# Patient Record
Sex: FEMALE | Race: WHITE | NOT HISPANIC OR LATINO | Employment: UNEMPLOYED | URBAN - METROPOLITAN AREA
[De-identification: names, ages, dates, MRNs, and addresses within clinical notes are randomized per-mention and may not be internally consistent; named-entity substitution may affect disease eponyms.]

---

## 2023-05-18 ENCOUNTER — OFFICE VISIT (OUTPATIENT)
Dept: PRIMARY CARE | Facility: CLINIC | Age: 68
End: 2023-05-18
Payer: MEDICARE

## 2023-05-18 VITALS
HEIGHT: 68 IN | WEIGHT: 241 LBS | BODY MASS INDEX: 36.53 KG/M2 | DIASTOLIC BLOOD PRESSURE: 72 MMHG | SYSTOLIC BLOOD PRESSURE: 124 MMHG

## 2023-05-18 DIAGNOSIS — M54.32 BILATERAL SCIATICA: ICD-10-CM

## 2023-05-18 DIAGNOSIS — E11.9 TYPE 2 DIABETES MELLITUS WITHOUT COMPLICATION, WITHOUT LONG-TERM CURRENT USE OF INSULIN (MULTI): ICD-10-CM

## 2023-05-18 DIAGNOSIS — Z00.00 ROUTINE GENERAL MEDICAL EXAMINATION AT HEALTH CARE FACILITY: Primary | ICD-10-CM

## 2023-05-18 DIAGNOSIS — M79.605 PAIN IN BOTH LOWER EXTREMITIES: ICD-10-CM

## 2023-05-18 DIAGNOSIS — M79.604 PAIN IN BOTH LOWER EXTREMITIES: ICD-10-CM

## 2023-05-18 DIAGNOSIS — M54.31 BILATERAL SCIATICA: ICD-10-CM

## 2023-05-18 PROCEDURE — 3066F NEPHROPATHY DOC TX: CPT | Performed by: STUDENT IN AN ORGANIZED HEALTH CARE EDUCATION/TRAINING PROGRAM

## 2023-05-18 PROCEDURE — 1159F MED LIST DOCD IN RCRD: CPT | Performed by: STUDENT IN AN ORGANIZED HEALTH CARE EDUCATION/TRAINING PROGRAM

## 2023-05-18 PROCEDURE — G0439 PPPS, SUBSEQ VISIT: HCPCS | Performed by: STUDENT IN AN ORGANIZED HEALTH CARE EDUCATION/TRAINING PROGRAM

## 2023-05-18 PROCEDURE — 1036F TOBACCO NON-USER: CPT | Performed by: STUDENT IN AN ORGANIZED HEALTH CARE EDUCATION/TRAINING PROGRAM

## 2023-05-18 PROCEDURE — 99214 OFFICE O/P EST MOD 30 MIN: CPT | Performed by: STUDENT IN AN ORGANIZED HEALTH CARE EDUCATION/TRAINING PROGRAM

## 2023-05-18 PROCEDURE — 1170F FXNL STATUS ASSESSED: CPT | Performed by: STUDENT IN AN ORGANIZED HEALTH CARE EDUCATION/TRAINING PROGRAM

## 2023-05-18 PROCEDURE — 4010F ACE/ARB THERAPY RXD/TAKEN: CPT | Performed by: STUDENT IN AN ORGANIZED HEALTH CARE EDUCATION/TRAINING PROGRAM

## 2023-05-18 PROCEDURE — 3074F SYST BP LT 130 MM HG: CPT | Performed by: STUDENT IN AN ORGANIZED HEALTH CARE EDUCATION/TRAINING PROGRAM

## 2023-05-18 PROCEDURE — 3078F DIAST BP <80 MM HG: CPT | Performed by: STUDENT IN AN ORGANIZED HEALTH CARE EDUCATION/TRAINING PROGRAM

## 2023-05-18 PROCEDURE — 1160F RVW MEDS BY RX/DR IN RCRD: CPT | Performed by: STUDENT IN AN ORGANIZED HEALTH CARE EDUCATION/TRAINING PROGRAM

## 2023-05-18 RX ORDER — FLUTICASONE PROPIONATE 50 MCG
SPRAY, SUSPENSION (ML) NASAL
COMMUNITY

## 2023-05-18 RX ORDER — LISINOPRIL 40 MG/1
40 TABLET ORAL DAILY
COMMUNITY
Start: 2018-07-11 | End: 2023-06-14 | Stop reason: SDUPTHER

## 2023-05-18 RX ORDER — BLOOD SUGAR DIAGNOSTIC
STRIP MISCELLANEOUS
Qty: 100 STRIP | Refills: 1 | Status: SHIPPED | OUTPATIENT
Start: 2023-05-18 | End: 2024-06-10 | Stop reason: SDUPTHER

## 2023-05-18 RX ORDER — METFORMIN HYDROCHLORIDE 500 MG/1
500 TABLET ORAL
COMMUNITY
Start: 2018-07-11 | End: 2023-06-14 | Stop reason: SDUPTHER

## 2023-05-18 RX ORDER — OMEPRAZOLE 40 MG/1
40 CAPSULE, DELAYED RELEASE ORAL DAILY
COMMUNITY
Start: 2018-07-11 | End: 2023-06-14 | Stop reason: SDUPTHER

## 2023-05-18 RX ORDER — AMLODIPINE BESYLATE 10 MG/1
10 TABLET ORAL DAILY
COMMUNITY
Start: 2018-07-11 | End: 2023-06-14 | Stop reason: SDUPTHER

## 2023-05-18 RX ORDER — CHOLECALCIFEROL (VITAMIN D3) 50 MCG
50 TABLET ORAL
COMMUNITY

## 2023-05-18 RX ORDER — PAROXETINE HYDROCHLORIDE 20 MG/1
20 TABLET, FILM COATED ORAL DAILY
COMMUNITY
Start: 2018-07-11 | End: 2023-06-14 | Stop reason: SDUPTHER

## 2023-05-18 RX ORDER — SIMVASTATIN 10 MG/1
10 TABLET, FILM COATED ORAL NIGHTLY
COMMUNITY
Start: 2018-07-11 | End: 2023-07-12 | Stop reason: SDUPTHER

## 2023-05-18 RX ORDER — HYDROCHLOROTHIAZIDE 25 MG/1
25 TABLET ORAL DAILY
COMMUNITY
Start: 2018-07-11 | End: 2023-06-14 | Stop reason: SDUPTHER

## 2023-05-18 RX ORDER — MELOXICAM 15 MG/1
15 TABLET ORAL DAILY
COMMUNITY
Start: 2021-09-21 | End: 2023-06-14 | Stop reason: SDUPTHER

## 2023-05-18 RX ORDER — MULTIVITAMIN
1 TABLET ORAL DAILY
COMMUNITY

## 2023-05-18 RX ORDER — BLOOD SUGAR DIAGNOSTIC
STRIP MISCELLANEOUS
COMMUNITY
Start: 2018-07-11 | End: 2023-05-18 | Stop reason: SDUPTHER

## 2023-05-18 ASSESSMENT — ACTIVITIES OF DAILY LIVING (ADL)
DOING_HOUSEWORK: INDEPENDENT
BATHING: INDEPENDENT
GROCERY_SHOPPING: INDEPENDENT
TAKING_MEDICATION: INDEPENDENT
DRESSING: INDEPENDENT
MANAGING_FINANCES: INDEPENDENT

## 2023-05-18 ASSESSMENT — ENCOUNTER SYMPTOMS
LOSS OF SENSATION IN FEET: 0
OCCASIONAL FEELINGS OF UNSTEADINESS: 0
DEPRESSION: 0

## 2023-05-18 NOTE — PROGRESS NOTES
"Subjective   Patient ID: Jordyn Neal is a 68 y.o. female who presents for Medicare Annual Wellness Visit Subsequent (Med refill).    HPI   Routine fu and Wellness exam.  She had right shoulder replacement 1/2023, recovering well. However, she slept in a recliner for 3.5 months, now has b/l back pain and pain in her legs. Some improvement after she went back to sleeping in her bed. No LE weakness. Increased pain when she ambulates.    Working on diet, has lost 10 lbs.    She has stress in her life caring for her sister at home.  Review of Systems  12-point ROS reviewed and was negative unless otherwise noted in HPI.    Objective   /72   Ht 1.727 m (5' 8\")   Wt 109 kg (241 lb)   BMI 36.64 kg/m²     Physical Exam  GEN: conversant, NAD  HEENT: PERRL, EOMI, MMM  NECK: supple, no carotid bruits appreciated b/l  CV: S1, S2, RRR  PULM: CTAB  ABD: soft, NT, obese  NEURO: no new gross focal deficits  EXT: no sig LE edema  PSYCH: appropriate affect    Assessment/Plan     #Leg pain: unclear etiology, possibly due to deconditioning after shoulder surgery. Referral to PT, graded exercise program. Check ABIs to assess for vascular claudication. Referral to PM&R for back eval.    #Hypertension: Stable. Refilled medications. Anxiety/stress from home living situation likely contributing.      #Shoulder OA: shoulder replacement performed 1/2023     #NIDDM: Lifestyle modifications discussed. Weight loss. Continue metformin, refilled. Offered trial of GLP-1 agonist or Mounjaro, patient to consider.     #HLD: Continue statin. Offered CACS, patient declines.     #Severe obesity, class 2: We have discussed importance of weight loss, lifestyle modifications. Has been more active since detention.      #Fatty liver: She says she had liver ultrasound in the past.      #GERD: PPI as needed.     #Depression: Generally stable. Continue Paroxetine.      #Health maintenance: Following with GYN. UTD with mammography. TDaP ~2016. Has had " pneumovax. Colonoscopy performed 2021, 5 year f/u advised. Received Shingrix. Advised yearly flu shot. Up to date on COVID and booster.      RTC 3 mo

## 2023-06-14 DIAGNOSIS — E11.9 TYPE 2 DIABETES MELLITUS WITHOUT COMPLICATION, WITHOUT LONG-TERM CURRENT USE OF INSULIN (MULTI): Primary | ICD-10-CM

## 2023-06-14 DIAGNOSIS — I10 HYPERTENSION, UNSPECIFIED TYPE: ICD-10-CM

## 2023-06-14 DIAGNOSIS — M19.019 ARTHRITIS OF SHOULDER: ICD-10-CM

## 2023-06-14 DIAGNOSIS — K21.00 GASTROESOPHAGEAL REFLUX DISEASE WITH ESOPHAGITIS, UNSPECIFIED WHETHER HEMORRHAGE: ICD-10-CM

## 2023-06-14 DIAGNOSIS — F32.A DEPRESSION, UNSPECIFIED DEPRESSION TYPE: ICD-10-CM

## 2023-06-14 RX ORDER — METFORMIN HYDROCHLORIDE 500 MG/1
500 TABLET ORAL
Qty: 30 TABLET | Refills: 2 | Status: SHIPPED | OUTPATIENT
Start: 2023-06-14 | End: 2023-08-17 | Stop reason: SDUPTHER

## 2023-06-14 RX ORDER — HYDROCHLOROTHIAZIDE 25 MG/1
25 TABLET ORAL DAILY
Qty: 30 TABLET | Refills: 2 | Status: SHIPPED | OUTPATIENT
Start: 2023-06-14 | End: 2023-08-17 | Stop reason: SDUPTHER

## 2023-06-14 RX ORDER — OMEPRAZOLE 40 MG/1
40 CAPSULE, DELAYED RELEASE ORAL DAILY
Qty: 30 CAPSULE | Refills: 2 | Status: SHIPPED | OUTPATIENT
Start: 2023-06-14 | End: 2023-08-17 | Stop reason: SDUPTHER

## 2023-06-14 RX ORDER — PAROXETINE HYDROCHLORIDE 20 MG/1
20 TABLET, FILM COATED ORAL DAILY
Qty: 30 TABLET | Refills: 2 | Status: SHIPPED | OUTPATIENT
Start: 2023-06-14 | End: 2023-08-17 | Stop reason: SDUPTHER

## 2023-06-14 RX ORDER — LISINOPRIL 40 MG/1
40 TABLET ORAL DAILY
Qty: 30 TABLET | Refills: 2 | Status: SHIPPED | OUTPATIENT
Start: 2023-06-14 | End: 2023-08-17 | Stop reason: SDUPTHER

## 2023-06-14 RX ORDER — MELOXICAM 15 MG/1
15 TABLET ORAL DAILY
Qty: 30 TABLET | Refills: 2 | Status: SHIPPED | OUTPATIENT
Start: 2023-06-14 | End: 2023-09-12

## 2023-06-14 RX ORDER — AMLODIPINE BESYLATE 10 MG/1
10 TABLET ORAL DAILY
Qty: 30 TABLET | Refills: 2 | Status: SHIPPED | OUTPATIENT
Start: 2023-06-14 | End: 2023-08-17 | Stop reason: SDUPTHER

## 2023-07-12 DIAGNOSIS — E78.5 HYPERLIPIDEMIA, UNSPECIFIED HYPERLIPIDEMIA TYPE: ICD-10-CM

## 2023-07-12 RX ORDER — SIMVASTATIN 10 MG/1
10 TABLET, FILM COATED ORAL NIGHTLY
Qty: 30 TABLET | Refills: 3 | Status: SHIPPED | OUTPATIENT
Start: 2023-07-12 | End: 2023-08-17 | Stop reason: SDUPTHER

## 2023-08-17 ENCOUNTER — LAB (OUTPATIENT)
Dept: LAB | Facility: LAB | Age: 68
End: 2023-08-17
Payer: MEDICARE

## 2023-08-17 ENCOUNTER — OFFICE VISIT (OUTPATIENT)
Dept: PRIMARY CARE | Facility: CLINIC | Age: 68
End: 2023-08-17
Payer: MEDICARE

## 2023-08-17 VITALS — DIASTOLIC BLOOD PRESSURE: 65 MMHG | SYSTOLIC BLOOD PRESSURE: 118 MMHG

## 2023-08-17 DIAGNOSIS — Z00.00 HEALTH CARE MAINTENANCE: ICD-10-CM

## 2023-08-17 DIAGNOSIS — E78.5 HYPERLIPIDEMIA, UNSPECIFIED HYPERLIPIDEMIA TYPE: ICD-10-CM

## 2023-08-17 DIAGNOSIS — E11.9 TYPE 2 DIABETES MELLITUS WITHOUT COMPLICATION, WITHOUT LONG-TERM CURRENT USE OF INSULIN (MULTI): ICD-10-CM

## 2023-08-17 DIAGNOSIS — K21.00 GASTROESOPHAGEAL REFLUX DISEASE WITH ESOPHAGITIS, UNSPECIFIED WHETHER HEMORRHAGE: ICD-10-CM

## 2023-08-17 DIAGNOSIS — F32.A DEPRESSION, UNSPECIFIED DEPRESSION TYPE: ICD-10-CM

## 2023-08-17 DIAGNOSIS — Z00.00 HEALTHCARE MAINTENANCE: ICD-10-CM

## 2023-08-17 DIAGNOSIS — Z00.00 HEALTHCARE MAINTENANCE: Primary | ICD-10-CM

## 2023-08-17 DIAGNOSIS — E11.69 TYPE 2 DIABETES MELLITUS WITH OTHER SPECIFIED COMPLICATION, WITHOUT LONG-TERM CURRENT USE OF INSULIN (MULTI): ICD-10-CM

## 2023-08-17 DIAGNOSIS — I10 HYPERTENSION, UNSPECIFIED TYPE: ICD-10-CM

## 2023-08-17 LAB
ALANINE AMINOTRANSFERASE (SGPT) (U/L) IN SER/PLAS: 35 U/L (ref 7–45)
ALBUMIN (G/DL) IN SER/PLAS: 4.6 G/DL (ref 3.4–5)
ALKALINE PHOSPHATASE (U/L) IN SER/PLAS: 92 U/L (ref 33–136)
ANION GAP IN SER/PLAS: 16 MMOL/L (ref 10–20)
ASPARTATE AMINOTRANSFERASE (SGOT) (U/L) IN SER/PLAS: 42 U/L (ref 9–39)
BILIRUBIN TOTAL (MG/DL) IN SER/PLAS: 1.3 MG/DL (ref 0–1.2)
CALCIUM (MG/DL) IN SER/PLAS: 10.8 MG/DL (ref 8.6–10.6)
CARBON DIOXIDE, TOTAL (MMOL/L) IN SER/PLAS: 25 MMOL/L (ref 21–32)
CHLORIDE (MMOL/L) IN SER/PLAS: 98 MMOL/L (ref 98–107)
CHOLESTEROL (MG/DL) IN SER/PLAS: 165 MG/DL (ref 0–199)
CHOLESTEROL IN HDL (MG/DL) IN SER/PLAS: 43.6 MG/DL
CHOLESTEROL/HDL RATIO: 3.8
CREATININE (MG/DL) IN SER/PLAS: 1.42 MG/DL (ref 0.5–1.05)
ERYTHROCYTE DISTRIBUTION WIDTH (RATIO) BY AUTOMATED COUNT: 12.5 % (ref 11.5–14.5)
ERYTHROCYTE MEAN CORPUSCULAR HEMOGLOBIN CONCENTRATION (G/DL) BY AUTOMATED: 33.2 G/DL (ref 32–36)
ERYTHROCYTE MEAN CORPUSCULAR VOLUME (FL) BY AUTOMATED COUNT: 90 FL (ref 80–100)
ERYTHROCYTES (10*6/UL) IN BLOOD BY AUTOMATED COUNT: 4.44 X10E12/L (ref 4–5.2)
ESTIMATED AVERAGE GLUCOSE FOR HBA1C: 123 MG/DL
GFR FEMALE: 41 ML/MIN/1.73M2
GLUCOSE (MG/DL) IN SER/PLAS: 94 MG/DL (ref 74–99)
HEMATOCRIT (%) IN BLOOD BY AUTOMATED COUNT: 39.8 % (ref 36–46)
HEMOGLOBIN (G/DL) IN BLOOD: 13.2 G/DL (ref 12–16)
HEMOGLOBIN A1C/HEMOGLOBIN TOTAL IN BLOOD: 5.9 %
LDL: 83 MG/DL (ref 0–99)
LEUKOCYTES (10*3/UL) IN BLOOD BY AUTOMATED COUNT: 8.3 X10E9/L (ref 4.4–11.3)
NRBC (PER 100 WBCS) BY AUTOMATED COUNT: 0 /100 WBC (ref 0–0)
PLATELETS (10*3/UL) IN BLOOD AUTOMATED COUNT: 262 X10E9/L (ref 150–450)
POTASSIUM (MMOL/L) IN SER/PLAS: 4.1 MMOL/L (ref 3.5–5.3)
PROTEIN TOTAL: 6.9 G/DL (ref 6.4–8.2)
SODIUM (MMOL/L) IN SER/PLAS: 135 MMOL/L (ref 136–145)
TRIGLYCERIDE (MG/DL) IN SER/PLAS: 194 MG/DL (ref 0–149)
UREA NITROGEN (MG/DL) IN SER/PLAS: 18 MG/DL (ref 6–23)
VLDL: 39 MG/DL (ref 0–40)

## 2023-08-17 PROCEDURE — 3078F DIAST BP <80 MM HG: CPT | Performed by: STUDENT IN AN ORGANIZED HEALTH CARE EDUCATION/TRAINING PROGRAM

## 2023-08-17 PROCEDURE — 4010F ACE/ARB THERAPY RXD/TAKEN: CPT | Performed by: STUDENT IN AN ORGANIZED HEALTH CARE EDUCATION/TRAINING PROGRAM

## 2023-08-17 PROCEDURE — 83036 HEMOGLOBIN GLYCOSYLATED A1C: CPT

## 2023-08-17 PROCEDURE — 1126F AMNT PAIN NOTED NONE PRSNT: CPT | Performed by: STUDENT IN AN ORGANIZED HEALTH CARE EDUCATION/TRAINING PROGRAM

## 2023-08-17 PROCEDURE — 85027 COMPLETE CBC AUTOMATED: CPT

## 2023-08-17 PROCEDURE — 99214 OFFICE O/P EST MOD 30 MIN: CPT | Performed by: STUDENT IN AN ORGANIZED HEALTH CARE EDUCATION/TRAINING PROGRAM

## 2023-08-17 PROCEDURE — 36415 COLL VENOUS BLD VENIPUNCTURE: CPT

## 2023-08-17 PROCEDURE — 1036F TOBACCO NON-USER: CPT | Performed by: STUDENT IN AN ORGANIZED HEALTH CARE EDUCATION/TRAINING PROGRAM

## 2023-08-17 PROCEDURE — 3066F NEPHROPATHY DOC TX: CPT | Performed by: STUDENT IN AN ORGANIZED HEALTH CARE EDUCATION/TRAINING PROGRAM

## 2023-08-17 PROCEDURE — 80053 COMPREHEN METABOLIC PANEL: CPT

## 2023-08-17 PROCEDURE — 3074F SYST BP LT 130 MM HG: CPT | Performed by: STUDENT IN AN ORGANIZED HEALTH CARE EDUCATION/TRAINING PROGRAM

## 2023-08-17 PROCEDURE — 1159F MED LIST DOCD IN RCRD: CPT | Performed by: STUDENT IN AN ORGANIZED HEALTH CARE EDUCATION/TRAINING PROGRAM

## 2023-08-17 PROCEDURE — 80061 LIPID PANEL: CPT

## 2023-08-17 PROCEDURE — 1160F RVW MEDS BY RX/DR IN RCRD: CPT | Performed by: STUDENT IN AN ORGANIZED HEALTH CARE EDUCATION/TRAINING PROGRAM

## 2023-08-17 RX ORDER — LISINOPRIL 40 MG/1
40 TABLET ORAL DAILY
Qty: 30 TABLET | Refills: 5 | Status: SHIPPED | OUTPATIENT
Start: 2023-08-17 | End: 2024-03-11 | Stop reason: SDUPTHER

## 2023-08-17 RX ORDER — SIMVASTATIN 10 MG/1
10 TABLET, FILM COATED ORAL NIGHTLY
Qty: 30 TABLET | Refills: 5 | Status: SHIPPED | OUTPATIENT
Start: 2023-08-17 | End: 2024-02-12 | Stop reason: SDUPTHER

## 2023-08-17 RX ORDER — PAROXETINE HYDROCHLORIDE 20 MG/1
20 TABLET, FILM COATED ORAL DAILY
Qty: 30 TABLET | Refills: 5 | Status: SHIPPED | OUTPATIENT
Start: 2023-08-17 | End: 2024-03-11 | Stop reason: SDUPTHER

## 2023-08-17 RX ORDER — METFORMIN HYDROCHLORIDE 500 MG/1
500 TABLET ORAL
Qty: 60 TABLET | Refills: 5 | Status: SHIPPED | OUTPATIENT
Start: 2023-08-17 | End: 2024-02-12 | Stop reason: SDUPTHER

## 2023-08-17 RX ORDER — HYDROCHLOROTHIAZIDE 25 MG/1
25 TABLET ORAL DAILY
Qty: 30 TABLET | Refills: 5 | Status: SHIPPED | OUTPATIENT
Start: 2023-08-17 | End: 2024-03-11 | Stop reason: SDUPTHER

## 2023-08-17 RX ORDER — AMLODIPINE BESYLATE 10 MG/1
10 TABLET ORAL DAILY
Qty: 30 TABLET | Refills: 5 | Status: SHIPPED | OUTPATIENT
Start: 2023-08-17 | End: 2024-03-11 | Stop reason: SDUPTHER

## 2023-08-17 RX ORDER — OMEPRAZOLE 40 MG/1
40 CAPSULE, DELAYED RELEASE ORAL DAILY
Qty: 30 CAPSULE | Refills: 5 | Status: SHIPPED | OUTPATIENT
Start: 2023-08-17 | End: 2024-03-11 | Stop reason: SDUPTHER

## 2023-08-17 NOTE — PROGRESS NOTES
Subjective   Patient ID: Jordyn Neal is a 68 y.o. female who presents for Follow-up (Med refill).    HPI   Routine fu.  Leg pain has resolved.  She is trying to walk for exercise.  She has successful lost a few more pounds.    Stress in her life caring for her sister Carrie and sister's declining health.  Review of Systems  12-point ROS reviewed and was negative unless otherwise noted in HPI.    Objective   There were no vitals taken for this visit.    Physical Exam  GEN: conversant, NAD  HEENT: PERRL, EOMI, MMM  NECK: supple, no carotid bruits appreciated b/l  CV: S1, S2, RRR  PULM: CTAB  ABD: soft, NT, obese  NEURO: no new gross focal deficits  EXT: no sig LE edema  PSYCH: appropriate affect    Assessment/Plan        #Hypertension: Stable. Refilled medications. Anxiety/stress from home living situation likely contributing.      #Shoulder OA: shoulder replacement performed 1/2023     #NIDDM: Lifestyle modifications discussed. Weight loss. Continue metformin, refilled. Offered trial of GLP-1 agonist or Mounjaro, patient to consider. Check A1c.     #HLD: Continue statin. Offered CACS, patient declines.     #Severe obesity, class 2: We have discussed importance of weight loss, lifestyle modifications. Has been more active since [facility].      #Fatty liver: She says she had liver ultrasound in the past.      #GERD: PPI as needed.     #Depression: Generally stable. Continue Paroxetine.      #Health maintenance: Following with GYN. Mammogram ordered. TDaP ~2016. Has had pneumovax. Colonoscopy performed 2021, 5 year f/u advised. Received Shingrix. Advised yearly flu shot. Up to date on COVID and booster.      RTC 3 mo

## 2023-10-12 DIAGNOSIS — M19.90 OSTEOARTHRITIS, UNSPECIFIED OSTEOARTHRITIS TYPE, UNSPECIFIED SITE: Primary | ICD-10-CM

## 2023-10-12 RX ORDER — MELOXICAM 15 MG/1
15 TABLET ORAL DAILY
Qty: 90 TABLET | Refills: 0 | Status: SHIPPED | OUTPATIENT
Start: 2023-10-12 | End: 2024-01-16 | Stop reason: SDUPTHER

## 2023-10-12 RX ORDER — MELOXICAM 15 MG/1
15 TABLET ORAL DAILY
COMMUNITY
Start: 2023-09-12 | End: 2023-10-12 | Stop reason: SDUPTHER

## 2023-12-20 ENCOUNTER — OFFICE VISIT (OUTPATIENT)
Dept: PRIMARY CARE | Facility: CLINIC | Age: 68
End: 2023-12-20
Payer: MEDICARE

## 2023-12-20 VITALS
DIASTOLIC BLOOD PRESSURE: 76 MMHG | WEIGHT: 235 LBS | HEIGHT: 68 IN | BODY MASS INDEX: 35.61 KG/M2 | SYSTOLIC BLOOD PRESSURE: 134 MMHG

## 2023-12-20 DIAGNOSIS — Z00.00 HEALTHCARE MAINTENANCE: ICD-10-CM

## 2023-12-20 DIAGNOSIS — I10 HYPERTENSION, UNSPECIFIED TYPE: ICD-10-CM

## 2023-12-20 DIAGNOSIS — F32.A DEPRESSION, UNSPECIFIED DEPRESSION TYPE: ICD-10-CM

## 2023-12-20 DIAGNOSIS — E78.5 HYPERLIPIDEMIA, UNSPECIFIED HYPERLIPIDEMIA TYPE: ICD-10-CM

## 2023-12-20 DIAGNOSIS — E11.69 TYPE 2 DIABETES MELLITUS WITH OTHER SPECIFIED COMPLICATION, WITHOUT LONG-TERM CURRENT USE OF INSULIN (MULTI): Primary | ICD-10-CM

## 2023-12-20 PROCEDURE — 3078F DIAST BP <80 MM HG: CPT | Performed by: STUDENT IN AN ORGANIZED HEALTH CARE EDUCATION/TRAINING PROGRAM

## 2023-12-20 PROCEDURE — 3044F HG A1C LEVEL LT 7.0%: CPT | Performed by: STUDENT IN AN ORGANIZED HEALTH CARE EDUCATION/TRAINING PROGRAM

## 2023-12-20 PROCEDURE — 1159F MED LIST DOCD IN RCRD: CPT | Performed by: STUDENT IN AN ORGANIZED HEALTH CARE EDUCATION/TRAINING PROGRAM

## 2023-12-20 PROCEDURE — 1160F RVW MEDS BY RX/DR IN RCRD: CPT | Performed by: STUDENT IN AN ORGANIZED HEALTH CARE EDUCATION/TRAINING PROGRAM

## 2023-12-20 PROCEDURE — 1126F AMNT PAIN NOTED NONE PRSNT: CPT | Performed by: STUDENT IN AN ORGANIZED HEALTH CARE EDUCATION/TRAINING PROGRAM

## 2023-12-20 PROCEDURE — 4010F ACE/ARB THERAPY RXD/TAKEN: CPT | Performed by: STUDENT IN AN ORGANIZED HEALTH CARE EDUCATION/TRAINING PROGRAM

## 2023-12-20 PROCEDURE — 3075F SYST BP GE 130 - 139MM HG: CPT | Performed by: STUDENT IN AN ORGANIZED HEALTH CARE EDUCATION/TRAINING PROGRAM

## 2023-12-20 PROCEDURE — 99213 OFFICE O/P EST LOW 20 MIN: CPT | Performed by: STUDENT IN AN ORGANIZED HEALTH CARE EDUCATION/TRAINING PROGRAM

## 2023-12-20 PROCEDURE — 1036F TOBACCO NON-USER: CPT | Performed by: STUDENT IN AN ORGANIZED HEALTH CARE EDUCATION/TRAINING PROGRAM

## 2023-12-20 PROCEDURE — 3066F NEPHROPATHY DOC TX: CPT | Performed by: STUDENT IN AN ORGANIZED HEALTH CARE EDUCATION/TRAINING PROGRAM

## 2023-12-20 NOTE — PROGRESS NOTES
Subjective   Patient ID: Jordyn Neal is a 68 y.o. female who presents for Follow-up.    HPI   Routine fu.  She feels well in general.  Her main complaint is chronic knee pain from arthritis. She takes meloxicam for this, does not want to see ortho or have knee injection or replacement.    Stress in her life caring for her sister Carrie and sister's declining health.   Review of Systems  12-point ROS reviewed and was negative unless otherwise noted in HPI.    Objective   There were no vitals taken for this visit.    Physical Exam  GEN: conversant, NAD  HEENT: PERRL, EOMI, MMM  NECK: supple  CV: S1, S2, RRR  PULM: CTAB  ABD: soft, NT, obese  NEURO: no new gross focal deficits  EXT: no sig LE edema  PSYCH: appropriate affect    Assessment/Plan     #Hypertension: Stable. Anxiety/stress from home living situation likely contributing.      #Shoulder/Knee OA: shoulder replacement performed 1/2023. Using meloxicam. She declines ortho eval for knees, can take Tylenol PRN.     #NIDDM: Lifestyle modifications discussed. Weight loss. Continue metformin, refilled. Offered trial of GLP-1 agonist or Mounjaro, patient to consider.      #HLD: Continue statin. Offered CACS, patient declines.     #Severe obesity, class 2: We have discussed importance of weight loss, lifestyle modifications. Has been more active since [facility].      #Fatty liver: She says she had liver ultrasound in the past.      #GERD: PPI as needed.     #Depression: Generally stable. Continue Paroxetine.     #CKD 3: continue ACE-i     #Health maintenance: Following with GYN. Mammogram ordered. TDaP ~2016. Has had pneumovax. Colonoscopy performed 2021, 5 year f/u advised. Received Shingrix. Advised RSV vaccine. Advised yearly flu shot. Up to date on COVID and booster.      RTC 3-6 mo

## 2024-01-15 DIAGNOSIS — M19.90 OSTEOARTHRITIS, UNSPECIFIED OSTEOARTHRITIS TYPE, UNSPECIFIED SITE: ICD-10-CM

## 2024-01-17 RX ORDER — MELOXICAM 15 MG/1
15 TABLET ORAL DAILY
Qty: 90 TABLET | Refills: 0 | Status: SHIPPED | OUTPATIENT
Start: 2024-01-17 | End: 2024-03-11 | Stop reason: SDUPTHER

## 2024-02-12 DIAGNOSIS — E78.5 HYPERLIPIDEMIA, UNSPECIFIED HYPERLIPIDEMIA TYPE: ICD-10-CM

## 2024-02-12 DIAGNOSIS — E11.9 TYPE 2 DIABETES MELLITUS WITHOUT COMPLICATION, WITHOUT LONG-TERM CURRENT USE OF INSULIN (MULTI): ICD-10-CM

## 2024-02-12 RX ORDER — SIMVASTATIN 10 MG/1
10 TABLET, FILM COATED ORAL NIGHTLY
Qty: 30 TABLET | Refills: 5 | Status: SHIPPED | OUTPATIENT
Start: 2024-02-12 | End: 2024-08-10

## 2024-02-12 RX ORDER — METFORMIN HYDROCHLORIDE 500 MG/1
500 TABLET ORAL
Qty: 60 TABLET | Refills: 5 | Status: SHIPPED | OUTPATIENT
Start: 2024-02-12 | End: 2024-08-10

## 2024-03-11 DIAGNOSIS — K21.00 GASTROESOPHAGEAL REFLUX DISEASE WITH ESOPHAGITIS, UNSPECIFIED WHETHER HEMORRHAGE: ICD-10-CM

## 2024-03-11 DIAGNOSIS — M19.90 OSTEOARTHRITIS, UNSPECIFIED OSTEOARTHRITIS TYPE, UNSPECIFIED SITE: ICD-10-CM

## 2024-03-11 DIAGNOSIS — F32.A DEPRESSION, UNSPECIFIED DEPRESSION TYPE: ICD-10-CM

## 2024-03-11 DIAGNOSIS — I10 HYPERTENSION, UNSPECIFIED TYPE: ICD-10-CM

## 2024-03-11 RX ORDER — HYDROCHLOROTHIAZIDE 25 MG/1
25 TABLET ORAL DAILY
Qty: 30 TABLET | Refills: 5 | Status: SHIPPED | OUTPATIENT
Start: 2024-03-11 | End: 2024-09-07

## 2024-03-11 RX ORDER — AMLODIPINE BESYLATE 10 MG/1
10 TABLET ORAL DAILY
Qty: 30 TABLET | Refills: 1 | Status: SHIPPED | OUTPATIENT
Start: 2024-03-11 | End: 2024-05-14 | Stop reason: SDUPTHER

## 2024-03-11 RX ORDER — MELOXICAM 15 MG/1
15 TABLET ORAL DAILY
Qty: 90 TABLET | Refills: 0 | Status: SHIPPED | OUTPATIENT
Start: 2024-03-11 | End: 2024-06-09

## 2024-03-11 RX ORDER — OMEPRAZOLE 40 MG/1
40 CAPSULE, DELAYED RELEASE ORAL DAILY
Qty: 30 CAPSULE | Refills: 5 | Status: SHIPPED | OUTPATIENT
Start: 2024-03-11 | End: 2024-09-07

## 2024-03-11 RX ORDER — PAROXETINE HYDROCHLORIDE 20 MG/1
20 TABLET, FILM COATED ORAL DAILY
Qty: 30 TABLET | Refills: 5 | Status: SHIPPED | OUTPATIENT
Start: 2024-03-11 | End: 2024-09-07

## 2024-03-11 RX ORDER — LISINOPRIL 40 MG/1
40 TABLET ORAL DAILY
Qty: 30 TABLET | Refills: 5 | Status: SHIPPED | OUTPATIENT
Start: 2024-03-11 | End: 2024-09-07

## 2024-04-22 ENCOUNTER — OFFICE VISIT (OUTPATIENT)
Dept: OBSTETRICS AND GYNECOLOGY | Facility: CLINIC | Age: 69
End: 2024-04-22
Payer: MEDICARE

## 2024-04-22 VITALS
BODY MASS INDEX: 35.8 KG/M2 | SYSTOLIC BLOOD PRESSURE: 120 MMHG | WEIGHT: 236.2 LBS | HEIGHT: 68 IN | DIASTOLIC BLOOD PRESSURE: 70 MMHG

## 2024-04-22 DIAGNOSIS — Z01.419 ENCOUNTER FOR ANNUAL ROUTINE GYNECOLOGICAL EXAMINATION: Primary | ICD-10-CM

## 2024-04-22 PROCEDURE — 99397 PER PM REEVAL EST PAT 65+ YR: CPT | Performed by: OBSTETRICS & GYNECOLOGY

## 2024-04-22 PROCEDURE — 1159F MED LIST DOCD IN RCRD: CPT | Performed by: OBSTETRICS & GYNECOLOGY

## 2024-04-22 ASSESSMENT — PATIENT HEALTH QUESTIONNAIRE - PHQ9
10. IF YOU CHECKED OFF ANY PROBLEMS, HOW DIFFICULT HAVE THESE PROBLEMS MADE IT FOR YOU TO DO YOUR WORK, TAKE CARE OF THINGS AT HOME, OR GET ALONG WITH OTHER PEOPLE: SOMEWHAT DIFFICULT
2. FEELING DOWN, DEPRESSED OR HOPELESS: SEVERAL DAYS
1. LITTLE INTEREST OR PLEASURE IN DOING THINGS: NOT AT ALL
SUM OF ALL RESPONSES TO PHQ9 QUESTIONS 1 & 2: 1

## 2024-04-22 NOTE — PROGRESS NOTES
"Assessment     1. Encounter for annual routine gynecological examination  - pap screening discontinued  - mammogram order in place from PCP  - Declines order for DEXA scan    Please return for your next visit in 1-2 years or sooner as needed.    Candi Tapia MD      Subjective     Jordyn Neal is a 69 y.o. female who is here for a routine exam.   PCP = Kit Fleming MD    APE Concerns: None    Gynecologic History:    OBHx: ,  x 1 and c/s x 1, older son passed away from leukemia, has a grandson named Jayant  Last Pap: 2020: NILM/neg HPV  History of Dysplasia: Denies  Sexually active: Not active with   STI testing: Declines  Mammogram: [facility] 1 in   Colonoscopy: 2022  DEXA scan: Declines   Menopause concerns: final menstrual period at age 41yo, occasional hot flashes  FamHx of GYN cancers:  sister passed away from uterine cancer at age 70yo    PMH, PSH, FH, SH, medications and allergies reviewed and edited as needed.      Objective   /70 (BP Location: Left arm, Patient Position: Sitting)   Ht 1.727 m (5' 8\")   Wt 107 kg (236 lb 3.2 oz)   BMI 35.91 kg/m²      General:   Alert and oriented, in no acute distress   Neck: Supple. No visible thyromegaly.    Breast/Axilla: Normal to palpation bilaterally without masses, skin changes, or nipple discharge.    Abdomen: Soft, non-tender, without masses or organomegaly   Vulva: Normal architecture without erythema, masses, or lesions.    Vagina: Normal mucosa without lesions, masses, or atrophy. No abnormal vaginal discharge.    Cervix: Normal without masses, lesions, or signs of cervicitis.    Uterus: Normal mobile, non-enlarged uterus    Adnexa: Normal without masses or lesions   Pelvic Floor No POP noted. No high tone pelvic floor   Psych Normal affect. Normal mood.        "

## 2024-04-22 NOTE — PROGRESS NOTES
Subjective   Patient ID: Jordyn Neal is a 69 y.o. female who presents for Annual Exam.    Last pap: 6/30/20 normal HPV-  Last mamm: 9/7/22, ordered for this year  LMP: absent  Contraception: menopause  Sexually active: no  Self breast exam: yes  Diet: balanced  Exercise: not much      HPI    Review of Systems    Objective   Physical Exam    Assessment/Plan            Ghazala Llamas CMA 04/22/24 10:49 AM

## 2024-04-25 ENCOUNTER — HOSPITAL ENCOUNTER (OUTPATIENT)
Dept: RADIOLOGY | Facility: CLINIC | Age: 69
Discharge: HOME | End: 2024-04-25
Payer: MEDICARE

## 2024-04-25 VITALS — HEIGHT: 68 IN | WEIGHT: 233 LBS | BODY MASS INDEX: 35.31 KG/M2

## 2024-04-25 DIAGNOSIS — Z00.00 HEALTHCARE MAINTENANCE: ICD-10-CM

## 2024-04-25 PROCEDURE — 77067 SCR MAMMO BI INCL CAD: CPT

## 2024-04-25 PROCEDURE — 77063 BREAST TOMOSYNTHESIS BI: CPT | Mod: BILATERAL PROCEDURE | Performed by: RADIOLOGY

## 2024-04-25 PROCEDURE — 77067 SCR MAMMO BI INCL CAD: CPT | Mod: BILATERAL PROCEDURE | Performed by: RADIOLOGY

## 2024-04-29 ENCOUNTER — OFFICE VISIT (OUTPATIENT)
Dept: PRIMARY CARE | Facility: CLINIC | Age: 69
End: 2024-04-29
Payer: MEDICARE

## 2024-04-29 ENCOUNTER — LAB (OUTPATIENT)
Dept: LAB | Facility: LAB | Age: 69
End: 2024-04-29
Payer: MEDICARE

## 2024-04-29 VITALS
DIASTOLIC BLOOD PRESSURE: 73 MMHG | BODY MASS INDEX: 35.31 KG/M2 | WEIGHT: 233 LBS | HEIGHT: 68 IN | SYSTOLIC BLOOD PRESSURE: 121 MMHG

## 2024-04-29 DIAGNOSIS — Z13.220 LIPID SCREENING: ICD-10-CM

## 2024-04-29 DIAGNOSIS — E11.9 TYPE 2 DIABETES MELLITUS WITHOUT COMPLICATION, WITHOUT LONG-TERM CURRENT USE OF INSULIN (MULTI): ICD-10-CM

## 2024-04-29 DIAGNOSIS — Z00.00 MEDICARE ANNUAL WELLNESS VISIT, SUBSEQUENT: ICD-10-CM

## 2024-04-29 DIAGNOSIS — Z00.00 HEALTH CARE MAINTENANCE: ICD-10-CM

## 2024-04-29 DIAGNOSIS — Z00.00 ROUTINE GENERAL MEDICAL EXAMINATION AT HEALTH CARE FACILITY: Primary | ICD-10-CM

## 2024-04-29 DIAGNOSIS — M17.9 OSTEOARTHRITIS OF KNEE, UNSPECIFIED LATERALITY, UNSPECIFIED OSTEOARTHRITIS TYPE: ICD-10-CM

## 2024-04-29 DIAGNOSIS — I10 HYPERTENSION, UNSPECIFIED TYPE: ICD-10-CM

## 2024-04-29 DIAGNOSIS — Z00.00 ENCOUNTER FOR PREVENTIVE HEALTH EXAMINATION: ICD-10-CM

## 2024-04-29 LAB
ALBUMIN SERPL BCP-MCNC: 4.5 G/DL (ref 3.4–5)
ALP SERPL-CCNC: 102 U/L (ref 33–136)
ALT SERPL W P-5'-P-CCNC: 31 U/L (ref 7–45)
ANION GAP SERPL CALC-SCNC: 14 MMOL/L (ref 10–20)
AST SERPL W P-5'-P-CCNC: 37 U/L (ref 9–39)
BILIRUB SERPL-MCNC: 1.1 MG/DL (ref 0–1.2)
BUN SERPL-MCNC: 17 MG/DL (ref 6–23)
CALCIUM SERPL-MCNC: 10.2 MG/DL (ref 8.6–10.6)
CHLORIDE SERPL-SCNC: 100 MMOL/L (ref 98–107)
CHOLEST SERPL-MCNC: 174 MG/DL (ref 0–199)
CHOLESTEROL/HDL RATIO: 3.8
CO2 SERPL-SCNC: 26 MMOL/L (ref 21–32)
CREAT SERPL-MCNC: 1.26 MG/DL (ref 0.5–1.05)
EGFRCR SERPLBLD CKD-EPI 2021: 47 ML/MIN/1.73M*2
ERYTHROCYTE [DISTWIDTH] IN BLOOD BY AUTOMATED COUNT: 12.5 % (ref 11.5–14.5)
EST. AVERAGE GLUCOSE BLD GHB EST-MCNC: 123 MG/DL
GLUCOSE SERPL-MCNC: 96 MG/DL (ref 74–99)
HBA1C MFR BLD: 5.9 %
HCT VFR BLD AUTO: 38.3 % (ref 36–46)
HDLC SERPL-MCNC: 46.1 MG/DL
HGB BLD-MCNC: 12.9 G/DL (ref 12–16)
LDLC SERPL CALC-MCNC: 83 MG/DL
MCH RBC QN AUTO: 30.3 PG (ref 26–34)
MCHC RBC AUTO-ENTMCNC: 33.7 G/DL (ref 32–36)
MCV RBC AUTO: 90 FL (ref 80–100)
NON HDL CHOLESTEROL: 128 MG/DL (ref 0–149)
NRBC BLD-RTO: 0 /100 WBCS (ref 0–0)
PLATELET # BLD AUTO: 238 X10*3/UL (ref 150–450)
POTASSIUM SERPL-SCNC: 4.2 MMOL/L (ref 3.5–5.3)
PROT SERPL-MCNC: 6.6 G/DL (ref 6.4–8.2)
RBC # BLD AUTO: 4.26 X10*6/UL (ref 4–5.2)
SODIUM SERPL-SCNC: 136 MMOL/L (ref 136–145)
TRIGL SERPL-MCNC: 224 MG/DL (ref 0–149)
TSH SERPL-ACNC: 1.52 MIU/L (ref 0.44–3.98)
VLDL: 45 MG/DL (ref 0–40)
WBC # BLD AUTO: 7.3 X10*3/UL (ref 4.4–11.3)

## 2024-04-29 PROCEDURE — 99397 PER PM REEVAL EST PAT 65+ YR: CPT | Performed by: STUDENT IN AN ORGANIZED HEALTH CARE EDUCATION/TRAINING PROGRAM

## 2024-04-29 PROCEDURE — 1159F MED LIST DOCD IN RCRD: CPT | Performed by: STUDENT IN AN ORGANIZED HEALTH CARE EDUCATION/TRAINING PROGRAM

## 2024-04-29 PROCEDURE — 36415 COLL VENOUS BLD VENIPUNCTURE: CPT

## 2024-04-29 PROCEDURE — 1170F FXNL STATUS ASSESSED: CPT | Performed by: STUDENT IN AN ORGANIZED HEALTH CARE EDUCATION/TRAINING PROGRAM

## 2024-04-29 PROCEDURE — 1160F RVW MEDS BY RX/DR IN RCRD: CPT | Performed by: STUDENT IN AN ORGANIZED HEALTH CARE EDUCATION/TRAINING PROGRAM

## 2024-04-29 PROCEDURE — 84443 ASSAY THYROID STIM HORMONE: CPT

## 2024-04-29 PROCEDURE — 80053 COMPREHEN METABOLIC PANEL: CPT

## 2024-04-29 PROCEDURE — 3078F DIAST BP <80 MM HG: CPT | Performed by: STUDENT IN AN ORGANIZED HEALTH CARE EDUCATION/TRAINING PROGRAM

## 2024-04-29 PROCEDURE — 3074F SYST BP LT 130 MM HG: CPT | Performed by: STUDENT IN AN ORGANIZED HEALTH CARE EDUCATION/TRAINING PROGRAM

## 2024-04-29 PROCEDURE — 4010F ACE/ARB THERAPY RXD/TAKEN: CPT | Performed by: STUDENT IN AN ORGANIZED HEALTH CARE EDUCATION/TRAINING PROGRAM

## 2024-04-29 PROCEDURE — 99214 OFFICE O/P EST MOD 30 MIN: CPT | Performed by: STUDENT IN AN ORGANIZED HEALTH CARE EDUCATION/TRAINING PROGRAM

## 2024-04-29 PROCEDURE — G0439 PPPS, SUBSEQ VISIT: HCPCS | Performed by: STUDENT IN AN ORGANIZED HEALTH CARE EDUCATION/TRAINING PROGRAM

## 2024-04-29 PROCEDURE — 80061 LIPID PANEL: CPT

## 2024-04-29 PROCEDURE — 85027 COMPLETE CBC AUTOMATED: CPT

## 2024-04-29 PROCEDURE — 83036 HEMOGLOBIN GLYCOSYLATED A1C: CPT

## 2024-04-29 ASSESSMENT — ENCOUNTER SYMPTOMS
LOSS OF SENSATION IN FEET: 0
OCCASIONAL FEELINGS OF UNSTEADINESS: 0
DEPRESSION: 0

## 2024-04-29 ASSESSMENT — ACTIVITIES OF DAILY LIVING (ADL)
DRESSING: INDEPENDENT
DOING_HOUSEWORK: INDEPENDENT
BATHING: INDEPENDENT
TAKING_MEDICATION: INDEPENDENT
MANAGING_FINANCES: INDEPENDENT
GROCERY_SHOPPING: INDEPENDENT

## 2024-04-29 NOTE — PROGRESS NOTES
"Subjective   Patient ID: Jordyn Neal is a 69 y.o. female who presents for No chief complaint on file..    HPI   Routine fu, wellness exam, and yearly physical.    She is taking care of her sister at home, finds this taxing.    She babysits her 2.4 yo grandson.    Chronic knee pain from arthritis.    Review of Systems  12-point ROS reviewed and was negative unless otherwise noted in HPI.    Objective   /73   Ht 1.727 m (5' 8\")   Wt 106 kg (233 lb)   BMI 35.43 kg/m²     Physical Exam  GEN: conversant, NAD  HEENT: PERRL, EOMI, MMM  NECK: supple, no carotid bruits appreciated b/l  CV: S1, S2, RRR  PULM: CTAB  ABD: soft, NT, ND  NEURO: no new gross focal deficits  EXT: no sig LE edema  PSYCH: appropriate affect    Assessment/Plan     #Hypertension: Stable. Anxiety/stress from home living situation likely contributing.      #Shoulder/Knee OA: shoulder replacement performed 1/2023. Using meloxicam. She declines ortho eval for knees, can take Tylenol PRN.     #NIDDM: Lifestyle modifications discussed. Weight loss. Continue metformin, refilled. Offered trial of GLP-1 agonist or Mounjaro, patient to consider. Check A1c.     #HLD: Continue statin. Offered CACS, patient declines.     #Severe obesity, class 2: We have discussed importance of weight loss, lifestyle modifications. Has been more active since [facility].      #Fatty liver: She says she had liver ultrasound in the past.      #GERD: PPI as needed.     #Depression: Generally stable. Continue Paroxetine.      #CKD 3: continue ACE-I, check CMP     #Health maintenance: Following with GYN. Mammogram done 4/2024. TDaP ~2016. Has had pneumovax. Colonoscopy performed 2021, 5 year f/u advised. Received Shingrix. Advised RSV vaccine. Advised yearly flu shot. Up to date on COVID and booster.      RTC 6 mo      "

## 2024-05-14 DIAGNOSIS — I10 HYPERTENSION, UNSPECIFIED TYPE: ICD-10-CM

## 2024-05-14 RX ORDER — AMLODIPINE BESYLATE 10 MG/1
10 TABLET ORAL DAILY
Qty: 90 TABLET | Refills: 1 | Status: SHIPPED | OUTPATIENT
Start: 2024-05-14 | End: 2024-11-10

## 2024-06-10 DIAGNOSIS — E11.9 TYPE 2 DIABETES MELLITUS WITHOUT COMPLICATION, WITHOUT LONG-TERM CURRENT USE OF INSULIN (MULTI): ICD-10-CM

## 2024-06-10 RX ORDER — BLOOD SUGAR DIAGNOSTIC
STRIP MISCELLANEOUS
Qty: 100 STRIP | Refills: 1 | Status: SHIPPED | OUTPATIENT
Start: 2024-06-10

## 2024-07-22 DIAGNOSIS — M17.9 OSTEOARTHRITIS OF KNEE, UNSPECIFIED LATERALITY, UNSPECIFIED OSTEOARTHRITIS TYPE: Primary | ICD-10-CM

## 2024-07-22 RX ORDER — MELOXICAM 15 MG/1
15 TABLET ORAL DAILY
Qty: 30 TABLET | Refills: 0 | Status: SHIPPED | OUTPATIENT
Start: 2024-07-22 | End: 2024-08-21

## 2024-08-13 DIAGNOSIS — E11.9 TYPE 2 DIABETES MELLITUS WITHOUT COMPLICATION, WITHOUT LONG-TERM CURRENT USE OF INSULIN (MULTI): ICD-10-CM

## 2024-08-13 RX ORDER — METFORMIN HYDROCHLORIDE 500 MG/1
500 TABLET ORAL
Qty: 60 TABLET | Refills: 2 | Status: SHIPPED | OUTPATIENT
Start: 2024-08-13 | End: 2024-11-11

## 2024-08-18 DIAGNOSIS — M17.9 OSTEOARTHRITIS OF KNEE, UNSPECIFIED LATERALITY, UNSPECIFIED OSTEOARTHRITIS TYPE: ICD-10-CM

## 2024-08-19 RX ORDER — MELOXICAM 15 MG/1
15 TABLET ORAL DAILY
Qty: 30 TABLET | Refills: 0 | Status: SHIPPED | OUTPATIENT
Start: 2024-08-19

## 2024-09-18 DIAGNOSIS — M17.9 OSTEOARTHRITIS OF KNEE, UNSPECIFIED LATERALITY, UNSPECIFIED OSTEOARTHRITIS TYPE: ICD-10-CM

## 2024-09-18 DIAGNOSIS — K21.00 GASTROESOPHAGEAL REFLUX DISEASE WITH ESOPHAGITIS, UNSPECIFIED WHETHER HEMORRHAGE: ICD-10-CM

## 2024-09-18 DIAGNOSIS — E78.5 HYPERLIPIDEMIA, UNSPECIFIED HYPERLIPIDEMIA TYPE: ICD-10-CM

## 2024-09-18 DIAGNOSIS — I10 HYPERTENSION, UNSPECIFIED TYPE: ICD-10-CM

## 2024-09-18 DIAGNOSIS — F32.A DEPRESSION, UNSPECIFIED DEPRESSION TYPE: ICD-10-CM

## 2024-09-18 RX ORDER — MELOXICAM 15 MG/1
15 TABLET ORAL DAILY
Qty: 30 TABLET | Refills: 1 | Status: SHIPPED | OUTPATIENT
Start: 2024-09-18

## 2024-09-18 RX ORDER — OMEPRAZOLE 40 MG/1
40 CAPSULE, DELAYED RELEASE ORAL DAILY
Qty: 30 CAPSULE | Refills: 1 | Status: SHIPPED | OUTPATIENT
Start: 2024-09-18 | End: 2025-03-17

## 2024-09-18 RX ORDER — HYDROCHLOROTHIAZIDE 25 MG/1
25 TABLET ORAL DAILY
Qty: 30 TABLET | Refills: 1 | Status: SHIPPED | OUTPATIENT
Start: 2024-09-18 | End: 2025-03-17

## 2024-09-18 RX ORDER — PAROXETINE HYDROCHLORIDE 20 MG/1
20 TABLET, FILM COATED ORAL DAILY
Qty: 30 TABLET | Refills: 1 | Status: SHIPPED | OUTPATIENT
Start: 2024-09-18 | End: 2025-03-17

## 2024-09-18 RX ORDER — LISINOPRIL 40 MG/1
40 TABLET ORAL DAILY
Qty: 30 TABLET | Refills: 1 | Status: SHIPPED | OUTPATIENT
Start: 2024-09-18 | End: 2025-03-17

## 2024-09-18 RX ORDER — SIMVASTATIN 10 MG/1
10 TABLET, FILM COATED ORAL NIGHTLY
Qty: 30 TABLET | Refills: 1 | Status: SHIPPED | OUTPATIENT
Start: 2024-09-18 | End: 2025-03-17

## 2024-11-05 ENCOUNTER — APPOINTMENT (OUTPATIENT)
Dept: PRIMARY CARE | Facility: CLINIC | Age: 69
End: 2024-11-05
Payer: MEDICARE

## 2024-11-05 VITALS
DIASTOLIC BLOOD PRESSURE: 80 MMHG | HEIGHT: 68 IN | SYSTOLIC BLOOD PRESSURE: 136 MMHG | WEIGHT: 232 LBS | BODY MASS INDEX: 35.16 KG/M2

## 2024-11-05 DIAGNOSIS — F32.A DEPRESSION, UNSPECIFIED DEPRESSION TYPE: ICD-10-CM

## 2024-11-05 DIAGNOSIS — E11.9 TYPE 2 DIABETES MELLITUS WITHOUT COMPLICATION, WITHOUT LONG-TERM CURRENT USE OF INSULIN (MULTI): ICD-10-CM

## 2024-11-05 DIAGNOSIS — E78.5 HYPERLIPIDEMIA, UNSPECIFIED HYPERLIPIDEMIA TYPE: ICD-10-CM

## 2024-11-05 DIAGNOSIS — M17.9 OSTEOARTHRITIS OF KNEE, UNSPECIFIED LATERALITY, UNSPECIFIED OSTEOARTHRITIS TYPE: ICD-10-CM

## 2024-11-05 DIAGNOSIS — I10 HYPERTENSION, UNSPECIFIED TYPE: ICD-10-CM

## 2024-11-05 DIAGNOSIS — R06.09 DYSPNEA ON EXERTION: Primary | ICD-10-CM

## 2024-11-05 DIAGNOSIS — K21.00 GASTROESOPHAGEAL REFLUX DISEASE WITH ESOPHAGITIS, UNSPECIFIED WHETHER HEMORRHAGE: ICD-10-CM

## 2024-11-05 PROCEDURE — 3079F DIAST BP 80-89 MM HG: CPT | Performed by: STUDENT IN AN ORGANIZED HEALTH CARE EDUCATION/TRAINING PROGRAM

## 2024-11-05 PROCEDURE — 99214 OFFICE O/P EST MOD 30 MIN: CPT | Performed by: STUDENT IN AN ORGANIZED HEALTH CARE EDUCATION/TRAINING PROGRAM

## 2024-11-05 PROCEDURE — 3048F LDL-C <100 MG/DL: CPT | Performed by: STUDENT IN AN ORGANIZED HEALTH CARE EDUCATION/TRAINING PROGRAM

## 2024-11-05 PROCEDURE — G2211 COMPLEX E/M VISIT ADD ON: HCPCS | Performed by: STUDENT IN AN ORGANIZED HEALTH CARE EDUCATION/TRAINING PROGRAM

## 2024-11-05 PROCEDURE — 3008F BODY MASS INDEX DOCD: CPT | Performed by: STUDENT IN AN ORGANIZED HEALTH CARE EDUCATION/TRAINING PROGRAM

## 2024-11-05 PROCEDURE — 3044F HG A1C LEVEL LT 7.0%: CPT | Performed by: STUDENT IN AN ORGANIZED HEALTH CARE EDUCATION/TRAINING PROGRAM

## 2024-11-05 PROCEDURE — 4010F ACE/ARB THERAPY RXD/TAKEN: CPT | Performed by: STUDENT IN AN ORGANIZED HEALTH CARE EDUCATION/TRAINING PROGRAM

## 2024-11-05 PROCEDURE — 3075F SYST BP GE 130 - 139MM HG: CPT | Performed by: STUDENT IN AN ORGANIZED HEALTH CARE EDUCATION/TRAINING PROGRAM

## 2024-11-05 RX ORDER — HYDROCHLOROTHIAZIDE 25 MG/1
25 TABLET ORAL DAILY
Qty: 90 TABLET | Refills: 1 | Status: SHIPPED | OUTPATIENT
Start: 2024-11-05 | End: 2025-05-04

## 2024-11-05 RX ORDER — LISINOPRIL 40 MG/1
40 TABLET ORAL DAILY
Qty: 90 TABLET | Refills: 1 | Status: SHIPPED | OUTPATIENT
Start: 2024-11-05 | End: 2025-05-04

## 2024-11-05 RX ORDER — PAROXETINE HYDROCHLORIDE 20 MG/1
20 TABLET, FILM COATED ORAL DAILY
Qty: 90 TABLET | Refills: 1 | Status: SHIPPED | OUTPATIENT
Start: 2024-11-05 | End: 2025-05-04

## 2024-11-05 RX ORDER — AMLODIPINE BESYLATE 10 MG/1
10 TABLET ORAL DAILY
Qty: 90 TABLET | Refills: 1 | Status: SHIPPED | OUTPATIENT
Start: 2024-11-05 | End: 2025-05-04

## 2024-11-05 RX ORDER — METFORMIN HYDROCHLORIDE 500 MG/1
500 TABLET ORAL
Qty: 90 TABLET | Refills: 1 | Status: SHIPPED | OUTPATIENT
Start: 2024-11-05 | End: 2025-02-03

## 2024-11-05 RX ORDER — SIMVASTATIN 10 MG/1
10 TABLET, FILM COATED ORAL NIGHTLY
Qty: 90 TABLET | Refills: 1 | Status: SHIPPED | OUTPATIENT
Start: 2024-11-05 | End: 2025-05-04

## 2024-11-05 RX ORDER — MELOXICAM 15 MG/1
15 TABLET ORAL DAILY
Qty: 90 TABLET | Refills: 1 | Status: SHIPPED | OUTPATIENT
Start: 2024-11-05

## 2024-11-05 RX ORDER — OMEPRAZOLE 40 MG/1
40 CAPSULE, DELAYED RELEASE ORAL DAILY
Qty: 90 CAPSULE | Refills: 1 | Status: SHIPPED | OUTPATIENT
Start: 2024-11-05 | End: 2025-05-04

## 2024-11-05 NOTE — PROGRESS NOTES
"Subjective   Patient ID: Jordyn Neal is a 69 y.o. female who presents for Follow-up.    HPI   Routine fu.    She has had a few episodes of dyspnea on exertion over the past several months. One episode occurred in July when she was walking an unusually long distance in the heat, felt LH and was SoB. No CP. She does not exercise intentionally ever.    Review of Systems  12-point ROS reviewed and was negative unless otherwise noted in HPI.    Objective   /80   Ht 1.727 m (5' 8\")   Wt 105 kg (232 lb)   BMI 35.28 kg/m²     Physical Exam  GEN: conversant, NAD  HEENT: PERRL, EOMI, MMM  NECK: supple, no carotid bruits appreciated b/l  CV: S1, S2, RRR  PULM: CTAB  ABD: soft, NT, ND  NEURO: no new gross focal deficits  EXT: no sig LE edema  PSYCH: appropriate affect    Assessment/Plan     #Dyspnea on exertion: referral to cardiology.    #Hypertension: Stable. Anxiety/stress from home living situation likely contributing.      #Shoulder/Knee OA: shoulder replacement performed 1/2023. Using meloxicam. She declines ortho eval for knees, can take Tylenol PRN.     #NIDDM: Lifestyle modifications discussed. Weight loss. Continue metformin, refilled. Offered trial of GLP-1 agonist or Mounjaro, patient to consider.      #HLD: Continue statin. Offered CACS, patient declines.     #Severe obesity, class 2: We have discussed importance of weight loss, lifestyle modifications. Has been more active since [facility].      #Fatty liver: She says she had liver ultrasound in the past.      #GERD: PPI as needed.     #Depression: Generally stable. Continue Paroxetine.      #CKD 3: continue ACE-i     #Health maintenance: Following with GYN. Mammogram done 4/2024. TDaP ~2016. Has had pneumovax. Colonoscopy performed 2021, 5 year f/u advised. FIT negative 7/2024. Received Shingrix. Advised RSV vaccine. Advised yearly flu shot. Up to date on COVID and booster. Longitudinal care.     RTC 6 mo      "

## 2024-11-05 NOTE — PROGRESS NOTES
"Subjective   Patient ID: Jordyn Neal is a 69 y.o. female who presents for No chief complaint on file..    HPI     Review of Systems    Objective   /80   Ht 1.727 m (5' 8\")   Wt 105 kg (232 lb)   BMI 35.28 kg/m²     Physical Exam    Assessment/Plan   {[email or web address]:90219}       "

## 2025-02-16 DIAGNOSIS — E11.9 TYPE 2 DIABETES MELLITUS WITHOUT COMPLICATION, WITHOUT LONG-TERM CURRENT USE OF INSULIN (MULTI): ICD-10-CM

## 2025-02-17 RX ORDER — METFORMIN HYDROCHLORIDE 500 MG/1
TABLET ORAL
Qty: 90 TABLET | Refills: 0 | Status: SHIPPED | OUTPATIENT
Start: 2025-02-17 | End: 2025-02-18 | Stop reason: SDUPTHER

## 2025-02-18 DIAGNOSIS — E11.9 TYPE 2 DIABETES MELLITUS WITHOUT COMPLICATION, WITHOUT LONG-TERM CURRENT USE OF INSULIN (MULTI): ICD-10-CM

## 2025-02-18 RX ORDER — METFORMIN HYDROCHLORIDE 500 MG/1
500 TABLET ORAL
Qty: 180 TABLET | Refills: 1 | Status: SHIPPED | OUTPATIENT
Start: 2025-02-18

## 2025-04-09 ENCOUNTER — HOSPITAL ENCOUNTER (INPATIENT)
Facility: HOSPITAL | Age: 70
LOS: 1 days | Discharge: ANOTHER HOSPITAL | DRG: 103 | End: 2025-04-11
Attending: STUDENT IN AN ORGANIZED HEALTH CARE EDUCATION/TRAINING PROGRAM | Admitting: STUDENT IN AN ORGANIZED HEALTH CARE EDUCATION/TRAINING PROGRAM
Payer: MEDICARE

## 2025-04-09 DIAGNOSIS — G43.A1 CYCLICAL VOMITING ASSOCIATED WITH INTRACTABLE MIGRAINE: Primary | ICD-10-CM

## 2025-04-09 DIAGNOSIS — R19.00 ABDOMINAL MASS, UNSPECIFIED ABDOMINAL LOCATION: ICD-10-CM

## 2025-04-09 LAB
ALBUMIN SERPL BCP-MCNC: 4.1 G/DL (ref 3.4–5)
ALP SERPL-CCNC: 240 U/L (ref 33–136)
ALT SERPL W P-5'-P-CCNC: 47 U/L (ref 7–45)
ANION GAP SERPL CALC-SCNC: 21 MMOL/L (ref 10–20)
AST SERPL W P-5'-P-CCNC: 67 U/L (ref 9–39)
BASOPHILS # BLD AUTO: 0.03 X10*3/UL (ref 0–0.1)
BASOPHILS NFR BLD AUTO: 0.3 %
BILIRUB SERPL-MCNC: 1.1 MG/DL (ref 0–1.2)
BUN SERPL-MCNC: 21 MG/DL (ref 6–23)
CALCIUM SERPL-MCNC: 10.1 MG/DL (ref 8.6–10.3)
CHLORIDE SERPL-SCNC: 98 MMOL/L (ref 98–107)
CO2 SERPL-SCNC: 19 MMOL/L (ref 21–32)
CREAT SERPL-MCNC: 1.81 MG/DL (ref 0.5–1.05)
EGFRCR SERPLBLD CKD-EPI 2021: 30 ML/MIN/1.73M*2
EOSINOPHIL # BLD AUTO: 0.17 X10*3/UL (ref 0–0.7)
EOSINOPHIL NFR BLD AUTO: 1.7 %
ERYTHROCYTE [DISTWIDTH] IN BLOOD BY AUTOMATED COUNT: 13.1 % (ref 11.5–14.5)
GLUCOSE SERPL-MCNC: 187 MG/DL (ref 74–99)
HCT VFR BLD AUTO: 38.2 % (ref 36–46)
HGB BLD-MCNC: 12 G/DL (ref 12–16)
IMM GRANULOCYTES # BLD AUTO: 0.04 X10*3/UL (ref 0–0.7)
IMM GRANULOCYTES NFR BLD AUTO: 0.4 % (ref 0–0.9)
LYMPHOCYTES # BLD AUTO: 1.92 X10*3/UL (ref 1.2–4.8)
LYMPHOCYTES NFR BLD AUTO: 19 %
MCH RBC QN AUTO: 28.4 PG (ref 26–34)
MCHC RBC AUTO-ENTMCNC: 31.4 G/DL (ref 32–36)
MCV RBC AUTO: 91 FL (ref 80–100)
MONOCYTES # BLD AUTO: 0.75 X10*3/UL (ref 0.1–1)
MONOCYTES NFR BLD AUTO: 7.4 %
NEUTROPHILS # BLD AUTO: 7.18 X10*3/UL (ref 1.2–7.7)
NEUTROPHILS NFR BLD AUTO: 71.2 %
NRBC BLD-RTO: 0 /100 WBCS (ref 0–0)
PLATELET # BLD AUTO: 278 X10*3/UL (ref 150–450)
POTASSIUM SERPL-SCNC: 3.9 MMOL/L (ref 3.5–5.3)
PROT SERPL-MCNC: 7.4 G/DL (ref 6.4–8.2)
RBC # BLD AUTO: 4.22 X10*6/UL (ref 4–5.2)
SODIUM SERPL-SCNC: 134 MMOL/L (ref 136–145)
WBC # BLD AUTO: 10.1 X10*3/UL (ref 4.4–11.3)

## 2025-04-09 PROCEDURE — 99285 EMERGENCY DEPT VISIT HI MDM: CPT | Performed by: STUDENT IN AN ORGANIZED HEALTH CARE EDUCATION/TRAINING PROGRAM

## 2025-04-09 PROCEDURE — 84075 ASSAY ALKALINE PHOSPHATASE: CPT | Performed by: STUDENT IN AN ORGANIZED HEALTH CARE EDUCATION/TRAINING PROGRAM

## 2025-04-09 PROCEDURE — 85025 COMPLETE CBC W/AUTO DIFF WBC: CPT | Performed by: STUDENT IN AN ORGANIZED HEALTH CARE EDUCATION/TRAINING PROGRAM

## 2025-04-09 PROCEDURE — 83690 ASSAY OF LIPASE: CPT | Performed by: STUDENT IN AN ORGANIZED HEALTH CARE EDUCATION/TRAINING PROGRAM

## 2025-04-09 PROCEDURE — 83735 ASSAY OF MAGNESIUM: CPT | Performed by: STUDENT IN AN ORGANIZED HEALTH CARE EDUCATION/TRAINING PROGRAM

## 2025-04-09 PROCEDURE — 82010 KETONE BODYS QUAN: CPT

## 2025-04-09 PROCEDURE — 36415 COLL VENOUS BLD VENIPUNCTURE: CPT | Performed by: STUDENT IN AN ORGANIZED HEALTH CARE EDUCATION/TRAINING PROGRAM

## 2025-04-09 ASSESSMENT — COLUMBIA-SUICIDE SEVERITY RATING SCALE - C-SSRS
6. HAVE YOU EVER DONE ANYTHING, STARTED TO DO ANYTHING, OR PREPARED TO DO ANYTHING TO END YOUR LIFE?: NO
2. HAVE YOU ACTUALLY HAD ANY THOUGHTS OF KILLING YOURSELF?: NO
1. IN THE PAST MONTH, HAVE YOU WISHED YOU WERE DEAD OR WISHED YOU COULD GO TO SLEEP AND NOT WAKE UP?: NO

## 2025-04-09 ASSESSMENT — PAIN DESCRIPTION - ORIENTATION: ORIENTATION: RIGHT;LOWER;POSTERIOR

## 2025-04-09 ASSESSMENT — PAIN DESCRIPTION - PAIN TYPE: TYPE: ACUTE PAIN

## 2025-04-09 ASSESSMENT — PAIN - FUNCTIONAL ASSESSMENT: PAIN_FUNCTIONAL_ASSESSMENT: 0-10

## 2025-04-09 ASSESSMENT — PAIN DESCRIPTION - LOCATION: LOCATION: ABDOMEN

## 2025-04-09 ASSESSMENT — PAIN SCALES - GENERAL: PAINLEVEL_OUTOF10: 5 - MODERATE PAIN

## 2025-04-10 ENCOUNTER — APPOINTMENT (OUTPATIENT)
Dept: CARDIOLOGY | Facility: HOSPITAL | Age: 70
DRG: 103 | End: 2025-04-10
Payer: MEDICARE

## 2025-04-10 ENCOUNTER — APPOINTMENT (OUTPATIENT)
Dept: RADIOLOGY | Facility: HOSPITAL | Age: 70
DRG: 103 | End: 2025-04-10
Payer: MEDICARE

## 2025-04-10 PROBLEM — G43.A1: Status: ACTIVE | Noted: 2025-04-10

## 2025-04-10 LAB
AFP SERPL-MCNC: 10 NG/ML (ref 0–9)
AFP SERPL-MCNC: 9 NG/ML (ref 0–9)
APPEARANCE UR: CLEAR
B-OH-BUTYR SERPL-SCNC: 0.56 MMOL/L (ref 0.02–0.27)
BILIRUB UR STRIP.AUTO-MCNC: NEGATIVE MG/DL
CANCER AG125 SERPL-ACNC: 271.8 U/ML (ref 0–30.2)
CANCER AG19-9 SERPL-ACNC: 27.2 U/ML
CANCER AG19-9 SERPL-ACNC: 28.27 U/ML
CANCER AG27-29 SERPL-ACNC: 132.9 U/ML (ref 0–38.6)
CARDIAC TROPONIN I PNL SERPL HS: 7 NG/L (ref 0–13)
CEA SERPL-MCNC: 3.1 UG/L
COLOR UR: YELLOW
GLUCOSE BLD MANUAL STRIP-MCNC: 114 MG/DL (ref 74–99)
GLUCOSE BLD MANUAL STRIP-MCNC: 121 MG/DL (ref 74–99)
GLUCOSE BLD MANUAL STRIP-MCNC: 124 MG/DL (ref 74–99)
GLUCOSE BLD MANUAL STRIP-MCNC: 132 MG/DL (ref 74–99)
GLUCOSE UR STRIP.AUTO-MCNC: NORMAL MG/DL
HOLD SPECIMEN: NORMAL
HYALINE CASTS #/AREA URNS AUTO: ABNORMAL /LPF
KETONES UR STRIP.AUTO-MCNC: ABNORMAL MG/DL
LACTATE SERPL-SCNC: 1.2 MMOL/L (ref 0.4–2)
LEUKOCYTE ESTERASE UR QL STRIP.AUTO: ABNORMAL
LIPASE SERPL-CCNC: 60 U/L (ref 9–82)
MAGNESIUM SERPL-MCNC: 1.57 MG/DL (ref 1.6–2.4)
MUCOUS THREADS #/AREA URNS AUTO: ABNORMAL /LPF
NITRITE UR QL STRIP.AUTO: NEGATIVE
PH UR STRIP.AUTO: 6 [PH]
PROT UR STRIP.AUTO-MCNC: ABNORMAL MG/DL
RBC # UR STRIP.AUTO: ABNORMAL MG/DL
RBC #/AREA URNS AUTO: ABNORMAL /HPF
SP GR UR STRIP.AUTO: 1.03
SQUAMOUS #/AREA URNS AUTO: ABNORMAL /HPF
UROBILINOGEN UR STRIP.AUTO-MCNC: ABNORMAL MG/DL
WBC #/AREA URNS AUTO: ABNORMAL /HPF

## 2025-04-10 PROCEDURE — 81001 URINALYSIS AUTO W/SCOPE: CPT | Performed by: STUDENT IN AN ORGANIZED HEALTH CARE EDUCATION/TRAINING PROGRAM

## 2025-04-10 PROCEDURE — 99223 1ST HOSP IP/OBS HIGH 75: CPT

## 2025-04-10 PROCEDURE — 93005 ELECTROCARDIOGRAM TRACING: CPT

## 2025-04-10 PROCEDURE — 76705 ECHO EXAM OF ABDOMEN: CPT | Performed by: STUDENT IN AN ORGANIZED HEALTH CARE EDUCATION/TRAINING PROGRAM

## 2025-04-10 PROCEDURE — 76830 TRANSVAGINAL US NON-OB: CPT | Performed by: STUDENT IN AN ORGANIZED HEALTH CARE EDUCATION/TRAINING PROGRAM

## 2025-04-10 PROCEDURE — 82105 ALPHA-FETOPROTEIN SERUM: CPT | Mod: PARLAB | Performed by: INTERNAL MEDICINE

## 2025-04-10 PROCEDURE — 74177 CT ABD & PELVIS W/CONTRAST: CPT

## 2025-04-10 PROCEDURE — 76705 ECHO EXAM OF ABDOMEN: CPT

## 2025-04-10 PROCEDURE — 2500000004 HC RX 250 GENERAL PHARMACY W/ HCPCS (ALT 636 FOR OP/ED): Performed by: STUDENT IN AN ORGANIZED HEALTH CARE EDUCATION/TRAINING PROGRAM

## 2025-04-10 PROCEDURE — 84484 ASSAY OF TROPONIN QUANT: CPT

## 2025-04-10 PROCEDURE — 76857 US EXAM PELVIC LIMITED: CPT | Performed by: STUDENT IN AN ORGANIZED HEALTH CARE EDUCATION/TRAINING PROGRAM

## 2025-04-10 PROCEDURE — 87086 URINE CULTURE/COLONY COUNT: CPT | Mod: PARLAB | Performed by: STUDENT IN AN ORGANIZED HEALTH CARE EDUCATION/TRAINING PROGRAM

## 2025-04-10 PROCEDURE — 99222 1ST HOSP IP/OBS MODERATE 55: CPT | Performed by: SURGERY

## 2025-04-10 PROCEDURE — 2500000002 HC RX 250 W HCPCS SELF ADMINISTERED DRUGS (ALT 637 FOR MEDICARE OP, ALT 636 FOR OP/ED)

## 2025-04-10 PROCEDURE — 87040 BLOOD CULTURE FOR BACTERIA: CPT | Mod: PARLAB

## 2025-04-10 PROCEDURE — 96365 THER/PROPH/DIAG IV INF INIT: CPT

## 2025-04-10 PROCEDURE — 74177 CT ABD & PELVIS W/CONTRAST: CPT | Performed by: STUDENT IN AN ORGANIZED HEALTH CARE EDUCATION/TRAINING PROGRAM

## 2025-04-10 PROCEDURE — 83605 ASSAY OF LACTIC ACID: CPT

## 2025-04-10 PROCEDURE — 96376 TX/PRO/DX INJ SAME DRUG ADON: CPT

## 2025-04-10 PROCEDURE — 86300 IMMUNOASSAY TUMOR CA 15-3: CPT | Mod: PARLAB | Performed by: INTERNAL MEDICINE

## 2025-04-10 PROCEDURE — 2500000001 HC RX 250 WO HCPCS SELF ADMINISTERED DRUGS (ALT 637 FOR MEDICARE OP)

## 2025-04-10 PROCEDURE — 2550000001 HC RX 255 CONTRASTS: Performed by: STUDENT IN AN ORGANIZED HEALTH CARE EDUCATION/TRAINING PROGRAM

## 2025-04-10 PROCEDURE — 86301 IMMUNOASSAY TUMOR CA 19-9: CPT | Mod: PARLAB

## 2025-04-10 PROCEDURE — 1200000002 HC GENERAL ROOM WITH TELEMETRY DAILY

## 2025-04-10 PROCEDURE — 99233 SBSQ HOSP IP/OBS HIGH 50: CPT

## 2025-04-10 PROCEDURE — A9575 INJ GADOTERATE MEGLUMI 0.1ML: HCPCS | Performed by: STUDENT IN AN ORGANIZED HEALTH CARE EDUCATION/TRAINING PROGRAM

## 2025-04-10 PROCEDURE — 86301 IMMUNOASSAY TUMOR CA 19-9: CPT | Mod: PARLAB | Performed by: INTERNAL MEDICINE

## 2025-04-10 PROCEDURE — 74183 MRI ABD W/O CNTR FLWD CNTR: CPT

## 2025-04-10 PROCEDURE — 96374 THER/PROPH/DIAG INJ IV PUSH: CPT | Mod: 59

## 2025-04-10 PROCEDURE — 76856 US EXAM PELVIC COMPLETE: CPT

## 2025-04-10 PROCEDURE — 96361 HYDRATE IV INFUSION ADD-ON: CPT

## 2025-04-10 PROCEDURE — 96375 TX/PRO/DX INJ NEW DRUG ADDON: CPT

## 2025-04-10 PROCEDURE — 2500000004 HC RX 250 GENERAL PHARMACY W/ HCPCS (ALT 636 FOR OP/ED)

## 2025-04-10 PROCEDURE — 82378 CARCINOEMBRYONIC ANTIGEN: CPT | Mod: PARLAB | Performed by: INTERNAL MEDICINE

## 2025-04-10 PROCEDURE — 82947 ASSAY GLUCOSE BLOOD QUANT: CPT

## 2025-04-10 PROCEDURE — 36415 COLL VENOUS BLD VENIPUNCTURE: CPT

## 2025-04-10 PROCEDURE — 82105 ALPHA-FETOPROTEIN SERUM: CPT | Mod: PARLAB

## 2025-04-10 PROCEDURE — 86304 IMMUNOASSAY TUMOR CA 125: CPT | Mod: PARLAB | Performed by: INTERNAL MEDICINE

## 2025-04-10 RX ORDER — HEPARIN SODIUM 5000 [USP'U]/ML
5000 INJECTION, SOLUTION INTRAVENOUS; SUBCUTANEOUS EVERY 8 HOURS
Status: DISCONTINUED | OUTPATIENT
Start: 2025-04-10 | End: 2025-04-11 | Stop reason: HOSPADM

## 2025-04-10 RX ORDER — METOCLOPRAMIDE HYDROCHLORIDE 5 MG/ML
10 INJECTION INTRAMUSCULAR; INTRAVENOUS ONCE
Status: COMPLETED | OUTPATIENT
Start: 2025-04-10 | End: 2025-04-10

## 2025-04-10 RX ORDER — HYDROCHLOROTHIAZIDE 25 MG/1
25 TABLET ORAL DAILY
Status: DISCONTINUED | OUTPATIENT
Start: 2025-04-10 | End: 2025-04-11 | Stop reason: HOSPADM

## 2025-04-10 RX ORDER — SODIUM CHLORIDE, SODIUM LACTATE, POTASSIUM CHLORIDE, CALCIUM CHLORIDE 600; 310; 30; 20 MG/100ML; MG/100ML; MG/100ML; MG/100ML
100 INJECTION, SOLUTION INTRAVENOUS CONTINUOUS
Status: ACTIVE | OUTPATIENT
Start: 2025-04-10 | End: 2025-04-11

## 2025-04-10 RX ORDER — INSULIN LISPRO 100 [IU]/ML
0-5 INJECTION, SOLUTION INTRAVENOUS; SUBCUTANEOUS EVERY 4 HOURS
Status: DISCONTINUED | OUTPATIENT
Start: 2025-04-10 | End: 2025-04-11

## 2025-04-10 RX ORDER — ONDANSETRON HYDROCHLORIDE 2 MG/ML
4 INJECTION, SOLUTION INTRAVENOUS EVERY 4 HOURS PRN
Status: DISCONTINUED | OUTPATIENT
Start: 2025-04-10 | End: 2025-04-11 | Stop reason: HOSPADM

## 2025-04-10 RX ORDER — ONDANSETRON HYDROCHLORIDE 2 MG/ML
4 INJECTION, SOLUTION INTRAVENOUS ONCE
Status: COMPLETED | OUTPATIENT
Start: 2025-04-10 | End: 2025-04-10

## 2025-04-10 RX ORDER — MORPHINE SULFATE 4 MG/ML
4 INJECTION, SOLUTION INTRAMUSCULAR; INTRAVENOUS EVERY 4 HOURS PRN
Status: DISCONTINUED | OUTPATIENT
Start: 2025-04-10 | End: 2025-04-11 | Stop reason: HOSPADM

## 2025-04-10 RX ORDER — DEXTROSE 50 % IN WATER (D50W) INTRAVENOUS SYRINGE
25
Status: DISCONTINUED | OUTPATIENT
Start: 2025-04-10 | End: 2025-04-11 | Stop reason: HOSPADM

## 2025-04-10 RX ORDER — MORPHINE SULFATE 4 MG/ML
4 INJECTION, SOLUTION INTRAMUSCULAR; INTRAVENOUS ONCE
Status: COMPLETED | OUTPATIENT
Start: 2025-04-10 | End: 2025-04-10

## 2025-04-10 RX ORDER — AMLODIPINE BESYLATE 10 MG/1
10 TABLET ORAL DAILY
Status: DISCONTINUED | OUTPATIENT
Start: 2025-04-10 | End: 2025-04-11 | Stop reason: HOSPADM

## 2025-04-10 RX ORDER — PANTOPRAZOLE SODIUM 40 MG/1
40 TABLET, DELAYED RELEASE ORAL
Status: DISCONTINUED | OUTPATIENT
Start: 2025-04-10 | End: 2025-04-11 | Stop reason: HOSPADM

## 2025-04-10 RX ORDER — POLYETHYLENE GLYCOL 3350 17 G/17G
17 POWDER, FOR SOLUTION ORAL DAILY
Status: DISCONTINUED | OUTPATIENT
Start: 2025-04-10 | End: 2025-04-11 | Stop reason: HOSPADM

## 2025-04-10 RX ORDER — FAMOTIDINE 10 MG/ML
20 INJECTION, SOLUTION INTRAVENOUS ONCE
Status: COMPLETED | OUTPATIENT
Start: 2025-04-10 | End: 2025-04-10

## 2025-04-10 RX ORDER — LOSARTAN POTASSIUM 50 MG/1
100 TABLET ORAL DAILY
COMMUNITY

## 2025-04-10 RX ORDER — LISINOPRIL 40 MG/1
40 TABLET ORAL DAILY
Status: DISCONTINUED | OUTPATIENT
Start: 2025-04-10 | End: 2025-04-11 | Stop reason: HOSPADM

## 2025-04-10 RX ORDER — DEXTROSE 50 % IN WATER (D50W) INTRAVENOUS SYRINGE
12.5
Status: DISCONTINUED | OUTPATIENT
Start: 2025-04-10 | End: 2025-04-11 | Stop reason: HOSPADM

## 2025-04-10 RX ORDER — PAROXETINE HYDROCHLORIDE 20 MG/1
20 TABLET, FILM COATED ORAL DAILY
Status: DISCONTINUED | OUTPATIENT
Start: 2025-04-10 | End: 2025-04-11 | Stop reason: HOSPADM

## 2025-04-10 RX ORDER — MAGNESIUM SULFATE HEPTAHYDRATE 40 MG/ML
4 INJECTION, SOLUTION INTRAVENOUS ONCE
Status: COMPLETED | OUTPATIENT
Start: 2025-04-10 | End: 2025-04-10

## 2025-04-10 RX ORDER — GADOTERATE MEGLUMINE 376.9 MG/ML
20 INJECTION INTRAVENOUS
Status: COMPLETED | OUTPATIENT
Start: 2025-04-10 | End: 2025-04-10

## 2025-04-10 RX ADMIN — MAGNESIUM SULFATE HEPTAHYDRATE 4 G: 40 INJECTION, SOLUTION INTRAVENOUS at 08:02

## 2025-04-10 RX ADMIN — PIPERACILLIN SODIUM AND TAZOBACTAM SODIUM 2.25 G: 2; .25 INJECTION, SOLUTION INTRAVENOUS at 18:33

## 2025-04-10 RX ADMIN — ONDANSETRON 4 MG: 2 INJECTION INTRAMUSCULAR; INTRAVENOUS at 01:49

## 2025-04-10 RX ADMIN — ONDANSETRON 4 MG: 2 INJECTION INTRAMUSCULAR; INTRAVENOUS at 07:59

## 2025-04-10 RX ADMIN — HEPARIN SODIUM 5000 UNITS: 5000 INJECTION INTRAVENOUS; SUBCUTANEOUS at 08:06

## 2025-04-10 RX ADMIN — SODIUM CHLORIDE, SODIUM LACTATE, POTASSIUM CHLORIDE, AND CALCIUM CHLORIDE 100 ML/HR: .6; .31; .03; .02 INJECTION, SOLUTION INTRAVENOUS at 08:02

## 2025-04-10 RX ADMIN — PIPERACILLIN SODIUM AND TAZOBACTAM SODIUM 3.38 G: 3; .375 INJECTION, SOLUTION INTRAVENOUS at 05:40

## 2025-04-10 RX ADMIN — MORPHINE SULFATE 4 MG: 4 INJECTION, SOLUTION INTRAMUSCULAR; INTRAVENOUS at 08:00

## 2025-04-10 RX ADMIN — MORPHINE SULFATE 4 MG: 4 INJECTION, SOLUTION INTRAMUSCULAR; INTRAVENOUS at 04:43

## 2025-04-10 RX ADMIN — FAMOTIDINE 20 MG: 10 INJECTION, SOLUTION INTRAVENOUS at 00:34

## 2025-04-10 RX ADMIN — PIPERACILLIN SODIUM AND TAZOBACTAM SODIUM 2.25 G: 2; .25 INJECTION, SOLUTION INTRAVENOUS at 14:53

## 2025-04-10 RX ADMIN — POLYETHYLENE GLYCOL 3350 17 G: 17 POWDER, FOR SOLUTION ORAL at 09:16

## 2025-04-10 RX ADMIN — IOHEXOL 75 ML: 350 INJECTION, SOLUTION INTRAVENOUS at 02:54

## 2025-04-10 RX ADMIN — GADOTERATE MEGLUMINE 20 ML: 376.9 INJECTION INTRAVENOUS at 13:54

## 2025-04-10 RX ADMIN — SODIUM CHLORIDE, SODIUM LACTATE, POTASSIUM CHLORIDE, AND CALCIUM CHLORIDE 1000 ML: .6; .31; .03; .02 INJECTION, SOLUTION INTRAVENOUS at 01:23

## 2025-04-10 RX ADMIN — ONDANSETRON 4 MG: 2 INJECTION INTRAMUSCULAR; INTRAVENOUS at 00:34

## 2025-04-10 RX ADMIN — MORPHINE SULFATE 4 MG: 4 INJECTION, SOLUTION INTRAMUSCULAR; INTRAVENOUS at 00:35

## 2025-04-10 RX ADMIN — PANTOPRAZOLE SODIUM 40 MG: 40 TABLET, DELAYED RELEASE ORAL at 08:00

## 2025-04-10 RX ADMIN — PAROXETINE HYDROCHLORIDE 20 MG: 20 TABLET, FILM COATED ORAL at 09:16

## 2025-04-10 RX ADMIN — MORPHINE SULFATE 4 MG: 4 INJECTION, SOLUTION INTRAMUSCULAR; INTRAVENOUS at 11:52

## 2025-04-10 RX ADMIN — HEPARIN SODIUM 5000 UNITS: 5000 INJECTION INTRAVENOUS; SUBCUTANEOUS at 14:53

## 2025-04-10 RX ADMIN — METOCLOPRAMIDE 10 MG: 5 INJECTION, SOLUTION INTRAMUSCULAR; INTRAVENOUS at 04:43

## 2025-04-10 SDOH — ECONOMIC STABILITY: FOOD INSECURITY: WITHIN THE PAST 12 MONTHS, THE FOOD YOU BOUGHT JUST DIDN'T LAST AND YOU DIDN'T HAVE MONEY TO GET MORE.: PATIENT DECLINED

## 2025-04-10 SDOH — ECONOMIC STABILITY: FOOD INSECURITY: HOW HARD IS IT FOR YOU TO PAY FOR THE VERY BASICS LIKE FOOD, HOUSING, MEDICAL CARE, AND HEATING?: PATIENT DECLINED

## 2025-04-10 SDOH — SOCIAL STABILITY: SOCIAL INSECURITY: WITHIN THE LAST YEAR, HAVE YOU BEEN HUMILIATED OR EMOTIONALLY ABUSED IN OTHER WAYS BY YOUR PARTNER OR EX-PARTNER?: NO

## 2025-04-10 SDOH — ECONOMIC STABILITY: HOUSING INSECURITY: IN THE LAST 12 MONTHS, WAS THERE A TIME WHEN YOU WERE NOT ABLE TO PAY THE MORTGAGE OR RENT ON TIME?: PATIENT DECLINED

## 2025-04-10 SDOH — SOCIAL STABILITY: SOCIAL INSECURITY: WITHIN THE LAST YEAR, HAVE YOU BEEN AFRAID OF YOUR PARTNER OR EX-PARTNER?: NO

## 2025-04-10 SDOH — ECONOMIC STABILITY: FOOD INSECURITY
WITHIN THE PAST 12 MONTHS, YOU WORRIED THAT YOUR FOOD WOULD RUN OUT BEFORE YOU GOT THE MONEY TO BUY MORE.: PATIENT DECLINED

## 2025-04-10 SDOH — ECONOMIC STABILITY: HOUSING INSECURITY: IN THE PAST 12 MONTHS, HOW MANY TIMES HAVE YOU MOVED WHERE YOU WERE LIVING?: 1

## 2025-04-10 SDOH — SOCIAL STABILITY: SOCIAL INSECURITY: ARE THERE ANY APPARENT SIGNS OF INJURIES/BEHAVIORS THAT COULD BE RELATED TO ABUSE/NEGLECT?: NO

## 2025-04-10 SDOH — ECONOMIC STABILITY: INCOME INSECURITY
IN THE PAST 12 MONTHS HAS THE ELECTRIC, GAS, OIL, OR WATER COMPANY THREATENED TO SHUT OFF SERVICES IN YOUR HOME?: PATIENT DECLINED

## 2025-04-10 SDOH — SOCIAL STABILITY: SOCIAL INSECURITY: DO YOU FEEL UNSAFE GOING BACK TO THE PLACE WHERE YOU ARE LIVING?: NO

## 2025-04-10 SDOH — SOCIAL STABILITY: SOCIAL INSECURITY
WITHIN THE LAST YEAR, HAVE YOU BEEN KICKED, HIT, SLAPPED, OR OTHERWISE PHYSICALLY HURT BY YOUR PARTNER OR EX-PARTNER?: NO

## 2025-04-10 SDOH — SOCIAL STABILITY: SOCIAL INSECURITY
WITHIN THE LAST YEAR, HAVE YOU BEEN RAPED OR FORCED TO HAVE ANY KIND OF SEXUAL ACTIVITY BY YOUR PARTNER OR EX-PARTNER?: NO

## 2025-04-10 SDOH — SOCIAL STABILITY: SOCIAL INSECURITY: ABUSE: ADULT

## 2025-04-10 SDOH — ECONOMIC STABILITY: TRANSPORTATION INSECURITY
IN THE PAST 12 MONTHS, HAS LACK OF TRANSPORTATION KEPT YOU FROM MEDICAL APPOINTMENTS OR FROM GETTING MEDICATIONS?: PATIENT DECLINED

## 2025-04-10 SDOH — SOCIAL STABILITY: SOCIAL INSECURITY: WERE YOU ABLE TO COMPLETE ALL THE BEHAVIORAL HEALTH SCREENINGS?: YES

## 2025-04-10 SDOH — SOCIAL STABILITY: SOCIAL INSECURITY: DOES ANYONE TRY TO KEEP YOU FROM HAVING/CONTACTING OTHER FRIENDS OR DOING THINGS OUTSIDE YOUR HOME?: NO

## 2025-04-10 SDOH — SOCIAL STABILITY: SOCIAL INSECURITY: HAVE YOU HAD ANY THOUGHTS OF HARMING ANYONE ELSE?: NO

## 2025-04-10 SDOH — SOCIAL STABILITY: SOCIAL INSECURITY: DO YOU FEEL ANYONE HAS EXPLOITED OR TAKEN ADVANTAGE OF YOU FINANCIALLY OR OF YOUR PERSONAL PROPERTY?: NO

## 2025-04-10 SDOH — ECONOMIC STABILITY: HOUSING INSECURITY: AT ANY TIME IN THE PAST 12 MONTHS, WERE YOU HOMELESS OR LIVING IN A SHELTER (INCLUDING NOW)?: PATIENT DECLINED

## 2025-04-10 SDOH — SOCIAL STABILITY: SOCIAL INSECURITY: HAVE YOU HAD THOUGHTS OF HARMING ANYONE ELSE?: NO

## 2025-04-10 SDOH — SOCIAL STABILITY: SOCIAL INSECURITY: HAS ANYONE EVER THREATENED TO HURT YOUR FAMILY OR YOUR PETS?: NO

## 2025-04-10 SDOH — SOCIAL STABILITY: SOCIAL INSECURITY: ARE YOU OR HAVE YOU BEEN THREATENED OR ABUSED PHYSICALLY, EMOTIONALLY, OR SEXUALLY BY ANYONE?: NO

## 2025-04-10 ASSESSMENT — COGNITIVE AND FUNCTIONAL STATUS - GENERAL
STANDING UP FROM CHAIR USING ARMS: A LITTLE
DAILY ACTIVITIY SCORE: 24
MOBILITY SCORE: 20
DAILY ACTIVITIY SCORE: 24
MOBILITY SCORE: 24
MOVING TO AND FROM BED TO CHAIR: A LITTLE
WALKING IN HOSPITAL ROOM: A LITTLE
STANDING UP FROM CHAIR USING ARMS: A LITTLE
PATIENT BASELINE BEDBOUND: NO
CLIMB 3 TO 5 STEPS WITH RAILING: A LITTLE
CLIMB 3 TO 5 STEPS WITH RAILING: A LITTLE
MOBILITY SCORE: 20
DAILY ACTIVITIY SCORE: 24
MOVING TO AND FROM BED TO CHAIR: A LITTLE
WALKING IN HOSPITAL ROOM: A LITTLE

## 2025-04-10 ASSESSMENT — LIFESTYLE VARIABLES
HOW OFTEN DO YOU HAVE A DRINK CONTAINING ALCOHOL: NEVER
SKIP TO QUESTIONS 9-10: 1
AUDIT-C TOTAL SCORE: 0
HOW OFTEN DO YOU HAVE 6 OR MORE DRINKS ON ONE OCCASION: NEVER
SUBSTANCE_ABUSE_PAST_12_MONTHS: NO
AUDIT-C TOTAL SCORE: 0
PRESCIPTION_ABUSE_PAST_12_MONTHS: NO
HOW MANY STANDARD DRINKS CONTAINING ALCOHOL DO YOU HAVE ON A TYPICAL DAY: PATIENT DOES NOT DRINK

## 2025-04-10 ASSESSMENT — ACTIVITIES OF DAILY LIVING (ADL)
LACK_OF_TRANSPORTATION: PATIENT DECLINED
GROOMING: INDEPENDENT
PATIENT'S MEMORY ADEQUATE TO SAFELY COMPLETE DAILY ACTIVITIES?: YES
LACK_OF_TRANSPORTATION: PATIENT DECLINED
DRESSING YOURSELF: INDEPENDENT
HEARING - LEFT EAR: FUNCTIONAL
HEARING - RIGHT EAR: FUNCTIONAL
ADEQUATE_TO_COMPLETE_ADL: YES
JUDGMENT_ADEQUATE_SAFELY_COMPLETE_DAILY_ACTIVITIES: YES
BATHING: INDEPENDENT
ASSISTIVE_DEVICE: EYEGLASSES
WALKS IN HOME: INDEPENDENT
TOILETING: INDEPENDENT
FEEDING YOURSELF: INDEPENDENT

## 2025-04-10 ASSESSMENT — PATIENT HEALTH QUESTIONNAIRE - PHQ9
2. FEELING DOWN, DEPRESSED OR HOPELESS: NOT AT ALL
1. LITTLE INTEREST OR PLEASURE IN DOING THINGS: NOT AT ALL
SUM OF ALL RESPONSES TO PHQ9 QUESTIONS 1 & 2: 0

## 2025-04-10 ASSESSMENT — PAIN SCALES - GENERAL
PAINLEVEL_OUTOF10: 10 - WORST POSSIBLE PAIN
PAINLEVEL_OUTOF10: 8
PAINLEVEL_OUTOF10: 0 - NO PAIN
PAINLEVEL_OUTOF10: 7

## 2025-04-10 ASSESSMENT — PAIN DESCRIPTION - LOCATION: LOCATION: ABDOMEN

## 2025-04-10 ASSESSMENT — PAIN - FUNCTIONAL ASSESSMENT: PAIN_FUNCTIONAL_ASSESSMENT: WONG-BAKER FACES

## 2025-04-10 ASSESSMENT — PAIN SCALES - WONG BAKER: WONGBAKER_NUMERICALRESPONSE: HURTS LITTLE BIT

## 2025-04-10 NOTE — ED TRIAGE NOTES
Pt presents to ED via EMS from home with c/o N/V and RLQ abdominal pain that started at 2030 tonight. Denies constipation or diarrhea. Given 4mg IV zofran by EMS.

## 2025-04-10 NOTE — CONSULTS
Reason For Consult  Concern for obstructive cholecystitis    History Of Present Illness  Jordyn Neal is a 70 y.o. female with HTN, HLD, T2DM, GERD, and depression presented to [facility] on 4/9 with c/o abdominal pain and N/V. Patient states her N/V started yesterday and she had multiple episodes of emesis. They were red tinged but the patient had previously eaten tomato soup, and she denies any obvious blood. She has pain located in her R posterior flank. General surgery was consulted due to a CT A/P that revealed mild intrahepatic duct dilation and distention of GB caused by lymph node conglomerate in the tim hepatis that results in narrowing of main portal vein and some mass effect on the cystic duct and CBD.      Past Medical History  She has a past medical history of Personal history of other benign neoplasm.    Surgical History  She has a past surgical history that includes Other surgical history (08/29/2022) and Other surgical history (06/30/2020).     Social History  She reports that she has never smoked. She has never used smokeless tobacco. She reports current alcohol use. She reports that she does not currently use drugs.    Family History  No family history on file.     Allergies  Patient has no known allergies.    Review of Systems  Negative unless noted in HPI     Physical Exam  Physical Exam  Vitals reviewed.   Cardiovascular:      Rate and Rhythm: Normal rate and regular rhythm.   Pulmonary:      Effort: Pulmonary effort is normal.      Breath sounds: Normal breath sounds.   Abdominal:      General: Bowel sounds are normal.      Palpations: Abdomen is soft.      Tenderness: There is no abdominal tenderness.   Musculoskeletal:      Comments: Tenderness over R posterior flank/back   Skin:     General: Skin is warm and dry.   Neurological:      Mental Status: She is alert and oriented to person, place, and time.   Psychiatric:         Mood and Affect: Mood normal.         Behavior: Behavior normal.  "           Last Recorded Vitals  Blood pressure 136/63, pulse 100, temperature 37.2 °C (99 °F), temperature source Temporal, resp. rate 18, height 1.727 m (5' 8\"), weight 99.9 kg (220 lb 3.8 oz), SpO2 90%.    Relevant Results  Results for orders placed or performed during the hospital encounter of 04/09/25 (from the past 24 hours)   CBC with Differential   Result Value Ref Range    WBC 10.1 4.4 - 11.3 x10*3/uL    nRBC 0.0 0.0 - 0.0 /100 WBCs    RBC 4.22 4.00 - 5.20 x10*6/uL    Hemoglobin 12.0 12.0 - 16.0 g/dL    Hematocrit 38.2 36.0 - 46.0 %    MCV 91 80 - 100 fL    MCH 28.4 26.0 - 34.0 pg    MCHC 31.4 (L) 32.0 - 36.0 g/dL    RDW 13.1 11.5 - 14.5 %    Platelets 278 150 - 450 x10*3/uL    Neutrophils % 71.2 40.0 - 80.0 %    Immature Granulocytes %, Automated 0.4 0.0 - 0.9 %    Lymphocytes % 19.0 13.0 - 44.0 %    Monocytes % 7.4 2.0 - 10.0 %    Eosinophils % 1.7 0.0 - 6.0 %    Basophils % 0.3 0.0 - 2.0 %    Neutrophils Absolute 7.18 1.20 - 7.70 x10*3/uL    Immature Granulocytes Absolute, Automated 0.04 0.00 - 0.70 x10*3/uL    Lymphocytes Absolute 1.92 1.20 - 4.80 x10*3/uL    Monocytes Absolute 0.75 0.10 - 1.00 x10*3/uL    Eosinophils Absolute 0.17 0.00 - 0.70 x10*3/uL    Basophils Absolute 0.03 0.00 - 0.10 x10*3/uL   Comprehensive Metabolic Panel   Result Value Ref Range    Glucose 187 (H) 74 - 99 mg/dL    Sodium 134 (L) 136 - 145 mmol/L    Potassium 3.9 3.5 - 5.3 mmol/L    Chloride 98 98 - 107 mmol/L    Bicarbonate 19 (L) 21 - 32 mmol/L    Anion Gap 21 (H) 10 - 20 mmol/L    Urea Nitrogen 21 6 - 23 mg/dL    Creatinine 1.81 (H) 0.50 - 1.05 mg/dL    eGFR 30 (L) >60 mL/min/1.73m*2    Calcium 10.1 8.6 - 10.3 mg/dL    Albumin 4.1 3.4 - 5.0 g/dL    Alkaline Phosphatase 240 (H) 33 - 136 U/L    Total Protein 7.4 6.4 - 8.2 g/dL    AST 67 (H) 9 - 39 U/L    Bilirubin, Total 1.1 0.0 - 1.2 mg/dL    ALT 47 (H) 7 - 45 U/L   Magnesium   Result Value Ref Range    Magnesium 1.57 (L) 1.60 - 2.40 mg/dL   Lipase   Result Value Ref Range    " Lipase 60 9 - 82 U/L   Lavender Top   Result Value Ref Range    Extra Tube Hold for add-ons.    PST Top   Result Value Ref Range    Extra Tube Hold for add-ons.    Beta Hydroxybutyrate   Result Value Ref Range    Beta-Hydroxybutyrate 0.56 (H) 0.02 - 0.27 mmol/L   Urinalysis with Reflex Culture and Microscopic   Result Value Ref Range    Color, Urine Yellow Light-Yellow, Yellow, Dark-Yellow    Appearance, Urine Clear Clear    Specific Gravity, Urine 1.029 1.005 - 1.035    pH, Urine 6.0 5.0, 5.5, 6.0, 6.5, 7.0, 7.5, 8.0    Protein, Urine 30 (1+) (A) NEGATIVE, 10 (TRACE), 20 (TRACE) mg/dL    Glucose, Urine Normal Normal mg/dL    Blood, Urine 0.03 (TRACE) (A) NEGATIVE mg/dL    Ketones, Urine 20 (1+) (A) NEGATIVE mg/dL    Bilirubin, Urine NEGATIVE NEGATIVE mg/dL    Urobilinogen, Urine 4 (2+) (A) Normal mg/dL    Nitrite, Urine NEGATIVE NEGATIVE    Leukocyte Esterase, Urine 25 Keena/uL (A) NEGATIVE   Extra Urine Gray Tube   Result Value Ref Range    Extra Tube Hold for add-ons.    Microscopic Only, Urine   Result Value Ref Range    WBC, Urine 1-5 1-5, NONE /HPF    RBC, Urine 3-5 NONE, 1-2, 3-5 /HPF    Squamous Epithelial Cells, Urine 1-9 (SPARSE) Reference range not established. /HPF    Mucus, Urine FEW Reference range not established. /LPF    Hyaline Casts, Urine 3+ (A) NONE /LPF   Blood Culture    Specimen: Peripheral Venipuncture; Blood culture   Result Value Ref Range    Blood Culture Loaded on Instrument - Culture in progress    Blood Culture    Specimen: Peripheral Venipuncture; Blood culture   Result Value Ref Range    Blood Culture Loaded on Instrument - Culture in progress    Lactate   Result Value Ref Range    Lactate 1.2 0.4 - 2.0 mmol/L   AFP Tumor Marker   Result Value Ref Range    Alpha-Fetoprotein 10 (H) 0 - 9 ng/mL   Cancer Antigen 19-9   Result Value Ref Range    Cancer AG 19-9 28.27 <35.00 U/mL   POCT GLUCOSE   Result Value Ref Range    POCT Glucose 132 (H) 74 - 99 mg/dL   Troponin I, High Sensitivity    Result Value Ref Range    Troponin I, High Sensitivity 7 0 - 13 ng/L   POCT GLUCOSE   Result Value Ref Range    POCT Glucose 124 (H) 74 - 99 mg/dL   SST TOP   Result Value Ref Range    Extra Tube Hold for add-ons.    POCT GLUCOSE   Result Value Ref Range    POCT Glucose 121 (H) 74 - 99 mg/dL     US PELVIS TRANSABDOMINAL WITH TRANSVAGINAL    Result Date: 4/10/2025  Interpreted By:  Kasi Jeffers, STUDY: US PELVIS TRANSABDOMINAL WITH TRANSVAGINAL;  4/10/2025 4:42 pm   INDICATION: Signs/Symptoms:Endometrial thickening seen on CT, hx of fibroids.     COMPARISON: None.   ACCESSION NUMBER(S): [id number]   ORDERING CLINICIAN: CRISTIANA TORRES   TECHNIQUE: Multiple multiplanar static gray scale, color and spectral waveform sonographic images of the pelvis were obtained. Transabdominal and endovaginal ultrasound was performed.   FINDINGS: UTERUS: The uterus measures  4.9 cm x 2.9 cm x 7.4 cm. Normal myometrium   ENDOMETRIUM: Abdominal thickened and heterogenous endometrial stripe with cystic changes measuring up to 1.6 cm   RIGHT ADNEXA: Not visualized   LEFT ADNEXA: The left ovary measures 1.3 cm x 0.8 cm x 2.1 cm and demonstrates normal flow. No gross left adnexal masses are seen, no hydrosalpinx.   CUL DE SAC: No gross free fluid is seen in the pelvic cul-de-sac.       1. Abnormally thickened and heterogenous endometrial stripe with cystic changes measuring up to 1.6 cm. Advise screening for underlying postmenopausal bleeding and accordingly further assessment including tissue sampling could be considered. 2. Right ovary could not be visualized.   MACRO: None   Signed by: Kasi Jeffers 4/10/2025 4:48 PM Dictation workstation:   [id number]    MR liver w and wo IV contrast    Result Date: 4/10/2025  Interpreted By:  Xavier Rocha, STUDY: MR LIVER W AND WO IV CONTRAST;  4/10/2025 2:10 pm   INDICATION: Signs/Symptoms:with MRCP phases. hepatic lesion, evaluating for HCC vs cholangiocarcinoma vs metastatic disease. evaluating  biliary tree for obstruction from portal lymph adenopathy.     COMPARISON: CT 04/10/2025   ACCESSION NUMBER(S): [id number]   ORDERING CLINICIAN: DAVIAN AUSTIN   TECHNIQUE: MRI PANCREAS; Multiplanar magnetic resonance images of the abdomen were obtained including the following sequences; T2-weighted SSFSE with and without fat saturation, T1-weighted GRE in/opposed phase, DWI, fat saturated 3D-T1w GRE pre and dynamically post contrast. Radial thick slab T2w RARE MRCP and coronally reconstructed navigator gated high resolution 3-D T2w RESTORE MRCP with MIP reconstruction were also performed for MRCP.  20 ML of Dotarem was administered intravenously without immediate complication.   FINDINGS: LIVER: Nodular and cirrhotic. No signal changes of steatosis. In segment VII there is a heterogeneous, T2 hyperintense mass measuring about 8.0 x 5.4 cm. On the arterial phase, there is peripheral continuous enhancement. On delayed phases, there is some central filling of contrast however more central areas of necrosis persist. On diffusion-weighted images, the enhancing components appear to demonstrate restricted diffusion.   There are scattered punctate foci of restricted diffusion which could reflect additional solid masses involving segments IV, VI, VII, and VIII, annotated on series 10.   BILE DUCTS: Severe compression of the midportion of the common bile duct by enlarged nodes on (Series 14, Image 55). There is upstream intrahepatic biliary dilatation, common hepatic duct dilated to 13 mm.   GALLBLADDER: Distended. No definite stone or wall thickening otherwise.   PANCREAS: Grossly unremarkable. No ductal dilatation. No pancreas divisum.   SPLEEN: Borderline enlarged measuring 13.2 cm in length.   ADRENAL GLANDS: Unremarkable.   KIDNEYS: Small left renal cysts. Otherwise unremarkable kidneys without hydronephrosis.   LYMPH NODES: There are multiple enlarged tim hepatis and retroperitoneal lymph nodes, some of which appear  enhancing, and some of which demonstrate varying degrees of necrosis. A conglomerate of tim hepatis lymph nodes measuring 8.7 x 8.0 cm with an adjacent tim hepatis node measuring 43 mm short axis appear to compress the mid common bile duct. There is also mild compression of the main portal vein.   Extensive retroperitoneal lymphadenopathy is noted, a dominant aortocaval node measuring 31 mm.   ABDOMINAL VESSELS: Abdominal aorta is atherosclerotic but otherwise patent without aneurysm. The major visceral arterial branches are patent. IVC is mildly compressed by enlarged lymph nodes but otherwise grossly patent without. Major branches appear patent. Major portal venous branches are patent.   BOWEL: No dilated bowel is visualized.   PERITONEUM/RETROPERITONEUM: No visualized free fluid.   BONES AND LOWER THORAX: Lumbar degenerative changes. Grossly clear lung bases.       1.  8 cm malignant-appearing, centrally necrotic indeterminate right hepatic lobe mass. Although there are morphologic changes of cirrhosis, the enhancement pattern is not suggestive of hepatocellular carcinoma. There is some delayed progressive central enhancement and cholangiocarcinoma may be a diagnostic consideration. 2. Scattered punctate foci restricted diffusion involving both lobes of the liver which could be additional metastatic lesions. 3. Extensive tim hepatis and retroperitoneal lymphadenopathy demonstrating varying degrees of necrosis. Enlarged tim hepatis lymph nodes appear to compress the mid common bile duct causing mild upstream intrahepatic dilatation.     MACRO: None   Signed by: Xavier Rocha 4/10/2025 3:09 PM Dictation workstation:   [id number]    US gallbladder    Result Date: 4/10/2025  Interpreted By:  Kasi Jeffers, STUDY: US GALLBLADDER;  4/10/2025 6:00 am   INDICATION: Signs/Symptoms:concern for nanette.     COMPARISON: CT scan 04/10/2025   ACCESSION NUMBER(S): [id number]   ORDERING CLINICIAN: SHWETHA CRUZ    TECHNIQUE: Multiple images of the right upper quadrant were obtained.   FINDINGS: LIVER: The liver measures 17.9 cm. Coarsened echotexture. Nodular contour. Right hepatic lobe mass measuring 7.2 cm     GALLBLADDER: The gallbladder is nondistended, and demonstrates no evidence of gallstones, wall thickening or surrounding fluid. The gallbladder wall thickness is 0.27 cm. Sonographic Delgado's sign is negative.     BILE DUCTS: Extrahepatic CBD measuring up to 1.2 cm with mild intrahepatic biliary dilatation   PANCREAS: The pancreas is poorly visualized due to overlying bowel gas.   RIGHT KIDNEY: The right kidney measures 9.5 cm in length. The renal cortical echogenicity and thickness are within normal limit.  No hydronephrosis or renal calculi are seen.   Multiple periportal enlarged lymph nodes largest measuring up to 5.8 cm       1. Cirrhotic hepatic morphology. Right hepatic lobe mass measuring 7.2 cm correlating with the prior CT scan finding. 2. Distended gallbladder with multiple stones but no wall thickening or pericholecystic free fluid. 3. Extrahepatic CBD measuring up to 1.2 cm with mild intrahepatic biliary dilatation could be secondary to extrinsic compression from the multiple large tim hepatis/periportal lymph nodes..   MACRO: None     Signed by: Kasi Jeffers 4/10/2025 6:25 AM Dictation workstation:   [id number]    CT abdomen pelvis w IV contrast    Result Date: 4/10/2025  Interpreted By:  Monster Starr, STUDY: CT ABDOMEN PELVIS W IV CONTRAST;  4/10/2025 2:53 am   INDICATION: Signs/Symptoms:RLQ pain with vomiting.   COMPARISON: None   ACCESSION NUMBER(S): [id number]   ORDERING CLINICIAN: SHWETHA CRUZ   TECHNIQUE: Axial CT images of the abdomen and pelvis with coronal and sagittal reconstructed images obtained after intravenous administration of contrast.   FINDINGS: LOWER CHEST: Unremarkable. BONES: No acute osseous abnormality. ABDOMINAL WALL: Rectus diastasis.   ABDOMEN:   LIVER: Slight  nodularity of the liver contour suspicious for underlying cirrhosis. Ill-defined hypoenhancing mass in segment 7 (201/29) measuring 6.2 x 6.8 cm, suspicious for site of primary disease. BILE DUCTS: Mild intra and extrahepatic bile duct dilation, secondary to mass effect from the enlarged lymph nodes in the miquel hepatis. GALLBLADDER: Mildly distended. Cholelithiasis. PANCREAS:Normal appearance of the pancreatic duct. Normal appearance of the pancreatic parenchyma. SPLEEN: Unremarkable. ADRENALS: Unremarkable. KIDNEYS and URETERS: Renal cortical scarring. Left interpolar simple cyst. VESSELS: Mild-to-moderate aortoiliac calcifications. The lymph nodes have mass effect and result in narrowing of the IVC at the level of the renal veins (201/49). SMV and portal confluence are patent. There is narrowing of the main portal vein by the miquel hepatis lymph node conglomerate. LYMPH NODES: Extensive retroperitoneal and miquel hepatis lymphadenopathy. For reference: *Miquel hepatis conglomerate (201/33), 4.9 x 8.3 cm. *Aortocaval (201/76), 3.1 x 3 cm. *Para-aortic (201/63), 2.4 x 2.6 cm.   RETROPERITONEUM/PERITONEUM: Unremarkable. BOWEL: Colonic diverticulosis. Small bowel loops are nonobstructive.   PELVIS:   REPRODUCTIVE ORGANS: Mild heterogeneity/thickening of the endometrium (204/54), indeterminate. BLADDER: Unremarkable.       1. Cirrhotic morphology of the liver. Ill-defined lesion in segment 7 measuring 6.2 x 6.8 cm is suspicious for malignancy with differential diagnosis including primary infiltrative HCC, cholangiocarcinoma, or metastatic disease. 2. Mild heterogeneity/thickening of the endometrium is indeterminate. This may represent hyperplasia versus endometrial carcinoma, recommend pelvic ultrasound for further evaluation. 3. Extensive miquel hepatis, upper abdominal, and retroperitoneal metastatic lymphadenopathy. 4. The lymph node conglomerate in the miquel hepatis results in narrowing of the main portal vein and  has some mass effect on the cystic duct and CBD resulting in mild intrahepatic bile duct dilation and distention of the gallbladder. If there is right upper quadrant pain on exam, findings could represent cholecystitis secondary to biliary obstruction by the tim hepatis mass.   Recommend oncology consultation and tissue sampling to clarify the source of primary disease.   MACRO: Monster Starr discussed the significance and urgency of this critical finding by telephone with  SHWETHA CRUZ on 4/10/2025 at 4:15 am. (**-RCF-**) Findings:  See findings.   Signed by: Monster Starr 4/10/2025 4:19 AM Dictation workstation:   [id number]        Assessment/Plan   Jordyn Neal is a 70 y.o. female with HTN, HLD, T2DM, GERD, and depression presented to [facility] on 4/9 with c/o abdominal pain and N/V. General surgery was consulted due to a CT A/P that revealed mild intrahepatic duct dilation and distention of GB caused by lymph node conglomerate in the tim hepatis that results in narrowing of main portal vein and some mass effect on the cystic duct and CBD.     Impression  - Concern for possible cholangiocarcinoma or hepatocellular carcinoma  - Enlarged lymph nodes    Recommendations  - No acute surgical intervention indicated at this time  - Patient had an MR liver earlier today    > GI team following, may possibly perform a biopsy by EGD or an ERCP with possible stent placement  - CLD per GI  - Remainder of care per primary team    Patient seen and discussed with attending surgeon, Dr. Dominguez.    Lissy Perry PA-C

## 2025-04-10 NOTE — ED PROVIDER NOTES
History of Present Illness     Chief Complaint   Patient presents with   • Abdominal Pain   • Vomiting       History provided by: Patient  Limitations to History: None    HPI:  Jordyn Neal is a 70 y.o. female presenting for evaluation of acute onset abdominal pain and vomiting.  She ate dinner around 5:30 PM, 3 hours later at 8:30 PM she started to have right-sided abdominal pain that radiates to the back with nausea and vomiting, denies any hematemesis.  She had a normal bowel movement earlier today, is still passing gas.  She denies any chest pain, difficulty breathing, fevers, chills, urinary symptoms.    PMH: HTN, OA, DM, HLD, GERD, hepatic steatosis  PSH: Right shoulder, multiple   Allg: No known medication allergies      Physical Exam   Triage vitals:  T 37.1 °C (98.8 °F)  HR (!) 106  /64  RR 18  O2 98 % None (Room air)    Constitutional: Awake, alert, and answering questions appropriately. Non toxic appearing but obviously in pain  Eyes: Pupils equally round and reactive to light, sclera normal, extraocular movements intact  Mouth: Mucus membranes dry  Cardiovascular: Tachycardic rate with regular rhythm without murmurs, Pulses equal throughout, no lower extremity edema  Respiratory: Breathing comfortably, Lungs clear to auscultation throughout, no focal wheeze, crackles, or rales  Abdomen: No overlying skin changes, tenderness worse in right lower quadrant, slight in right upper quadrant, no rebound or guarding  Back: Mild CVA tenderness on the right  Skin: No rashes    ED Course     ED Course as of 04/10/25 2126   u Apr 10, 2025   0346 ECG 12 lead  I independently reviewed the12 lead ECG completed at 0154 showing normal sinus rhythm at a rate of 87 bpm. Axis is normal. R wave progressions across precordium is poor. There are no ST elevations/t wave inversions. GA, QRS, and QT intervals are appropriate.   [KV]   0451 CBC with Differential(!) [KV]   0451 Comprehensive Metabolic Panel(!)  [KV]   0451 Magnesium(!) [KV]   0451 Urinalysis with Reflex Culture and Microscopic(!) [KV]   0451 Microscopic Only, Urine(!) [KV]   0451 Lipase [KV]   2124 CT abdomen pelvis w IV contrast  IMPRESSION:  1. Cirrhotic morphology of the liver. Ill-defined lesion in segment 7  measuring 6.2 x 6.8 cm is suspicious for malignancy with differential  diagnosis including primary infiltrative HCC, cholangiocarcinoma, or  metastatic disease.  2. Mild heterogeneity/thickening of the endometrium is indeterminate.  This may represent hyperplasia versus endometrial carcinoma,  recommend pelvic ultrasound for further evaluation.  3. Extensive tim hepatis, upper abdominal, and retroperitoneal  metastatic lymphadenopathy.  4. The lymph node conglomerate in the tim hepatis results in  narrowing of the main portal vein and has some mass effect on the  cystic duct and CBD resulting in mild intrahepatic bile duct dilation  and distention of the gallbladder. If there is right upper quadrant  pain on exam, findings could represent cholecystitis secondary to  biliary obstruction by the tim hepatis mass.      Recommend oncology consultation and tissue sampling to clarify the  source of primary disease.   [KV]   2124 US gallbladder  IMPRESSION:  1. Cirrhotic hepatic morphology. Right hepatic lobe mass measuring  7.2 cm correlating with the prior CT scan finding.  2. Distended gallbladder with multiple stones but no wall thickening  or pericholecystic free fluid.  3. Extrahepatic CBD measuring up to 1.2 cm with mild intrahepatic  biliary dilatation could be secondary to extrinsic compression from  the multiple large tim hepatis/periportal lymph nodes..   [KV]      ED Course User Index  [KV] Yuni Alatorre MD         Diagnoses as of 04/10/25 2126   Cyclical vomiting associated with intractable migraine   Abdominal mass, unspecified abdominal location       Procedures   Procedures    Medical Decision Making   Medical Decision  M[age] y.o. female presenting for evaluation of acute onset abdominal pain, she does have significant tenderness in her right side without peritonitic nature, however will obtain CT abdomen pelvis first given that she has right lower quadrant so biliary etiology less likely.  IV access established, she is given rehydration with 1 L LR bolus, she is also given analgesia with 4 mg IV morphine, 4 mg IV Zofran and 20 mg IV Pepcid for symptomatic relief.    On reevaluation patient is still feeling very nauseous and had another episode of emesis with increased pain, therefore she is given another dose of analgesia and antiemetic.    CMP does show evidence of CHIO with creatinine 1.81 which is up from her baseline, CBC does not have a leukocytosis or anemia, lipase is negative.    CT abdomen pelvis returned with multiple findings concerning for malignancy with unknown origin, there was evidence of bile duct dilatation, therefore right upper quadrant ultrasound completed to ensure that there is no evidence of cholecystitis.  Patient is started on Zosyn empirically until ultrasound results are complete.    Ultrasound negative for Shira cystitis.  At this point patient does not require urgent surgical consultation, therefore patient is admitted to medicine.  I had a conversation with patient regarding the concern for multiple areas of cancer/malignancy on CT, she has a sister who  of uterine cancer and a mother who  of pancreatic cancer.  ----            Disposition   Hospital admission    Yuni Alatorre MD  Emergency Medicine       Yuni Alatorre MD  04/10/25 [id number]

## 2025-04-10 NOTE — H&P
Subjective   HPI  Jordyn Neal is a 70 y.o. female with past medical history of HTN, T2DM, HLD, fatty liver, GERD, CKD Stage III who presented to [facility] ED on 4/10/25 with nausea, vomiting and RLQ abdominal pain. Patient mentions that the pain began last night. She had dinner around 5:30pm and 3 hours later at 8:30pm she started to have right-sided abdominal pain that radiated to her back with nausea and vomiting. She denies any hematemesis. She had a normal bowel movement earlier in the day and has been passing gas. She denies any chest pain, difficulty breathing, fevers, or chills, or urinary symptoms.    In the ED, vitals notable for sinus tachycardia to 112, -150s, SpO2 95% on RA. Labs notable for Cr 1.81 (baseline around 1.2-1.4), Alk Phos 240, ALT 47, AST 67, Mg 1.57, UA positive for infection and 3+ hyaline casts. CTAP showed cirrhotic morphology of the liver. Ill-defined lesion suspicious for malignancy, Extensive tim hepatis, upper abdominal, and retroperitoneal metastatic lymphadenopathy, possible cholecystitis secondary to biliary obstruction by the tim hepatis mass. Patient was given 1L LR, Zosyn, and pain and nausea control in the ED.    Patient admitted to General Medicine service for evaluation and management of obstructive cholecystitis and possible malignancy work-up.    PMH: As stated above  PSH: As stated above, right shoulder replacement, , lipoma removal  Social: No alcohol use, drug use, or tobacco use. Retired  Family: Mother - Pancreatic Cancer, Sister - Uterine Cancer, Son - Leukemia  Medications/Allergies:  NKSA    ROS: 12 systems reviewed and negative except otherwise noted in HPI above.    Objective   Vitals:    04/10/25 0800   BP:    Pulse: (!) 110   Resp:    Temp:    SpO2: 95%      Physical Exam  GEN: conversant, NAD  HEENT: PERRL, EOMI, MMM OP clear, TMs clear bilaterally  NECK: supple, no cervical LAD, no carotid bruits  CV: S1, S2, regular, no murmur  PULM:  CTAB  ABD: soft, tender to deep palpation in RLQ and RUQ quadrants, nondistended  EXT: no LE edema  NEURO: no gross focal deficits  PSYCH: appropriate affect       Assessment/Plan   Jordyn Neal is a 70 y.o. female with past medical history of HTN, T2DM, HLD, fatty liver, GERD, CKD Stage III who presented to [facility] ED on 4/10/25 with nausea, vomiting and RLQ abdominal pain. Patient admitted to General Medicine service for evaluation and management of obstructive cholecystitis and possible malignancy work-up.    CTAP showed cirrhotic morphology of the liver. Ill-defined lesion suspicious for malignancy, Extensive tim hepatis, upper abdominal, and retroperitoneal metastatic lymphadenopathy, possible cholecystitis secondary to biliary obstruction by the tim hepatis mass    #Concern for obstructive cholecystitis in setting of hepatic mass and extensive lymphadenopathy  #Concern for malignancy in setting of above  #Elevated liver enzymes, cholestatic   - Continue with Zosyn  - Will check blood cultures  - Will consult GI, Heme/Onc, and General Surgery for multidisciplinary approach.   - Will check AFP and CA 19-9  - NPO for now pending possible intervention  - Pain and nausea control with morphine and zofran, currently well controlled     #HAGMA  #CHIO on CKD  #Hypomagnesia  - Replete electrolytes PRN  - Monitor urine output  - IVF    Chronic Conditions  #T2DM: Not on insulin at home. Continue with Q4 SSI while NPO  #HTN: Hold home antihypertensives while treating possible infection and CHIO  #MDD: Continue home paroxetine      DVT: SubQ Heparin  Diet: NPO  IVF:  ml/hr  Antibiotics: Zosyn  Consults: GI, Oncology, General Surgery  Disposition: Telemetry    CODE: DNR    This is a preliminary note, please await attending attestation for a finalized plan.    Joao Jama MD  Internal Medicine PGY3  Please message me with any questions

## 2025-04-10 NOTE — CONSULTS
Jordyn Neal is a 70 y.o. female on day 0 of admission presenting with Cyclical vomiting associated with intractable migraine.      Assessment / Plan        Reason for consult: Abdominal pain, nausea, vomiting.  Concern for obstructive cholecystitis.  CT shows hepatic masses with suspected malignancy    HPI:  Jordyn Neal is a 70 y.o. female with past medical history of HTN, T2DM, HLD, fatty liver, GERD, CKD Stage III who presented to [facility] ED on 4/10/25 with nausea, vomiting and RLQ abdominal pain. Patient mentions that the pain began last night. She had dinner around 5:30pm and 3 hours later at 8:30pm she started to have right-sided abdominal pain that radiated to her back with nausea and vomiting. She denies any hematemesis. She had a normal bowel movement earlier in the day and has been passing gas. She denies any chest pain, difficulty breathing, fevers, or chills, or urinary symptoms.    #Hepatic lesions with suspicion for malignancy  -Ill-defined lesion in segment 7 measuring 6.2 x 6.8 cm is suspicious for malignancy with differential diagnosis including primary infiltrative HCC, cholangiocarcinoma, or metastatic disease.  Plan:  -Would like to confirm diagnoses. possible biopsy by EGD with EUS versus ERCP   -Oncology consultation     #Concern for obstructive cholecystitis  #Possible rib fracture vs metastatic disease to rib  -CT abdomen pelvis showed concern for obstructing portal lymph nodes causing compression of the cystic duct resulting in intrahepatic bile duct dilation and distention of the gallbladder  -On exam, patient is severely tender in the 10th rib posteriorly in the mid scapular region. This pain started after she had episodes of vomiting   -It is possible her right sided abdominal pain radiating to the back is due to rib fracture or metastatic disease to the ribs  -MRI liver completed today: Enlarged tim hepatis lymph nodes appear to compress the mid CBD causing mild upstream  intrahepatic dilatation. 8 cm malignant-appearing, centrally necrotic indeterminate right hepatic lobe mass. There is some delayed progressive central enhancement and cholangiocarcinoma may be a diagnostic consideration. Scattered punctate foci restricted diffusion involving both lobes of the liver which could be additional metastatic lesions  Plan:  -Patient may benefit from targeted drainage of the gallbladder  -Considering endoscopic drainage options like a cystic duct stent or boomerang to [facility] for a EUS guided cholecystoduodenostomy depending on availability and scheduling           Ashvin Kerr DO   PGY-1, Internal Medicine  This is a preliminary note, please await attending attestation for final A/P    Subjective     Patient seen and examined. No acute overnight events.  Patient continues to endorse right-sided abdominal pain rating to the back.  On exam she is severely tender in the right 10th rib posteriorly and in the mid scapular region.  She says that this pain started after vomiting.      Objective       Physical Exam:  General:  Pleasant and cooperative. No apparent distress.  HEENT:  Normocephalic, atraumatic  Chest:  Clear to auscultation bilaterally. No wheezes, rales, or rhonchi. severely tender in the right 10th rib posteriorly and in the mid scapular region  CV:  Regular rate and rhythm. No murmurs    Abdomen: Abdomen is soft, non-tender, non-distended. BS +   Extremities:  No lower extremity edema or cyanosis.   Neurological:  AAOx3. No focal deficits.  Skin:  Warm and dry.    ROS   Constitutional:  No Weight Change, No Fever, No Chills, No Night Sweats, No Fatigue, No Malaise   ENT/Mouth:  No Hearing Changes, No Ear Pain, No Nasal Congestion, No  Sinus Pain, No Hoarseness, No sore throat, No Rhinorrhea, No Swallowing  Difficulty   Eyes:  No Eye Pain, No Swelling, No Redness, No Foreign Body, No Discharge, No Vision Changes   Cardiovascular:  No Chest Pain, No SOB, No PND, No Dyspnea on  "Exertion,  No Orthopnea, No Claudication, No Edema, No Palpitations   Respiratory:  No Cough, No Sputum, No Wheezing, No Smoke Exposure, No Dyspnea   Gastrointestinal:  No Nausea, No Vomiting, No Diarrhea, No  Constipation, No Pain, No Heartburn, No Anorexia, No Dysphagia, No  Hematochezia, No Melena, No Flatulence, No Jaundice   Genitourinary:  No Dysmenorrhea, No DUB, No Dyspareunia, No Dysuria, No  Urinary Frequency, No Hematuria, No Urinary Incontinence, No Urgency,  No Flank Pain, No Urinary Flow Changes, No Hesitancy   Musculoskeletal:  No Arthralgias, No Myalgias, No Joint Swelling, No  Joint Stiffness, No Back Pain, No Neck Pain, No Injury History   Skin:  No Skin Lesions, No Pruritis, No Hair Changes, No Breast/Skin Changes, No Nipple Discharge   Neuro:  No Weakness, No Numbness, No Paresthesias, No Loss of  Consciousness, No Syncope, No Dizziness, No Headache, No Coordination  Changes, No Recent Falls   Psych:  No Anxiety/Panic, No Depression, No Insomnia, No Personality  Changes, No Delusions, No Rumination, No SI/HI/AH/VH, No Social Issues,  No Memory Changes, No Violence/Abuse Hx., No Eating Concerns   Heme/Lymph:  No Bruising, No Bleeding, No Transfusions History, No Lymphadenopathy   Endocrine:  No Polyuria, No Polydipsia, No Temperature Intolerance    Last Recorded Vitals  Blood pressure 148/67, pulse 101, temperature 36.9 °C (98.4 °F), temperature source Temporal, resp. rate 18, height 1.727 m (5' 8\"), weight 104 kg (230 lb), SpO2 90%.  Intake/Output last 3 Shifts:  I/O last 3 completed shifts:  In: 1050 (10.1 mL/kg) [IV Piggyback:1050]  Out: - (0 mL/kg)   Weight: 104.3 kg     Last CBC & BMP  Lab Results   Component Value Date    GLUCOSE 187 (H) 04/09/2025    CALCIUM 10.1 04/09/2025     (L) 04/09/2025    K 3.9 04/09/2025    CO2 19 (L) 04/09/2025    CL 98 04/09/2025    BUN 21 04/09/2025    CREATININE 1.81 (H) 04/09/2025     Lab Results   Component Value Date    WBC 10.1 04/09/2025    HGB 12.0 " 04/09/2025    HCT 38.2 04/09/2025    MCV 91 04/09/2025     04/09/2025

## 2025-04-10 NOTE — CONSULTS
Reason For Consult  Nausea vomiting abnormal CT of the abdomen and pelvis    History Of Present Illness  Jordyn Neal is a 70 y.o. female presenting with sudden onset of severe upper abdominal pain localizing to the back and right lower flank region after eating a pizza last p.m..  Patient denies any abdominal pain prior to this point.  There is no history of weight loss no change in bowel habits.  CT imaging demonstrated enlarged intra-abdominal adenopathy particularly in the region of the tim hepatis with compression on the common bile duct and question of cystic duct.  She denies any dark urine acholic stools.  Noted that the patient's mother  from pancreatic cancer.  She has a son with leukemia.     Past Medical History  She has a past medical history of Personal history of other benign neoplasm.    Surgical History  She has a past surgical history that includes Other surgical history (2022) and Other surgical history (2020).     Social History  She reports that she has never smoked. She has never used smokeless tobacco. She reports current alcohol use. She reports that she does not currently use drugs.    Family History  No family history on file.     Allergies  Patient has no known allergies.    Review of Systems  10 review of systems otherwise negative     Physical Exam  Obese  GENERAL  MENTAL STATUS - Alert. GENERAL APPEARANCE - Cooperative and well groomed. ORIENTATION - Oriented X4. BUILD & NUTRITION - Well nourished and well developed. HYDRATION - Well hydrated.    INTEGUMENTARY  GENERAL CHARACTERISTICS - Overall examination of the patient's skin reveals no rashes. COLOR - not icteric. SKIN MOISTURE - normal skin moisture. TEMPERATURE - normal worth is noted.    HEAD AND NECK  HEAD  HEAD SHAPE - Atraumatic and normocephalic.  TRACHEA - midline.  THYROID  GLAND CHARACTERISTICS - normal size and consistency and no palpable nodules.    EYE  SCLERA/CONJUNCTIVA - BILATERAL -  "Anicteric.    CHEST AND LUNG EXAM  AUSCULTATION -  BREATH SOUNDS - Clear.    BREAST - Did not examine.    CARDIOVASCULAR  AUSCULTATION: RHYTHM - Regular. HEART SOUNDS - Normal heart sounds.  MURMURS & OTHER HEART SOUNDS - Auscultation of the heart reveals regular rate and no murmurs.    ABDOMEN  PALPATION/PERCUSSION - Palpation and percussion of the abdomen reveal soft, non tender, no mass and no hepatosplenomegaly.  Tenderness is in right flank and lower.  No abdominal tenderness    LYMPHATIC  GENERAL LYMPHATICS  BREAST LYMPHATIC EXAM - No cervical adenopathy, supraclavicular adenopathy or axilliary adenopathy.        Last Recorded Vitals  Blood pressure 162/75, pulse 98, temperature 36.9 °C (98.4 °F), temperature source Temporal, resp. rate 18, height 1.727 m (5' 8\"), weight 99.9 kg (220 lb 3.8 oz), SpO2 90%.    Relevant Results  Admitting laboratory studies demonstrate a BUN and creatinine of 21 and 1.8    Plan is 1.1 with mild elevation of her transaminases at 47 and 67.  Alkaline phosphatase is 240  WBC 10.1 with H&H of 12 and 38.  Reviewed the CT imaging, MRCP pending  Assessment/Plan     High level of concern for possible cholangiocarcinoma or hepatocellular carcinoma with multiple enlarged nodes.  Awaiting MRI.  I have discussed the findings with gastroenterology.  Pending results of MR, possible biopsy by EGD with EUS versus ERCP possible stent placement to prevent obstruction of the liver.  There is no acute surgical indication at this point.    I spent 40 minutes in the professional and overall care of this patient.      Dirk Dominguez MD    "

## 2025-04-10 NOTE — CONSULTS
"Patient ID: : Jordyn Neal            Primary Care Provider: Kit Fleming MD    REASON FOR CONSULT:  Hepatic lesions suspicious for malignancy    HISTORY OF PRESENT ILLNESS:  Jordyn Neal is a 70 y.o. female with past medical history of HTN, T2DM, HLD, fatty liver, GERD, CKD Stage III who presented to [facility] ED on 4/10/25 with nausea, vomiting and RLQ abdominal pain. Patient mentions that the pain began last night. She had dinner around 5:30pm and 3 hours later at 8:30pm she started to have right-sided abdominal pain that radiated to her back with nausea and vomiting. She denies any hematemesis. She had a normal bowel movement earlier in the day and has been passing gas.     Patient notes that she has had a poor appetite for several weeks and worsening dyspnea that started in January.        INTERVAL HISTORY:  Patient denies any new complaints.  She endorses persistent right flank pain, lower back pain, and nausea which she feels is being addressed at this time.    PAST MEDICAL HISTORY:  Fibroid tumors of the thigh, breast, uterus (removed in  & )  T2DM  Fatty liver  GERD  CKD 3  HTN/HLD    SOCIAL HISTORY:   Patient was born and raised in [place], no notable occupational exposure. She denies any history of tobacco or alcohol use.    FAMILY HISTORY:  Son, leukemia,    Sister, uterine cancer,  at age 70  Mother, pancreatic cancer,  at age 84      REVIEW OF SYSTEMS:   Pertinent finding as per the history above.  All other systems have been reviewed and generally negative and noncontributory.    VITALS:  BSA: 2.23 meters squared  /75 (BP Location: Left arm, Patient Position: Lying)   Pulse 98   Temp 36.9 °C (98.4 °F) (Temporal)   Resp 18   Ht 1.727 m (5' 8\")   Wt 104 kg (230 lb)   SpO2 90%   BMI 34.97 kg/m²     PHYSICAL EXAMINATION:  General: alert and oriented. No acute distress.  HEENT: oral mucosa moist, EOMI. No JVD.   CHEST: clear breath sounds, no crackles, " no wheeze, no tachypnea.  CVS: regular rate and rhythm, no murmurs.   ABD: Soft, Non Tender, BS present.  EXT: no edema.  SKIN: no rash.  NEURO: Nonfocal grossly.      RELEVANT RESULTS:  Results from last 7 days   Lab Units 04/09/25  2259   WBC AUTO x10*3/uL 10.1   HEMOGLOBIN g/dL 12.0   HEMATOCRIT % 38.2   PLATELETS AUTO x10*3/uL 278     Results from last 7 days   Lab Units 04/09/25  2259   SODIUM mmol/L 134*   POTASSIUM mmol/L 3.9   CHLORIDE mmol/L 98   CO2 mmol/L 19*   BUN mg/dL 21   CREATININE mg/dL 1.81*   CALCIUM mg/dL 10.1   PROTEIN TOTAL g/dL 7.4   BILIRUBIN TOTAL mg/dL 1.1   ALK PHOS U/L 240*   ALT U/L 47*   AST U/L 67*   GLUCOSE mg/dL 187*       CT abdomen pelvis with contrast shows an ill-defined hepatic mass 6.2 x 6.8 cm, tim hepatis, upper abdominal, retroperitoneal lymphadenopathy, mild heterogeneity/thickening of the endometrium.    ASSESSMENT / PLAN:    Hepatic mass concerning for malignancy.  Tumor marker workup ordered with CEA, , CA 27-29, CA 19-9, and AFP.  Continue supportive care for nausea, vomiting, and pain at this time.    Endometrial thickening with Hx of uterine fibroid s/p removal.  Pelvic ultrasound ordered to further characterize area.    This is a preliminary note written by the resident. Please wait for attending addendum for finalization of note and recommendations.    Sandro Sagastume MD  PGY-1

## 2025-04-11 ENCOUNTER — APPOINTMENT (OUTPATIENT)
Dept: RADIOLOGY | Facility: HOSPITAL | Age: 70
DRG: 103 | End: 2025-04-11
Payer: MEDICARE

## 2025-04-11 ENCOUNTER — HOSPITAL ENCOUNTER (INPATIENT)
Facility: HOSPITAL | Age: 70
DRG: 423 | End: 2025-04-11
Attending: INTERNAL MEDICINE | Admitting: INTERNAL MEDICINE
Payer: MEDICARE

## 2025-04-11 ENCOUNTER — APPOINTMENT (OUTPATIENT)
Dept: CARDIOLOGY | Facility: HOSPITAL | Age: 70
DRG: 423 | End: 2025-04-11
Payer: MEDICARE

## 2025-04-11 VITALS
DIASTOLIC BLOOD PRESSURE: 87 MMHG | BODY MASS INDEX: 33.38 KG/M2 | OXYGEN SATURATION: 95 % | TEMPERATURE: 99.1 F | SYSTOLIC BLOOD PRESSURE: 131 MMHG | HEART RATE: 114 BPM | WEIGHT: 220.24 LBS | HEIGHT: 68 IN | RESPIRATION RATE: 18 BRPM

## 2025-04-11 DIAGNOSIS — R16.0 LIVER MASS: Primary | ICD-10-CM

## 2025-04-11 DIAGNOSIS — E11.9 TYPE 2 DIABETES MELLITUS WITHOUT COMPLICATION, WITHOUT LONG-TERM CURRENT USE OF INSULIN: ICD-10-CM

## 2025-04-11 PROBLEM — D72.829 LEUKOCYTOSIS: Status: ACTIVE | Noted: 2025-04-11

## 2025-04-11 PROBLEM — K81.9 CHOLECYSTITIS: Status: ACTIVE | Noted: 2025-04-11

## 2025-04-11 PROBLEM — N17.9 ACUTE KIDNEY INJURY SUPERIMPOSED ON CKD: Status: ACTIVE | Noted: 2025-04-11

## 2025-04-11 PROBLEM — G43.A1: Status: RESOLVED | Noted: 2025-04-10 | Resolved: 2025-04-11

## 2025-04-11 PROBLEM — R59.0 RETROPERITONEAL LYMPHADENOPATHY: Status: ACTIVE | Noted: 2025-04-11

## 2025-04-11 PROBLEM — N18.9 ACUTE KIDNEY INJURY SUPERIMPOSED ON CKD: Status: ACTIVE | Noted: 2025-04-11

## 2025-04-11 LAB
ALBUMIN SERPL BCP-MCNC: 3.8 G/DL (ref 3.4–5)
ALP SERPL-CCNC: 213 U/L (ref 33–136)
ALT SERPL W P-5'-P-CCNC: 39 U/L (ref 7–45)
ANION GAP SERPL CALC-SCNC: 14 MMOL/L (ref 10–20)
AST SERPL W P-5'-P-CCNC: 69 U/L (ref 9–39)
BACTERIA UR CULT: NORMAL
BILIRUB SERPL-MCNC: 1.9 MG/DL (ref 0–1.2)
BUN SERPL-MCNC: 17 MG/DL (ref 6–23)
CALCIUM SERPL-MCNC: 10.1 MG/DL (ref 8.6–10.3)
CHLORIDE SERPL-SCNC: 98 MMOL/L (ref 98–107)
CO2 SERPL-SCNC: 26 MMOL/L (ref 21–32)
CREAT SERPL-MCNC: 1.45 MG/DL (ref 0.5–1.05)
EGFRCR SERPLBLD CKD-EPI 2021: 39 ML/MIN/1.73M*2
ERYTHROCYTE [DISTWIDTH] IN BLOOD BY AUTOMATED COUNT: 13.2 % (ref 11.5–14.5)
FLUAV RNA RESP QL NAA+PROBE: NOT DETECTED
FLUBV RNA RESP QL NAA+PROBE: NOT DETECTED
GLUCOSE BLD MANUAL STRIP-MCNC: 128 MG/DL (ref 74–99)
GLUCOSE BLD MANUAL STRIP-MCNC: 143 MG/DL (ref 74–99)
GLUCOSE BLD MANUAL STRIP-MCNC: 145 MG/DL (ref 74–99)
GLUCOSE BLD MANUAL STRIP-MCNC: 148 MG/DL (ref 74–99)
GLUCOSE SERPL-MCNC: 137 MG/DL (ref 74–99)
HCT VFR BLD AUTO: 38 % (ref 36–46)
HGB BLD-MCNC: 12.1 G/DL (ref 12–16)
MAGNESIUM SERPL-MCNC: 1.87 MG/DL (ref 1.6–2.4)
MCH RBC QN AUTO: 29.1 PG (ref 26–34)
MCHC RBC AUTO-ENTMCNC: 31.8 G/DL (ref 32–36)
MCV RBC AUTO: 91 FL (ref 80–100)
NRBC BLD-RTO: 0 /100 WBCS (ref 0–0)
PLATELET # BLD AUTO: 262 X10*3/UL (ref 150–450)
POTASSIUM SERPL-SCNC: 4 MMOL/L (ref 3.5–5.3)
PROT SERPL-MCNC: 7.2 G/DL (ref 6.4–8.2)
RBC # BLD AUTO: 4.16 X10*6/UL (ref 4–5.2)
SARS-COV-2 RNA RESP QL NAA+PROBE: NOT DETECTED
SODIUM SERPL-SCNC: 134 MMOL/L (ref 136–145)
WBC # BLD AUTO: 15.9 X10*3/UL (ref 4.4–11.3)

## 2025-04-11 PROCEDURE — 36415 COLL VENOUS BLD VENIPUNCTURE: CPT

## 2025-04-11 PROCEDURE — 82947 ASSAY GLUCOSE BLOOD QUANT: CPT

## 2025-04-11 PROCEDURE — 2500000001 HC RX 250 WO HCPCS SELF ADMINISTERED DRUGS (ALT 637 FOR MEDICARE OP)

## 2025-04-11 PROCEDURE — 99223 1ST HOSP IP/OBS HIGH 75: CPT | Performed by: INTERNAL MEDICINE

## 2025-04-11 PROCEDURE — 36415 COLL VENOUS BLD VENIPUNCTURE: CPT | Performed by: NURSE PRACTITIONER

## 2025-04-11 PROCEDURE — 2500000004 HC RX 250 GENERAL PHARMACY W/ HCPCS (ALT 636 FOR OP/ED): Performed by: NURSE PRACTITIONER

## 2025-04-11 PROCEDURE — 2500000002 HC RX 250 W HCPCS SELF ADMINISTERED DRUGS (ALT 637 FOR MEDICARE OP, ALT 636 FOR OP/ED)

## 2025-04-11 PROCEDURE — 83735 ASSAY OF MAGNESIUM: CPT

## 2025-04-11 PROCEDURE — 99231 SBSQ HOSP IP/OBS SF/LOW 25: CPT | Performed by: SURGERY

## 2025-04-11 PROCEDURE — 99232 SBSQ HOSP IP/OBS MODERATE 35: CPT | Performed by: NURSE PRACTITIONER

## 2025-04-11 PROCEDURE — 85027 COMPLETE CBC AUTOMATED: CPT

## 2025-04-11 PROCEDURE — 1200000002 HC GENERAL ROOM WITH TELEMETRY DAILY

## 2025-04-11 PROCEDURE — 71045 X-RAY EXAM CHEST 1 VIEW: CPT

## 2025-04-11 PROCEDURE — 2500000001 HC RX 250 WO HCPCS SELF ADMINISTERED DRUGS (ALT 637 FOR MEDICARE OP): Performed by: NURSE PRACTITIONER

## 2025-04-11 PROCEDURE — 71045 X-RAY EXAM CHEST 1 VIEW: CPT | Performed by: RADIOLOGY

## 2025-04-11 PROCEDURE — 99233 SBSQ HOSP IP/OBS HIGH 50: CPT

## 2025-04-11 PROCEDURE — 71110 X-RAY EXAM RIBS BIL 3 VIEWS: CPT

## 2025-04-11 PROCEDURE — 93010 ELECTROCARDIOGRAM REPORT: CPT | Performed by: INTERNAL MEDICINE

## 2025-04-11 PROCEDURE — 84145 PROCALCITONIN (PCT): CPT | Mod: STJLAB | Performed by: NURSE PRACTITIONER

## 2025-04-11 PROCEDURE — 93005 ELECTROCARDIOGRAM TRACING: CPT

## 2025-04-11 PROCEDURE — 84075 ASSAY ALKALINE PHOSPHATASE: CPT

## 2025-04-11 PROCEDURE — 71110 X-RAY EXAM RIBS BIL 3 VIEWS: CPT | Performed by: RADIOLOGY

## 2025-04-11 PROCEDURE — 2500000004 HC RX 250 GENERAL PHARMACY W/ HCPCS (ALT 636 FOR OP/ED)

## 2025-04-11 PROCEDURE — 87636 SARSCOV2 & INF A&B AMP PRB: CPT | Performed by: NURSE PRACTITIONER

## 2025-04-11 PROCEDURE — 99231 SBSQ HOSP IP/OBS SF/LOW 25: CPT

## 2025-04-11 RX ORDER — METFORMIN HYDROCHLORIDE 500 MG/1
500 TABLET ORAL
Status: DISCONTINUED | OUTPATIENT
Start: 2025-04-11 | End: 2025-04-15 | Stop reason: HOSPADM

## 2025-04-11 RX ORDER — ACETAMINOPHEN 325 MG/1
650 TABLET ORAL ONCE
Status: COMPLETED | OUTPATIENT
Start: 2025-04-11 | End: 2025-04-11

## 2025-04-11 RX ORDER — AMLODIPINE BESYLATE 10 MG/1
10 TABLET ORAL DAILY
Status: DISCONTINUED | OUTPATIENT
Start: 2025-04-11 | End: 2025-04-15 | Stop reason: HOSPADM

## 2025-04-11 RX ORDER — METOCLOPRAMIDE HYDROCHLORIDE 5 MG/ML
10 INJECTION INTRAMUSCULAR; INTRAVENOUS ONCE
Status: COMPLETED | OUTPATIENT
Start: 2025-04-11 | End: 2025-04-11

## 2025-04-11 RX ORDER — POLYETHYLENE GLYCOL 3350 17 G/17G
17 POWDER, FOR SOLUTION ORAL DAILY PRN
Status: DISCONTINUED | OUTPATIENT
Start: 2025-04-11 | End: 2025-04-15 | Stop reason: HOSPADM

## 2025-04-11 RX ORDER — INSULIN LISPRO 100 [IU]/ML
0-5 INJECTION, SOLUTION INTRAVENOUS; SUBCUTANEOUS
Status: DISCONTINUED | OUTPATIENT
Start: 2025-04-11 | End: 2025-04-11 | Stop reason: HOSPADM

## 2025-04-11 RX ORDER — TALC
3 POWDER (GRAM) TOPICAL NIGHTLY PRN
Status: DISCONTINUED | OUTPATIENT
Start: 2025-04-11 | End: 2025-04-15 | Stop reason: HOSPADM

## 2025-04-11 RX ORDER — PANTOPRAZOLE SODIUM 40 MG/10ML
40 INJECTION, POWDER, LYOPHILIZED, FOR SOLUTION INTRAVENOUS
Status: DISCONTINUED | OUTPATIENT
Start: 2025-04-12 | End: 2025-04-15 | Stop reason: HOSPADM

## 2025-04-11 RX ORDER — MELOXICAM 15 MG/1
15 TABLET ORAL DAILY
Status: DISCONTINUED | OUTPATIENT
Start: 2025-04-11 | End: 2025-04-15 | Stop reason: HOSPADM

## 2025-04-11 RX ORDER — HEPARIN SODIUM 5000 [USP'U]/ML
5000 INJECTION, SOLUTION INTRAVENOUS; SUBCUTANEOUS EVERY 8 HOURS
Status: DISCONTINUED | OUTPATIENT
Start: 2025-04-11 | End: 2025-04-15 | Stop reason: HOSPADM

## 2025-04-11 RX ORDER — INSULIN LISPRO 100 [IU]/ML
0-5 INJECTION, SOLUTION INTRAVENOUS; SUBCUTANEOUS
Status: DISCONTINUED | OUTPATIENT
Start: 2025-04-11 | End: 2025-04-15 | Stop reason: HOSPADM

## 2025-04-11 RX ORDER — DEXTROSE 50 % IN WATER (D50W) INTRAVENOUS SYRINGE
25
Status: DISCONTINUED | OUTPATIENT
Start: 2025-04-11 | End: 2025-04-15 | Stop reason: HOSPADM

## 2025-04-11 RX ORDER — DEXTROSE 50 % IN WATER (D50W) INTRAVENOUS SYRINGE
12.5
Status: DISCONTINUED | OUTPATIENT
Start: 2025-04-11 | End: 2025-04-15 | Stop reason: HOSPADM

## 2025-04-11 RX ORDER — PANTOPRAZOLE SODIUM 40 MG/1
40 TABLET, DELAYED RELEASE ORAL
Status: DISCONTINUED | OUTPATIENT
Start: 2025-04-12 | End: 2025-04-15 | Stop reason: HOSPADM

## 2025-04-11 RX ORDER — SIMVASTATIN 20 MG/1
10 TABLET, FILM COATED ORAL NIGHTLY
Status: DISCONTINUED | OUTPATIENT
Start: 2025-04-11 | End: 2025-04-15 | Stop reason: HOSPADM

## 2025-04-11 RX ORDER — ACETAMINOPHEN 325 MG/1
650 TABLET ORAL EVERY 6 HOURS PRN
Status: DISCONTINUED | OUTPATIENT
Start: 2025-04-11 | End: 2025-04-11 | Stop reason: HOSPADM

## 2025-04-11 RX ORDER — INSULIN LISPRO 100 [IU]/ML
0-5 INJECTION, SOLUTION INTRAVENOUS; SUBCUTANEOUS EVERY 6 HOURS
Status: DISCONTINUED | OUTPATIENT
Start: 2025-04-11 | End: 2025-04-11

## 2025-04-11 RX ORDER — HYDROCHLOROTHIAZIDE 25 MG/1
25 TABLET ORAL DAILY
Status: DISCONTINUED | OUTPATIENT
Start: 2025-04-11 | End: 2025-04-15 | Stop reason: HOSPADM

## 2025-04-11 RX ORDER — PAROXETINE HYDROCHLORIDE 20 MG/1
20 TABLET, FILM COATED ORAL DAILY
Status: DISCONTINUED | OUTPATIENT
Start: 2025-04-12 | End: 2025-04-15 | Stop reason: HOSPADM

## 2025-04-11 RX ORDER — SODIUM CHLORIDE 9 MG/ML
100 INJECTION, SOLUTION INTRAVENOUS CONTINUOUS
Status: ACTIVE | OUTPATIENT
Start: 2025-04-11 | End: 2025-04-12

## 2025-04-11 RX ORDER — LOSARTAN POTASSIUM 100 MG/1
100 TABLET ORAL DAILY
Status: DISCONTINUED | OUTPATIENT
Start: 2025-04-11 | End: 2025-04-15 | Stop reason: HOSPADM

## 2025-04-11 RX ORDER — FLUTICASONE PROPIONATE 50 MCG
1 SPRAY, SUSPENSION (ML) NASAL DAILY
Status: DISCONTINUED | OUTPATIENT
Start: 2025-04-11 | End: 2025-04-15 | Stop reason: HOSPADM

## 2025-04-11 RX ADMIN — SODIUM CHLORIDE 100 ML/HR: 9 INJECTION, SOLUTION INTRAVENOUS at 15:57

## 2025-04-11 RX ADMIN — PANTOPRAZOLE SODIUM 40 MG: 40 TABLET, DELAYED RELEASE ORAL at 05:14

## 2025-04-11 RX ADMIN — ACETAMINOPHEN 650 MG: 325 TABLET ORAL at 22:02

## 2025-04-11 RX ADMIN — PIPERACILLIN SODIUM AND TAZOBACTAM SODIUM 2.25 G: 2; .25 INJECTION, SOLUTION INTRAVENOUS at 00:14

## 2025-04-11 RX ADMIN — AMLODIPINE BESYLATE 10 MG: 10 TABLET ORAL at 16:28

## 2025-04-11 RX ADMIN — PIPERACILLIN SODIUM AND TAZOBACTAM SODIUM 2.25 G: 2; .25 INJECTION, SOLUTION INTRAVENOUS at 05:14

## 2025-04-11 RX ADMIN — HEPARIN SODIUM 5000 UNITS: 5000 INJECTION, SOLUTION INTRAVENOUS; SUBCUTANEOUS at 23:01

## 2025-04-11 RX ADMIN — METOCLOPRAMIDE 10 MG: 5 INJECTION, SOLUTION INTRAMUSCULAR; INTRAVENOUS at 22:02

## 2025-04-11 RX ADMIN — HEPARIN SODIUM 5000 UNITS: 5000 INJECTION, SOLUTION INTRAVENOUS; SUBCUTANEOUS at 15:57

## 2025-04-11 RX ADMIN — PIPERACILLIN SODIUM AND TAZOBACTAM SODIUM 3.38 G: 3; .375 INJECTION, SOLUTION INTRAVENOUS at 19:48

## 2025-04-11 RX ADMIN — PAROXETINE HYDROCHLORIDE 20 MG: 20 TABLET, FILM COATED ORAL at 08:09

## 2025-04-11 SDOH — SOCIAL STABILITY: SOCIAL INSECURITY: ABUSE: ADULT

## 2025-04-11 SDOH — SOCIAL STABILITY: SOCIAL INSECURITY: HAVE YOU HAD ANY THOUGHTS OF HARMING ANYONE ELSE?: NO

## 2025-04-11 SDOH — SOCIAL STABILITY: SOCIAL INSECURITY: WITHIN THE LAST YEAR, HAVE YOU BEEN AFRAID OF YOUR PARTNER OR EX-PARTNER?: NO

## 2025-04-11 SDOH — SOCIAL STABILITY: SOCIAL INSECURITY: WERE YOU ABLE TO COMPLETE ALL THE BEHAVIORAL HEALTH SCREENINGS?: YES

## 2025-04-11 SDOH — SOCIAL STABILITY: SOCIAL INSECURITY: WITHIN THE LAST YEAR, HAVE YOU BEEN HUMILIATED OR EMOTIONALLY ABUSED IN OTHER WAYS BY YOUR PARTNER OR EX-PARTNER?: NO

## 2025-04-11 SDOH — SOCIAL STABILITY: SOCIAL INSECURITY: ARE THERE ANY APPARENT SIGNS OF INJURIES/BEHAVIORS THAT COULD BE RELATED TO ABUSE/NEGLECT?: NO

## 2025-04-11 SDOH — SOCIAL STABILITY: SOCIAL INSECURITY: ARE YOU OR HAVE YOU BEEN THREATENED OR ABUSED PHYSICALLY, EMOTIONALLY, OR SEXUALLY BY ANYONE?: NO

## 2025-04-11 SDOH — SOCIAL STABILITY: SOCIAL INSECURITY: DO YOU FEEL ANYONE HAS EXPLOITED OR TAKEN ADVANTAGE OF YOU FINANCIALLY OR OF YOUR PERSONAL PROPERTY?: NO

## 2025-04-11 SDOH — ECONOMIC STABILITY: FOOD INSECURITY: WITHIN THE PAST 12 MONTHS, THE FOOD YOU BOUGHT JUST DIDN'T LAST AND YOU DIDN'T HAVE MONEY TO GET MORE.: PATIENT DECLINED

## 2025-04-11 SDOH — SOCIAL STABILITY: SOCIAL INSECURITY: HAS ANYONE EVER THREATENED TO HURT YOUR FAMILY OR YOUR PETS?: NO

## 2025-04-11 SDOH — SOCIAL STABILITY: SOCIAL INSECURITY: DO YOU FEEL UNSAFE GOING BACK TO THE PLACE WHERE YOU ARE LIVING?: NO

## 2025-04-11 SDOH — SOCIAL STABILITY: SOCIAL INSECURITY: HAVE YOU HAD THOUGHTS OF HARMING ANYONE ELSE?: NO

## 2025-04-11 SDOH — SOCIAL STABILITY: SOCIAL INSECURITY: DOES ANYONE TRY TO KEEP YOU FROM HAVING/CONTACTING OTHER FRIENDS OR DOING THINGS OUTSIDE YOUR HOME?: NO

## 2025-04-11 ASSESSMENT — LIFESTYLE VARIABLES
HOW OFTEN DO YOU HAVE 6 OR MORE DRINKS ON ONE OCCASION: NEVER
SKIP TO QUESTIONS 9-10: 1
AUDIT-C TOTAL SCORE: 0
HOW OFTEN DO YOU HAVE A DRINK CONTAINING ALCOHOL: NEVER
AUDIT-C TOTAL SCORE: 0
HOW MANY STANDARD DRINKS CONTAINING ALCOHOL DO YOU HAVE ON A TYPICAL DAY: PATIENT DOES NOT DRINK

## 2025-04-11 ASSESSMENT — COGNITIVE AND FUNCTIONAL STATUS - GENERAL
DAILY ACTIVITIY SCORE: 24
PATIENT BASELINE BEDBOUND: NO
MOBILITY SCORE: 21
STANDING UP FROM CHAIR USING ARMS: A LITTLE
DAILY ACTIVITIY SCORE: 24
CLIMB 3 TO 5 STEPS WITH RAILING: A LITTLE
WALKING IN HOSPITAL ROOM: A LITTLE
MOBILITY SCORE: 24

## 2025-04-11 ASSESSMENT — ACTIVITIES OF DAILY LIVING (ADL)
GROOMING: INDEPENDENT
ASSISTIVE_DEVICE: EYEGLASSES
TOILETING: INDEPENDENT
HEARING - RIGHT EAR: FUNCTIONAL
ADEQUATE_TO_COMPLETE_ADL: YES
WALKS IN HOME: INDEPENDENT
HEARING - LEFT EAR: FUNCTIONAL
FEEDING YOURSELF: INDEPENDENT
BATHING: INDEPENDENT
PATIENT'S MEMORY ADEQUATE TO SAFELY COMPLETE DAILY ACTIVITIES?: YES
DRESSING YOURSELF: INDEPENDENT
LACK_OF_TRANSPORTATION: PATIENT DECLINED
JUDGMENT_ADEQUATE_SAFELY_COMPLETE_DAILY_ACTIVITIES: YES

## 2025-04-11 ASSESSMENT — PATIENT HEALTH QUESTIONNAIRE - PHQ9
SUM OF ALL RESPONSES TO PHQ9 QUESTIONS 1 & 2: 0
2. FEELING DOWN, DEPRESSED OR HOPELESS: NOT AT ALL
1. LITTLE INTEREST OR PLEASURE IN DOING THINGS: NOT AT ALL

## 2025-04-11 ASSESSMENT — PAIN SCALES - GENERAL
PAINLEVEL_OUTOF10: 5 - MODERATE PAIN
PAINLEVEL_OUTOF10: 8
PAINLEVEL_OUTOF10: 0 - NO PAIN

## 2025-04-11 ASSESSMENT — PAIN DESCRIPTION - LOCATION: LOCATION: HEAD

## 2025-04-11 ASSESSMENT — PAIN - FUNCTIONAL ASSESSMENT: PAIN_FUNCTIONAL_ASSESSMENT: 0-10

## 2025-04-11 ASSESSMENT — COLUMBIA-SUICIDE SEVERITY RATING SCALE - C-SSRS
1. IN THE PAST MONTH, HAVE YOU WISHED YOU WERE DEAD OR WISHED YOU COULD GO TO SLEEP AND NOT WAKE UP?: NO
2. HAVE YOU ACTUALLY HAD ANY THOUGHTS OF KILLING YOURSELF?: NO
6. HAVE YOU EVER DONE ANYTHING, STARTED TO DO ANYTHING, OR PREPARED TO DO ANYTHING TO END YOUR LIFE?: NO

## 2025-04-11 NOTE — PROGRESS NOTES
"Patient ID: : Jordyn Neal              HEMATOLOGY ONCOLOGY PROBLEMS:  Hepatic mass concerning for malignancy  Endometrial thickening with Hx of uterine fibroid s/p removal    INTERVAL HISTORY:  Patient denies any new complaints. Her pain is improved from admission.    PAST MEDICAL / SOCIAL & FAMILY HISTORY:  Please refer to the details as outlined in the initial consult note.      REVIEW OF SYSTEMS:   Pertinent finding as per the history above.  All other systems have been reviewed and generally negative and noncontributory.    VITAL SIGNS  BSA: 2.19 meters squared  /71 (BP Location: Right arm, Patient Position: Lying)   Pulse 98   Temp 37.6 °C (99.7 °F) (Temporal)   Resp 16   Ht 1.727 m (5' 8\")   Wt 99.7 kg (219 lb 12.8 oz)   LMP  (LMP Unknown)   SpO2 97%   BMI 33.42 kg/m²     PHYSICAL EXAMINATION:  Detailed physical examination not done.      LAB DATA:  CBC shows leukocytosis 15.9 up from 10.1, otherwise unremarkable CBC. CMP unremarkable, elevated Cr and mild hyponatremia are stable at this time. CA 27-29 is elevated at 132.9,  elevated at 271.8, other tumor markers are WNL.      ASSESSMENT / PLAN:  Hepatic mass concerning for malignancy. Patient to be transferred to [facility] for EUS with ERCP.  Endometrial thickening. Pelvic US showed thickened and heterogenous endometrial stripe measuring up to 1.6 cm. Recommend follow up and monitoring regarding these findings after patient is transferred.    This is a preliminary note written by the resident. Please wait for attending addendum for finalization of note and recommendations.    Sandro Sagastume MD  PGY-1         " Thrombocytopenia

## 2025-04-11 NOTE — CONSULTS
Patient transferred from [facility] for ERCP/EUS 04/14.    Please refer to formal consult note completed 04/10    Kristy Porter MD

## 2025-04-11 NOTE — PROGRESS NOTES
Jordyn Neal is a 70 y.o. female on day 0 of admission presenting with Liver mass.      Subjective   Patient is a 70-year-old female with past medical history of hypertension, diabetes, HLD, fatty liver, GERD, and CKD stage III who presents to [facility] as a transfer from [facility].  Patient was originally admitted to [facility] on 4/10/25 with abdominal pain and vomiting that had been present for 1 day.  During my assessment patient denies all pain as well as denying nausea/vomiting today.  Symptoms have resolved.  Patient is currently being evaluated and managed for retroperitoneal lymphadenopathy, obstructive cholecystitis and liver mass.  While at [facility] patient was seen by oncology, GI, and surgery.  Plan is for patient to undergo ERCP with EUS with biopsy with GI on 4/14/2025.  Surgery at [facility] said there is no indication for surgery at this time.  Oncology at [facility] did begin with tumor markers.  Patient did develop a elevation of WBC today of 15.9 and per report had a fever during her time at [facility].  She does not currently have a fever.  Chest x-ray and UA were negative.  Blood culture showed no growth.  Patient is now at [facility] with plan for EUS and ERCP on 4/14/25.       Objective     Last Recorded Vitals  /71 (BP Location: Right arm, Patient Position: Lying)   Pulse 98   Temp 37.6 °C (99.7 °F) (Temporal)   Resp 16   Wt 99.7 kg (219 lb 12.8 oz)   SpO2 97%   Intake/Output last 3 Shifts:  No intake or output data in the 24 hours ending 04/11/25 1711    Admission Weight  Weight: 99.7 kg (219 lb 12.8 oz) (04/11/25 1500)    Daily Weight  04/11/25 : 99.7 kg (219 lb 12.8 oz)    Image Results  XR ribs 4 views bilateral  Narrative: Interpreted By:  Marty Hwang,   STUDY:  XR RIBS 4 VIEWS BILATERAL      INDICATION:  Signs/Symptoms:Concern for fracture in right 9th and 10th ribs.      COMPARISON:  Earlier the same day 8:49 p.m.      ACCESSION NUMBER(S):  [id number]       ORDERING CLINICIAN:  CRISTIANA TORRES      FINDINGS:  No definite fracture. No consolidation, effusion, edema, or  pneumothorax.      Impression: No evidence of rib fracture.      Signed by: Marty Hwang 4/11/2025 2:12 PM  Dictation workstation:   [id number]  XR chest 1 view  Narrative: Interpreted By:  Eugenia Rosales,   STUDY:  XR CHEST 1 VIEW 4/11/2025 8:50 am      INDICATION:  Signs/Symptoms:Fever, dyspnea      COMPARISON:  None available.      ACCESSION NUMBER(S):  [id number]      ORDERING CLINICIAN:  CRISTIANA TORRES      TECHNIQUE:  Semi-erect view of the chest at bedside      FINDINGS:  The cardiac size is within normal limits. The lungs are clear without  pleural abnormality.      There is postoperative change from reverse shoulder arthroplasty on  the right. Osteoarthritis of the left shoulder is present.      Impression: No acute cardiopulmonary disease.      Signed by: Eugenia Rosales 4/11/2025 11:00 AM  Dictation workstation:   [id number]      Physical Exam  Constitutional:       General: She is not in acute distress.     Appearance: Normal appearance. She is obese. She is not diaphoretic.   HENT:      Head: Normocephalic and atraumatic.      Nose: Nose normal. No congestion.      Mouth/Throat:      Mouth: Mucous membranes are moist.      Pharynx: Oropharynx is clear.   Eyes:      Extraocular Movements: Extraocular movements intact.      Conjunctiva/sclera: Conjunctivae normal.      Pupils: Pupils are equal, round, and reactive to light.   Cardiovascular:      Rate and Rhythm: Normal rate and regular rhythm.      Pulses: Normal pulses.      Heart sounds: Normal heart sounds. No murmur heard.  Pulmonary:      Effort: Pulmonary effort is normal. No respiratory distress.      Breath sounds: Normal breath sounds. No stridor. No wheezing, rhonchi or rales.      Comments: Reports shortness of breath with activity, currently on room air, no respiratory distress noted  Chest:      Chest wall: No tenderness.   Abdominal:       General: Bowel sounds are normal. There is no distension.      Palpations: Abdomen is soft.      Tenderness: There is no abdominal tenderness. There is no guarding or rebound.   Musculoskeletal:         General: No swelling or tenderness. Normal range of motion.      Cervical back: Normal range of motion and neck supple. No tenderness.      Right lower leg: No edema.      Left lower leg: No edema.   Skin:     General: Skin is warm and dry.      Capillary Refill: Capillary refill takes 2 to 3 seconds.      Coloration: Skin is pale.      Findings: No erythema, lesion or rash.   Neurological:      General: No focal deficit present.      Mental Status: She is alert and oriented to person, place, and time.   Psychiatric:         Mood and Affect: Mood normal.         Behavior: Behavior normal.         Relevant Results               Assessment/Plan                  Assessment & Plan  Liver mass    Cholecystitis    Retroperitoneal lymphadenopathy        Acute kidney injury superimposed on CKD    Leukocytosis    Plan:  - GI on consult for concern of obstructive cholecystitis, recommend abdominal exams, trend LFTs, monitor for fevers and elevation WBC.  During my assessment patient does not currently have a fever, nausea/vomiting, or abdominal pain.  - Surgery at [facility] said no surgical intervention at this time  - GI on consult and plans for EUS and ERCP on 4/14/25  - N.p.o. at midnight on 4/14/25  - Will need to hold a.m. dose of subcu heparin on 4/14/25  - Will continue Zosyn, infectious disease consulted per attending request  - Oncology on consult  - Tumor markers were sent at [facility]  - Diabetic diet  - Accu-Cheks with sliding scale ACHS  - Avoid nephrotoxins  - Continue home medications as appropriate multiple meds placed on hold due to CHIO  - Melatonin and MiraLAX as needed  -NS at 100 ml/hr for now  - SCD and heparin for VTE prophylaxis  - Vitals every 4 hours  - Cardiac monitoring          Bea Saha  APRN-CNP

## 2025-04-11 NOTE — CARE PLAN
1453: Pt arrived to floor from [facility] at this time.    EOS note: Pt transferred from [facility] this shift. No acute changes. Remains Aox4, room air, on tele- NSR/ST, SBA. No complaints of pain. Continued ACHS accu-checks with no coverage needed. NS running at 100 mL/hr at this time. Pt resting comfortably in bed with bed alarm on and call light within reach at this time.

## 2025-04-11 NOTE — PROGRESS NOTES
This note was created using voice recognition transcription software. Despite proofreading, unintentional typographical errors may be present. Please contact the GI office with any questions or concerns.       Subjective  Patient states she feels okay.  No nausea, vomiting fevers or chills but was told she had fevers overnight. No BM's but is passing gas.   at bedside.        Physical Exam:  General: Polite, calm, resting  Skin:  Warm and dry, mildly jaundice  HEENT: No scleral icterus, no conjunctival pallor, normocephalic, atraumatic, mucous membranes moist  Neck:  atraumatic, trachea midline, no JVD  Chest:  Clear to auscultation bilaterally. No wheezes, rales, or rhonchi  CV:  Regular rate and rhythm.  Positive S1/S2  Abdomen: Obese, no distension, +BS, soft, non-tender to palpation, no rebound tenderness, no guarding, no rigidity, no discernible ascites   Extremities: no lower extremity edema, chronic pigmentary changes, no cyanosis  Neurological:  A&Ox3, no asterixis  Psychiatric: cooperative     Investigations:  Labs, radiological imaging and cardiac work up were reviewed        Objective:         4/10/2025     8:38 AM 4/10/2025    11:31 AM 4/10/2025     2:23 PM 4/10/2025     3:30 PM 4/10/2025     8:36 PM 4/11/2025     3:05 AM 4/11/2025     7:41 AM   Vitals   Systolic 148 162  136 178 142 131   Diastolic 67 75  63 80 68 87   BP Location Left arm Left arm  Left arm Left arm Left arm Left arm   Heart Rate 101 98  100 103 105 114   Temp 36.9 °C (98.4 °F) 36.9 °C (98.4 °F)  37.2 °C (99 °F) 37.6 °C (99.7 °F) 37.4 °C (99.3 °F) 37.3 °C (99.1 °F)   Resp 18 18  18 18 18 18   Weight (lb)   220.24       BMI   33.49 kg/m2       BSA (m2)   2.19 m2              Medications:  [Held by provider] amLODIPine, 10 mg, oral, Daily  heparin (porcine), 5,000 Units, subcutaneous, q8h  [Held by provider] hydroCHLOROthiazide, 25 mg, oral, Daily  insulin lispro, 0-5 Units, subcutaneous, TID AC  [Held by provider] lisinopril, 40  mg, oral, Daily  pantoprazole, 40 mg, oral, Daily before breakfast  PARoxetine, 20 mg, oral, Daily  piperacillin-tazobactam, 2.25 g, intravenous, q6h  polyethylene glycol, 17 g, oral, Daily         Recent Results (from the past 72 hours)   CBC with Differential    Collection Time: 04/09/25 10:59 PM   Result Value Ref Range    WBC 10.1 4.4 - 11.3 x10*3/uL    nRBC 0.0 0.0 - 0.0 /100 WBCs    RBC 4.22 4.00 - 5.20 x10*6/uL    Hemoglobin 12.0 12.0 - 16.0 g/dL    Hematocrit 38.2 36.0 - 46.0 %    MCV 91 80 - 100 fL    MCH 28.4 26.0 - 34.0 pg    MCHC 31.4 (L) 32.0 - 36.0 g/dL    RDW 13.1 11.5 - 14.5 %    Platelets 278 150 - 450 x10*3/uL    Neutrophils % 71.2 40.0 - 80.0 %    Immature Granulocytes %, Automated 0.4 0.0 - 0.9 %    Lymphocytes % 19.0 13.0 - 44.0 %    Monocytes % 7.4 2.0 - 10.0 %    Eosinophils % 1.7 0.0 - 6.0 %    Basophils % 0.3 0.0 - 2.0 %    Neutrophils Absolute 7.18 1.20 - 7.70 x10*3/uL    Immature Granulocytes Absolute, Automated 0.04 0.00 - 0.70 x10*3/uL    Lymphocytes Absolute 1.92 1.20 - 4.80 x10*3/uL    Monocytes Absolute 0.75 0.10 - 1.00 x10*3/uL    Eosinophils Absolute 0.17 0.00 - 0.70 x10*3/uL    Basophils Absolute 0.03 0.00 - 0.10 x10*3/uL   Comprehensive Metabolic Panel    Collection Time: 04/09/25 10:59 PM   Result Value Ref Range    Glucose 187 (H) 74 - 99 mg/dL    Sodium 134 (L) 136 - 145 mmol/L    Potassium 3.9 3.5 - 5.3 mmol/L    Chloride 98 98 - 107 mmol/L    Bicarbonate 19 (L) 21 - 32 mmol/L    Anion Gap 21 (H) 10 - 20 mmol/L    Urea Nitrogen 21 6 - 23 mg/dL    Creatinine 1.81 (H) 0.50 - 1.05 mg/dL    eGFR 30 (L) >60 mL/min/1.73m*2    Calcium 10.1 8.6 - 10.3 mg/dL    Albumin 4.1 3.4 - 5.0 g/dL    Alkaline Phosphatase 240 (H) 33 - 136 U/L    Total Protein 7.4 6.4 - 8.2 g/dL    AST 67 (H) 9 - 39 U/L    Bilirubin, Total 1.1 0.0 - 1.2 mg/dL    ALT 47 (H) 7 - 45 U/L   Magnesium    Collection Time: 04/09/25 10:59 PM   Result Value Ref Range    Magnesium 1.57 (L) 1.60 - 2.40 mg/dL   Lipase     Collection Time: 04/09/25 10:59 PM   Result Value Ref Range    Lipase 60 9 - 82 U/L   Lavender Top    Collection Time: 04/09/25 11:04 PM   Result Value Ref Range    Extra Tube Hold for add-ons.    PST Top    Collection Time: 04/09/25 11:04 PM   Result Value Ref Range    Extra Tube Hold for add-ons.    Beta Hydroxybutyrate    Collection Time: 04/09/25 11:04 PM   Result Value Ref Range    Beta-Hydroxybutyrate 0.56 (H) 0.02 - 0.27 mmol/L   Urinalysis with Reflex Culture and Microscopic    Collection Time: 04/10/25 12:34 AM   Result Value Ref Range    Color, Urine Yellow Light-Yellow, Yellow, Dark-Yellow    Appearance, Urine Clear Clear    Specific Gravity, Urine 1.029 1.005 - 1.035    pH, Urine 6.0 5.0, 5.5, 6.0, 6.5, 7.0, 7.5, 8.0    Protein, Urine 30 (1+) (A) NEGATIVE, 10 (TRACE), 20 (TRACE) mg/dL    Glucose, Urine Normal Normal mg/dL    Blood, Urine 0.03 (TRACE) (A) NEGATIVE mg/dL    Ketones, Urine 20 (1+) (A) NEGATIVE mg/dL    Bilirubin, Urine NEGATIVE NEGATIVE mg/dL    Urobilinogen, Urine 4 (2+) (A) Normal mg/dL    Nitrite, Urine NEGATIVE NEGATIVE    Leukocyte Esterase, Urine 25 Keena/uL (A) NEGATIVE   Extra Urine Gray Tube    Collection Time: 04/10/25 12:34 AM   Result Value Ref Range    Extra Tube Hold for add-ons.    Microscopic Only, Urine    Collection Time: 04/10/25 12:34 AM   Result Value Ref Range    WBC, Urine 1-5 1-5, NONE /HPF    RBC, Urine 3-5 NONE, 1-2, 3-5 /HPF    Squamous Epithelial Cells, Urine 1-9 (SPARSE) Reference range not established. /HPF    Mucus, Urine FEW Reference range not established. /LPF    Hyaline Casts, Urine 3+ (A) NONE /LPF   Urine Culture    Collection Time: 04/10/25 12:34 AM    Specimen: Clean Catch/Voided; Urine   Result Value Ref Range    Urine Culture       Clinically insignificant growth based on current clinical standards.   Blood Culture    Collection Time: 04/10/25  7:45 AM    Specimen: Peripheral Venipuncture; Blood culture   Result Value Ref Range    Blood Culture No  growth at 1 day    Blood Culture    Collection Time: 04/10/25  7:45 AM    Specimen: Peripheral Venipuncture; Blood culture   Result Value Ref Range    Blood Culture No growth at 1 day    Lactate    Collection Time: 04/10/25  7:45 AM   Result Value Ref Range    Lactate 1.2 0.4 - 2.0 mmol/L   AFP Tumor Marker    Collection Time: 04/10/25  8:09 AM   Result Value Ref Range    Alpha-Fetoprotein 10 (H) 0 - 9 ng/mL   Cancer Antigen 19-9    Collection Time: 04/10/25  8:09 AM   Result Value Ref Range    Cancer AG 19-9 28.27 <35.00 U/mL   POCT GLUCOSE    Collection Time: 04/10/25  8:37 AM   Result Value Ref Range    POCT Glucose 132 (H) 74 - 99 mg/dL   Troponin I, High Sensitivity    Collection Time: 04/10/25  9:00 AM   Result Value Ref Range    Troponin I, High Sensitivity 7 0 - 13 ng/L   POCT GLUCOSE    Collection Time: 04/10/25 11:32 AM   Result Value Ref Range    POCT Glucose 124 (H) 74 - 99 mg/dL   CEA    Collection Time: 04/10/25 11:41 AM   Result Value Ref Range    Carcinoembryonic AG 3.1 ug/L       Collection Time: 04/10/25 11:41 AM   Result Value Ref Range    Cancer  271.8 (H) 0.0 - 30.2 U/mL   Cancer Antigen 27-29    Collection Time: 04/10/25 11:41 AM   Result Value Ref Range    CA 27.29 132.9 (H) 0.0 - 38.6 U/mL   Cancer Antigen 19-9    Collection Time: 04/10/25 11:41 AM   Result Value Ref Range    Cancer AG 19-9 27.20 <35.00 U/mL   AFP Tumor Marker    Collection Time: 04/10/25 11:41 AM   Result Value Ref Range    Alpha-Fetoprotein 9 0 - 9 ng/mL   SST TOP    Collection Time: 04/10/25 11:41 AM   Result Value Ref Range    Extra Tube Hold for add-ons.    POCT GLUCOSE    Collection Time: 04/10/25  4:45 PM   Result Value Ref Range    POCT Glucose 121 (H) 74 - 99 mg/dL   POCT GLUCOSE    Collection Time: 04/10/25 10:43 PM   Result Value Ref Range    POCT Glucose 114 (H) 74 - 99 mg/dL   POCT GLUCOSE    Collection Time: 04/11/25  2:08 AM   Result Value Ref Range    POCT Glucose 128 (H) 74 - 99 mg/dL   POCT  GLUCOSE    Collection Time: 04/11/25  6:13 AM   Result Value Ref Range    POCT Glucose 148 (H) 74 - 99 mg/dL   CBC    Collection Time: 04/11/25  7:57 AM   Result Value Ref Range    WBC 15.9 (H) 4.4 - 11.3 x10*3/uL    nRBC 0.0 0.0 - 0.0 /100 WBCs    RBC 4.16 4.00 - 5.20 x10*6/uL    Hemoglobin 12.1 12.0 - 16.0 g/dL    Hematocrit 38.0 36.0 - 46.0 %    MCV 91 80 - 100 fL    MCH 29.1 26.0 - 34.0 pg    MCHC 31.8 (L) 32.0 - 36.0 g/dL    RDW 13.2 11.5 - 14.5 %    Platelets 262 150 - 450 x10*3/uL   Comprehensive Metabolic Panel    Collection Time: 04/11/25  7:57 AM   Result Value Ref Range    Glucose 137 (H) 74 - 99 mg/dL    Sodium 134 (L) 136 - 145 mmol/L    Potassium 4.0 3.5 - 5.3 mmol/L    Chloride 98 98 - 107 mmol/L    Bicarbonate 26 21 - 32 mmol/L    Anion Gap 14 10 - 20 mmol/L    Urea Nitrogen 17 6 - 23 mg/dL    Creatinine 1.45 (H) 0.50 - 1.05 mg/dL    eGFR 39 (L) >60 mL/min/1.73m*2    Calcium 10.1 8.6 - 10.3 mg/dL    Albumin 3.8 3.4 - 5.0 g/dL    Alkaline Phosphatase 213 (H) 33 - 136 U/L    Total Protein 7.2 6.4 - 8.2 g/dL    AST 69 (H) 9 - 39 U/L    Bilirubin, Total 1.9 (H) 0.0 - 1.2 mg/dL    ALT 39 7 - 45 U/L   Magnesium    Collection Time: 04/11/25  7:57 AM   Result Value Ref Range    Magnesium 1.87 1.60 - 2.40 mg/dL   POCT GLUCOSE    Collection Time: 04/11/25 11:22 AM   Result Value Ref Range    POCT Glucose 145 (H) 74 - 99 mg/dL          Assessment:  This is a 69 yo Female with a PMH of HTN, T2DM, HLD, fatty liver, GERD, and CKD3 who presented to [facility] on 4/9/25 with reports of RLQ abdominal pain.  GI was consulted for concern for obstructive cholecystitis.  CT showed cirrhosis, hyperplasia vs endometrial carcinoma, extensive tim hepatis and narrowing of the main portal vein having an effect on the cystic duct and CBD resulting in mild intrahepatic bile duct dilation and distention of the gallbladder; rule out cholecystitis secondary to bilary obstruction by the tim hepatis mass.  US showed a distended  gallbladder with multiple stones and CBD measuring up to 1.2 cm and possible extrinsic compression from multiple large itm hepatis/periportal lymph nodes.  MRCP showed an 8 cm malignant appearing right hepatic lobe mass, cirrhosis and ?cholangiocarcinoma, metastatic lesions, and previous above stated findings.  No pain today and feels okay.      Alk phos 213 and trending down, AST 69, and T bili up to 1.9.      Discussed case with Dr. Reynaga and he would like to have her transferred to [facility] for further management and EUS/ERCP tentatively on 4/14.        Plan  1.)  Concern for obstructive cholecystitis-Serial abdominal exams, trend LFT's, monitor for fevers and elevation in WBC.  Trx in process per Dr. Fleming.        Discussed patient and above stated assessment and plan with Dr. Choudhury.    I spent 65 minutes in the professional and overall care of this patient.      04/11/25 at 1:30 PM - NILSON Vieira-CNP

## 2025-04-11 NOTE — PROGRESS NOTES
04/11/25 1024   Discharge Planning   Living Arrangements Spouse/significant other   Support Systems Spouse/significant other   Type of Residence Private residence   Do you have animals or pets at home? No   Who is requesting discharge planning? Provider   Expected Discharge Disposition Home   Stroke Family Assessment   Stroke Family Assessment Needed No   Intensity of Service   Intensity of Service 0-30 min     Met with pt and her spouse.  Pt admit for vomiting .  GI and surgery consulted,  will need ERCP and liver bx.  At this time, plan is for home, will follow.  Home address, insurance and PCP confirmed.  Danielle Kat RN TCC

## 2025-04-11 NOTE — DISCHARGE SUMMARY
Discharge Diagnosis  #Hepatic mass and extensive lymphadenopathy  #Concern for malignancy in setting of above  #Elevated liver enzymes, cholestatic  #Leukocytosis in setting of above     Issues Requiring Follow-Up  Patient to have ERCP and EUS with biopsy with GI at [facility]. Patient will need to get repeat CBC to assess leukocytosis.    Discharge Meds     Medication List      ASK your doctor about these medications     amLODIPine 10 mg tablet; Commonly known as: Norvasc; Take 1 tablet (10   mg) by mouth once daily.   cholecalciferol 50 mcg (2,000 units) tablet; Commonly known as: Vitamin   D-3   fluticasone 50 mcg/actuation nasal spray; Commonly known as: Flonase   hydroCHLOROthiazide 25 mg tablet; Commonly known as: HYDRODiuril; Take 1   tablet (25 mg) by mouth once daily.   lisinopril 40 mg tablet; Take 1 tablet (40 mg) by mouth once daily.   losartan 50 mg tablet; Commonly known as: Cozaar   meloxicam 15 mg tablet; Commonly known as: Mobic; Take 1 tablet (15 mg)   by mouth once daily.   metFORMIN 500 mg tablet; Commonly known as: Glucophage; Take 1 tablet   (500 mg) by mouth 2 times daily (morning and late afternoon).   multivitamin tablet   omeprazole 40 mg DR capsule; Commonly known as: PriLOSEC; Take 1 capsule   (40 mg) by mouth once daily.   OneTouch Ultra Test; Generic drug: blood sugar diagnostic; Testing once   daily   PARoxetine 20 mg tablet; Commonly known as: Paxil; Take 1 tablet (20 mg)   by mouth once daily.   simvastatin 10 mg tablet; Commonly known as: Zocor; Take 1 tablet (10   mg) by mouth once daily at bedtime.       Test Results Pending At Discharge  Pending Labs       Order Current Status    Blood Culture Preliminary result    Blood Culture Preliminary result            Hospital Course   Jordyn Neal is a 70 y.o. female with past medical history of HTN, T2DM, HLD, fatty liver, GERD, CKD Stage III who presented to [facility] ED on 4/10/25 with nausea, vomiting and RLQ abdominal pain. Patient admitted  to General Medicine service for evaluation and management of obstructive cholecystitis and possible malignancy work-up. While admitted, patient's symptoms improved. Oncology, GI, and General Surgery were consulted. Interdisciplinary team determined best plan of action would be ERCP and EUS with biopsy. GI determined that patient did not have cocurrent cholecystitis. Of note, patient did develop leukocytosis during course and had one episode of fevers. Patient denied any infectious symptoms and reported that her presenting symptoms had mostly resolved during stay. CXR was negative for any new PNA. Patient was maintained on Zosyn while admitted.    Patient will be discharged to [facility] for continued management in preparation for planned procedure on Monday 4/14/25.    Pertinent Physical Exam At Time of Discharge  Physical Exam  GEN: conversant, NAD  HEENT: PERRL, EOMI, MMM OP clear, TMs clear bilaterally  NECK: supple, no cervical LAD, no carotid bruits  CV: S1, S2, regular, no murmur  PULM: CTAB  ABD: soft, tender to deep palpation in RLQ and RUQ quadrants, nondistended  EXT: no LE edema  NEURO: no gross focal deficits  PSYCH: appropriate affect     Outpatient Follow-Up  Future Appointments   Date Time Provider Department Center   4/14/2025  1:00 PM Kasi Reynaga MD [id number] [place]   5/6/2025 11:15 AM Kit Fleming MD [id number] [place]         Joao Jama MD

## 2025-04-11 NOTE — CARE PLAN
The patient's goals for the shift include      The clinical goals for the shift include Patient will remain safe this shift      Problem: Pain - Adult  Goal: Verbalizes/displays adequate comfort level or baseline comfort level  Outcome: Progressing     Problem: Safety - Adult  Goal: Free from fall injury  Outcome: Progressing

## 2025-04-11 NOTE — PROGRESS NOTES
"Jordyn Neal is a 70 y.o. female on day 1 of admission presenting with Cyclical vomiting associated with intractable migraine.    Subjective   Patient denies abdominal pain nausea vomiting.  Flank pain much improved.  Patient is depressed.       Objective review of systems negative except for noted in HPI    Physical Exam GENERAL  MENTAL STATUS - Alert. GENERAL APPEARANCE - Cooperative and well groomed. ORIENTATION - Oriented X4. BUILD & NUTRITION - Well nourished and well developed. HYDRATION - Well hydrated.    INTEGUMENTARY  GENERAL CHARACTERISTICS - Overall examination of the patient's skin reveals no rashes. COLOR - not icteric. SKIN MOISTURE - normal skin moisture. TEMPERATURE - normal worth is noted.    HEAD AND NECK  HEAD  HEAD SHAPE - Atraumatic and normocephalic.  TRACHEA - midline.  THYROID  GLAND CHARACTERISTICS - normal size and consistency and no palpable nodules.    EYE  SCLERA/CONJUNCTIVA - BILATERAL - Anicteric.    CHEST AND LUNG EXAM  AUSCULTATION -  BREATH SOUNDS - Clear.    BREAST - Did not examine.    CARDIOVASCULAR  AUSCULTATION: RHYTHM - Regular. HEART SOUNDS - Normal heart sounds.  MURMURS & OTHER HEART SOUNDS - Auscultation of the heart reveals regular rate and no murmurs.    ABDOMEN  PALPATION/PERCUSSION - Palpation and percussion of the abdomen reveal soft, non tender, no mass and no hepatosplenomegaly.    LYMPHATIC  GENERAL LYMPHATICS  BREAST LYMPHATIC EXAM - No cervical adenopathy, supraclavicular adenopathy or axilliary adenopathy.       Last Recorded Vitals  Blood pressure 131/87, pulse (!) 114, temperature 37.3 °C (99.1 °F), temperature source Temporal, resp. rate 18, height 1.727 m (5' 8\"), weight 99.9 kg (220 lb 3.8 oz), SpO2 95%.  Intake/Output last 3 Shifts:  I/O last 3 completed shifts:  In: 2345 (23.5 mL/kg) [I.V.:1145 (11.5 mL/kg); IV Piggyback:1200]  Out: - (0 mL/kg)   Weight: 99.9 kg     Relevant Results            This patient currently has cardiac telemetry ordered; if you " would like to modify or discontinue the telemetry order, click here to go to the orders activity to modify/discontinue the order.                 Assessment/Plan   Assessment & Plan  Cyclical vomiting associated with intractable migraine    Increasing bilirubin noted however patient has improved from the standpoint of abdominal pain.  The MRI is still pending.  Noted elevated CA 27-29.   elevated to 271.  CEA is within normal limits.  Patient with increasing compression of the biliary tree.  Will defer management to gastroenterology for possible ERCP with EUS and biopsy.  There is no acute surgical indication at this point.  Discussed the findings with the patient and indicated that management at this point per gastroenterology       I spent 20 minutes in the professional and overall care of this patient.      Dirk Dominguez MD

## 2025-04-11 NOTE — NURSING NOTE
Pt. Transferred to [facility] at this time. Pt's  called and notified and report called to staff at [facility] .

## 2025-04-11 NOTE — PROGRESS NOTES
"Jordyn Neal is a 70 y.o. female on day 1 of admission presenting with Cyclical vomiting associated with intractable migraine.    Subjective   Pt reports feeling better today. Denies abdominal pain or N/V. She is quite reserved and not very talkative today. Mild increase in Tbili, mild increase in WBC. MRI still pending read.       Objective     Physical Exam  Constitutional:       General: She is not in acute distress.     Appearance: She is not ill-appearing or toxic-appearing.   HENT:      Mouth/Throat:      Mouth: Mucous membranes are moist.   Eyes:      General: No scleral icterus.  Cardiovascular:      Rate and Rhythm: Regular rhythm. Tachycardia present.      Heart sounds: Normal heart sounds.      Comments: ST 110s on tele  Pulmonary:      Effort: Pulmonary effort is normal. No respiratory distress.      Breath sounds: Normal breath sounds.   Abdominal:      General: Bowel sounds are normal. There is no distension.      Palpations: Abdomen is soft. There is no mass.      Tenderness: There is no abdominal tenderness.   Skin:     General: Skin is warm and dry.      Coloration: Skin is not jaundiced.   Neurological:      General: No focal deficit present.      Mental Status: She is alert.   Psychiatric:         Behavior: Behavior normal.      Comments: Reserved, cooperative         Last Recorded Vitals  Blood pressure 131/87, pulse (!) 114, temperature 37.3 °C (99.1 °F), temperature source Temporal, resp. rate 18, height 1.727 m (5' 8\"), weight 99.9 kg (220 lb 3.8 oz), SpO2 95%.  Intake/Output last 3 Shifts:  I/O last 3 completed shifts:  In: 2345 (23.5 mL/kg) [I.V.:1145 (11.5 mL/kg); IV Piggyback:1200]  Out: - (0 mL/kg)   Weight: 99.9 kg     Relevant Results  Results for orders placed or performed during the hospital encounter of 04/09/25 (from the past 24 hours)   POCT GLUCOSE   Result Value Ref Range    POCT Glucose 124 (H) 74 - 99 mg/dL   CEA   Result Value Ref Range    Carcinoembryonic AG 3.1 ug/L   CA " - blood pressure not control, patient requests referral for Cardiology, order placed   125   Result Value Ref Range    Cancer  271.8 (H) 0.0 - 30.2 U/mL   Cancer Antigen 27-29   Result Value Ref Range    CA 27.29 132.9 (H) 0.0 - 38.6 U/mL   Cancer Antigen 19-9   Result Value Ref Range    Cancer AG 19-9 27.20 <35.00 U/mL   AFP Tumor Marker   Result Value Ref Range    Alpha-Fetoprotein 9 0 - 9 ng/mL   SST TOP   Result Value Ref Range    Extra Tube Hold for add-ons.    POCT GLUCOSE   Result Value Ref Range    POCT Glucose 121 (H) 74 - 99 mg/dL   POCT GLUCOSE   Result Value Ref Range    POCT Glucose 114 (H) 74 - 99 mg/dL   POCT GLUCOSE   Result Value Ref Range    POCT Glucose 128 (H) 74 - 99 mg/dL   POCT GLUCOSE   Result Value Ref Range    POCT Glucose 148 (H) 74 - 99 mg/dL   CBC   Result Value Ref Range    WBC 15.9 (H) 4.4 - 11.3 x10*3/uL    nRBC 0.0 0.0 - 0.0 /100 WBCs    RBC 4.16 4.00 - 5.20 x10*6/uL    Hemoglobin 12.1 12.0 - 16.0 g/dL    Hematocrit 38.0 36.0 - 46.0 %    MCV 91 80 - 100 fL    MCH 29.1 26.0 - 34.0 pg    MCHC 31.8 (L) 32.0 - 36.0 g/dL    RDW 13.2 11.5 - 14.5 %    Platelets 262 150 - 450 x10*3/uL   Comprehensive Metabolic Panel   Result Value Ref Range    Glucose 137 (H) 74 - 99 mg/dL    Sodium 134 (L) 136 - 145 mmol/L    Potassium 4.0 3.5 - 5.3 mmol/L    Chloride 98 98 - 107 mmol/L    Bicarbonate 26 21 - 32 mmol/L    Anion Gap 14 10 - 20 mmol/L    Urea Nitrogen 17 6 - 23 mg/dL    Creatinine 1.45 (H) 0.50 - 1.05 mg/dL    eGFR 39 (L) >60 mL/min/1.73m*2    Calcium 10.1 8.6 - 10.3 mg/dL    Albumin 3.8 3.4 - 5.0 g/dL    Alkaline Phosphatase 213 (H) 33 - 136 U/L    Total Protein 7.2 6.4 - 8.2 g/dL    AST 69 (H) 9 - 39 U/L    Bilirubin, Total 1.9 (H) 0.0 - 1.2 mg/dL    ALT 39 7 - 45 U/L   Magnesium   Result Value Ref Range    Magnesium 1.87 1.60 - 2.40 mg/dL           Assessment/Plan   Jordyn Neal is a 70 y.o. female with HTN, HLD, T2DM, GERD, and depression presented [facility] on 4/[facility] with c/o abdominal pain and N/V. General surgery was consulted due to a CT A/P that revealed mild  intrahepatic duct dilation and distention of GB caused by lymph node conglomerate in the tim hepatis that results in narrowing of main portal vein and some mass effect on the cystic duct and CBD.      Impression  - Liver mass, suspicious for malignancy    > Concern for cholangiocarcinoma vs hepatocellular carcinoma vs mets  - No concern for acute cholecystitis at this time  - Lymphadenopathy at tim hepatis w/ mass effect on CBD  - Mild transaminitis   - Leukocytosis, uncertain etiology     Recommendations  - No acute surgical intervention indicated at this time  - Pending MRI read  - Oncology on consult, appreciate recs    > Tumor markers pending  - GI on consult, appreciate recs    > Possible ERCP and EUS on Monday at [facility]  - Diet as tolerated  - Remainder of care per primary team    Dispo: okay for discharge from general surgery perspective     Patient seen and discussed with attending surgeon, Dr. Dominguez.      Josi Lovelace PA-C

## 2025-04-11 NOTE — H&P (VIEW-ONLY)
Patient transferred from [place] for ERCP/EUS 04/14.    Please refer to formal consult note completed 04/10    Kristy Porter MD

## 2025-04-11 NOTE — PROGRESS NOTES
Subjective   Patient endorses feeling better compared to yesterday. She mentions that her abdominal pain has almost completely resolved. She mentions that she still has some nausea but has significantly improved. Had one fever this morning, but she mentions that she did not feel it and that the fever resolved on repeat testing with no intervention. Denies chills, dysuria. Endorses cough but mentions chronic.      Objective   Vitals:    04/11/25 0741   BP: 131/87   Pulse: (!) 114   Resp: 18   Temp: 37.3 °C (99.1 °F)   SpO2: 95%      Physical Exam  Physical Exam:  GEN: conversant, NAD  HEENT: PERRL, EOMI, MMM OP clear, TMs clear bilaterally  NECK: supple, no cervical LAD, no carotid bruits  CV: S1, S2, regular, no murmur  PULM: CTAB  ABD: soft, tender to deep palpation in RLQ and RUQ quadrants, nondistended  EXT: no LE edema  NEURO: no gross focal deficits  PSYCH: appropriate affect     Assessment/Plan   Jordyn Neal is a 70 y.o. female with past medical history of HTN, T2DM, HLD, fatty liver, GERD, CKD Stage III who presented to [facility] beds [facility] the [facility] ED on 4/10/25 with nausea, vomiting and RLQ abdominal pain. Patient admitted to General Medicine service for evaluation and management of obstructive cholecystitis and possible malignancy work-up.     #Concern for obstructive cholecystitis in setting of hepatic mass and extensive lymphadenopathy  #Concern for malignancy in setting of above  #Elevated liver enzymes, cholestatic  #Leukocytosis in setting of above   - GI following, plan for EUS and ERCP with biopsy Monday at [facility]  - Patient had new leukocytosis today and fever this morning, no infectious signs during hx will check CXR and trend CBC for tomorrow. Plan for possible discharge tomorrow.  - Follow-up tumor markers  - Diet advanced to Regular     #HAGMA, resolved  #CHIO on CKD, improved  #Hypomagnesia, resolved  - Replete electrolytes PRN  - Monitor urine output  - Discontinue IVF today as patient is eating     Chronic  Conditions  #T2DM: Not on insulin at home. Continue with Q4 SSI while NPO  #HTN: Hold home antihypertensives while treating possible infection and CHIO  #MDD: Continue home paroxetine     DVT: SubQ Heparin  Diet: Regular  IVF: None  Antibiotics: Zosyn (day 2)  Consults: GI, Oncology, General Surgery  Disposition: Telemetry     CODE: DNR     This is a preliminary note, please await attending attestation for a finalized plan.     Joao Jama MD  Internal Medicine PGY3  Please message me with any questions

## 2025-04-12 LAB
ALBUMIN SERPL BCP-MCNC: 3.4 G/DL (ref 3.4–5)
ALP SERPL-CCNC: 159 U/L (ref 33–136)
ALT SERPL W P-5'-P-CCNC: 32 U/L (ref 7–45)
ANION GAP SERPL CALC-SCNC: 13 MMOL/L (ref 10–20)
AST SERPL W P-5'-P-CCNC: 77 U/L (ref 9–39)
BILIRUB SERPL-MCNC: 2 MG/DL (ref 0–1.2)
BUN SERPL-MCNC: 14 MG/DL (ref 6–23)
CALCIUM SERPL-MCNC: 9.2 MG/DL (ref 8.6–10.3)
CHLORIDE SERPL-SCNC: 103 MMOL/L (ref 98–107)
CO2 SERPL-SCNC: 22 MMOL/L (ref 21–32)
CREAT SERPL-MCNC: 1.05 MG/DL (ref 0.5–1.05)
EGFRCR SERPLBLD CKD-EPI 2021: 58 ML/MIN/1.73M*2
ERYTHROCYTE [DISTWIDTH] IN BLOOD BY AUTOMATED COUNT: 13.1 % (ref 11.5–14.5)
GLUCOSE BLD MANUAL STRIP-MCNC: 105 MG/DL (ref 74–99)
GLUCOSE BLD MANUAL STRIP-MCNC: 131 MG/DL (ref 74–99)
GLUCOSE BLD MANUAL STRIP-MCNC: 184 MG/DL (ref 74–99)
GLUCOSE BLD MANUAL STRIP-MCNC: 90 MG/DL (ref 74–99)
GLUCOSE SERPL-MCNC: 132 MG/DL (ref 74–99)
HCT VFR BLD AUTO: 33.3 % (ref 36–46)
HGB BLD-MCNC: 11.2 G/DL (ref 12–16)
HOLD SPECIMEN: NORMAL
MCH RBC QN AUTO: 29.6 PG (ref 26–34)
MCHC RBC AUTO-ENTMCNC: 33.6 G/DL (ref 32–36)
MCV RBC AUTO: 88 FL (ref 80–100)
NRBC BLD-RTO: 0 /100 WBCS (ref 0–0)
PLATELET # BLD AUTO: 176 X10*3/UL (ref 150–450)
POTASSIUM SERPL-SCNC: 4.5 MMOL/L (ref 3.5–5.3)
PROCALCITONIN SERPL-MCNC: 0.47 NG/ML
PROT SERPL-MCNC: 6.5 G/DL (ref 6.4–8.2)
RBC # BLD AUTO: 3.78 X10*6/UL (ref 4–5.2)
SODIUM SERPL-SCNC: 133 MMOL/L (ref 136–145)
WBC # BLD AUTO: 10.8 X10*3/UL (ref 4.4–11.3)

## 2025-04-12 PROCEDURE — 2500000002 HC RX 250 W HCPCS SELF ADMINISTERED DRUGS (ALT 637 FOR MEDICARE OP, ALT 636 FOR OP/ED): Performed by: NURSE PRACTITIONER

## 2025-04-12 PROCEDURE — 82947 ASSAY GLUCOSE BLOOD QUANT: CPT

## 2025-04-12 PROCEDURE — 99233 SBSQ HOSP IP/OBS HIGH 50: CPT | Performed by: STUDENT IN AN ORGANIZED HEALTH CARE EDUCATION/TRAINING PROGRAM

## 2025-04-12 PROCEDURE — 2500000001 HC RX 250 WO HCPCS SELF ADMINISTERED DRUGS (ALT 637 FOR MEDICARE OP): Performed by: NURSE PRACTITIONER

## 2025-04-12 PROCEDURE — 1200000002 HC GENERAL ROOM WITH TELEMETRY DAILY

## 2025-04-12 PROCEDURE — 36415 COLL VENOUS BLD VENIPUNCTURE: CPT | Performed by: INTERNAL MEDICINE

## 2025-04-12 PROCEDURE — 36415 COLL VENOUS BLD VENIPUNCTURE: CPT | Performed by: NURSE PRACTITIONER

## 2025-04-12 PROCEDURE — 80053 COMPREHEN METABOLIC PANEL: CPT | Performed by: NURSE PRACTITIONER

## 2025-04-12 PROCEDURE — 85027 COMPLETE CBC AUTOMATED: CPT | Performed by: NURSE PRACTITIONER

## 2025-04-12 PROCEDURE — 2500000004 HC RX 250 GENERAL PHARMACY W/ HCPCS (ALT 636 FOR OP/ED): Performed by: NURSE PRACTITIONER

## 2025-04-12 RX ORDER — BISACODYL 10 MG/1
10 SUPPOSITORY RECTAL DAILY PRN
Status: DISCONTINUED | OUTPATIENT
Start: 2025-04-12 | End: 2025-04-15 | Stop reason: HOSPADM

## 2025-04-12 RX ADMIN — AMLODIPINE BESYLATE 10 MG: 10 TABLET ORAL at 08:54

## 2025-04-12 RX ADMIN — PIPERACILLIN SODIUM AND TAZOBACTAM SODIUM 3.38 G: 3; .375 INJECTION, SOLUTION INTRAVENOUS at 12:52

## 2025-04-12 RX ADMIN — INSULIN LISPRO 1 UNITS: 100 INJECTION, SOLUTION INTRAVENOUS; SUBCUTANEOUS at 12:52

## 2025-04-12 RX ADMIN — PIPERACILLIN SODIUM AND TAZOBACTAM SODIUM 3.38 G: 3; .375 INJECTION, SOLUTION INTRAVENOUS at 03:42

## 2025-04-12 RX ADMIN — PAROXETINE HYDROCHLORIDE 20 MG: 20 TABLET, FILM COATED ORAL at 08:54

## 2025-04-12 RX ADMIN — HEPARIN SODIUM 5000 UNITS: 5000 INJECTION, SOLUTION INTRAVENOUS; SUBCUTANEOUS at 15:38

## 2025-04-12 RX ADMIN — FLUTICASONE PROPIONATE 1 SPRAY: 50 SPRAY, METERED NASAL at 21:42

## 2025-04-12 RX ADMIN — BISACODYL 10 MG: 10 SUPPOSITORY RECTAL at 21:42

## 2025-04-12 RX ADMIN — SODIUM CHLORIDE 100 ML/HR: 9 INJECTION, SOLUTION INTRAVENOUS at 02:27

## 2025-04-12 RX ADMIN — PIPERACILLIN SODIUM AND TAZOBACTAM SODIUM 3.38 G: 3; .375 INJECTION, SOLUTION INTRAVENOUS at 19:38

## 2025-04-12 RX ADMIN — HEPARIN SODIUM 5000 UNITS: 5000 INJECTION, SOLUTION INTRAVENOUS; SUBCUTANEOUS at 08:54

## 2025-04-12 RX ADMIN — PANTOPRAZOLE SODIUM 40 MG: 40 TABLET, DELAYED RELEASE ORAL at 06:01

## 2025-04-12 RX ADMIN — HEPARIN SODIUM 5000 UNITS: 5000 INJECTION, SOLUTION INTRAVENOUS; SUBCUTANEOUS at 23:44

## 2025-04-12 ASSESSMENT — COGNITIVE AND FUNCTIONAL STATUS - GENERAL
DAILY ACTIVITIY SCORE: 24
TURNING FROM BACK TO SIDE WHILE IN FLAT BAD: A LITTLE
MOBILITY SCORE: 24
MOBILITY SCORE: 22
DAILY ACTIVITIY SCORE: 24
MOVING FROM LYING ON BACK TO SITTING ON SIDE OF FLAT BED WITH BEDRAILS: A LITTLE

## 2025-04-12 ASSESSMENT — PAIN SCALES - GENERAL
PAINLEVEL_OUTOF10: 0 - NO PAIN
PAINLEVEL_OUTOF10: 0 - NO PAIN

## 2025-04-12 ASSESSMENT — PAIN - FUNCTIONAL ASSESSMENT: PAIN_FUNCTIONAL_ASSESSMENT: 0-10

## 2025-04-12 NOTE — CONSULTS
PATIENT NAME: Jordyn Neal       MRN: [id number]     SERVICE DATE:  4/12/2025          SERVICE TIME:  1:23 PM          SIGNATURE: Rei Lugo MD, MS          PRIMARY CARE PHYSICIAN: Kit Fleming MD     REASON FOR CONSULT: leukocytosis     REQUESTING PHYSICIAN: Dr. Renteria          ASSESSMENT AND PLAN      Liver mass, leukocytosis in 69 y/o female with  past medical history of HTN, T2DM, HLD, fatty liver, GERD, CKD Stage III    MRCP has liver mass with scattered foci concerning for metastatic lesions  US RUQ has no evidence of cholecystitis  Blood culture and urine culture negative  Had leukocytosis yesterday that resolved today    Concern for malignancy  Oncology is on board  Will have ERCP on Monday  Low suspicion for cholecystitis  Will continue Zosyn till ERCP and likely discontinue if ERCP has no evidence of infection    Zosyn is an antibiotic that can cause side effects such as rash, diarrhea, bone marrow suppression. Will monitor those potential side effects.         HPI     Ms. Neal is a 70 y.o. female with past medical history of HTN, T2DM, HLD, fatty liver, GERD, CKD Stage III [facility] nausea, vomiting and RLQ abdominal pain started one night before. She had dinner around 5:30pm and 3 hours later at 8:30pm she started to have right-sided abdominal pain that radiated to her back with nausea and vomiting. She denies any hematemesis. She had a normal bowel movement earlier in the day and has been passing gas. She denies any chest pain, difficulty breathing, fevers, or chills, or urinary symptoms. Image showed liver mass. She was transferred to [facility]. Zosyn was started. GI plan ERCP on Monday. Her abdominal pain, nausea, vomiting has resolved.          COMPLETE REVIEW OF SYSTEMS:       All other reviewed and negative other than HPI.          PAST MEDICAL HISTORY:   Past Medical History:   Diagnosis Date    CKD (chronic kidney disease) stage 3, GFR 30-59 ml/min (Multi)      Diabetes mellitus (Multi)     Fatty liver     GERD (gastroesophageal reflux disease)     HLD (hyperlipidemia)     Hypertension     Personal history of other benign neoplasm     History of uterine leiomyoma        PAST SURGICAL HISTORY:   Past Surgical History:   Procedure Laterality Date    OTHER SURGICAL HISTORY  2022     section    OTHER SURGICAL HISTORY  2020    Lumpectomy        FAMILY HISTORY:   Family History   Problem Relation Name Age of Onset    Pancreatic cancer Mother      Uterine cancer Sister          SOCIAL HISTORY:   Social History     Tobacco Use    Smoking status: Never    Smokeless tobacco: Never   Vaping Use    Vaping status: Never Used   Substance Use Topics    Alcohol use: Yes     Comment: social    Drug use: Not Currently        CURRENT ALLERGIES:   Allergies as of 2025    (No Known Allergies)        MEDICATIONS:     Prior to Admission Medications:     Medications Prior to Admission   Medication Sig Dispense Refill Last Dose/Taking    amLODIPine (Norvasc) 10 mg tablet Take 1 tablet (10 mg) by mouth once daily. 90 tablet 1 Unknown    hydroCHLOROthiazide (HYDRODiuril) 25 mg tablet Take 1 tablet (25 mg) by mouth once daily. 90 tablet 1 Unknown    meloxicam (Mobic) 15 mg tablet Take 1 tablet (15 mg) by mouth once daily. 90 tablet 1 Unknown    metFORMIN (Glucophage) 500 mg tablet Take 1 tablet (500 mg) by mouth 2 times daily (morning and late afternoon). 180 tablet 1 Unknown    omeprazole (PriLOSEC) 40 mg DR capsule Take 1 capsule (40 mg) by mouth once daily. 90 capsule 1 Unknown    PARoxetine (Paxil) 20 mg tablet Take 1 tablet (20 mg) by mouth once daily. 90 tablet 1 Unknown    simvastatin (Zocor) 10 mg tablet Take 1 tablet (10 mg) by mouth once daily at bedtime. 90 tablet 1 Unknown    blood sugar diagnostic (OneTouch Ultra Test) strip Testing once daily 100 strip 1     cholecalciferol (Vitamin D-3) 50 MCG ( UT) tablet Take 1 tablet (50 mcg) by mouth.   Unknown     "fluticasone (Flonase) 50 mcg/actuation nasal spray Administer into affected nostril(s).   Unknown    lisinopril 40 mg tablet Take 1 tablet (40 mg) by mouth once daily. (Patient not taking: Reported on 4/10/2025) 90 tablet 1     losartan (Cozaar) 50 mg tablet Take 2 tablets (100 mg) by mouth once daily.   Unknown    multivitamin tablet Take 1 tablet by mouth once daily.   Unknown        Scheduled medications  amLODIPine, 10 mg, oral, Daily  fluticasone, 1 spray, Each Nostril, Daily  heparin (porcine), 5,000 Units, subcutaneous, q8h  [Held by provider] hydroCHLOROthiazide, 25 mg, oral, Daily  insulin lispro, 0-5 Units, subcutaneous, Before meals & nightly  [Held by provider] losartan, 100 mg, oral, Daily  [Held by provider] meloxicam, 15 mg, oral, Daily  [Held by provider] metFORMIN, 500 mg, oral, BID  pantoprazole, 40 mg, oral, Daily before breakfast   Or  pantoprazole, 40 mg, intravenous, Daily before breakfast  PARoxetine, 20 mg, oral, Daily  piperacillin-tazobactam, 3.375 g, intravenous, q8h  [Held by provider] simvastatin, 10 mg, oral, Nightly      Continuous medications  sodium chloride 0.9%, 100 mL/hr, Last Rate: 100 mL/hr (04/12/25 0556)      PRN medications  PRN medications: dextrose, dextrose, glucagon, glucagon, melatonin, polyethylene glycol         PHYSICAL EXAM:     Patient Vitals for the past 24 hrs:   BP Temp Temp src Pulse Resp SpO2 Height Weight   04/12/25 1200 137/81 36.3 °C (97.3 °F) Temporal 98 16 97 % -- --   04/12/25 0800 151/85 37.4 °C (99.3 °F) Temporal 95 16 98 % -- --   04/12/25 0400 157/85 36.7 °C (98.1 °F) Temporal 89 16 94 % -- --   04/12/25 0000 130/75 36.2 °C (97.2 °F) Temporal 88 16 92 % -- --   04/11/25 2000 163/82 37.1 °C (98.8 °F) Temporal 106 20 93 % -- --   04/11/25 1500 141/71 37.6 °C (99.7 °F) Temporal 98 16 97 % 1.727 m (5' 8\") 99.7 kg (219 lb 12.8 oz)        Body mass index is 33.42 kg/m².     Eyes: no conjunctiva erythema     ENMT: oral mucosa moist     Neck: symmetric, no " mass     Cardiovascular: RRR     Respiratory: CTA     GI: BS present, soft,     musculoskeletal: no leg edema,     Skin: Warm and dry.     psychiatric: alert and cooperative          DATA:      Diagnostic tests reviewed for today's visit:       Labs from this admission reviewed     Imagines from this admission reviewed     Cultures: Reviewed

## 2025-04-12 NOTE — PROGRESS NOTES
"      Subjective  No acute events overnight.     at bedside.  She denies any significant abdominal pain today.      Physical Exam:  General: Polite, calm, resting  Skin:  Warm and dry, mildly jaundice  HEENT: No scleral icterus, no conjunctival pallor, normocephalic, atraumatic, mucous membranes moist  Neck:  atraumatic, trachea midline, no JVD  Chest:  Clear to auscultation bilaterally. No wheezes, rales, or rhonchi  CV:  Regular rate and rhythm.  Positive S1/S2  Abdomen: Obese, no distension, +BS, soft, non-tender to palpation, no rebound tenderness, no guarding, no rigidity, no discernible ascites   Extremities: no lower extremity edema, chronic pigmentary changes, no cyanosis  Neurological:  A&Ox3, no asterixis  Psychiatric: cooperative     Investigations:  Labs, radiological imaging and cardiac work up were reviewed        Objective:         4/11/2025     3:05 AM 4/11/2025     7:41 AM 4/11/2025     3:00 PM 4/11/2025     8:00 PM 4/12/2025    12:00 AM 4/12/2025     4:00 AM 4/12/2025     8:00 AM   Vitals   Systolic 142 131 141 163 130 157 151   Diastolic 68 87 71 82 75 85 85   BP Location Left arm Left arm Right arm Left arm Left arm Left arm    Heart Rate 105 114 98 106 88 89 95   Temp 37.4 °C (99.3 °F) 37.3 °C (99.1 °F) 37.6 °C (99.7 °F) 37.1 °C (98.8 °F) 36.2 °C (97.2 °F) 36.7 °C (98.1 °F) 37.4 °C (99.3 °F)   Resp 18 18 16 20 16 16 16   Height   1.727 m (5' 8\")       Weight (lb)   219.8       BMI   33.42 kg/m2       BSA (m2)   2.19 m2              Medications:  amLODIPine, 10 mg, oral, Daily  fluticasone, 1 spray, Each Nostril, Daily  heparin (porcine), 5,000 Units, subcutaneous, q8h  [Held by provider] hydroCHLOROthiazide, 25 mg, oral, Daily  insulin lispro, 0-5 Units, subcutaneous, Before meals & nightly  [Held by provider] losartan, 100 mg, oral, Daily  [Held by provider] meloxicam, 15 mg, oral, Daily  [Held by provider] metFORMIN, 500 mg, oral, BID  pantoprazole, 40 mg, oral, Daily before breakfast   " Or  pantoprazole, 40 mg, intravenous, Daily before breakfast  PARoxetine, 20 mg, oral, Daily  piperacillin-tazobactam, 3.375 g, intravenous, q8h  [Held by provider] simvastatin, 10 mg, oral, Nightly         Recent Results (from the past 72 hours)   CBC with Differential    Collection Time: 04/09/25 10:59 PM   Result Value Ref Range    WBC 10.1 4.4 - 11.3 x10*3/uL    nRBC 0.0 0.0 - 0.0 /100 WBCs    RBC 4.22 4.00 - 5.20 x10*6/uL    Hemoglobin 12.0 12.0 - 16.0 g/dL    Hematocrit 38.2 36.0 - 46.0 %    MCV 91 80 - 100 fL    MCH 28.4 26.0 - 34.0 pg    MCHC 31.4 (L) 32.0 - 36.0 g/dL    RDW 13.1 11.5 - 14.5 %    Platelets 278 150 - 450 x10*3/uL    Neutrophils % 71.2 40.0 - 80.0 %    Immature Granulocytes %, Automated 0.4 0.0 - 0.9 %    Lymphocytes % 19.0 13.0 - 44.0 %    Monocytes % 7.4 2.0 - 10.0 %    Eosinophils % 1.7 0.0 - 6.0 %    Basophils % 0.3 0.0 - 2.0 %    Neutrophils Absolute 7.18 1.20 - 7.70 x10*3/uL    Immature Granulocytes Absolute, Automated 0.04 0.00 - 0.70 x10*3/uL    Lymphocytes Absolute 1.92 1.20 - 4.80 x10*3/uL    Monocytes Absolute 0.75 0.10 - 1.00 x10*3/uL    Eosinophils Absolute 0.17 0.00 - 0.70 x10*3/uL    Basophils Absolute 0.03 0.00 - 0.10 x10*3/uL   Comprehensive Metabolic Panel    Collection Time: 04/09/25 10:59 PM   Result Value Ref Range    Glucose 187 (H) 74 - 99 mg/dL    Sodium 134 (L) 136 - 145 mmol/L    Potassium 3.9 3.5 - 5.3 mmol/L    Chloride 98 98 - 107 mmol/L    Bicarbonate 19 (L) 21 - 32 mmol/L    Anion Gap 21 (H) 10 - 20 mmol/L    Urea Nitrogen 21 6 - 23 mg/dL    Creatinine 1.81 (H) 0.50 - 1.05 mg/dL    eGFR 30 (L) >60 mL/min/1.73m*2    Calcium 10.1 8.6 - 10.3 mg/dL    Albumin 4.1 3.4 - 5.0 g/dL    Alkaline Phosphatase 240 (H) 33 - 136 U/L    Total Protein 7.4 6.4 - 8.2 g/dL    AST 67 (H) 9 - 39 U/L    Bilirubin, Total 1.1 0.0 - 1.2 mg/dL    ALT 47 (H) 7 - 45 U/L   Magnesium    Collection Time: 04/09/25 10:59 PM   Result Value Ref Range    Magnesium 1.57 (L) 1.60 - 2.40 mg/dL   Lipase     Collection Time: 04/09/25 10:59 PM   Result Value Ref Range    Lipase 60 9 - 82 U/L   Lavender Top    Collection Time: 04/09/25 11:04 PM   Result Value Ref Range    Extra Tube Hold for add-ons.    PST Top    Collection Time: 04/09/25 11:04 PM   Result Value Ref Range    Extra Tube Hold for add-ons.    Beta Hydroxybutyrate    Collection Time: 04/09/25 11:04 PM   Result Value Ref Range    Beta-Hydroxybutyrate 0.56 (H) 0.02 - 0.27 mmol/L   Urinalysis with Reflex Culture and Microscopic    Collection Time: 04/10/25 12:34 AM   Result Value Ref Range    Color, Urine Yellow Light-Yellow, Yellow, Dark-Yellow    Appearance, Urine Clear Clear    Specific Gravity, Urine 1.029 1.005 - 1.035    pH, Urine 6.0 5.0, 5.5, 6.0, 6.5, 7.0, 7.5, 8.0    Protein, Urine 30 (1+) (A) NEGATIVE, 10 (TRACE), 20 (TRACE) mg/dL    Glucose, Urine Normal Normal mg/dL    Blood, Urine 0.03 (TRACE) (A) NEGATIVE mg/dL    Ketones, Urine 20 (1+) (A) NEGATIVE mg/dL    Bilirubin, Urine NEGATIVE NEGATIVE mg/dL    Urobilinogen, Urine 4 (2+) (A) Normal mg/dL    Nitrite, Urine NEGATIVE NEGATIVE    Leukocyte Esterase, Urine 25 Keena/uL (A) NEGATIVE   Extra Urine Gray Tube    Collection Time: 04/10/25 12:34 AM   Result Value Ref Range    Extra Tube Hold for add-ons.    Microscopic Only, Urine    Collection Time: 04/10/25 12:34 AM   Result Value Ref Range    WBC, Urine 1-5 1-5, NONE /HPF    RBC, Urine 3-5 NONE, 1-2, 3-5 /HPF    Squamous Epithelial Cells, Urine 1-9 (SPARSE) Reference range not established. /HPF    Mucus, Urine FEW Reference range not established. /LPF    Hyaline Casts, Urine 3+ (A) NONE /LPF   Urine Culture    Collection Time: 04/10/25 12:34 AM    Specimen: Clean Catch/Voided; Urine   Result Value Ref Range    Urine Culture       Clinically insignificant growth based on current clinical standards.   Blood Culture    Collection Time: 04/10/25  7:45 AM    Specimen: Peripheral Venipuncture; Blood culture   Result Value Ref Range    Blood Culture No  growth at 1 day    Blood Culture    Collection Time: 04/10/25  7:45 AM    Specimen: Peripheral Venipuncture; Blood culture   Result Value Ref Range    Blood Culture No growth at 1 day    Lactate    Collection Time: 04/10/25  7:45 AM   Result Value Ref Range    Lactate 1.2 0.4 - 2.0 mmol/L   AFP Tumor Marker    Collection Time: 04/10/25  8:09 AM   Result Value Ref Range    Alpha-Fetoprotein 10 (H) 0 - 9 ng/mL   Cancer Antigen 19-9    Collection Time: 04/10/25  8:09 AM   Result Value Ref Range    Cancer AG 19-9 28.27 <35.00 U/mL   POCT GLUCOSE    Collection Time: 04/10/25  8:37 AM   Result Value Ref Range    POCT Glucose 132 (H) 74 - 99 mg/dL   Troponin I, High Sensitivity    Collection Time: 04/10/25  9:00 AM   Result Value Ref Range    Troponin I, High Sensitivity 7 0 - 13 ng/L   POCT GLUCOSE    Collection Time: 04/10/25 11:32 AM   Result Value Ref Range    POCT Glucose 124 (H) 74 - 99 mg/dL   CEA    Collection Time: 04/10/25 11:41 AM   Result Value Ref Range    Carcinoembryonic AG 3.1 ug/L       Collection Time: 04/10/25 11:41 AM   Result Value Ref Range    Cancer  271.8 (H) 0.0 - 30.2 U/mL   Cancer Antigen 27-29    Collection Time: 04/10/25 11:41 AM   Result Value Ref Range    CA 27.29 132.9 (H) 0.0 - 38.6 U/mL   Cancer Antigen 19-9    Collection Time: 04/10/25 11:41 AM   Result Value Ref Range    Cancer AG 19-9 27.20 <35.00 U/mL   AFP Tumor Marker    Collection Time: 04/10/25 11:41 AM   Result Value Ref Range    Alpha-Fetoprotein 9 0 - 9 ng/mL   SST TOP    Collection Time: 04/10/25 11:41 AM   Result Value Ref Range    Extra Tube Hold for add-ons.    POCT GLUCOSE    Collection Time: 04/10/25  4:45 PM   Result Value Ref Range    POCT Glucose 121 (H) 74 - 99 mg/dL   POCT GLUCOSE    Collection Time: 04/10/25 10:43 PM   Result Value Ref Range    POCT Glucose 114 (H) 74 - 99 mg/dL   POCT GLUCOSE    Collection Time: 04/11/25  2:08 AM   Result Value Ref Range    POCT Glucose 128 (H) 74 - 99 mg/dL   POCT  GLUCOSE    Collection Time: 04/11/25  6:13 AM   Result Value Ref Range    POCT Glucose 148 (H) 74 - 99 mg/dL   CBC    Collection Time: 04/11/25  7:57 AM   Result Value Ref Range    WBC 15.9 (H) 4.4 - 11.3 x10*3/uL    nRBC 0.0 0.0 - 0.0 /100 WBCs    RBC 4.16 4.00 - 5.20 x10*6/uL    Hemoglobin 12.1 12.0 - 16.0 g/dL    Hematocrit 38.0 36.0 - 46.0 %    MCV 91 80 - 100 fL    MCH 29.1 26.0 - 34.0 pg    MCHC 31.8 (L) 32.0 - 36.0 g/dL    RDW 13.2 11.5 - 14.5 %    Platelets 262 150 - 450 x10*3/uL   Comprehensive Metabolic Panel    Collection Time: 04/11/25  7:57 AM   Result Value Ref Range    Glucose 137 (H) 74 - 99 mg/dL    Sodium 134 (L) 136 - 145 mmol/L    Potassium 4.0 3.5 - 5.3 mmol/L    Chloride 98 98 - 107 mmol/L    Bicarbonate 26 21 - 32 mmol/L    Anion Gap 14 10 - 20 mmol/L    Urea Nitrogen 17 6 - 23 mg/dL    Creatinine 1.45 (H) 0.50 - 1.05 mg/dL    eGFR 39 (L) >60 mL/min/1.73m*2    Calcium 10.1 8.6 - 10.3 mg/dL    Albumin 3.8 3.4 - 5.0 g/dL    Alkaline Phosphatase 213 (H) 33 - 136 U/L    Total Protein 7.2 6.4 - 8.2 g/dL    AST 69 (H) 9 - 39 U/L    Bilirubin, Total 1.9 (H) 0.0 - 1.2 mg/dL    ALT 39 7 - 45 U/L   Magnesium    Collection Time: 04/11/25  7:57 AM   Result Value Ref Range    Magnesium 1.87 1.60 - 2.40 mg/dL   POCT GLUCOSE    Collection Time: 04/11/25 11:22 AM   Result Value Ref Range    POCT Glucose 145 (H) 74 - 99 mg/dL   Sars-CoV-2 and Influenza A/B PCR    Collection Time: 04/11/25  4:01 PM   Result Value Ref Range    Flu A Result Not Detected Not Detected    Flu B Result Not Detected Not Detected    Coronavirus 2019, PCR Not Detected Not Detected   POCT GLUCOSE    Collection Time: 04/11/25  7:21 PM   Result Value Ref Range    POCT Glucose 143 (H) 74 - 99 mg/dL   Comprehensive metabolic panel    Collection Time: 04/12/25  6:19 AM   Result Value Ref Range    Glucose 132 (H) 74 - 99 mg/dL    Sodium 133 (L) 136 - 145 mmol/L    Potassium 4.5 3.5 - 5.3 mmol/L    Chloride 103 98 - 107 mmol/L    Bicarbonate 22  21 - 32 mmol/L    Anion Gap 13 10 - 20 mmol/L    Urea Nitrogen 14 6 - 23 mg/dL    Creatinine 1.05 0.50 - 1.05 mg/dL    eGFR 58 (L) >60 mL/min/1.73m*2    Calcium 9.2 8.6 - 10.3 mg/dL    Albumin 3.4 3.4 - 5.0 g/dL    Alkaline Phosphatase 159 (H) 33 - 136 U/L    Total Protein 6.5 6.4 - 8.2 g/dL    AST 77 (H) 9 - 39 U/L    Bilirubin, Total 2.0 (H) 0.0 - 1.2 mg/dL    ALT 32 7 - 45 U/L   CBC    Collection Time: 04/12/25  6:19 AM   Result Value Ref Range    WBC 10.8 4.4 - 11.3 x10*3/uL    nRBC 0.0 0.0 - 0.0 /100 WBCs    RBC 3.78 (L) 4.00 - 5.20 x10*6/uL    Hemoglobin 11.2 (L) 12.0 - 16.0 g/dL    Hematocrit 33.3 (L) 36.0 - 46.0 %    MCV 88 80 - 100 fL    MCH 29.6 26.0 - 34.0 pg    MCHC 33.6 32.0 - 36.0 g/dL    RDW 13.1 11.5 - 14.5 %    Platelets 176 150 - 450 x10*3/uL   PST Top    Collection Time: 04/12/25  6:41 AM   Result Value Ref Range    Extra Tube Hold for add-ons.    POCT GLUCOSE    Collection Time: 04/12/25  7:17 AM   Result Value Ref Range    POCT Glucose 131 (H) 74 - 99 mg/dL          Assessment:  This is a 71 yo Female with a PMH of HTN, T2DM, HLD, fatty liver, GERD, and CKD3 who presented to [facility] on 4/9/25 with reports of RLQ abdominal pain in setting of MR liver demonstrating 8 cm malignant appearing centrally necrotic right hepatic lobe mass worrisome for cholangiocarcinoma with liver mets. CA-19 WNL. On Zosyn due to concern for ?cholecystitis    Plan  Continue to trend LFTs daily  Hold Mobic  Plan on ERCP/EUS 04/14. Pls keep NPO at midnight on Monday 04/12/25 at 10:17 AM - Kristy Porter MD

## 2025-04-12 NOTE — ASSESSMENT & PLAN NOTE
Admit patient to regular medical floor  Consult GI for possible EUS  Consult oncology  Monitor liver function

## 2025-04-12 NOTE — H&P
Internal Medicine History and Physical    PATIENT NAME:  Jordyn Neal    MRN: [id number]  DATE of SERVICE: 2025    Primary Care Physician: Kit Fleming MD          Chief Complaint:  Liver Mass    HPI:  This is a 70 y.o. female with surgical history of hypertension, hyperlipidemia  who was transferred from [facility] and was she was admitted with right upper quadrant abdominal pain and she was found to have liver mass, she was also diagnosed with obstructive cholecystitis, patient got transferred to [facility] for EUS.    Past Medical History:  Past Medical History:   Diagnosis Date    CKD (chronic kidney disease) stage 3, GFR 30-59 ml/min (Multi)     Diabetes mellitus (Multi)     Fatty liver     GERD (gastroesophageal reflux disease)     HLD (hyperlipidemia)     Hypertension     Personal history of other benign neoplasm     History of uterine leiomyoma       Past Surgical History:  Past Surgical History:   Procedure Laterality Date    OTHER SURGICAL HISTORY  2022     section    OTHER SURGICAL HISTORY  2020    Lumpectomy       Family History:  Family History   Problem Relation Name Age of Onset    Pancreatic cancer Mother      Uterine cancer Sister         Social History:    Social History     Socioeconomic History    Marital status:      Spouse name: Not on file    Number of children: Not on file    Years of education: Not on file    Highest education level: Not on file   Occupational History    Not on file   Tobacco Use    Smoking status: Never    Smokeless tobacco: Never   Vaping Use    Vaping status: Never Used   Substance and Sexual Activity    Alcohol use: Yes     Comment: social    Drug use: Not Currently    Sexual activity: Not on file   Other Topics Concern    Not on file   Social History Narrative    Not on file     Social Drivers of Health     Financial Resource Strain: Patient Declined (2025)    Overall Financial Resource Strain (CARDIA)      Difficulty of Paying Living Expenses: Patient declined   Food Insecurity: Patient Declined (4/11/2025)    Hunger Vital Sign     Worried About Running Out of Food in the Last Year: Patient declined     Ran Out of Food in the Last Year: Patient declined   Transportation Needs: Patient Declined (4/11/2025)    PRAPARE - Transportation     Lack of Transportation (Medical): Patient declined     Lack of Transportation (Non-Medical): Patient declined   Physical Activity: Not on file   Stress: Not on file   Social Connections: Not on file   Intimate Partner Violence: Not At Risk (4/11/2025)    Humiliation, Afraid, Rape, and Kick questionnaire     Fear of Current or Ex-Partner: No     Emotionally Abused: No     Physically Abused: No     Sexually Abused: No   Housing Stability: Patient Declined (4/11/2025)    Housing Stability Vital Sign     Unable to Pay for Housing in the Last Year: Patient declined     Number of Times Moved in the Last Year: 1     Homeless in the Last Year: Patient declined         Prior to Admission Medications:  Medications Prior to Admission   Medication Sig Dispense Refill Last Dose/Taking    amLODIPine (Norvasc) 10 mg tablet Take 1 tablet (10 mg) by mouth once daily. 90 tablet 1 Unknown    hydroCHLOROthiazide (HYDRODiuril) 25 mg tablet Take 1 tablet (25 mg) by mouth once daily. 90 tablet 1 Unknown    meloxicam (Mobic) 15 mg tablet Take 1 tablet (15 mg) by mouth once daily. 90 tablet 1 Unknown    metFORMIN (Glucophage) 500 mg tablet Take 1 tablet (500 mg) by mouth 2 times daily (morning and late afternoon). 180 tablet 1 Unknown    omeprazole (PriLOSEC) 40 mg DR capsule Take 1 capsule (40 mg) by mouth once daily. 90 capsule 1 Unknown    PARoxetine (Paxil) 20 mg tablet Take 1 tablet (20 mg) by mouth once daily. 90 tablet 1 Unknown    simvastatin (Zocor) 10 mg tablet Take 1 tablet (10 mg) by mouth once daily at bedtime. 90 tablet 1 Unknown    blood sugar diagnostic (OneTouch Ultra Test) strip Testing once  daily 100 strip 1     cholecalciferol (Vitamin D-3) 50 MCG (2000 UT) tablet Take 1 tablet (50 mcg) by mouth.   Unknown    fluticasone (Flonase) 50 mcg/actuation nasal spray Administer into affected nostril(s).   Unknown    lisinopril 40 mg tablet Take 1 tablet (40 mg) by mouth once daily. (Patient not taking: Reported on 4/10/2025) 90 tablet 1     losartan (Cozaar) 50 mg tablet Take 2 tablets (100 mg) by mouth once daily.   Unknown    multivitamin tablet Take 1 tablet by mouth once daily.   Unknown       Active orders:  Active Orders   Lab    CBC     Frequency: AM draw     Number of Occurrences: Until Specified    Comprehensive metabolic panel     Frequency: AM draw     Number of Occurrences: Until Specified    Protime-INR     Frequency: STAT     Number of Occurrences: 1 Occurrences   Diet    Adult diet Consistent Carb; CCD 60 gm/meal     Frequency: Effective now     Number of Occurrences: Until Specified    NPO Diet Except: Sips with meds; Effective midnight     Frequency: Effective midnight     Number of Occurrences: Until Specified   Nursing    Activity (specify) Out of Bed with Assistance     Frequency: Until discontinued     Number of Occurrences: Until Specified    Apply sequential compression device     Frequency: Until discontinued     Number of Occurrences: Until Specified    Glucose 10-70 mg/dL & CONSCIOUS- Give 15 Grams of Carbohydrates and repeat until blood glucose level reaches 100 mg/dL or greater.     Frequency: Until discontinued     Number of Occurrences: Until Specified     Order Comments: Select 1 of the following:  -1 cup skim milk  -3 or 4 glucose tablets  -4 ounces of fruit juice or regular soda.  Patient MUST be CONSCIOUS and able to eat or drink      Notify provider     Frequency: Until discontinued     Number of Occurrences: Until Specified    Notify provider (specify parameters)     Frequency: Until discontinued     Number of Occurrences: Until Specified     Order Comments: [place]  Hypoglycemia interventions.      Notify provider (specify parameters)     Frequency: Until discontinued     Number of Occurrences: Until Specified     Order Comments: For blood glucose greater than 200 mg/dL twice over 24 hours.  Provider to consider Endocrine Consult.      Notify provider (specify parameters)     Frequency: Until discontinued     Number of Occurrences: Until Specified    Pain Assessment     Frequency: PRN     Number of Occurrences: Until Specified    Vital Signs     Frequency: q4h     Number of Occurrences: Until Specified   Code Status    DNR (Patient allows mechanical ventilation)     Frequency: Continuous     Number of Occurrences: Until Specified   Consult    Inpatient consult to Infectious Diseases     Frequency: Once     Number of Occurrences: 1 Occurrences   Nourishments    May Participate in Room Service     Frequency: Once     Number of Occurrences: 1 Occurrences   ECG    ECG 12 lead     Frequency: Once     Number of Occurrences: 1 Occurrences    ECG 12 Lead     Frequency: Once     Number of Occurrences: 1 Occurrences    Electrocardiogram, 12-lead PRN ACS symptoms     Frequency: PRN     Number of Occurrences: Until Specified     Order Comments: Notify provider if performed.     Point of Care Testing - Docked Device    POCT Glucose     Frequency: PRN     Number of Occurrences: Until Specified     Order Comments: PRN for hypoglycemia interventions instituted.  Retest blood glucose level every 15 minutes after every hypoglycemic intervention until blood glucose is greater than 100 mg/dL.        POCT Glucose     Frequency: 4x daily - AC and at bedtime     Number of Occurrences: 3 Days   Endoscopy    Endoscopic Retrograde Cholangiopancreatography (ERCP)     Frequency: Once     Number of Occurrences: 1 Occurrences    Endoscopic Ultrasound (Upper)     Frequency: Once     Number of Occurrences: 1 Occurrences   Telemetry    Telemetry monitoring - Floor only     Frequency: Until discontinued      Number of Occurrences: 3 Days   Medications    amLODIPine (Norvasc) tablet 10 mg     Frequency: Daily     Dose: 10 mg     Route: oral    dextrose 50 % injection 12.5 g     Frequency: q15 min PRN     Dose: 12.5 g     Route: intravenous    dextrose 50 % injection 25 g     Frequency: q15 min PRN     Dose: 25 g     Route: intravenous    fluticasone (Flonase) nasal spray 1 spray     Frequency: Daily     Dose: 1 spray     Route: Each Nostril    glucagon (Glucagen) injection 1 mg     Frequency: q15 min PRN     Dose: 1 mg     Route: intramuscular    glucagon (Glucagen) injection 1 mg     Frequency: q15 min PRN     Dose: 1 mg     Route: intramuscular    heparin (porcine) injection 5,000 Units     Frequency: q8h     Dose: 5,000 Units     Route: subcutaneous    hydroCHLOROthiazide (HYDRODiuril) tablet 25 mg     Frequency: Daily     Dose: 25 mg     Route: oral    insulin lispro injection 0-5 Units     Frequency: Before meals & nightly     Dose: 0-5 Units     Route: subcutaneous    losartan (Cozaar) tablet 100 mg     Frequency: Daily     Dose: 100 mg     Route: oral    melatonin tablet 3 mg     Frequency: Nightly PRN     Dose: 3 mg     Route: oral    meloxicam (Mobic) tablet 15 mg     Frequency: Daily     Dose: 15 mg     Route: oral    metFORMIN (Glucophage) tablet 500 mg     Frequency: BID     Dose: 500 mg     Route: oral    pantoprazole (ProtoNix) EC tablet 40 mg     Linked Order: Or     Frequency: Daily before breakfast     Dose: 40 mg     Route: oral    pantoprazole (Protonix) injection 40 mg     Linked Order: Or     Frequency: Daily before breakfast     Dose: 40 mg     Route: intravenous    PARoxetine (Paxil) tablet 20 mg     Frequency: Daily     Dose: 20 mg     Route: oral    piperacillin-tazobactam (Zosyn) 3.375 g in dextrose (iso) IV 50 mL     Frequency: q8h     Dose: 3.375 g     Route: intravenous    polyethylene glycol (Glycolax, Miralax) packet 17 g     Frequency: Daily PRN     Dose: 17 g     Route: oral     simvastatin (Zocor) tablet 10 mg     Frequency: Nightly     Dose: 10 mg     Route: oral    sodium chloride 0.9% infusion     Frequency: Continuous     Dose: 100 mL/hr     Route: intravenous           Allergies:   No Known Allergies    Review of Systems:  A 10 systems review of systems is negative except for HPI.      Vitals:  Patient Vitals for the past 24 hrs:   BP Temp Temp src Pulse Resp SpO2   04/12/25 1600 117/83 37.5 °C (99.5 °F) Temporal 97 16 96 %   04/12/25 1200 137/81 36.3 °C (97.3 °F) Temporal 98 16 97 %   04/12/25 0800 151/85 37.4 °C (99.3 °F) Temporal 95 16 98 %   04/12/25 0400 157/85 36.7 °C (98.1 °F) Temporal 89 16 94 %   04/12/25 0000 130/75 36.2 °C (97.2 °F) Temporal 88 16 92 %   04/11/25 2000 163/82 37.1 °C (98.8 °F) Temporal 106 20 93 %     Body mass index is 33.42 kg/m².         Physical Exam:  General appearance: Well-appearing alert, in no acute distress   Skin: Skin color, texture, turgor normal, no suspicious rashes or lesions  Head: normal  Eyes: Anicteric sclera. Extraocular movements are intact.   Ears: external ears normal  Nose/Sinuses: Negative  Oropharynx: Lips, mucosa, and tongue normal  Neck: Supple, no adenopathy; thyroid symmetric, normal size, no bruits  Lungs: Clear to auscultation, no wheezing or rhonchi  Heart: RRR without murmur, gallop, or rubs.   Abdomen: Soft, non-tender. Bowel sounds normal. No masses, organomegaly  Extremities: No deformity, no edema  Neuro: Alert oriented x3, no focal deficit.      Labs:  Results for orders placed or performed during the hospital encounter of 04/11/25 (from the past 24 hours)   POCT GLUCOSE   Result Value Ref Range    POCT Glucose 143 (H) 74 - 99 mg/dL   Comprehensive metabolic panel   Result Value Ref Range    Glucose 132 (H) 74 - 99 mg/dL    Sodium 133 (L) 136 - 145 mmol/L    Potassium 4.5 3.5 - 5.3 mmol/L    Chloride 103 98 - 107 mmol/L    Bicarbonate 22 21 - 32 mmol/L    Anion Gap 13 10 - 20 mmol/L    Urea Nitrogen 14 6 - 23 mg/dL     "Creatinine 1.05 0.50 - 1.05 mg/dL    eGFR 58 (L) >60 mL/min/1.73m*2    Calcium 9.2 8.6 - 10.3 mg/dL    Albumin 3.4 3.4 - 5.0 g/dL    Alkaline Phosphatase 159 (H) 33 - 136 U/L    Total Protein 6.5 6.4 - 8.2 g/dL    AST 77 (H) 9 - 39 U/L    Bilirubin, Total 2.0 (H) 0.0 - 1.2 mg/dL    ALT 32 7 - 45 U/L   CBC   Result Value Ref Range    WBC 10.8 4.4 - 11.3 x10*3/uL    nRBC 0.0 0.0 - 0.0 /100 WBCs    RBC 3.78 (L) 4.00 - 5.20 x10*6/uL    Hemoglobin 11.2 (L) 12.0 - 16.0 g/dL    Hematocrit 33.3 (L) 36.0 - 46.0 %    MCV 88 80 - 100 fL    MCH 29.6 26.0 - 34.0 pg    MCHC 33.6 32.0 - 36.0 g/dL    RDW 13.1 11.5 - 14.5 %    Platelets 176 150 - 450 x10*3/uL   PST Top   Result Value Ref Range    Extra Tube Hold for add-ons.    POCT GLUCOSE   Result Value Ref Range    POCT Glucose 131 (H) 74 - 99 mg/dL   POCT GLUCOSE   Result Value Ref Range    POCT Glucose 184 (H) 74 - 99 mg/dL   POCT GLUCOSE   Result Value Ref Range    POCT Glucose 90 74 - 99 mg/dL         Imaging:   Reviewed          Assessment/Plan:  Assessment & Plan  Liver mass  Admit patient to regular medical floor  Consult GI for possible EUS  Consult oncology  Monitor liver function  Cholecystitis  Continue Zosyn  Consult ID  Retroperitoneal lymphadenopathy  Most likely due to metastasis  Acute kidney injury superimposed on CKD  Kidney function improved  Continue monitoring  Leukocytosis  Continue Zosyn  ID consult      DVT Prophylaxis- Addressed    Discussed with patient, RN    SIGNATURE: Nolan Renteria MD  DATE: April 12, 2025  TIME: 6:19 PM      This note was partially created using voice recognition software and is inherently subject to errors including those of syntax and \"sound-alike\" substitutions which may escape proofreading. In such instances, original meaning may be extrapolated by contextual derivation    "

## 2025-04-12 NOTE — CARE PLAN
Problem: Pain - Adult  Goal: Verbalizes/displays adequate comfort level or baseline comfort level  Outcome: Progressing     Problem: Safety - Adult  Goal: Free from fall injury  Outcome: Progressing     Problem: Discharge Planning  Goal: Discharge to home or other facility with appropriate resources  Outcome: Progressing     Problem: Chronic Conditions and Co-morbidities  Goal: Patient's chronic conditions and co-morbidity symptoms are monitored and maintained or improved  Outcome: Progressing     Problem: Nutrition  Goal: Nutrient intake appropriate for maintaining nutritional needs  Outcome: Progressing     Problem: Fall/Injury  Goal: Not fall by end of shift  Outcome: Progressing  Goal: Be free from injury by end of the shift  Outcome: Progressing  Goal: Verbalize understanding of personal risk factors for fall in the hospital  Outcome: Progressing  Goal: Verbalize understanding of risk factor reduction measures to prevent injury from fall in the home  Outcome: Progressing  Goal: Use assistive devices by end of the shift  Outcome: Progressing  Goal: Pace activities to prevent fatigue by end of the shift  Outcome: Progressing     Problem: Diabetes  Goal: Achieve decreasing blood glucose levels by end of shift  4/12/2025 1823 by Bea Calixto RN  Outcome: Progressing  4/12/2025 0740 by Bea Calixto RN  Flowsheets (Taken 4/12/2025 0740)  Achieve decreasing blood glucose levels by end of shift: Med administration/monitoring of effect  Goal: Increase stability of blood glucose readings by end of shift  Outcome: Progressing  Goal: Decrease in ketones present in urine by end of shift  Outcome: Progressing  Goal: Maintain electrolyte levels within acceptable range throughout shift  Outcome: Progressing  Goal: Maintain glucose levels >70mg/dl to <250mg/dl throughout shift  Outcome: Progressing  Goal: No changes in neurological exam by end of shift  Outcome: Progressing  Goal: Learn about and adhere to nutrition  recommendations by end of shift  Outcome: Progressing  Goal: Vital signs within normal range for age by end of shift  Outcome: Progressing  Goal: Increase self care and/or family involovement by end of shift  Outcome: Progressing  Goal: Receive DSME education by end of shift  Outcome: Progressing        EOS- No acute changes throughout the shift. Patient denies any pain or shortness of breath.  at bedside this morning and family in the evening. IV antibiotics continued. Safety maintained and call light within reach.

## 2025-04-12 NOTE — NURSING NOTE
EOS:  Slept on and off.  Remains on RA with VSS, monitor showing NSR, denies abdominal pain this shift.  Independent in room, tolerating well.  Medicated once with Tylenol and Reglan for headache with good results.

## 2025-04-13 VITALS
HEIGHT: 68 IN | TEMPERATURE: 99.3 F | RESPIRATION RATE: 18 BRPM | OXYGEN SATURATION: 92 % | BODY MASS INDEX: 33.31 KG/M2 | SYSTOLIC BLOOD PRESSURE: 156 MMHG | HEART RATE: 87 BPM | DIASTOLIC BLOOD PRESSURE: 82 MMHG | WEIGHT: 219.8 LBS

## 2025-04-13 LAB
ALBUMIN SERPL BCP-MCNC: 3.6 G/DL (ref 3.4–5)
ALP SERPL-CCNC: 162 U/L (ref 33–136)
ALT SERPL W P-5'-P-CCNC: 35 U/L (ref 7–45)
ANION GAP SERPL CALC-SCNC: 13 MMOL/L (ref 10–20)
AST SERPL W P-5'-P-CCNC: 54 U/L (ref 9–39)
BACTERIA BLD CULT: NORMAL
BACTERIA BLD CULT: NORMAL
BILIRUB SERPL-MCNC: 1.9 MG/DL (ref 0–1.2)
BUN SERPL-MCNC: 14 MG/DL (ref 6–23)
CALCIUM SERPL-MCNC: 9.5 MG/DL (ref 8.6–10.3)
CHLORIDE SERPL-SCNC: 102 MMOL/L (ref 98–107)
CO2 SERPL-SCNC: 22 MMOL/L (ref 21–32)
CREAT SERPL-MCNC: 1.13 MG/DL (ref 0.5–1.05)
EGFRCR SERPLBLD CKD-EPI 2021: 53 ML/MIN/1.73M*2
ERYTHROCYTE [DISTWIDTH] IN BLOOD BY AUTOMATED COUNT: 13 % (ref 11.5–14.5)
GLUCOSE BLD MANUAL STRIP-MCNC: 116 MG/DL (ref 74–99)
GLUCOSE BLD MANUAL STRIP-MCNC: 138 MG/DL (ref 74–99)
GLUCOSE BLD MANUAL STRIP-MCNC: 92 MG/DL (ref 74–99)
GLUCOSE BLD MANUAL STRIP-MCNC: 99 MG/DL (ref 74–99)
GLUCOSE SERPL-MCNC: 115 MG/DL (ref 74–99)
HCT VFR BLD AUTO: 34.5 % (ref 36–46)
HGB BLD-MCNC: 11.4 G/DL (ref 12–16)
MCH RBC QN AUTO: 29.2 PG (ref 26–34)
MCHC RBC AUTO-ENTMCNC: 33 G/DL (ref 32–36)
MCV RBC AUTO: 89 FL (ref 80–100)
NRBC BLD-RTO: 0 /100 WBCS (ref 0–0)
PLATELET # BLD AUTO: 211 X10*3/UL (ref 150–450)
POTASSIUM SERPL-SCNC: 3.4 MMOL/L (ref 3.5–5.3)
PROT SERPL-MCNC: 6.8 G/DL (ref 6.4–8.2)
RBC # BLD AUTO: 3.9 X10*6/UL (ref 4–5.2)
SODIUM SERPL-SCNC: 134 MMOL/L (ref 136–145)
WBC # BLD AUTO: 11.5 X10*3/UL (ref 4.4–11.3)

## 2025-04-13 PROCEDURE — 2500000002 HC RX 250 W HCPCS SELF ADMINISTERED DRUGS (ALT 637 FOR MEDICARE OP, ALT 636 FOR OP/ED): Performed by: NURSE PRACTITIONER

## 2025-04-13 PROCEDURE — 1200000002 HC GENERAL ROOM WITH TELEMETRY DAILY

## 2025-04-13 PROCEDURE — 2500000004 HC RX 250 GENERAL PHARMACY W/ HCPCS (ALT 636 FOR OP/ED): Performed by: NURSE PRACTITIONER

## 2025-04-13 PROCEDURE — 84075 ASSAY ALKALINE PHOSPHATASE: CPT | Performed by: NURSE PRACTITIONER

## 2025-04-13 PROCEDURE — 2500000001 HC RX 250 WO HCPCS SELF ADMINISTERED DRUGS (ALT 637 FOR MEDICARE OP): Performed by: NURSE PRACTITIONER

## 2025-04-13 PROCEDURE — 82947 ASSAY GLUCOSE BLOOD QUANT: CPT

## 2025-04-13 PROCEDURE — 36415 COLL VENOUS BLD VENIPUNCTURE: CPT | Performed by: NURSE PRACTITIONER

## 2025-04-13 PROCEDURE — 85027 COMPLETE CBC AUTOMATED: CPT | Performed by: NURSE PRACTITIONER

## 2025-04-13 RX ORDER — SODIUM CHLORIDE 9 MG/ML
75 INJECTION, SOLUTION INTRAVENOUS CONTINUOUS
Status: ACTIVE | OUTPATIENT
Start: 2025-04-13 | End: 2025-04-14

## 2025-04-13 RX ORDER — POTASSIUM CHLORIDE 20 MEQ/1
20 TABLET, EXTENDED RELEASE ORAL ONCE
Status: COMPLETED | OUTPATIENT
Start: 2025-04-13 | End: 2025-04-13

## 2025-04-13 RX ADMIN — FLUTICASONE PROPIONATE 1 SPRAY: 50 SPRAY, METERED NASAL at 22:07

## 2025-04-13 RX ADMIN — PIPERACILLIN SODIUM AND TAZOBACTAM SODIUM 3.38 G: 3; .375 INJECTION, SOLUTION INTRAVENOUS at 11:47

## 2025-04-13 RX ADMIN — PAROXETINE HYDROCHLORIDE 20 MG: 20 TABLET, FILM COATED ORAL at 08:53

## 2025-04-13 RX ADMIN — HEPARIN SODIUM 5000 UNITS: 5000 INJECTION, SOLUTION INTRAVENOUS; SUBCUTANEOUS at 08:53

## 2025-04-13 RX ADMIN — POTASSIUM CHLORIDE 20 MEQ: 1500 TABLET, EXTENDED RELEASE ORAL at 11:47

## 2025-04-13 RX ADMIN — PANTOPRAZOLE SODIUM 40 MG: 40 TABLET, DELAYED RELEASE ORAL at 05:11

## 2025-04-13 RX ADMIN — HEPARIN SODIUM 5000 UNITS: 5000 INJECTION, SOLUTION INTRAVENOUS; SUBCUTANEOUS at 23:01

## 2025-04-13 RX ADMIN — SODIUM CHLORIDE 75 ML/HR: 9 INJECTION, SOLUTION INTRAVENOUS at 23:43

## 2025-04-13 RX ADMIN — PIPERACILLIN SODIUM AND TAZOBACTAM SODIUM 3.38 G: 3; .375 INJECTION, SOLUTION INTRAVENOUS at 19:47

## 2025-04-13 RX ADMIN — SODIUM CHLORIDE 75 ML/HR: 9 INJECTION, SOLUTION INTRAVENOUS at 11:47

## 2025-04-13 RX ADMIN — AMLODIPINE BESYLATE 10 MG: 10 TABLET ORAL at 08:53

## 2025-04-13 RX ADMIN — PIPERACILLIN SODIUM AND TAZOBACTAM SODIUM 3.38 G: 3; .375 INJECTION, SOLUTION INTRAVENOUS at 04:53

## 2025-04-13 RX ADMIN — HEPARIN SODIUM 5000 UNITS: 5000 INJECTION, SOLUTION INTRAVENOUS; SUBCUTANEOUS at 16:44

## 2025-04-13 ASSESSMENT — PAIN SCALES - GENERAL
PAINLEVEL_OUTOF10: 0 - NO PAIN
PAINLEVEL_OUTOF10: 0 - NO PAIN

## 2025-04-13 ASSESSMENT — COGNITIVE AND FUNCTIONAL STATUS - GENERAL
MOVING FROM LYING ON BACK TO SITTING ON SIDE OF FLAT BED WITH BEDRAILS: A LITTLE
DAILY ACTIVITIY SCORE: 24
MOBILITY SCORE: 24
TURNING FROM BACK TO SIDE WHILE IN FLAT BAD: A LITTLE
DAILY ACTIVITIY SCORE: 24
MOBILITY SCORE: 22

## 2025-04-13 ASSESSMENT — PAIN - FUNCTIONAL ASSESSMENT: PAIN_FUNCTIONAL_ASSESSMENT: 0-10

## 2025-04-13 ASSESSMENT — ACTIVITIES OF DAILY LIVING (ADL): LACK_OF_TRANSPORTATION: NO

## 2025-04-13 NOTE — CARE PLAN
The patient's goals for the shift include sleep    The clinical goals for the shift include see plan of care

## 2025-04-13 NOTE — PROGRESS NOTES
04/13/25 1427   Discharge Planning   Living Arrangements Spouse/significant other;Family members   Support Systems Spouse/significant other   Assistance Needed none   Type of Residence Private residence   Home or Post Acute Services None   Expected Discharge Disposition Home   Does the patient need discharge transport arranged? No   Financial Resource Strain   How hard is it for you to pay for the very basics like food, housing, medical care, and heating? Not very   Housing Stability   In the last 12 months, was there a time when you were not able to pay the mortgage or rent on time? N   At any time in the past 12 months, were you homeless or living in a shelter (including now)? N   Transportation Needs   In the past 12 months, has lack of transportation kept you from medical appointments or from getting medications? no   In the past 12 months, has lack of transportation kept you from meetings, work, or from getting things needed for daily living? No   Stroke Family Assessment   Stroke Family Assessment Needed No   Intensity of Service   Intensity of Service 0-30 min     Met with the patient and her  in the room. She gave permission to peak with her  present. She states that she lives at home with her , the patient cares for her sick sister at home. The patient states that she is independent in her daily activities, has no current discharge concerns. She was transferred from [facility] to have a procedure done on the 14th. She plans to return home when discharged. She follows with her primary care MD, Dr. Kit Fleming and manages her own medications.

## 2025-04-13 NOTE — PROGRESS NOTES
Internal Medicine Progress Note    Patient Name: Jordyn Neal          MRN: [id number]  Today's Date: April 13, 2025          Attending: Nolan Renteria MD    Subjective:  Patient was seen and examined at bedside, she feels fine, denies abdominal pain, nausea or vomiting.    Review Of Systems:  GENERAL: No malaise or fevers.  CARDIOVASCULAR: Negative for chest pain, leg swelling  RESPIRATORY: Negative for shortness of breath  GI: No nausea, vomiting, or diarrhea  MUSCULOSKELETAL: Negative for joint pain or swelling      Objective:  Vitals:    04/12/25 2000 04/13/25 0000 04/13/25 0400 04/13/25 0800   BP: 152/77 144/64 146/85 151/89   BP Location: Right arm Right arm Right arm    Patient Position: Lying Lying Lying    Pulse: 82 96 95 94   Resp: 16 16 16 16   Temp: 36.8 °C (98.2 °F) 36.5 °C (97.7 °F) 36.1 °C (97 °F) 36.3 °C (97.3 °F)   TempSrc: Temporal Temporal Temporal Temporal   SpO2: 96% 98% 97% 98%   Weight:       Height:               Physical Exam:   General appearance: Well-appearing alert, in no acute distress   Lungs: Clear to auscultation, no wheezing or rhonchi  Heart: RRR without murmur, gallop, or rubs.  No ectopy  Abdomen: Soft, non-tender. Bowel sounds normal.  Extremities: No deformity, no edema  Neuro: Alert oriented x3, no focal deficit.    Labs:  Results for orders placed or performed during the hospital encounter of 04/11/25 (from the past 24 hours)   POCT GLUCOSE   Result Value Ref Range    POCT Glucose 184 (H) 74 - 99 mg/dL   POCT GLUCOSE   Result Value Ref Range    POCT Glucose 90 74 - 99 mg/dL   POCT GLUCOSE   Result Value Ref Range    POCT Glucose 105 (H) 74 - 99 mg/dL   Comprehensive metabolic panel   Result Value Ref Range    Glucose 115 (H) 74 - 99 mg/dL    Sodium 134 (L) 136 - 145 mmol/L    Potassium 3.4 (L) 3.5 - 5.3 mmol/L    Chloride 102 98 - 107 mmol/L    Bicarbonate 22 21 - 32 mmol/L    Anion Gap 13 10 - 20 mmol/L    Urea Nitrogen 14 6 - 23 mg/dL    Creatinine 1.13 (H) 0.50 - 1.05 mg/dL     eGFR 53 (L) >60 mL/min/1.73m*2    Calcium 9.5 8.6 - 10.3 mg/dL    Albumin 3.6 3.4 - 5.0 g/dL    Alkaline Phosphatase 162 (H) 33 - 136 U/L    Total Protein 6.8 6.4 - 8.2 g/dL    AST 54 (H) 9 - 39 U/L    Bilirubin, Total 1.9 (H) 0.0 - 1.2 mg/dL    ALT 35 7 - 45 U/L   CBC   Result Value Ref Range    WBC 11.5 (H) 4.4 - 11.3 x10*3/uL    nRBC 0.0 0.0 - 0.0 /100 WBCs    RBC 3.90 (L) 4.00 - 5.20 x10*6/uL    Hemoglobin 11.4 (L) 12.0 - 16.0 g/dL    Hematocrit 34.5 (L) 36.0 - 46.0 %    MCV 89 80 - 100 fL    MCH 29.2 26.0 - 34.0 pg    MCHC 33.0 32.0 - 36.0 g/dL    RDW 13.0 11.5 - 14.5 %    Platelets 211 150 - 450 x10*3/uL   POCT GLUCOSE   Result Value Ref Range    POCT Glucose 116 (H) 74 - 99 mg/dL       Medications:  Scheduled medications  amLODIPine, 10 mg, oral, Daily  fluticasone, 1 spray, Each Nostril, Daily  heparin (porcine), 5,000 Units, subcutaneous, q8h  [Held by provider] hydroCHLOROthiazide, 25 mg, oral, Daily  insulin lispro, 0-5 Units, subcutaneous, Before meals & nightly  [Held by provider] losartan, 100 mg, oral, Daily  [Held by provider] meloxicam, 15 mg, oral, Daily  [Held by provider] metFORMIN, 500 mg, oral, BID  pantoprazole, 40 mg, oral, Daily before breakfast   Or  pantoprazole, 40 mg, intravenous, Daily before breakfast  PARoxetine, 20 mg, oral, Daily  piperacillin-tazobactam, 3.375 g, intravenous, q8h  [Held by provider] simvastatin, 10 mg, oral, Nightly      Continuous medications     PRN medications  PRN medications: bisacodyl, dextrose, dextrose, glucagon, glucagon, melatonin, polyethylene glycol      Assessment/Plan:  Assessment & Plan  Liver mass  Plan for EUS tomorrow  GI and oncology are following  Monitor liver function  Cholecystitis  Continue Zosyn  ID is following  Retroperitoneal lymphadenopathy  Most likely due to metastasis  Acute kidney injury superimposed on CKD  Kidney function improved  Continue monitoring  Leukocytosis  Continue Zosyn    Discussed with patient, CATHY Renteria,  "MD   Date: 04/13/25  Time: 10:11 AM    This note was partially created using voice recognition software and is inherently subject to errors including those of syntax and \"sound-alike\" substitutions which may escape proofreading. In such instances, original meaning may be extrapolated by contextual derivation    "

## 2025-04-13 NOTE — CARE PLAN
Problem: Pain - Adult  Goal: Verbalizes/displays adequate comfort level or baseline comfort level  4/13/2025 1742 by Bea Calixto RN  Outcome: Progressing  4/13/2025 0732 by Bea Calixto RN  Flowsheets (Taken 4/13/2025 0732)  Verbalizes/displays adequate comfort level or baseline comfort level:   Encourage patient to monitor pain and request assistance   Assess pain using appropriate pain scale     Problem: Safety - Adult  Goal: Free from fall injury  Outcome: Progressing     Problem: Discharge Planning  Goal: Discharge to home or other facility with appropriate resources  Outcome: Progressing     Problem: Chronic Conditions and Co-morbidities  Goal: Patient's chronic conditions and co-morbidity symptoms are monitored and maintained or improved  Outcome: Progressing     Problem: Nutrition  Goal: Nutrient intake appropriate for maintaining nutritional needs  Outcome: Progressing     Problem: Fall/Injury  Goal: Not fall by end of shift  Outcome: Progressing  Goal: Be free from injury by end of the shift  Outcome: Progressing  Goal: Verbalize understanding of personal risk factors for fall in the hospital  Outcome: Progressing  Goal: Verbalize understanding of risk factor reduction measures to prevent injury from fall in the home  Outcome: Progressing  Goal: Use assistive devices by end of the shift  Outcome: Progressing  Goal: Pace activities to prevent fatigue by end of the shift  Outcome: Progressing     Problem: Diabetes  Goal: Achieve decreasing blood glucose levels by end of shift  Outcome: Progressing  Goal: Increase stability of blood glucose readings by end of shift  Outcome: Progressing  Goal: Decrease in ketones present in urine by end of shift  Outcome: Progressing  Goal: Maintain electrolyte levels within acceptable range throughout shift  Outcome: Progressing  Goal: Maintain glucose levels >70mg/dl to <250mg/dl throughout shift  Outcome: Progressing  Goal: No changes in neurological exam by  end of shift  Outcome: Progressing  Goal: Learn about and adhere to nutrition recommendations by end of shift  Outcome: Progressing  Goal: Vital signs within normal range for age by end of shift  Outcome: Progressing  Goal: Increase self care and/or family involovement by end of shift  Outcome: Progressing  Goal: Receive DSME education by end of shift  Outcome: Progressing          EOS- No acute changes throughout the shift. Patient denies any pain or shortness of breath. IV fluids started and IV antibiotics continued.  at bedside this afternoon. Safety maintained and call light within reach.

## 2025-04-13 NOTE — NURSING NOTE
EOS:  Slept well with no complaints.  Bisacodyl suppository administered yesterday evening at patients request for constipation with results of small hard formed BM.  Remains independent in room, tolerating well.  VSS, monitor continues to show NSR.  No abdominal pain this shift.   Allan Felix(Resident)

## 2025-04-14 ENCOUNTER — ANESTHESIA EVENT (OUTPATIENT)
Dept: GASTROENTEROLOGY | Facility: HOSPITAL | Age: 70
DRG: 423 | End: 2025-04-14
Payer: MEDICARE

## 2025-04-14 ENCOUNTER — APPOINTMENT (OUTPATIENT)
Dept: RADIOLOGY | Facility: HOSPITAL | Age: 70
DRG: 423 | End: 2025-04-14
Payer: MEDICARE

## 2025-04-14 ENCOUNTER — APPOINTMENT (OUTPATIENT)
Dept: GASTROENTEROLOGY | Facility: HOSPITAL | Age: 70
DRG: 423 | End: 2025-04-14
Payer: MEDICARE

## 2025-04-14 ENCOUNTER — ANESTHESIA (OUTPATIENT)
Dept: GASTROENTEROLOGY | Facility: HOSPITAL | Age: 70
DRG: 423 | End: 2025-04-14
Payer: MEDICARE

## 2025-04-14 LAB
ALBUMIN SERPL BCP-MCNC: 3.3 G/DL (ref 3.4–5)
ALP SERPL-CCNC: 146 U/L (ref 33–136)
ALT SERPL W P-5'-P-CCNC: 30 U/L (ref 7–45)
ANION GAP SERPL CALC-SCNC: 10 MMOL/L (ref 10–20)
AST SERPL W P-5'-P-CCNC: 40 U/L (ref 9–39)
BACTERIA BLD CULT: NORMAL
BACTERIA BLD CULT: NORMAL
BILIRUB SERPL-MCNC: 1.6 MG/DL (ref 0–1.2)
BUN SERPL-MCNC: 11 MG/DL (ref 6–23)
CALCIUM SERPL-MCNC: 8.8 MG/DL (ref 8.6–10.3)
CHLORIDE SERPL-SCNC: 106 MMOL/L (ref 98–107)
CO2 SERPL-SCNC: 22 MMOL/L (ref 21–32)
CREAT SERPL-MCNC: 1.04 MG/DL (ref 0.5–1.05)
EGFRCR SERPLBLD CKD-EPI 2021: 59 ML/MIN/1.73M*2
ERYTHROCYTE [DISTWIDTH] IN BLOOD BY AUTOMATED COUNT: 13.1 % (ref 11.5–14.5)
GLUCOSE BLD MANUAL STRIP-MCNC: 105 MG/DL (ref 74–99)
GLUCOSE BLD MANUAL STRIP-MCNC: 111 MG/DL (ref 74–99)
GLUCOSE BLD MANUAL STRIP-MCNC: 159 MG/DL (ref 74–99)
GLUCOSE BLD MANUAL STRIP-MCNC: 159 MG/DL (ref 74–99)
GLUCOSE BLD MANUAL STRIP-MCNC: 93 MG/DL (ref 74–99)
GLUCOSE SERPL-MCNC: 108 MG/DL (ref 74–99)
HCT VFR BLD AUTO: 31.1 % (ref 36–46)
HGB BLD-MCNC: 10.1 G/DL (ref 12–16)
INR PPP: 1.3 (ref 0.9–1.1)
MAGNESIUM SERPL-MCNC: 1.6 MG/DL (ref 1.6–2.4)
MCH RBC QN AUTO: 28.9 PG (ref 26–34)
MCHC RBC AUTO-ENTMCNC: 32.5 G/DL (ref 32–36)
MCV RBC AUTO: 89 FL (ref 80–100)
NRBC BLD-RTO: 0 /100 WBCS (ref 0–0)
PLATELET # BLD AUTO: 197 X10*3/UL (ref 150–450)
POTASSIUM SERPL-SCNC: 3.3 MMOL/L (ref 3.5–5.3)
PROT SERPL-MCNC: 6.6 G/DL (ref 6.4–8.2)
PROTHROMBIN TIME: 14.6 SECONDS (ref 9.8–12.4)
RBC # BLD AUTO: 3.5 X10*6/UL (ref 4–5.2)
SODIUM SERPL-SCNC: 135 MMOL/L (ref 136–145)
WBC # BLD AUTO: 7.1 X10*3/UL (ref 4.4–11.3)

## 2025-04-14 PROCEDURE — 84075 ASSAY ALKALINE PHOSPHATASE: CPT | Performed by: NURSE PRACTITIONER

## 2025-04-14 PROCEDURE — 0F798DZ DILATION OF COMMON BILE DUCT WITH INTRALUMINAL DEVICE, VIA NATURAL OR ARTIFICIAL OPENING ENDOSCOPIC: ICD-10-PCS | Performed by: STUDENT IN AN ORGANIZED HEALTH CARE EDUCATION/TRAINING PROGRAM

## 2025-04-14 PROCEDURE — 2780000003 HC OR 278 NO HCPCS

## 2025-04-14 PROCEDURE — 43262 ENDO CHOLANGIOPANCREATOGRAPH: CPT | Performed by: STUDENT IN AN ORGANIZED HEALTH CARE EDUCATION/TRAINING PROGRAM

## 2025-04-14 PROCEDURE — 1200000002 HC GENERAL ROOM WITH TELEMETRY DAILY

## 2025-04-14 PROCEDURE — C2625 STENT, NON-COR, TEM W/DEL SY: HCPCS

## 2025-04-14 PROCEDURE — 43238 EGD US FINE NEEDLE BX/ASPIR: CPT | Performed by: STUDENT IN AN ORGANIZED HEALTH CARE EDUCATION/TRAINING PROGRAM

## 2025-04-14 PROCEDURE — 3700000001 HC GENERAL ANESTHESIA TIME - INITIAL BASE CHARGE

## 2025-04-14 PROCEDURE — 0DJ08ZZ INSPECTION OF UPPER INTESTINAL TRACT, VIA NATURAL OR ARTIFICIAL OPENING ENDOSCOPIC: ICD-10-PCS | Performed by: STUDENT IN AN ORGANIZED HEALTH CARE EDUCATION/TRAINING PROGRAM

## 2025-04-14 PROCEDURE — 43274 ERCP DUCT STENT PLACEMENT: CPT | Performed by: STUDENT IN AN ORGANIZED HEALTH CARE EDUCATION/TRAINING PROGRAM

## 2025-04-14 PROCEDURE — 2500000002 HC RX 250 W HCPCS SELF ADMINISTERED DRUGS (ALT 637 FOR MEDICARE OP, ALT 636 FOR OP/ED): Performed by: NURSE PRACTITIONER

## 2025-04-14 PROCEDURE — 88341 IMHCHEM/IMCYTCHM EA ADD ANTB: CPT | Mod: TC,STJLAB | Performed by: STUDENT IN AN ORGANIZED HEALTH CARE EDUCATION/TRAINING PROGRAM

## 2025-04-14 PROCEDURE — C1889 IMPLANT/INSERT DEVICE, NOC: HCPCS

## 2025-04-14 PROCEDURE — 36415 COLL VENOUS BLD VENIPUNCTURE: CPT | Performed by: NURSE PRACTITIONER

## 2025-04-14 PROCEDURE — 85610 PROTHROMBIN TIME: CPT | Performed by: NURSE PRACTITIONER

## 2025-04-14 PROCEDURE — 07BD4ZX EXCISION OF AORTIC LYMPHATIC, PERCUTANEOUS ENDOSCOPIC APPROACH, DIAGNOSTIC: ICD-10-PCS | Performed by: STUDENT IN AN ORGANIZED HEALTH CARE EDUCATION/TRAINING PROGRAM

## 2025-04-14 PROCEDURE — 74328 X-RAY BILE DUCT ENDOSCOPY: CPT | Performed by: STUDENT IN AN ORGANIZED HEALTH CARE EDUCATION/TRAINING PROGRAM

## 2025-04-14 PROCEDURE — 2500000004 HC RX 250 GENERAL PHARMACY W/ HCPCS (ALT 636 FOR OP/ED): Performed by: ANESTHESIOLOGY

## 2025-04-14 PROCEDURE — A43274 PR ERCP STENT PLACEMENT BILIARY/PANCREATIC DUCT: Performed by: NURSE ANESTHETIST, CERTIFIED REGISTERED

## 2025-04-14 PROCEDURE — 43273 ENDOSCOPIC PANCREATOSCOPY: CPT | Performed by: STUDENT IN AN ORGANIZED HEALTH CARE EDUCATION/TRAINING PROGRAM

## 2025-04-14 PROCEDURE — 079D8ZX DRAINAGE OF AORTIC LYMPHATIC, VIA NATURAL OR ARTIFICIAL OPENING ENDOSCOPIC APPROACH, DIAGNOSTIC: ICD-10-PCS | Performed by: STUDENT IN AN ORGANIZED HEALTH CARE EDUCATION/TRAINING PROGRAM

## 2025-04-14 PROCEDURE — 2500000004 HC RX 250 GENERAL PHARMACY W/ HCPCS (ALT 636 FOR OP/ED): Performed by: NURSE PRACTITIONER

## 2025-04-14 PROCEDURE — 2500000004 HC RX 250 GENERAL PHARMACY W/ HCPCS (ALT 636 FOR OP/ED): Performed by: STUDENT IN AN ORGANIZED HEALTH CARE EDUCATION/TRAINING PROGRAM

## 2025-04-14 PROCEDURE — 2720000007 HC OR 272 NO HCPCS

## 2025-04-14 PROCEDURE — 2500000001 HC RX 250 WO HCPCS SELF ADMINISTERED DRUGS (ALT 637 FOR MEDICARE OP): Performed by: NURSE PRACTITIONER

## 2025-04-14 PROCEDURE — 82947 ASSAY GLUCOSE BLOOD QUANT: CPT

## 2025-04-14 PROCEDURE — 3700000002 HC GENERAL ANESTHESIA TIME - EACH INCREMENTAL 1 MINUTE

## 2025-04-14 PROCEDURE — 88112 CYTOPATH CELL ENHANCE TECH: CPT | Mod: TC | Performed by: STUDENT IN AN ORGANIZED HEALTH CARE EDUCATION/TRAINING PROGRAM

## 2025-04-14 PROCEDURE — 85027 COMPLETE CBC AUTOMATED: CPT | Performed by: NURSE PRACTITIONER

## 2025-04-14 PROCEDURE — 2500000005 HC RX 250 GENERAL PHARMACY W/O HCPCS: Performed by: STUDENT IN AN ORGANIZED HEALTH CARE EDUCATION/TRAINING PROGRAM

## 2025-04-14 PROCEDURE — C1769 GUIDE WIRE: HCPCS

## 2025-04-14 PROCEDURE — 2500000004 HC RX 250 GENERAL PHARMACY W/ HCPCS (ALT 636 FOR OP/ED): Performed by: NURSE ANESTHETIST, CERTIFIED REGISTERED

## 2025-04-14 PROCEDURE — A43274 PR ERCP STENT PLACEMENT BILIARY/PANCREATIC DUCT: Performed by: STUDENT IN AN ORGANIZED HEALTH CARE EDUCATION/TRAINING PROGRAM

## 2025-04-14 PROCEDURE — 7100000001 HC RECOVERY ROOM TIME - INITIAL BASE CHARGE

## 2025-04-14 PROCEDURE — 83735 ASSAY OF MAGNESIUM: CPT | Performed by: NURSE PRACTITIONER

## 2025-04-14 PROCEDURE — 7100000002 HC RECOVERY ROOM TIME - EACH INCREMENTAL 1 MINUTE

## 2025-04-14 RX ORDER — ONDANSETRON HYDROCHLORIDE 2 MG/ML
INJECTION, SOLUTION INTRAVENOUS AS NEEDED
Status: DISCONTINUED | OUTPATIENT
Start: 2025-04-14 | End: 2025-04-14

## 2025-04-14 RX ORDER — FENTANYL CITRATE 50 UG/ML
12.5 INJECTION, SOLUTION INTRAMUSCULAR; INTRAVENOUS EVERY 5 MIN PRN
Status: DISCONTINUED | OUTPATIENT
Start: 2025-04-14 | End: 2025-04-15 | Stop reason: HOSPADM

## 2025-04-14 RX ORDER — HYDROMORPHONE HYDROCHLORIDE 1 MG/ML
1 INJECTION, SOLUTION INTRAMUSCULAR; INTRAVENOUS; SUBCUTANEOUS EVERY 5 MIN PRN
Status: DISCONTINUED | OUTPATIENT
Start: 2025-04-14 | End: 2025-04-15 | Stop reason: HOSPADM

## 2025-04-14 RX ORDER — LIDOCAINE HYDROCHLORIDE 20 MG/ML
INJECTION, SOLUTION INFILTRATION; PERINEURAL AS NEEDED
Status: DISCONTINUED | OUTPATIENT
Start: 2025-04-14 | End: 2025-04-14

## 2025-04-14 RX ORDER — SODIUM CHLORIDE, SODIUM LACTATE, POTASSIUM CHLORIDE, CALCIUM CHLORIDE 600; 310; 30; 20 MG/100ML; MG/100ML; MG/100ML; MG/100ML
100 INJECTION, SOLUTION INTRAVENOUS CONTINUOUS
Status: CANCELLED | OUTPATIENT
Start: 2025-04-14 | End: 2025-04-15

## 2025-04-14 RX ORDER — ACETAMINOPHEN 325 MG/1
975 TABLET ORAL ONCE
Status: CANCELLED | OUTPATIENT
Start: 2025-04-14 | End: 2025-04-14

## 2025-04-14 RX ORDER — INDOMETHACIN 100 MG
SUPPOSITORY, RECTAL RECTAL AS NEEDED
Status: DISCONTINUED | OUTPATIENT
Start: 2025-04-14 | End: 2025-04-14 | Stop reason: HOSPADM

## 2025-04-14 RX ORDER — ROCURONIUM BROMIDE 10 MG/ML
INJECTION, SOLUTION INTRAVENOUS AS NEEDED
Status: DISCONTINUED | OUTPATIENT
Start: 2025-04-14 | End: 2025-04-14

## 2025-04-14 RX ORDER — PROPOFOL 10 MG/ML
INJECTION, EMULSION INTRAVENOUS AS NEEDED
Status: DISCONTINUED | OUTPATIENT
Start: 2025-04-14 | End: 2025-04-14

## 2025-04-14 RX ORDER — AMLODIPINE BESYLATE 10 MG/1
10 TABLET ORAL ONCE
Status: COMPLETED | OUTPATIENT
Start: 2025-04-14 | End: 2025-04-14

## 2025-04-14 RX ORDER — ALBUTEROL SULFATE 0.83 MG/ML
2.5 SOLUTION RESPIRATORY (INHALATION) ONCE AS NEEDED
Status: DISCONTINUED | OUTPATIENT
Start: 2025-04-14 | End: 2025-04-15

## 2025-04-14 RX ORDER — MIDAZOLAM HYDROCHLORIDE 1 MG/ML
1 INJECTION, SOLUTION INTRAMUSCULAR; INTRAVENOUS ONCE AS NEEDED
Status: CANCELLED | OUTPATIENT
Start: 2025-04-14

## 2025-04-14 RX ORDER — LIDOCAINE HYDROCHLORIDE 10 MG/ML
0.1 INJECTION, SOLUTION INFILTRATION; PERINEURAL ONCE
Status: CANCELLED | OUTPATIENT
Start: 2025-04-14 | End: 2025-04-14

## 2025-04-14 RX ORDER — DIPHENHYDRAMINE HYDROCHLORIDE 50 MG/ML
12.5 INJECTION, SOLUTION INTRAMUSCULAR; INTRAVENOUS ONCE AS NEEDED
Status: CANCELLED | OUTPATIENT
Start: 2025-04-14

## 2025-04-14 RX ORDER — OXYCODONE HYDROCHLORIDE 5 MG/1
5 TABLET ORAL EVERY 4 HOURS PRN
Status: CANCELLED | OUTPATIENT
Start: 2025-04-14

## 2025-04-14 RX ORDER — MIDAZOLAM HYDROCHLORIDE 1 MG/ML
1 INJECTION, SOLUTION INTRAMUSCULAR; INTRAVENOUS ONCE AS NEEDED
Status: DISCONTINUED | OUTPATIENT
Start: 2025-04-14 | End: 2025-04-15 | Stop reason: HOSPADM

## 2025-04-14 RX ORDER — MIDAZOLAM HYDROCHLORIDE 1 MG/ML
INJECTION, SOLUTION INTRAMUSCULAR; INTRAVENOUS AS NEEDED
Status: DISCONTINUED | OUTPATIENT
Start: 2025-04-14 | End: 2025-04-14

## 2025-04-14 RX ORDER — HYDRALAZINE HYDROCHLORIDE 20 MG/ML
5 INJECTION INTRAMUSCULAR; INTRAVENOUS EVERY 30 MIN PRN
Status: DISCONTINUED | OUTPATIENT
Start: 2025-04-14 | End: 2025-04-15 | Stop reason: HOSPADM

## 2025-04-14 RX ORDER — ONDANSETRON HYDROCHLORIDE 2 MG/ML
4 INJECTION, SOLUTION INTRAVENOUS ONCE AS NEEDED
Status: CANCELLED | OUTPATIENT
Start: 2025-04-14

## 2025-04-14 RX ORDER — POTASSIUM CHLORIDE 14.9 MG/ML
20 INJECTION INTRAVENOUS
Status: COMPLETED | OUTPATIENT
Start: 2025-04-14 | End: 2025-04-14

## 2025-04-14 RX ORDER — ALBUTEROL SULFATE 0.83 MG/ML
2.5 SOLUTION RESPIRATORY (INHALATION) ONCE AS NEEDED
Status: CANCELLED | OUTPATIENT
Start: 2025-04-14

## 2025-04-14 RX ORDER — HYDROMORPHONE HYDROCHLORIDE 1 MG/ML
1 INJECTION, SOLUTION INTRAMUSCULAR; INTRAVENOUS; SUBCUTANEOUS EVERY 5 MIN PRN
Status: CANCELLED | OUTPATIENT
Start: 2025-04-14

## 2025-04-14 RX ORDER — LIDOCAINE HYDROCHLORIDE 10 MG/ML
0.1 INJECTION, SOLUTION EPIDURAL; INFILTRATION; INTRACAUDAL; PERINEURAL ONCE
Status: DISCONTINUED | OUTPATIENT
Start: 2025-04-14 | End: 2025-04-15 | Stop reason: HOSPADM

## 2025-04-14 RX ORDER — FENTANYL CITRATE 50 UG/ML
12.5 INJECTION, SOLUTION INTRAMUSCULAR; INTRAVENOUS EVERY 5 MIN PRN
Status: CANCELLED | OUTPATIENT
Start: 2025-04-14

## 2025-04-14 RX ORDER — LABETALOL HYDROCHLORIDE 5 MG/ML
5 INJECTION, SOLUTION INTRAVENOUS ONCE AS NEEDED
Status: COMPLETED | OUTPATIENT
Start: 2025-04-14 | End: 2025-04-14

## 2025-04-14 RX ORDER — SODIUM CHLORIDE, SODIUM LACTATE, POTASSIUM CHLORIDE, CALCIUM CHLORIDE 600; 310; 30; 20 MG/100ML; MG/100ML; MG/100ML; MG/100ML
100 INJECTION, SOLUTION INTRAVENOUS CONTINUOUS
Status: DISCONTINUED | OUTPATIENT
Start: 2025-04-14 | End: 2025-04-14

## 2025-04-14 RX ORDER — DIPHENHYDRAMINE HYDROCHLORIDE 50 MG/ML
12.5 INJECTION, SOLUTION INTRAMUSCULAR; INTRAVENOUS ONCE AS NEEDED
Status: DISCONTINUED | OUTPATIENT
Start: 2025-04-14 | End: 2025-04-15 | Stop reason: HOSPADM

## 2025-04-14 RX ORDER — ACETAMINOPHEN 325 MG/1
975 TABLET ORAL ONCE
Status: DISCONTINUED | OUTPATIENT
Start: 2025-04-14 | End: 2025-04-15 | Stop reason: HOSPADM

## 2025-04-14 RX ORDER — MAGNESIUM SULFATE HEPTAHYDRATE 40 MG/ML
2 INJECTION, SOLUTION INTRAVENOUS ONCE
Status: COMPLETED | OUTPATIENT
Start: 2025-04-14 | End: 2025-04-14

## 2025-04-14 RX ORDER — HYDRALAZINE HYDROCHLORIDE 20 MG/ML
5 INJECTION INTRAMUSCULAR; INTRAVENOUS EVERY 30 MIN PRN
Status: CANCELLED | OUTPATIENT
Start: 2025-04-14

## 2025-04-14 RX ORDER — FENTANYL CITRATE 50 UG/ML
INJECTION, SOLUTION INTRAMUSCULAR; INTRAVENOUS AS NEEDED
Status: DISCONTINUED | OUTPATIENT
Start: 2025-04-14 | End: 2025-04-14

## 2025-04-14 RX ORDER — ONDANSETRON HYDROCHLORIDE 2 MG/ML
4 INJECTION, SOLUTION INTRAVENOUS ONCE AS NEEDED
Status: DISCONTINUED | OUTPATIENT
Start: 2025-04-14 | End: 2025-04-15 | Stop reason: HOSPADM

## 2025-04-14 RX ORDER — OXYCODONE HYDROCHLORIDE 5 MG/1
5 TABLET ORAL EVERY 4 HOURS PRN
Status: DISCONTINUED | OUTPATIENT
Start: 2025-04-14 | End: 2025-04-15 | Stop reason: HOSPADM

## 2025-04-14 RX ADMIN — SODIUM CHLORIDE, SODIUM LACTATE, POTASSIUM CHLORIDE, AND CALCIUM CHLORIDE 100 ML/HR: .6; .31; .03; .02 INJECTION, SOLUTION INTRAVENOUS at 15:51

## 2025-04-14 RX ADMIN — DEXAMETHASONE SODIUM PHOSPHATE 4 MG: 4 INJECTION INTRA-ARTICULAR; INTRALESIONAL; INTRAMUSCULAR; INTRAVENOUS; SOFT TISSUE at 12:50

## 2025-04-14 RX ADMIN — ROCURONIUM BROMIDE 50 MG: 10 INJECTION INTRAVENOUS at 12:50

## 2025-04-14 RX ADMIN — PIPERACILLIN SODIUM AND TAZOBACTAM SODIUM 3.38 G: 3; .375 INJECTION, SOLUTION INTRAVENOUS at 11:49

## 2025-04-14 RX ADMIN — PIPERACILLIN SODIUM AND TAZOBACTAM SODIUM 3.38 G: 3; .375 INJECTION, SOLUTION INTRAVENOUS at 20:55

## 2025-04-14 RX ADMIN — MAGNESIUM SULFATE HEPTAHYDRATE 2 G: 40 INJECTION, SOLUTION INTRAVENOUS at 08:57

## 2025-04-14 RX ADMIN — POTASSIUM CHLORIDE 20 MEQ: 200 INJECTION, SOLUTION INTRAVENOUS at 08:57

## 2025-04-14 RX ADMIN — FENTANYL CITRATE 50 MCG: 50 INJECTION, SOLUTION INTRAMUSCULAR; INTRAVENOUS at 12:53

## 2025-04-14 RX ADMIN — MIDAZOLAM 2 MG: 1 INJECTION INTRAMUSCULAR; INTRAVENOUS at 12:50

## 2025-04-14 RX ADMIN — FLUTICASONE PROPIONATE 1 SPRAY: 50 SPRAY, METERED NASAL at 22:20

## 2025-04-14 RX ADMIN — FENTANYL CITRATE 50 MCG: 50 INJECTION, SOLUTION INTRAMUSCULAR; INTRAVENOUS at 12:50

## 2025-04-14 RX ADMIN — LABETALOL HYDROCHLORIDE 5 MG: 5 INJECTION, SOLUTION INTRAVENOUS at 14:17

## 2025-04-14 RX ADMIN — POTASSIUM CHLORIDE 20 MEQ: 200 INJECTION, SOLUTION INTRAVENOUS at 11:32

## 2025-04-14 RX ADMIN — PROPOFOL 150 MG: 10 INJECTION, EMULSION INTRAVENOUS at 12:50

## 2025-04-14 RX ADMIN — ONDANSETRON 4 MG: 2 INJECTION, SOLUTION INTRAMUSCULAR; INTRAVENOUS at 12:50

## 2025-04-14 RX ADMIN — ROCURONIUM BROMIDE 10 MG: 10 INJECTION INTRAVENOUS at 13:25

## 2025-04-14 RX ADMIN — SODIUM CHLORIDE, POTASSIUM CHLORIDE, SODIUM LACTATE AND CALCIUM CHLORIDE: 600; 310; 30; 20 INJECTION, SOLUTION INTRAVENOUS at 13:26

## 2025-04-14 RX ADMIN — PROPOFOL 50 MG: 10 INJECTION, EMULSION INTRAVENOUS at 12:59

## 2025-04-14 RX ADMIN — INSULIN LISPRO 1 UNITS: 100 INJECTION, SOLUTION INTRAVENOUS; SUBCUTANEOUS at 22:20

## 2025-04-14 RX ADMIN — SODIUM CHLORIDE: 9 INJECTION, SOLUTION INTRAVENOUS at 12:29

## 2025-04-14 RX ADMIN — SUGAMMADEX 200 MG: 100 INJECTION, SOLUTION INTRAVENOUS at 13:57

## 2025-04-14 RX ADMIN — AMLODIPINE BESYLATE 10 MG: 10 TABLET ORAL at 16:02

## 2025-04-14 RX ADMIN — PIPERACILLIN SODIUM AND TAZOBACTAM SODIUM 3.38 G: 3; .375 INJECTION, SOLUTION INTRAVENOUS at 03:06

## 2025-04-14 RX ADMIN — Medication 100 MG: at 13:03

## 2025-04-14 RX ADMIN — LIDOCAINE HYDROCHLORIDE 80 MG: 20 INJECTION, SOLUTION INFILTRATION; PERINEURAL at 12:50

## 2025-04-14 ASSESSMENT — COGNITIVE AND FUNCTIONAL STATUS - GENERAL
STANDING UP FROM CHAIR USING ARMS: A LITTLE
WALKING IN HOSPITAL ROOM: A LITTLE
DAILY ACTIVITIY SCORE: 24
TURNING FROM BACK TO SIDE WHILE IN FLAT BAD: A LITTLE
DAILY ACTIVITIY SCORE: 24
MOBILITY SCORE: 22
MOBILITY SCORE: 20
MOVING TO AND FROM BED TO CHAIR: A LITTLE
CLIMB 3 TO 5 STEPS WITH RAILING: A LITTLE
MOVING FROM LYING ON BACK TO SITTING ON SIDE OF FLAT BED WITH BEDRAILS: A LITTLE

## 2025-04-14 ASSESSMENT — PAIN SCALES - GENERAL
PAINLEVEL_OUTOF10: 0 - NO PAIN

## 2025-04-14 ASSESSMENT — PAIN - FUNCTIONAL ASSESSMENT
PAIN_FUNCTIONAL_ASSESSMENT: 0-10

## 2025-04-14 NOTE — NURSING NOTE
EOS:  Slept on and off, spent some of the night in the chair, remains independent in room, tolerating well.  IV fluids continue as ordered, has been NPO since midnight.  Monitor continues to show NSR, VSS.  No abdominal pain this shift.

## 2025-04-14 NOTE — CARE PLAN
Problem: Pain - Adult  Goal: Verbalizes/displays adequate comfort level or baseline comfort level  4/14/2025 1727 by Bea Calixto RN  Outcome: Progressing  4/14/2025 0735 by Bea Calixto RN  Flowsheets (Taken 4/14/2025 0735)  Verbalizes/displays adequate comfort level or baseline comfort level:   Encourage patient to monitor pain and request assistance   Assess pain using appropriate pain scale     Problem: Safety - Adult  Goal: Free from fall injury  Outcome: Progressing     Problem: Discharge Planning  Goal: Discharge to home or other facility with appropriate resources  Outcome: Progressing     Problem: Chronic Conditions and Co-morbidities  Goal: Patient's chronic conditions and co-morbidity symptoms are monitored and maintained or improved  Outcome: Progressing     Problem: Nutrition  Goal: Nutrient intake appropriate for maintaining nutritional needs  Outcome: Progressing     Problem: Fall/Injury  Goal: Not fall by end of shift  Outcome: Progressing  Goal: Be free from injury by end of the shift  Outcome: Progressing  Goal: Verbalize understanding of personal risk factors for fall in the hospital  Outcome: Progressing  Goal: Verbalize understanding of risk factor reduction measures to prevent injury from fall in the home  Outcome: Progressing  Goal: Use assistive devices by end of the shift  Outcome: Progressing  Goal: Pace activities to prevent fatigue by end of the shift  Outcome: Progressing     Problem: Diabetes  Goal: Achieve decreasing blood glucose levels by end of shift  4/14/2025 1727 by Bea Calixto RN  Outcome: Progressing  4/14/2025 0735 by Bea Calixto RN  Flowsheets (Taken 4/14/2025 0735)  Achieve decreasing blood glucose levels by end of shift: Med administration/monitoring of effect  Goal: Increase stability of blood glucose readings by end of shift  Outcome: Progressing  Goal: Decrease in ketones present in urine by end of shift  Outcome: Progressing  Goal: Maintain  electrolyte levels within acceptable range throughout shift  Outcome: Progressing  Goal: Maintain glucose levels >70mg/dl to <250mg/dl throughout shift  Outcome: Progressing  Goal: No changes in neurological exam by end of shift  Outcome: Progressing  Goal: Learn about and adhere to nutrition recommendations by end of shift  Outcome: Progressing  Goal: Vital signs within normal range for age by end of shift  Outcome: Progressing  Goal: Increase self care and/or family involovement by end of shift  Outcome: Progressing  Goal: Receive DSME education by end of shift  Outcome: Progressing          1150- Patient going down for ERCP.  at bedside.     1540- Patient arrived back on the unit. Patient also a little dizzy so assisted her to the bathroom.    1551- Blood pressure elevated. Notified Maria Isabel Walton NP as patient was unable to take morning dose of norvasc d/t NPO for procedure. One time dose ordered.     EOS- No acute changes throughout the shift. Patient denies any pain or shortness of breath. IV potassium and magnesium replacement given. IV antibiotics continued.  at bedside throughout the shift. Safety maintained and call light within reach.

## 2025-04-14 NOTE — PROGRESS NOTES
Internal Medicine Progress Note    Patient Name: Jordyn Neal          MRN: [id number]  Today's Date: April 14, 2025          Attending: Nolan Renteria MD    Subjective:  Patient was seen and examined at bedside, she feels fine, denies abdominal pain, nausea or vomiting, patient is scheduled for EUS.    Review Of Systems:  GENERAL: No malaise or fevers.  CARDIOVASCULAR: Negative for chest pain, leg swelling  RESPIRATORY: Negative for shortness of breath  GI: No nausea, vomiting, or diarrhea  MUSCULOSKELETAL: Negative for joint pain or swelling      Objective:  Vitals:    04/13/25 2000 04/14/25 0000 04/14/25 0400 04/14/25 0800   BP: 156/82 158/75 137/72 140/87   BP Location: Right arm Right arm Right arm Right arm   Patient Position: Lying Lying Sitting Lying   Pulse: 87 92 79 88   Resp: 18 18 16 18   Temp: 37.4 °C (99.3 °F) 37.2 °C (99 °F) 37.1 °C (98.8 °F) 37 °C (98.6 °F)   TempSrc: Temporal Temporal Temporal Temporal   SpO2: 92% 100% 97% 95%   Weight:       Height:               Physical Exam:   General appearance: Well-appearing alert, in no acute distress   Lungs: Clear to auscultation, no wheezing or rhonchi  Heart: RRR without murmur, gallop, or rubs  Abdomen: Soft, non-tender. Bowel sounds normal.  Extremities: No deformity, no edema  Neuro: Alert oriented x3, no focal deficit.    Labs:  Results for orders placed or performed during the hospital encounter of 04/11/25 (from the past 24 hours)   POCT GLUCOSE   Result Value Ref Range    POCT Glucose 138 (H) 74 - 99 mg/dL   POCT GLUCOSE   Result Value Ref Range    POCT Glucose 92 74 - 99 mg/dL   POCT GLUCOSE   Result Value Ref Range    POCT Glucose 99 74 - 99 mg/dL   Comprehensive metabolic panel   Result Value Ref Range    Glucose 108 (H) 74 - 99 mg/dL    Sodium 135 (L) 136 - 145 mmol/L    Potassium 3.3 (L) 3.5 - 5.3 mmol/L    Chloride 106 98 - 107 mmol/L    Bicarbonate 22 21 - 32 mmol/L    Anion Gap 10 10 - 20 mmol/L    Urea Nitrogen 11 6 - 23 mg/dL    Creatinine  1.04 0.50 - 1.05 mg/dL    eGFR 59 (L) >60 mL/min/1.73m*2    Calcium 8.8 8.6 - 10.3 mg/dL    Albumin 3.3 (L) 3.4 - 5.0 g/dL    Alkaline Phosphatase 146 (H) 33 - 136 U/L    Total Protein 6.6 6.4 - 8.2 g/dL    AST 40 (H) 9 - 39 U/L    Bilirubin, Total 1.6 (H) 0.0 - 1.2 mg/dL    ALT 30 7 - 45 U/L   CBC   Result Value Ref Range    WBC 7.1 4.4 - 11.3 x10*3/uL    nRBC 0.0 0.0 - 0.0 /100 WBCs    RBC 3.50 (L) 4.00 - 5.20 x10*6/uL    Hemoglobin 10.1 (L) 12.0 - 16.0 g/dL    Hematocrit 31.1 (L) 36.0 - 46.0 %    MCV 89 80 - 100 fL    MCH 28.9 26.0 - 34.0 pg    MCHC 32.5 32.0 - 36.0 g/dL    RDW 13.1 11.5 - 14.5 %    Platelets 197 150 - 450 x10*3/uL   Protime-INR   Result Value Ref Range    Protime 14.6 (H) 9.8 - 12.4 seconds    INR 1.3 (H) 0.9 - 1.1   Magnesium   Result Value Ref Range    Magnesium 1.60 1.60 - 2.40 mg/dL   POCT GLUCOSE   Result Value Ref Range    POCT Glucose 105 (H) 74 - 99 mg/dL       Medications:  Scheduled medications  amLODIPine, 10 mg, oral, Daily  fluticasone, 1 spray, Each Nostril, Daily  [Held by provider] heparin (porcine), 5,000 Units, subcutaneous, q8h  [Held by provider] hydroCHLOROthiazide, 25 mg, oral, Daily  insulin lispro, 0-5 Units, subcutaneous, Before meals & nightly  [Held by provider] losartan, 100 mg, oral, Daily  magnesium sulfate, 2 g, intravenous, Once  [Held by provider] meloxicam, 15 mg, oral, Daily  [Held by provider] metFORMIN, 500 mg, oral, BID  pantoprazole, 40 mg, oral, Daily before breakfast   Or  pantoprazole, 40 mg, intravenous, Daily before breakfast  PARoxetine, 20 mg, oral, Daily  piperacillin-tazobactam, 3.375 g, intravenous, q8h  potassium chloride, 20 mEq, intravenous, q2h  [Held by provider] simvastatin, 10 mg, oral, Nightly      Continuous medications  sodium chloride 0.9%, 75 mL/hr, Last Rate: 75 mL/hr (04/13/25 3622)      PRN medications  PRN medications: bisacodyl, dextrose, dextrose, glucagon, glucagon, melatonin, polyethylene  "glycol      Assessment/Plan:  Assessment & Plan  Liver mass  Plan for EUS today  GI and oncology are following  Cholecystitis  Continue Zosyn  ID is following  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 In such instances, original meaning may be extrapolated by contextual derivation    "

## 2025-04-14 NOTE — ANESTHESIA PREPROCEDURE EVALUATION
Patient: Jordyn Neal    Procedure Information       Date/Time: 04/14/25 1300    Scheduled providers: Kasi Reynaga MD    Procedures:       ENDOSCOPIC ULTRASOUND (UPPER)      ERCP    Location: [facility]            Relevant Problems   Liver   (+) Cholecystitis       Clinical information reviewed:                   NPO Detail:  No data recorded     Physical Exam    Airway  Mallampati: II  TM distance: >3 FB  Neck ROM: full     Cardiovascular - normal exam     Dental    Pulmonary - normal exam     Abdominal - normal exam  (+) obese             Anesthesia Plan    History of general anesthesia?: yes  History of complications of general anesthesia?: no    ASA 3     general     intravenous induction   Anesthetic plan and risks discussed with patient.    Plan discussed with CRNA.

## 2025-04-14 NOTE — ANESTHESIA PROCEDURE NOTES
Airway  Date/Time: 4/14/2025 12:52 PM  Urgency: elective    Airway not difficult    Staffing  Performed: CRNA   Authorized by: Lonnie Bush DO    Performed by: NILSON Badillo-EMETERIO  Patient location during procedure: OR    Indications and Patient Condition  Indications for airway management: anesthesia  Spontaneous Ventilation: absent  Sedation level: deep  Preoxygenated: yes  Patient position: sniffing  MILS maintained throughout  Mask difficulty assessment: 1 - vent by mask  Planned trial extubation    Final Airway Details  Final airway type: endotracheal airway      Successful airway: ETT  Cuffed: yes   Successful intubation technique: direct laryngoscopy  Facilitating devices/methods: intubating stylet  Endotracheal tube insertion site: oral  Blade: Jena  Blade size: #3  ETT size (mm): 7.0  Cormack-Lehane Classification: grade I - full view of glottis  Placement verified by: chest auscultation   Cuff volume (mL): 4  Measured from: lips  ETT to lips (cm): 21  Number of attempts at approach: 1

## 2025-04-14 NOTE — PROGRESS NOTES
" INPATIENT PROGRESS NOTES    PATIENT NAME: Jordyn Neal    MRN: [id number]  SERVICE DATE:  4/14/2025   SERVICE TIME:  12:43 PM    SIGNATURE: Chang Miguel MD      ASSESSMENT :   Liver mass, leukocytosis in 69 y/o female with  past medical history of HTN, T2DM, HLD, fatty liver, GERD, CKD Stage III     PLAN:  -For ERCP today and we will follow-up the result  -Continue Zosyn  -Closely monitor for antibiotics side effects including rash, Diarrhea/CDI, thrombocytopenia, HCIO, etc.        SUBJECTIVE  Afebrile  No events overnight       OBJECTIVE  PHYSICAL EXAM:   Patient Vitals for the past 24 hrs:   BP Temp Temp src Pulse Resp SpO2   04/14/25 0800 140/87 37 °C (98.6 °F) Temporal 88 18 95 %   04/14/25 0400 137/72 37.1 °C (98.8 °F) Temporal 79 16 97 %   04/14/25 0000 158/75 37.2 °C (99 °F) Temporal 92 18 100 %   04/13/25 2000 156/82 37.4 °C (99.3 °F) Temporal 87 18 92 %   04/13/25 1600 147/79 36.2 °C (97.2 °F) Temporal 87 16 96 %         Gen: NAD  Neck: symmetric, no mass  Cardiovascular: RRR  Respiratory: No distress       Labs:  Lab Results   Component Value Date    WBC 7.1 04/14/2025    HGB 10.1 (L) 04/14/2025    HCT 31.1 (L) 04/14/2025    MCV 89 04/14/2025     04/14/2025     Lab Results   Component Value Date    GLUCOSE 108 (H) 04/14/2025    CALCIUM 8.8 04/14/2025     (L) 04/14/2025    K 3.3 (L) 04/14/2025    CO2 22 04/14/2025     04/14/2025    BUN 11 04/14/2025    CREATININE 1.04 04/14/2025   ESR: --No results found for: \"SEDRATE\"No results found for: \"CRP\"  Lab Results   Component Value Date    ALT 30 04/14/2025    AST 40 (H) 04/14/2025    ALKPHOS 146 (H) 04/14/2025    BILITOT 1.6 (H) 04/14/2025         DATA:   Diagnostic tests reviewed for today's visit:    Labs this admission reviewed  Imagings this admission reviewed  Cultures: Reviewed        Chang Miguel MD.   Infectious Disease Attending            This note was partially created using voice recognition software and is inherently subject " "to errors including those of syntax and \"sound-alike\" substitutions which may escape proofreading. In such instances, original meaning may be extrapolated by contextual derivation       "

## 2025-04-15 ENCOUNTER — PHARMACY VISIT (OUTPATIENT)
Dept: PHARMACY | Facility: CLINIC | Age: 70
End: 2025-04-15
Payer: COMMERCIAL

## 2025-04-15 ENCOUNTER — APPOINTMENT (OUTPATIENT)
Dept: RADIOLOGY | Facility: HOSPITAL | Age: 70
DRG: 423 | End: 2025-04-15
Payer: MEDICARE

## 2025-04-15 VITALS
HEART RATE: 77 BPM | DIASTOLIC BLOOD PRESSURE: 72 MMHG | RESPIRATION RATE: 18 BRPM | BODY MASS INDEX: 33.31 KG/M2 | OXYGEN SATURATION: 95 % | SYSTOLIC BLOOD PRESSURE: 106 MMHG | TEMPERATURE: 97.5 F | HEIGHT: 68 IN | WEIGHT: 219.8 LBS

## 2025-04-15 LAB
ALBUMIN SERPL BCP-MCNC: 3.3 G/DL (ref 3.4–5)
ALP SERPL-CCNC: 171 U/L (ref 33–136)
ALT SERPL W P-5'-P-CCNC: 32 U/L (ref 7–45)
ANION GAP SERPL CALC-SCNC: 12 MMOL/L (ref 10–20)
AST SERPL W P-5'-P-CCNC: 52 U/L (ref 9–39)
BILIRUB SERPL-MCNC: 1.2 MG/DL (ref 0–1.2)
BUN SERPL-MCNC: 11 MG/DL (ref 6–23)
CALCIUM SERPL-MCNC: 9.1 MG/DL (ref 8.6–10.3)
CHLORIDE SERPL-SCNC: 104 MMOL/L (ref 98–107)
CO2 SERPL-SCNC: 23 MMOL/L (ref 21–32)
CREAT SERPL-MCNC: 0.96 MG/DL (ref 0.5–1.05)
EGFRCR SERPLBLD CKD-EPI 2021: 65 ML/MIN/1.73M*2
ERYTHROCYTE [DISTWIDTH] IN BLOOD BY AUTOMATED COUNT: 12.9 % (ref 11.5–14.5)
GLUCOSE BLD MANUAL STRIP-MCNC: 101 MG/DL (ref 74–99)
GLUCOSE SERPL-MCNC: 105 MG/DL (ref 74–99)
HCT VFR BLD AUTO: 32.8 % (ref 36–46)
HGB BLD-MCNC: 10.6 G/DL (ref 12–16)
MAGNESIUM SERPL-MCNC: 1.96 MG/DL (ref 1.6–2.4)
MCH RBC QN AUTO: 29.4 PG (ref 26–34)
MCHC RBC AUTO-ENTMCNC: 32.3 G/DL (ref 32–36)
MCV RBC AUTO: 91 FL (ref 80–100)
NRBC BLD-RTO: 0 /100 WBCS (ref 0–0)
PLATELET # BLD AUTO: 195 X10*3/UL (ref 150–450)
POTASSIUM SERPL-SCNC: 3.4 MMOL/L (ref 3.5–5.3)
PROT SERPL-MCNC: 6.4 G/DL (ref 6.4–8.2)
RBC # BLD AUTO: 3.61 X10*6/UL (ref 4–5.2)
SODIUM SERPL-SCNC: 136 MMOL/L (ref 136–145)
WBC # BLD AUTO: 7.5 X10*3/UL (ref 4.4–11.3)

## 2025-04-15 PROCEDURE — 2500000004 HC RX 250 GENERAL PHARMACY W/ HCPCS (ALT 636 FOR OP/ED): Performed by: NURSE PRACTITIONER

## 2025-04-15 PROCEDURE — 85027 COMPLETE CBC AUTOMATED: CPT | Performed by: NURSE PRACTITIONER

## 2025-04-15 PROCEDURE — 82947 ASSAY GLUCOSE BLOOD QUANT: CPT

## 2025-04-15 PROCEDURE — 84075 ASSAY ALKALINE PHOSPHATASE: CPT | Performed by: NURSE PRACTITIONER

## 2025-04-15 PROCEDURE — 71260 CT THORAX DX C+: CPT

## 2025-04-15 PROCEDURE — 99232 SBSQ HOSP IP/OBS MODERATE 35: CPT | Performed by: NURSE PRACTITIONER

## 2025-04-15 PROCEDURE — RXMED WILLOW AMBULATORY MEDICATION CHARGE

## 2025-04-15 PROCEDURE — 36415 COLL VENOUS BLD VENIPUNCTURE: CPT | Performed by: NURSE PRACTITIONER

## 2025-04-15 PROCEDURE — 2500000001 HC RX 250 WO HCPCS SELF ADMINISTERED DRUGS (ALT 637 FOR MEDICARE OP): Performed by: NURSE PRACTITIONER

## 2025-04-15 PROCEDURE — 2550000001 HC RX 255 CONTRASTS: Performed by: INTERNAL MEDICINE

## 2025-04-15 PROCEDURE — 2500000002 HC RX 250 W HCPCS SELF ADMINISTERED DRUGS (ALT 637 FOR MEDICARE OP, ALT 636 FOR OP/ED): Performed by: NURSE PRACTITIONER

## 2025-04-15 PROCEDURE — 83735 ASSAY OF MAGNESIUM: CPT | Performed by: NURSE PRACTITIONER

## 2025-04-15 RX ORDER — POTASSIUM CHLORIDE 20 MEQ/1
40 TABLET, EXTENDED RELEASE ORAL ONCE
Status: COMPLETED | OUTPATIENT
Start: 2025-04-15 | End: 2025-04-15

## 2025-04-15 RX ORDER — METFORMIN HYDROCHLORIDE 500 MG/1
500 TABLET ORAL
Start: 2025-04-15 | End: 2025-04-22 | Stop reason: SDUPTHER

## 2025-04-15 RX ORDER — LANOLIN ALCOHOL/MO/W.PET/CERES
400 CREAM (GRAM) TOPICAL DAILY
Status: COMPLETED | OUTPATIENT
Start: 2025-04-15 | End: 2025-04-15

## 2025-04-15 RX ORDER — OXYCODONE HYDROCHLORIDE 5 MG/1
5 TABLET ORAL EVERY 4 HOURS PRN
Qty: 10 TABLET | Refills: 0 | Status: SHIPPED | OUTPATIENT
Start: 2025-04-15 | End: 2025-04-23 | Stop reason: SDUPTHER

## 2025-04-15 RX ADMIN — AMLODIPINE BESYLATE 10 MG: 10 TABLET ORAL at 09:28

## 2025-04-15 RX ADMIN — Medication 400 MG: at 09:28

## 2025-04-15 RX ADMIN — PIPERACILLIN SODIUM AND TAZOBACTAM SODIUM 3.38 G: 3; .375 INJECTION, SOLUTION INTRAVENOUS at 04:10

## 2025-04-15 RX ADMIN — IOHEXOL 50 ML: 350 INJECTION, SOLUTION INTRAVENOUS at 08:53

## 2025-04-15 RX ADMIN — PAROXETINE HYDROCHLORIDE 20 MG: 20 TABLET, FILM COATED ORAL at 09:28

## 2025-04-15 RX ADMIN — PANTOPRAZOLE SODIUM 40 MG: 40 TABLET, DELAYED RELEASE ORAL at 06:15

## 2025-04-15 RX ADMIN — POTASSIUM CHLORIDE 40 MEQ: 1500 TABLET, EXTENDED RELEASE ORAL at 09:27

## 2025-04-15 ASSESSMENT — PAIN SCALES - GENERAL
PAINLEVEL_OUTOF10: 0 - NO PAIN
PAIN_LEVEL: 0

## 2025-04-15 NOTE — PROGRESS NOTES
" INPATIENT PROGRESS NOTES    PATIENT NAME: Jordyn Neal    MRN: [id number]  SERVICE DATE:  4/15/2025   SERVICE TIME:  1:03 PM    SIGNATURE: Chang Miguel MD      ASSESSMENT :   Liver mass, leukocytosis in 71 y/o female with  past medical history of HTN, T2DM, HLD, fatty liver, GERD, CKD Stage III     PLAN:  -ERCP concerning for liver malignancy  -Can be discharged off antibiotics and follow-up with GI for biopsy result.    SUBJECTIVE  Afebrile  No events overnight       OBJECTIVE  PHYSICAL EXAM:   Patient Vitals for the past 24 hrs:   BP Temp Temp src Pulse Resp SpO2   04/15/25 1155 106/72 36.4 °C (97.5 °F) Temporal 77 18 95 %   04/15/25 0800 149/79 36.6 °C (97.9 °F) Temporal 79 18 94 %   04/15/25 0400 (!) 153/93 36 °C (96.8 °F) Temporal 81 18 96 %   04/15/25 0000 151/85 36.4 °C (97.5 °F) Temporal 90 18 94 %   04/14/25 2000 (!) 181/90 36.3 °C (97.3 °F) Temporal 94 18 98 %   04/14/25 1542 (!) 189/88 36.1 °C (97 °F) Temporal 88 20 93 %   04/14/25 1445 141/88 -- -- 89 (!) 27 96 %   04/14/25 1430 167/84 -- -- 86 (!) 29 95 %   04/14/25 1415 (!) 205/84 -- -- 97 21 100 %   04/14/25 1414 -- -- -- 98 23 100 %   04/14/25 1413 -- -- -- 98 23 100 %   04/14/25 1412 -- -- -- 99 22 100 %   04/14/25 1411 (!) 197/92 37 °C (98.6 °F) -- 98 18 100 %   04/14/25 1410 -- -- -- 100 (!) 35 99 %   04/14/25 1409 (!) 197/92 -- -- -- -- --   04/14/25 1409 -- -- -- 98 -- 99 %   04/14/25 1407 (!) 203/93 36 °C (96.8 °F) Temporal 98 14 100 %         Gen: No fever  Respiratory: No distress       Labs:  Lab Results   Component Value Date    WBC 7.5 04/15/2025    HGB 10.6 (L) 04/15/2025    HCT 32.8 (L) 04/15/2025    MCV 91 04/15/2025     04/15/2025     Lab Results   Component Value Date    GLUCOSE 105 (H) 04/15/2025    CALCIUM 9.1 04/15/2025     04/15/2025    K 3.4 (L) 04/15/2025    CO2 23 04/15/2025     04/15/2025    BUN 11 04/15/2025    CREATININE 0.96 04/15/2025   ESR: --No results found for: \"SEDRATE\"No results found for: " "\"CRP\"  Lab Results   Component Value Date    ALT 32 04/15/2025    AST 52 (H) 04/15/2025    ALKPHOS 171 (H) 04/15/2025    BILITOT 1.2 04/15/2025         DATA:   Diagnostic tests reviewed for today's visit:    Labs this admission reviewed  Imagings this admission reviewed  Cultures: Reviewed        Chang Miguel MD.   Infectious Disease Attending            This note was partially created using voice recognition software and is inherently subject to errors including those of syntax and \"sound-alike\" substitutions which may escape proofreading. In such instances, original meaning may be extrapolated by contextual derivation     ERCP confirmed  "

## 2025-04-15 NOTE — CARE PLAN
The patient's goals for the shift include    Problem: Pain - Adult  Goal: Verbalizes/displays adequate comfort level or baseline comfort level  Outcome: Progressing     Problem: Safety - Adult  Goal: Free from fall injury  Outcome: Progressing     Problem: Discharge Planning  Goal: Discharge to home or other facility with appropriate resources  Outcome: Progressing     Problem: Chronic Conditions and Co-morbidities  Goal: Patient's chronic conditions and co-morbidity symptoms are monitored and maintained or improved  Outcome: Progressing     Problem: Nutrition  Goal: Nutrient intake appropriate for maintaining nutritional needs  Outcome: Progressing     Problem: Fall/Injury  Goal: Not fall by end of shift  Outcome: Progressing  Goal: Be free from injury by end of the shift  Outcome: Progressing  Goal: Verbalize understanding of personal risk factors for fall in the hospital  Outcome: Progressing  Goal: Verbalize understanding of risk factor reduction measures to prevent injury from fall in the home  Outcome: Progressing  Goal: Use assistive devices by end of the shift  Outcome: Progressing  Goal: Pace activities to prevent fatigue by end of the shift  Outcome: Progressing     Problem: Diabetes  Goal: Achieve decreasing blood glucose levels by end of shift  Outcome: Progressing  Goal: Increase stability of blood glucose readings by end of shift  Outcome: Progressing  Goal: Decrease in ketones present in urine by end of shift  Outcome: Progressing  Goal: Maintain electrolyte levels within acceptable range throughout shift  Outcome: Progressing  Goal: Maintain glucose levels >70mg/dl to <250mg/dl throughout shift  Outcome: Progressing  Goal: No changes in neurological exam by end of shift  Outcome: Progressing  Goal: Learn about and adhere to nutrition recommendations by end of shift  Outcome: Progressing  Goal: Vital signs within normal range for age by end of shift  Outcome: Progressing  Goal: Increase self care  and/or family involovement by end of shift  Outcome: Progressing  Goal: Receive DSME education by end of shift  Outcome: Progressing       The clinical goals for the shift include see plan of care

## 2025-04-15 NOTE — PROGRESS NOTES
Spiritual Care Visit  Spiritual Care Request    Reason for Visit:  Routine Visit: Introduction  Continue Visiting: No     Request Received From:       Focus of Care:  Visited With: Patient         Refer to :          Spiritual Care Assessment    Spiritual Assessment:                      Care Provided:  Intended Effects: Establish rapport and connectedness, Meaning-making, Lana affirmation, Build relationship of care and support, Helping someone feel comforted, Demonstrate caring and concern, Lessen anxiety  Methods: Encourage sharing of feelings, Explore lana and values, Explore presence of God, Explore spiritual/Latter-day beliefs, Exploring hope, Offer spiritual/Latter-day support  Interventions: Acknowledge current situation, Active listening, Discuss concerns, Discuss spirituality/Holiness with someone, Perform a Latter-day rite or ritual, Cope    Sense of Community and or Catholic Affiliation:  Denominational         Addressed Needs/Concerns and/or [place] Through:  Catholic Encounters  Catholic Needs: Prayer, Literature, Catholic articles, Sacred text  Sacramental Encounters  Communion: Patient wants communion  Communion Given Indicator: Yes  Sacrament of Sick-Anointing: Anointed, Patient requested anointing  Other Sacrament: I celebrated sacramental confession with her.    Outcome:  Outcome of Spiritual Care Visit: Affirmation, [place], Personal disclosure/revelation, Peace/gratitude, Spirituality connected, Comfort/healing presence     Advance Directives:         Spiritual Care Annotation    Annotation:  She spoke of her anger with God over her son's death 5 years ago and told me that, as he  from cancer, now she has cancer. We talked about God, and I assured her of God's love for her and his mercy.

## 2025-04-15 NOTE — PROGRESS NOTES
Internal Medicine Progress Note    Patient Name: Jordyn Neal          MRN: [id number]  Today's Date: April 15, 2025          Attending: Nolan Renteria MD    Subjective:  Patient was seen and examined at bedside, she feels fine, denies abdominal pain, nausea or vomiting, patient is scheduled for EUS.    Review Of Systems:  GENERAL: No malaise or fevers.  CARDIOVASCULAR: Negative for chest pain, leg swelling  RESPIRATORY: Negative for shortness of breath  GI: No nausea, vomiting, or diarrhea  MUSCULOSKELETAL: Negative for joint pain or swelling      Objective:  Vitals:    04/14/25 2000 04/15/25 0000 04/15/25 0400 04/15/25 0800   BP: (!) 181/90 151/85 (!) 153/93 149/79   BP Location: Right arm Right arm Right arm Right arm   Patient Position: Sitting Lying Lying Lying   Pulse: 94 90 81 79   Resp: 18 18 18 18   Temp: 36.3 °C (97.3 °F) 36.4 °C (97.5 °F) 36 °C (96.8 °F) 36.6 °C (97.9 °F)   TempSrc: Temporal Temporal Temporal Temporal   SpO2: 98% 94% 96% 94%   Weight:       Height:               Physical Exam:   General appearance: Well-appearing alert, in no acute distress   Lungs: Clear to auscultation, no wheezing or rhonchi  Heart: RRR without murmur, gallop, or rubs  Abdomen: Soft, non-tender. Bowel sounds normal.  Extremities: No deformity, no edema  Neuro: Alert oriented x3, no focal deficit.    Labs:  Results for orders placed or performed during the hospital encounter of 04/11/25 (from the past 24 hours)   POCT GLUCOSE   Result Value Ref Range    POCT Glucose 93 74 - 99 mg/dL   POCT GLUCOSE   Result Value Ref Range    POCT Glucose 159 (H) 74 - 99 mg/dL   POCT GLUCOSE   Result Value Ref Range    POCT Glucose 159 (H) 74 - 99 mg/dL   Comprehensive metabolic panel   Result Value Ref Range    Glucose 105 (H) 74 - 99 mg/dL    Sodium 136 136 - 145 mmol/L    Potassium 3.4 (L) 3.5 - 5.3 mmol/L    Chloride 104 98 - 107 mmol/L    Bicarbonate 23 21 - 32 mmol/L    Anion Gap 12 10 - 20 mmol/L    Urea Nitrogen 11 6 - 23 mg/dL     Creatinine 0.96 0.50 - 1.05 mg/dL    eGFR 65 >60 mL/min/1.73m*2    Calcium 9.1 8.6 - 10.3 mg/dL    Albumin 3.3 (L) 3.4 - 5.0 g/dL    Alkaline Phosphatase 171 (H) 33 - 136 U/L    Total Protein 6.4 6.4 - 8.2 g/dL    AST 52 (H) 9 - 39 U/L    Bilirubin, Total 1.2 0.0 - 1.2 mg/dL    ALT 32 7 - 45 U/L   CBC   Result Value Ref Range    WBC 7.5 4.4 - 11.3 x10*3/uL    nRBC 0.0 0.0 - 0.0 /100 WBCs    RBC 3.61 (L) 4.00 - 5.20 x10*6/uL    Hemoglobin 10.6 (L) 12.0 - 16.0 g/dL    Hematocrit 32.8 (L) 36.0 - 46.0 %    MCV 91 80 - 100 fL    MCH 29.4 26.0 - 34.0 pg    MCHC 32.3 32.0 - 36.0 g/dL    RDW 12.9 11.5 - 14.5 %    Platelets 195 150 - 450 x10*3/uL   Magnesium   Result Value Ref Range    Magnesium 1.96 1.60 - 2.40 mg/dL   POCT GLUCOSE   Result Value Ref Range    POCT Glucose 101 (H) 74 - 99 mg/dL       Medications:  Scheduled medications  acetaminophen, 975 mg, oral, Once  amLODIPine, 10 mg, oral, Daily  fluticasone, 1 spray, Each Nostril, Daily  [Held by provider] heparin (porcine), 5,000 Units, subcutaneous, q8h  [Held by provider] hydroCHLOROthiazide, 25 mg, oral, Daily  insulin lispro, 0-5 Units, subcutaneous, Before meals & nightly  lidocaine, 0.1 mL, subcutaneous, Once  [Held by provider] losartan, 100 mg, oral, Daily  magnesium oxide, 400 mg, oral, Daily  [Held by provider] meloxicam, 15 mg, oral, Daily  [Held by provider] metFORMIN, 500 mg, oral, BID  pantoprazole, 40 mg, oral, Daily before breakfast   Or  pantoprazole, 40 mg, intravenous, Daily before breakfast  PARoxetine, 20 mg, oral, Daily  piperacillin-tazobactam, 3.375 g, intravenous, q8h  potassium chloride CR, 40 mEq, oral, Once  [Held by provider] simvastatin, 10 mg, oral, Nightly      Continuous medications       PRN medications  PRN medications: bisacodyl, dextrose, dextrose, diphenhydrAMINE, fentaNYL PF, glucagon, glucagon, hydrALAZINE, HYDROmorphone, HYDROmorphone, melatonin, midazolam, ondansetron, oxyCODONE, oxygen, polyethylene  "glycol      Assessment/Plan:  Assessment & Plan  Liver mass  Plan for EUS today  GI and oncology are following  Cholecystitis  Continue Zosyn  ID is following  Retroperitoneal lymphadenopathy  Most likely due to metastasis  Acute kidney injury superimposed on CKD  Continue monitoring  Leukocytosis  Continue Zosyn    Discussed with patient, RN    Nolan Renteria MD   Date: 04/15/25  Time: 9:02 AM    This note was partially created using voice recognition software and is inherently subject to errors including those of syntax and \"sound-alike\" substitutions which may escape proofreading. In such instances, original meaning may be extrapolated by contextual derivation    "

## 2025-04-15 NOTE — ANESTHESIA POSTPROCEDURE EVALUATION
Patient: Jordyn Neal    Procedure Summary       Date: 04/14/25 [place] [facility] [place]    Anesthesia Start: 1248 Anesthesia Stop: 1411    Procedures:       ENDOSCOPIC ULTRASOUND (UPPER)      ERCP Diagnosis:       Cyclical vomiting associated with intractable migraine      Abdominal mass, unspecified abdominal location    Scheduled Providers: Kasi Reynaga MD Responsible Provider: Lonnie Bush DO    Anesthesia Type: general ASA Status: 3            Anesthesia Type: general    Vitals Value Taken Time   /78 04/14/25 1455   Temp 37 °C (98.6 °F) 04/14/25 1411   Pulse 88 04/14/25 1503   Resp 26 04/14/25 1503   SpO2 94 % 04/14/25 1503   Vitals shown include unfiled device data.    Anesthesia Post Evaluation    Patient location during evaluation: PACU  Patient participation: complete - patient participated  Level of consciousness: awake  Pain score: 0  Pain management: adequate  Multimodal analgesia pain management approach  Airway patency: patent  Two or more strategies used to mitigate risk of obstructive sleep apnea  Cardiovascular status: acceptable  Respiratory status: acceptable  Hydration status: acceptable  Postoperative Nausea and Vomiting: none        There were no known notable events for this encounter.

## 2025-04-15 NOTE — PROGRESS NOTES
"Subjective  Patient sitting up in chair in NAD.  S/p EUS/ERCP with biliary stent placement.  Patient with no abdominal pain or nausea. Tolerating diet this morning. Alkaline phosphatase elevated at 171, remaining liver enzymes and bilirubin downtrending. Tolerating diet.  Plan for repeat ERCP in 3 months.  Follow-up pathology.     Objective  Blood pressure 149/79, pulse 79, temperature 36.6 °C (97.9 °F), temperature source Temporal, resp. rate 18, height 1.727 m (5' 8\"), weight 99.7 kg (219 lb 12.8 oz), SpO2 94%.    Physical Exam  Constitutional: Alert, pleasant and interactive, in NAD  Eyes: PERRL, sclera clear, no conjunctival injection  Skin: Warm and dry, no rash or ecchymosis  ENMT: Mucous membranes moist, no lesions noted  Resp: CTAB, even and unlabored  CV: RRR, normal S1, S2, no m,r,g  GI: +BS, soft, round, NT, no rebound tenderness or guarding, no palpable masses or organomegaly  Extremities: Extremities warm, no edema, contusions, wounds or cyanosis  Neuro: Alert and oriented x3  Psych: Appropriate mood and behavior    Medications  Scheduled medications  acetaminophen, 975 mg, oral, Once  amLODIPine, 10 mg, oral, Daily  fluticasone, 1 spray, Each Nostril, Daily  [Held by provider] heparin (porcine), 5,000 Units, subcutaneous, q8h  [Held by provider] hydroCHLOROthiazide, 25 mg, oral, Daily  insulin lispro, 0-5 Units, subcutaneous, Before meals & nightly  lidocaine, 0.1 mL, subcutaneous, Once  [Held by provider] losartan, 100 mg, oral, Daily  [Held by provider] meloxicam, 15 mg, oral, Daily  [Held by provider] metFORMIN, 500 mg, oral, BID  pantoprazole, 40 mg, oral, Daily before breakfast   Or  pantoprazole, 40 mg, intravenous, Daily before breakfast  PARoxetine, 20 mg, oral, Daily  piperacillin-tazobactam, 3.375 g, intravenous, q8h  [Held by provider] simvastatin, 10 mg, oral, Nightly      Continuous medications     PRN medications  PRN medications: bisacodyl, dextrose, dextrose, diphenhydrAMINE, fentaNYL " "PF, glucagon, glucagon, hydrALAZINE, HYDROmorphone, HYDROmorphone, melatonin, midazolam, ondansetron, oxyCODONE, oxygen, polyethylene glycol     Labs  Lab Results   Component Value Date    WBC 7.5 04/15/2025    HGB 10.6 (L) 04/15/2025    HCT 32.8 (L) 04/15/2025    MCV 91 04/15/2025     04/15/2025     Lab Results   Component Value Date    GLUCOSE 105 (H) 04/15/2025    CALCIUM 9.1 04/15/2025     04/15/2025    K 3.4 (L) 04/15/2025    CO2 23 04/15/2025     04/15/2025    BUN 11 04/15/2025    CREATININE 0.96 04/15/2025     Lab Results   Component Value Date    ALT 32 04/15/2025    AST 52 (H) 04/15/2025    ALKPHOS 171 (H) 04/15/2025    BILITOT 1.2 04/15/2025     No results found for: \"IRON\", \"TIBC\", \"FERRITIN\"  Lab Results   Component Value Date    INR 1.3 (H) 04/14/2025    PROTIME 14.6 (H) 04/14/2025     Endoscopic reports  Patient underwent EUS/ERCP with Dr. Reynaga 4/14/2025 noting:  EUS:  Impression  Normal pancreas  Heterogenous appearing hepatic parenchyma suggestive of diffuse malignancy in the right lobe  Innumerable enlarged lymph nodes throughout the upper abdomen with a particularly enlarged lymph node in the portal region. The lymph node was causing biliary stricturing with evidence of a plastic biliary stent traversing the region of obstruction  Multiple passes were obtained of the malignant appearing portal lymph node for diagnosis.   The gallbladder was visualized and appeared distended. The gallbladder contained stones and biliary sludge.    ERCP  Impression  Normal EGD  Extrinsic compression of the proximal common bile duct with normal appearing biliary tissue on cholangioscopy  Following sphincterotomy, a 10 Fr x 9 cm plastic biliary stent was placed.     Radiology  CT A/P with IV contrast 4/10/2025 noting:  IMPRESSION:  1. Cirrhotic morphology of the liver. Ill-defined lesion in segment 7  measuring 6.2 x 6.8 cm is suspicious for malignancy with differential  diagnosis including primary " infiltrative HCC, cholangiocarcinoma, or  metastatic disease.  2. Mild heterogeneity/thickening of the endometrium is indeterminate.  This may represent hyperplasia versus endometrial carcinoma,  recommend pelvic ultrasound for further evaluation.  3. Extensive tim hepatis, upper abdominal, and retroperitoneal  metastatic lymphadenopathy.  4. The lymph node conglomerate in the tim hepatis results in  narrowing of the main portal vein and has some mass effect on the  cystic duct and CBD resulting in mild intrahepatic bile duct dilation  and distention of the gallbladder. If there is right upper quadrant  pain on exam, findings could represent cholecystitis secondary to  biliary obstruction by the tim hepatis mass.      Recommend oncology consultation and tissue sampling to clarify the  source of primary disease.      MACRO:  Monster Starr discussed the significance and urgency of this critical  finding by telephone with  SHWETHA CRUZ on 4/10/2025 at 4:15 am.  (**-RCF-**) Findings:  See findings.      Signed by: Monster Starr 4/10/2025 4:19 AM  Dictation workstation:   [id number]    US gallbladder 4/10/2025 noting:  IMPRESSION:  1. Cirrhotic hepatic morphology. Right hepatic lobe mass measuring  7.2 cm correlating with the prior CT scan finding.  2. Distended gallbladder with multiple stones but no wall thickening  or pericholecystic free fluid.  3. Extrahepatic CBD measuring up to 1.2 cm with mild intrahepatic  biliary dilatation could be secondary to extrinsic compression from  the multiple large tim hepatis/periportal lymph nodes..    Signed by: Kasi Jeffers 4/10/2025 6:25 AM  Dictation workstation:   [id number]    MRI liver with and without IV contrast 4/10/2025 noting:  IMPRESSION:  1.  8 cm malignant-appearing, centrally necrotic indeterminate right  hepatic lobe mass. Although there are morphologic changes of  cirrhosis, the enhancement pattern is not suggestive of  hepatocellular carcinoma. There  is some delayed progressive central  enhancement and cholangiocarcinoma may be a diagnostic consideration.  2. Scattered punctate foci restricted diffusion involving both lobes  of the liver which could be additional metastatic lesions.  3. Extensive tim hepatis and retroperitoneal lymphadenopathy  demonstrating varying degrees of necrosis. Enlarged tim hepatis  lymph nodes appear to compress the mid common bile duct causing mild  upstream intrahepatic dilatation.    Signed by: Xavier Rocha 4/10/2025 3:09 PM  Dictation workstation:   [id number]    Assessment  Jordyn Neal is a 70 y.o. female with PMH of HTN, T2DM, HLD, fatty liver, GERD, CKD Stage III who presented to [facility] ED on 4/10/25 with nausea, vomiting and RLQ abdominal pain.  GI consulted for concern for obstructive cholecystitis.  Imaging findings as described above though MRCP noting concern for malignant R hepatic lobe mass cirrhosis and?  Cholangiocarcinoma, metastatic lesions.  Liver enzymes and bilirubin elevated with T. bili up to 1.9.    Patient underwent EUS/ERCP with Dr. Reynaga yesterday noting biliary compression stricture s/p stent placement.  Pathological lymphadenopathy noted.  FNB performed.  Patient doing well today.  Alk phos elevated to 171 though remaining enzymes and bilirubin downtrending.  Patient with no complaints of abdominal pain or nausea.  Tolerating diet.    # c/f malignant R hepatic mass  # lymphadenopathy  # biliary stricture s/p stent placement  # transaminitis/ hyperbilirubinemia  # N/V- resolved    Plan:  - continue supportive care  - diet as tolerated  - continue to trend liver enzymes daily  - pt will have repeat ERCP in 2 months for stent removal- office to arrange  - await pathology   - pt should follow up with outpatient oncology    Thank you for allowing us to participate in care. Please call with any further questions or concerns.      Plan has been discussed with Dr. Porter. GI will sign off.     Ifeoma  NILSON Beebe/CNP

## 2025-04-15 NOTE — NURSING NOTE
ADOD: 4/15.   Destination: home  Transportation Provided by:  spouse  Patient feels updated by provider(s) and involved in POC.   Patient has been advised to defer to AVS for new medications and follow-up visits.   Patient is active with MyChart.  Patient's Pharmacy M2B.  Appointments will be made by patient.  Patient has been notified about a survey and call back is to be expected 1-2 weeks post discharge.   Notified patient that DC Navigator is available everyday throughout their stay if any assistance is needed.     Patients  will transport home  Plan for M2B for any new prescriptions  Pending patients tolerance of regular diet today

## 2025-04-16 ENCOUNTER — PATIENT OUTREACH (OUTPATIENT)
Dept: PRIMARY CARE | Facility: CLINIC | Age: 70
End: 2025-04-16
Payer: MEDICARE

## 2025-04-16 ENCOUNTER — TELEPHONE (OUTPATIENT)
Dept: RADIOLOGY | Facility: HOSPITAL | Age: 70
End: 2025-04-16
Payer: MEDICARE

## 2025-04-16 NOTE — PROGRESS NOTES
Discharge Facility:[facility]  Discharge Diagnosis:Liver Mass  Admission Date:4/11/2025  Discharge Date: 4/15/2025    PCP Appointment Date:4/22/2025  Specialist Appointment Date:   4/29/2025 GI/Ronnell  Hem/Onc/Alba   Hospital Encounter and Summary Linked: Yes  Admission (Discharged) with Nolan Renteria MD (04/11/2025)   See discharge assessment below for further details  Wrap Up  Wrap Up Additional Comments: -- (Jordyn states she is doing ok, she had some pain last night. She was able to eat breakfast. She will contact provider if any concerns prior to appointment.) (4/16/2025 10:31 AM)    Engagement  Call Start Time: 1028 (4/16/2025 10:31 AM)    Medications  Medications reviewed with patient/caregiver?: Yes (4/16/2025 10:31 AM)  Is the patient having any side effects they believe may be caused by any medication additions or changes?: No (4/16/2025 10:31 AM)  Does the patient have all medications ordered at discharge?: Yes (4/16/2025 10:31 AM)  Care Management Interventions: Provided patient education (4/16/2025 10:31 AM)  Prescription Comments: -- (Oxy 5 mg IR q 4 hours prn, To hold Metformin until tomorrow) (4/16/2025 10:31 AM)  Is the patient taking all medications as directed (includes completed medication regime)?: Yes (4/16/2025 10:31 AM)  Care Management Interventions: Provided patient education (4/16/2025 10:31 AM)  Medication Comments: -- (Jordyn verbalized understanding of medication) (4/16/2025 10:31 AM)    Appointments  Does the patient have a primary care provider?: Yes (4/16/2025 10:31 AM)  Care Management Interventions: Verified appointment date/time/provider (4/22/2025) (4/16/2025 10:31 AM)  Has the patient kept scheduled appointments due by today?: Yes (4/16/2025 10:31 AM)    Self Management  What is the home health agency?: -- (na) (4/16/2025 10:31 AM)  What Durable Medical Equipment (DME) was ordered?: -- (na) (4/16/2025 10:31 AM)    Patient Teaching  Does the patient have access to their discharge  instructions?: Yes (4/16/2025 10:31 AM)  Care Management Interventions: Reviewed instructions with patient (4/16/2025 10:31 AM)  What is the patient's perception of their health status since discharge?: Improving (4/16/2025 10:31 AM)  Is the patient/caregiver able to teach back the hierarchy of who to call/visit for symptoms/problems? PCP, Specialist, Home Health nurse, Urgent Care, ED, 911: Yes (4/16/2025 10:31 AM)

## 2025-04-16 NOTE — TELEPHONE ENCOUNTER
Jordyn is listed on the radiology actionable findings list based on imaging completed 4/10/2025 while hospitalized in which radiology noted a hepatic mass. EUS/ERCP completed 4/14/2025.     *Please note: this note is for documentation purposes only. No phone conversation was had with the patient on this day/time.

## 2025-04-18 NOTE — CONSULTS
Reason For Consult  Liver mass    History Of Present Illness  Jordyn Neal is a 70 y.o. female presenting with hepatic mass and extensive LAD concerning for malignancy. Planned to have ERCP with EUS on Monday.  Initially presented to [facility] on 4/10/2025 with nausea, vomiting and right lower quadrant abdominal pain.  Upon arrival she was noted to have an alk phos of 240 ALT 47, AST 67.  CT abdomen pelvis showed cirrhotic morphology of the liver and ill-defined lesion suspicion for malignancy.  AFP was normal.  CA 19-9 also normal.  CA 27-29 elevated at 832,  271.  CEA normal.  Initial CT of the abdomen showed cirrhotic morphology of the liver. Ill defined lesion in segment 7 measuring 6.2x6.8cm spacious for malignancy.  There is extensive tim hepatis, upper abdominal, retroperitoneal metastatic lymphadenopathy     Past Medical History  She has a past medical history of CKD (chronic kidney disease) stage 3, GFR 30-59 ml/min (Multi), Diabetes mellitus (Multi), Fatty liver, GERD (gastroesophageal reflux disease), HLD (hyperlipidemia), Hypertension, and Personal history of other benign neoplasm.    Surgical History  She has a past surgical history that includes Other surgical history (08/29/2022) and Other surgical history (06/30/2020).     Social History  She reports that she has never smoked. She has never used smokeless tobacco. She reports current alcohol use. She reports that she does not currently use drugs.    Family History  Family: Mother - Pancreatic Cancer, Sister - Uterine Cancer, Son - Leukemia      Allergies  Patient has no known allergies.    Review of Systems  Negative outside of HPI     Physical Exam  Gen: NAD  HEENT: atraumatic head, normocephalic, EOMI, conjuctiva normal  LUNG: no increased WOB  CV: No JVD. RRR  GI: soft, NT, ND  LE: no LE edema  Skin: no obvious rashes or lesions  Neuro: interactive, no focal deficits, limited in setting of acute illness  Psych: difficult to  "participate, normal affect         Last Recorded Vitals  Blood pressure 141/71, pulse 98, temperature 37.6 °C (99.7 °F), temperature source Temporal, resp. rate 16, height 1.727 m (5' 8\"), weight 99.7 kg (219 lb 12.8 oz), SpO2 97%.    Relevant Results  Results for orders placed or performed during the hospital encounter of 04/09/25 (from the past 24 hours)   POCT GLUCOSE   Result Value Ref Range    POCT Glucose 114 (H) 74 - 99 mg/dL   POCT GLUCOSE   Result Value Ref Range    POCT Glucose 128 (H) 74 - 99 mg/dL   POCT GLUCOSE   Result Value Ref Range    POCT Glucose 148 (H) 74 - 99 mg/dL   CBC   Result Value Ref Range    WBC 15.9 (H) 4.4 - 11.3 x10*3/uL    nRBC 0.0 0.0 - 0.0 /100 WBCs    RBC 4.16 4.00 - 5.20 x10*6/uL    Hemoglobin 12.1 12.0 - 16.0 g/dL    Hematocrit 38.0 36.0 - 46.0 %    MCV 91 80 - 100 fL    MCH 29.1 26.0 - 34.0 pg    MCHC 31.8 (L) 32.0 - 36.0 g/dL    RDW 13.2 11.5 - 14.5 %    Platelets 262 150 - 450 x10*3/uL   Comprehensive Metabolic Panel   Result Value Ref Range    Glucose 137 (H) 74 - 99 mg/dL    Sodium 134 (L) 136 - 145 mmol/L    Potassium 4.0 3.5 - 5.3 mmol/L    Chloride 98 98 - 107 mmol/L    Bicarbonate 26 21 - 32 mmol/L    Anion Gap 14 10 - 20 mmol/L    Urea Nitrogen 17 6 - 23 mg/dL    Creatinine 1.45 (H) 0.50 - 1.05 mg/dL    eGFR 39 (L) >60 mL/min/1.73m*2    Calcium 10.1 8.6 - 10.3 mg/dL    Albumin 3.8 3.4 - 5.0 g/dL    Alkaline Phosphatase 213 (H) 33 - 136 U/L    Total Protein 7.2 6.4 - 8.2 g/dL    AST 69 (H) 9 - 39 U/L    Bilirubin, Total 1.9 (H) 0.0 - 1.2 mg/dL    ALT 39 7 - 45 U/L   Magnesium   Result Value Ref Range    Magnesium 1.87 1.60 - 2.40 mg/dL   POCT GLUCOSE   Result Value Ref Range    POCT Glucose 145 (H) 74 - 99 mg/dL          Assessment/Plan     Liver lesion  Retroperitoneal lymphadenopathy    Given elevation in CA 27-29 and , suspicion for underlying GYN versus breast primary.  Pelvic ultrasound does show abnormally thickened and heterogeneous endometrial stripe.  " Recommend CT of the chest to complete staging.  Will await final pathology to guide next steps for treatment.  Likely will follow outpatient with Dr. Willis unless gyn primary.    I spent 80 minutes in the professional and overall care of this patient.      Jocelyn Thompson MD     Click to add…

## 2025-04-19 LAB
ATRIAL RATE: 104 BPM
P AXIS: 46 DEGREES
P OFFSET: 191 MS
P ONSET: 133 MS
PR INTERVAL: 144 MS
Q ONSET: 205 MS
QRS COUNT: 17 BEATS
QRS DURATION: 90 MS
QT INTERVAL: 344 MS
QTC CALCULATION(BAZETT): 452 MS
QTC FREDERICIA: 413 MS
R AXIS: 5 DEGREES
T AXIS: 25 DEGREES
T OFFSET: 377 MS
VENTRICULAR RATE: 104 BPM

## 2025-04-22 ENCOUNTER — APPOINTMENT (OUTPATIENT)
Dept: PRIMARY CARE | Facility: CLINIC | Age: 70
End: 2025-04-22
Payer: MEDICARE

## 2025-04-22 VITALS — SYSTOLIC BLOOD PRESSURE: 88 MMHG | DIASTOLIC BLOOD PRESSURE: 60 MMHG

## 2025-04-22 DIAGNOSIS — C79.9 METASTATIC ADENOCARCINOMA (MULTI): ICD-10-CM

## 2025-04-22 DIAGNOSIS — Z09 HOSPITAL DISCHARGE FOLLOW-UP: ICD-10-CM

## 2025-04-22 DIAGNOSIS — E78.5 HYPERLIPIDEMIA, UNSPECIFIED HYPERLIPIDEMIA TYPE: ICD-10-CM

## 2025-04-22 DIAGNOSIS — E11.9 TYPE 2 DIABETES MELLITUS WITHOUT COMPLICATION, WITHOUT LONG-TERM CURRENT USE OF INSULIN: ICD-10-CM

## 2025-04-22 DIAGNOSIS — F32.A DEPRESSION, UNSPECIFIED DEPRESSION TYPE: ICD-10-CM

## 2025-04-22 DIAGNOSIS — R11.0 NAUSEA: ICD-10-CM

## 2025-04-22 DIAGNOSIS — I10 HYPERTENSION, UNSPECIFIED TYPE: ICD-10-CM

## 2025-04-22 DIAGNOSIS — I95.9 HYPOTENSION, UNSPECIFIED HYPOTENSION TYPE: ICD-10-CM

## 2025-04-22 DIAGNOSIS — R53.83 OTHER FATIGUE: Primary | ICD-10-CM

## 2025-04-22 LAB
ALBUMIN SERPL-MCNC: 4 G/DL (ref 3.6–5.1)
ALP SERPL-CCNC: 217 U/L (ref 37–153)
ALT SERPL-CCNC: 42 U/L (ref 6–29)
ANION GAP SERPL CALCULATED.4IONS-SCNC: 15 MMOL/L (CALC) (ref 7–17)
AST SERPL-CCNC: 77 U/L (ref 10–35)
BILIRUB SERPL-MCNC: 0.8 MG/DL (ref 0.2–1.2)
BUN SERPL-MCNC: 20 MG/DL (ref 7–25)
CALCIUM SERPL-MCNC: 9.8 MG/DL (ref 8.6–10.4)
CHLORIDE SERPL-SCNC: 96 MMOL/L (ref 98–110)
CO2 SERPL-SCNC: 23 MMOL/L (ref 20–32)
CREAT SERPL-MCNC: 1.87 MG/DL (ref 0.5–1.05)
EGFRCR SERPLBLD CKD-EPI 2021: 29 ML/MIN/1.73M2
GLUCOSE SERPL-MCNC: 116 MG/DL (ref 65–99)
LAB AP ASR DISCLAIMER: NORMAL
LABORATORY COMMENT REPORT: NORMAL
PATH REPORT.FINAL DX SPEC: NORMAL
PATH REPORT.FINAL DX SPEC: NORMAL
PATH REPORT.GROSS SPEC: NORMAL
PATH REPORT.GROSS SPEC: NORMAL
PATH REPORT.RELEVANT HX SPEC: NORMAL
PATH REPORT.TOTAL CANCER: NORMAL
PATH REPORT.TOTAL CANCER: NORMAL
POTASSIUM SERPL-SCNC: 3.5 MMOL/L (ref 3.5–5.3)
PROT SERPL-MCNC: 7.3 G/DL (ref 6.1–8.1)
SODIUM SERPL-SCNC: 134 MMOL/L (ref 135–146)

## 2025-04-22 PROCEDURE — 4010F ACE/ARB THERAPY RXD/TAKEN: CPT | Performed by: STUDENT IN AN ORGANIZED HEALTH CARE EDUCATION/TRAINING PROGRAM

## 2025-04-22 PROCEDURE — 1111F DSCHRG MED/CURRENT MED MERGE: CPT | Performed by: STUDENT IN AN ORGANIZED HEALTH CARE EDUCATION/TRAINING PROGRAM

## 2025-04-22 PROCEDURE — 1036F TOBACCO NON-USER: CPT | Performed by: STUDENT IN AN ORGANIZED HEALTH CARE EDUCATION/TRAINING PROGRAM

## 2025-04-22 PROCEDURE — 3074F SYST BP LT 130 MM HG: CPT | Performed by: STUDENT IN AN ORGANIZED HEALTH CARE EDUCATION/TRAINING PROGRAM

## 2025-04-22 PROCEDURE — 3078F DIAST BP <80 MM HG: CPT | Performed by: STUDENT IN AN ORGANIZED HEALTH CARE EDUCATION/TRAINING PROGRAM

## 2025-04-22 PROCEDURE — 99496 TRANSJ CARE MGMT HIGH F2F 7D: CPT | Performed by: STUDENT IN AN ORGANIZED HEALTH CARE EDUCATION/TRAINING PROGRAM

## 2025-04-22 RX ORDER — ONDANSETRON 4 MG/1
4 TABLET, ORALLY DISINTEGRATING ORAL EVERY 8 HOURS PRN
Qty: 60 TABLET | Refills: 2 | Status: SHIPPED | OUTPATIENT
Start: 2025-04-22 | End: 2025-06-21

## 2025-04-22 RX ORDER — SIMVASTATIN 10 MG/1
10 TABLET, FILM COATED ORAL NIGHTLY
Qty: 90 TABLET | Refills: 1 | Status: SHIPPED | OUTPATIENT
Start: 2025-04-22 | End: 2025-10-19

## 2025-04-22 RX ORDER — METFORMIN HYDROCHLORIDE 500 MG/1
500 TABLET ORAL
Qty: 180 TABLET | Refills: 1 | Status: SHIPPED | OUTPATIENT
Start: 2025-04-22

## 2025-04-22 RX ORDER — PAROXETINE HYDROCHLORIDE 20 MG/1
20 TABLET, FILM COATED ORAL DAILY
Qty: 90 TABLET | Refills: 1 | Status: SHIPPED | OUTPATIENT
Start: 2025-04-22 | End: 2025-10-19

## 2025-04-22 RX ORDER — AMLODIPINE BESYLATE 10 MG/1
10 TABLET ORAL DAILY
Qty: 90 TABLET | Refills: 1 | Status: CANCELLED | OUTPATIENT
Start: 2025-04-22 | End: 2025-10-19

## 2025-04-22 RX ORDER — HYDROCHLOROTHIAZIDE 25 MG/1
25 TABLET ORAL DAILY
Qty: 90 TABLET | Refills: 1 | Status: SHIPPED | OUTPATIENT
Start: 2025-04-22 | End: 2025-10-19

## 2025-04-22 NOTE — PROGRESS NOTES
Subjective   Patient ID: Jordyn Neal is a 70 y.o. female who presents for Post Hospitalization ([facility]/).    HPI   Hospital discharge fu.    S/p biliary stent placement 2 weeks ago. Low appetite. Feels weak.  Staying hydrated though. Confirms Sleepy, fatigued, strength issues. Confirms nausea that seems persistent but no vomiting. Would like to try an anti-emetic. Advised to stop anti-HTN and stay hydrated and repeat BP measurements more freq at home.    Accompanied by her .    Review of Systems  A 10 point ROS was performed with the patient denying any complaint at this time aside from those listed in the HPI above.     Objective   BP 88/60   LMP  (LMP Unknown)     Physical Exam  GEN: conversant, NAD  HEENT: PERRL, EOMI, MMM  NECK: supple  CV: S1, S2, RRR  PULM: CTAB  ABD: soft, NT, ND  NEURO: no new gross focal deficits  EXT: no sig LE edema  PSYCH: appropriate affect    Assessment/Plan     This was a highly complex transition of care visit completed within 7 days of hospital discharge. Available hospital records, labs, imaging were reviewed. Inpatient and outpatient medications were reconciled.      #Hypotension, Nausea, Fatigue, Dehydration post ERCP w stent placement:   -advised to stop taking anti-HTN medications.   -Stay hydrated at home with fluid/electrolytes.   -Will obtain metabolic panel.   -Repeat BP measurements freq at home. -Seek attention at ED if symptoms worsen.   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 Torrez. Pathology results suggestive of the above. Is set to follow up with Dr. Willis.    #Dyspnea on exertion: referral to cardiology.     #Hypertension: Holding hydrochlorothiazide, Losartan while hypotensive      #Shoulder/Knee OA: shoulder replacement performed 1/2023. Using meloxicam. She declines ortho eval for knees, can take Tylenol  PRN.     #NIDDM: Lifestyle modifications discussed. Weight loss. Continue metformin, refilled.      #HLD: Continue statin. Offered CACS, patient declines.     #Severe obesity, class 2: We have discussed importance of weight loss, lifestyle modifications. Has been more active since half-way.      #Fatty liver: She says she had liver ultrasound in the past.      #GERD: PPI as needed.     #Depression: Generally stable. Continue Paroxetine.      #CKD 3: stop ARB in setting of hypotension     #Health maintenance: Following with GYN. Mammogram done 4/2024. TDaP ~2016. Has had pneumovax. Colonoscopy performed 2021, 5 year f/u advised. FIT negative 7/2024. Received Shingrix. Advised RSV vaccine. Advised yearly flu shot. Up to date on COVID and booster. Longitudinal care.     RTC 1 mo

## 2025-04-23 DIAGNOSIS — R16.0 LIVER MASS: ICD-10-CM

## 2025-04-23 RX ORDER — OXYCODONE HYDROCHLORIDE 5 MG/1
5 TABLET ORAL EVERY 4 HOURS PRN
Qty: 15 TABLET | Refills: 0 | Status: SHIPPED | OUTPATIENT
Start: 2025-04-23 | End: 2025-04-26

## 2025-04-29 ENCOUNTER — TELEPHONE (OUTPATIENT)
Dept: HEMATOLOGY/ONCOLOGY | Facility: CLINIC | Age: 70
End: 2025-04-29

## 2025-04-29 ENCOUNTER — APPOINTMENT (OUTPATIENT)
Dept: GASTROENTEROLOGY | Facility: CLINIC | Age: 70
End: 2025-04-29
Payer: MEDICARE

## 2025-04-29 LAB
LAB AP ASR DISCLAIMER: NORMAL
LABORATORY COMMENT REPORT: NORMAL
PATH REPORT.ADDENDUM SPEC: NORMAL
PATH REPORT.FINAL DX SPEC: NORMAL
PATH REPORT.GROSS SPEC: NORMAL
PATH REPORT.RELEVANT HX SPEC: NORMAL
PATH REPORT.TOTAL CANCER: NORMAL

## 2025-04-29 NOTE — TELEPHONE ENCOUNTER
Reason for Conversation  reminder call for upcoming doctor appointment    Background   I called and talked to the patients  giving a reminder call of an upcoming doctor appointment on May 7th.  confirmed appointment.    Disposition   No disposition on file.

## 2025-04-30 ENCOUNTER — PATIENT OUTREACH (OUTPATIENT)
Dept: PRIMARY CARE | Facility: CLINIC | Age: 70
End: 2025-04-30
Payer: MEDICARE

## 2025-04-30 NOTE — PROGRESS NOTES
Confirmation of at least 2 patient identifiers.    Completed telephonic follow-up with patient after recent visit with PCP    Spoke to Spouse/significant other during outreach call.    Patient reports feeling: Improved    Patient has questions or concerns about medications: No    Have all prescribed medications been filled? Yes    Patient has necessary resources to manage their care? Yes    Patient has questions or concerns? No    Next care management follow-up approximately within one month.  Care  information provided to patient.  Jordyn is doing very well, no concerns.

## 2025-05-06 ENCOUNTER — APPOINTMENT (OUTPATIENT)
Dept: PRIMARY CARE | Facility: CLINIC | Age: 70
End: 2025-05-06
Payer: MEDICARE

## 2025-05-07 ENCOUNTER — LAB (OUTPATIENT)
Dept: LAB | Facility: CLINIC | Age: 70
End: 2025-05-07
Payer: MEDICARE

## 2025-05-07 ENCOUNTER — OFFICE VISIT (OUTPATIENT)
Dept: HEMATOLOGY/ONCOLOGY | Facility: CLINIC | Age: 70
End: 2025-05-07
Payer: MEDICARE

## 2025-05-07 VITALS
BODY MASS INDEX: 37.47 KG/M2 | HEART RATE: 102 BPM | RESPIRATION RATE: 20 BRPM | WEIGHT: 224.87 LBS | TEMPERATURE: 97.2 F | DIASTOLIC BLOOD PRESSURE: 85 MMHG | OXYGEN SATURATION: 98 % | HEIGHT: 65 IN | SYSTOLIC BLOOD PRESSURE: 146 MMHG

## 2025-05-07 DIAGNOSIS — R16.0 LIVER MASS: ICD-10-CM

## 2025-05-07 DIAGNOSIS — C22.1 CHOLANGIOCARCINOMA (MULTI): Primary | ICD-10-CM

## 2025-05-07 DIAGNOSIS — R59.0 RETROPERITONEAL LYMPHADENOPATHY: ICD-10-CM

## 2025-05-07 DIAGNOSIS — C22.1 CHOLANGIOCARCINOMA (MULTI): ICD-10-CM

## 2025-05-07 LAB
ALBUMIN SERPL BCP-MCNC: 3.5 G/DL (ref 3.4–5)
ALP SERPL-CCNC: 183 U/L (ref 33–136)
ALT SERPL W P-5'-P-CCNC: 20 U/L (ref 7–45)
ANION GAP SERPL CALC-SCNC: 13 MMOL/L (ref 10–20)
APTT PPP: 29 SECONDS (ref 26–36)
AST SERPL W P-5'-P-CCNC: 41 U/L (ref 9–39)
BASOPHILS # BLD AUTO: 0.03 X10*3/UL (ref 0–0.1)
BASOPHILS NFR BLD AUTO: 0.4 %
BILIRUB SERPL-MCNC: 1.2 MG/DL (ref 0–1.2)
BUN SERPL-MCNC: 14 MG/DL (ref 6–23)
CALCIUM SERPL-MCNC: 9.6 MG/DL (ref 8.6–10.3)
CHLORIDE SERPL-SCNC: 102 MMOL/L (ref 98–107)
CO2 SERPL-SCNC: 26 MMOL/L (ref 21–32)
CREAT SERPL-MCNC: 1.28 MG/DL (ref 0.5–1.05)
EGFRCR SERPLBLD CKD-EPI 2021: 46 ML/MIN/1.73M*2
EOSINOPHIL # BLD AUTO: 0.16 X10*3/UL (ref 0–0.7)
EOSINOPHIL NFR BLD AUTO: 2.2 %
ERYTHROCYTE [DISTWIDTH] IN BLOOD BY AUTOMATED COUNT: 13.6 % (ref 11.5–14.5)
GLUCOSE SERPL-MCNC: 103 MG/DL (ref 74–99)
HCT VFR BLD AUTO: 37.3 % (ref 36–46)
HGB BLD-MCNC: 11.8 G/DL (ref 12–16)
IMM GRANULOCYTES # BLD AUTO: 0.02 X10*3/UL (ref 0–0.7)
IMM GRANULOCYTES NFR BLD AUTO: 0.3 % (ref 0–0.9)
INR PPP: 1.2 (ref 0.9–1.1)
LYMPHOCYTES # BLD AUTO: 1.25 X10*3/UL (ref 1.2–4.8)
LYMPHOCYTES NFR BLD AUTO: 16.8 %
MCH RBC QN AUTO: 29 PG (ref 26–34)
MCHC RBC AUTO-ENTMCNC: 31.6 G/DL (ref 32–36)
MCV RBC AUTO: 92 FL (ref 80–100)
MONOCYTES # BLD AUTO: 0.66 X10*3/UL (ref 0.1–1)
MONOCYTES NFR BLD AUTO: 8.9 %
NEUTROPHILS # BLD AUTO: 5.32 X10*3/UL (ref 1.2–7.7)
NEUTROPHILS NFR BLD AUTO: 71.4 %
NRBC BLD-RTO: 0 /100 WBCS (ref 0–0)
PLATELET # BLD AUTO: 220 X10*3/UL (ref 150–450)
POTASSIUM SERPL-SCNC: 3.6 MMOL/L (ref 3.5–5.3)
PROT SERPL-MCNC: 6.2 G/DL (ref 6.4–8.2)
PROTHROMBIN TIME: 13.8 SECONDS (ref 9.8–12.4)
RBC # BLD AUTO: 4.07 X10*6/UL (ref 4–5.2)
SODIUM SERPL-SCNC: 137 MMOL/L (ref 136–145)
WBC # BLD AUTO: 7.4 X10*3/UL (ref 4.4–11.3)

## 2025-05-07 PROCEDURE — 84075 ASSAY ALKALINE PHOSPHATASE: CPT

## 2025-05-07 PROCEDURE — 85025 COMPLETE CBC W/AUTO DIFF WBC: CPT

## 2025-05-07 PROCEDURE — 1111F DSCHRG MED/CURRENT MED MERGE: CPT | Performed by: INTERNAL MEDICINE

## 2025-05-07 PROCEDURE — 1126F AMNT PAIN NOTED NONE PRSNT: CPT | Performed by: INTERNAL MEDICINE

## 2025-05-07 PROCEDURE — 36415 COLL VENOUS BLD VENIPUNCTURE: CPT

## 2025-05-07 PROCEDURE — 99215 OFFICE O/P EST HI 40 MIN: CPT | Performed by: INTERNAL MEDICINE

## 2025-05-07 PROCEDURE — 3008F BODY MASS INDEX DOCD: CPT | Performed by: INTERNAL MEDICINE

## 2025-05-07 PROCEDURE — 36415 COLL VENOUS BLD VENIPUNCTURE: CPT | Performed by: INTERNAL MEDICINE

## 2025-05-07 PROCEDURE — 85730 THROMBOPLASTIN TIME PARTIAL: CPT

## 2025-05-07 PROCEDURE — 85610 PROTHROMBIN TIME: CPT

## 2025-05-07 PROCEDURE — 1159F MED LIST DOCD IN RCRD: CPT | Performed by: INTERNAL MEDICINE

## 2025-05-07 ASSESSMENT — ENCOUNTER SYMPTOMS
LOSS OF SENSATION IN FEET: 0
OCCASIONAL FEELINGS OF UNSTEADINESS: 1
DEPRESSION: 0

## 2025-05-07 ASSESSMENT — PATIENT HEALTH QUESTIONNAIRE - PHQ9
1. LITTLE INTEREST OR PLEASURE IN DOING THINGS: NOT AT ALL
2. FEELING DOWN, DEPRESSED OR HOPELESS: NOT AT ALL
SUM OF ALL RESPONSES TO PHQ9 QUESTIONS 1 AND 2: 0

## 2025-05-07 ASSESSMENT — PAIN SCALES - GENERAL: PAINLEVEL_OUTOF10: 0-NO PAIN

## 2025-05-07 NOTE — PROGRESS NOTES
Patient ID: : Jordyn Neal            Primary Care Provider: Kit Fleming MD    LOCATION:  [facility]    HEMATOLOGY ONCOLOGY PROBLEMS:  1.  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    HISTORY:  Jorydn Neal is a 70 y.o. 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  She was admitted at [facility] in April 2025 with complaint of severe vomiting and right upper quadrant pain.  There was also history of 20-30 pound weight loss over the last 6 months.  Initial workup including CT scan revealed a large hepatic mass along with extensive intra-abdominal lymphadenopathy causing compression of the cystic duct resulting in intrahepatic biliary ductal dilatation and gallbladder distention.  There was also finding of mildly heterogenicity/thickening of the endometrium.  Follow-up liver MRI confirmed 8 x 5.4 cm heterogeneous mass along with extensive lymphadenopathy.  Pelvic ultrasound mainly showed uterine hyperplasia.  She was transferred to [facility] and had ERCP with stent placement in CBD and fluoroscopic biopsy of the hilar lymph node confirming poorly differentiated carcinoma with histo chemical changes suggestive of most probable pancreaticobiliary origin with possibility of primary cholangiocarcinoma.      INTERVAL HISTORY:  As per the patient right upper quadrant pain is improved after the stent placement.  Still have issues with anorexia and weight loss and generalized weakness and fatigue.    PAST MEDICAL HISTORY:  1.  Metastatic pancreaticobiliary carcinoma.  2.  Hypertension  3.  Diabetes mellitus  4.  Hyperlipidemia  5.  CKD  6.  GERD  7.  Depression  8.  Uterine leiomyoma  9.  History of right shoulder replacement surgery.    SOCIAL HISTORY:   for 45 years and  "lives in [place].  Non-smoker.  Rare social alcohol intake.  She is retired and used to work as a  for mortgage company.  Born and raised in [place].    FAMILY HISTORY:  Father  at age 52 from MS related complication.  Mother  at age 84 from pancreatic cancer.  2 siblings.  A sister  from uterine cancer.  2 children.  One of her son  from leukemia at age 29.  No other family history of bleeding, clotting or malignant disorders in the family history.    REVIEW OF SYSTEMS:   Pertinent finding as per the history above.  All other systems have been reviewed and generally negative and noncontributory.    VITAL SIGNS  BSA: 2.17 meters squared  /85   Pulse 102   Temp 36.2 °C (97.2 °F)   Resp 20   Ht 1.663 m (5' 5.47\")   Wt 102 kg (224 lb 13.9 oz)   LMP  (LMP Unknown)   SpO2 98%   BMI 36.88 kg/m²     PHYSICAL EXAMINATION:  Detailed physical examination not done.    LAB DATA:  Latest CBC from 4/15/2025 showed a hemoglobin of 10.6 with MCV of 91.  WBC was 7.5 and platelets were 195.  CMP from 2024 showed a creatinine of 1.8 with alkaline phosphatase of 217, AST of 77 and ALT of 42.    RADIOLOGICAL DATA:  CT chest 4/15/2025  Impression:  1. Enlarged left supraclavicular, and lower esophageal/gastrohepatic lymph nodes are noted. No suspicious pulmonary nodules are evident.  2. Redemonstration of the ill-defined hypoattenuating right hepatic lesion and upper abdominal lymphadenopathy, which are better visualized on prior CT abdomen pelvis.  3. Interval placement of a common bile duct stent.    CT abdomen/pelvis 4/10/2025  Impression:  1. Cirrhotic morphology of the liver. Ill-defined lesion in segment 7 measuring 6.2 x 6.8 cm is suspicious for malignancy with differential  diagnosis including primary infiltrative HCC, cholangiocarcinoma, or metastatic disease.  2. Mild heterogeneity/thickening of the endometrium is indeterminate. This may represent hyperplasia versus " endometrial carcinoma,  recommend pelvic ultrasound for further evaluation.  3. Extensive tim hepatis, upper abdominal, and retroperitoneal metastatic lymphadenopathy.  4. The lymph node conglomerate in the tim hepatis results in narrowing of the main portal vein and has some mass effect on the  cystic duct and CBD resulting in mild intrahepatic bile duct dilation and distention of the gallbladder. If there is right upper quadrant  pain on exam, findings could represent cholecystitis secondary to biliary obstruction by the tim hepatis mass.    ASSESSMENT / PLAN:  1.  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  Please refer the details of initial presentation and management as ordered above.  In summary she was admitted to [facility] in April 2025 with complaining of severe nausea and vomiting along with right upper quadrant pain.  CT scans showed large heterogeneous liver mass along with extensive intra-abdominal and intrathoracic lymphadenopathy.  She had ERCP/EUS at [facility] with CBD stent placement and biopsy of the hilar lymph node confirming poorly differentiated carcinoma with histochemical features suggestive of primary pancreaticobiliary origin.    Long discussion with the patient and family members and they were explained about the pathology results.  Based on the initial evaluation she had extensive lymph melony involvement making it stage IV disease.  Main treatment option will be trial of systemic chemotherapy.  To complete the initial workup I will schedule her for an urgent PET scan and we will also request tumor NGS evaluation both for tumor profiling and also for cancer of unknown primary testing with [facility].  To save time we will also request interventional radiology help for Mediport placement.  Unless there are specific genomic abnormalities main treatment option will be trial of gemcitabine/Abraxane or FOLFIRINOX type regimen.  As expected her overall  prognosis is guarded.  If possible, I will also request evaluation with our GI oncology experts at [facility] especially to explore the option of force-feeding and any ongoing clinical trials.    2.  She will return to the clinic immediately after port placement and [facility] evaluation.  In the meantime we will follow-up on the pending blood work and imaging studies results.    A total of 60 minutes were spent in evaluation - performing physical examination, history taking, review of the previous extensive records/information and formulating current and future management plan. Majority of the time was spend in consultation and discussion.    This note has been transcribed using Dragon voice recognition system and there is a possibility of unintentional typing misprints.

## 2025-05-12 ENCOUNTER — TELEPHONE (OUTPATIENT)
Dept: HEMATOLOGY/ONCOLOGY | Facility: CLINIC | Age: 70
End: 2025-05-12
Payer: MEDICARE

## 2025-05-12 NOTE — TELEPHONE ENCOUNTER
Call placed to the patient to confirm that an appointment with the GI clinic at [facility] was made. No answer at this time and no voicemail was in place.

## 2025-05-18 LAB
DNA RANGE(S) EXAMINED NAR: NORMAL
GENE DIS ANL INTERP-IMP: POSITIVE
GENE DIS ASSESSED: NORMAL
REASON FOR STUDY: NORMAL
TEMPUS BLOOD TUMOR MUTATIONAL BURDEN: 5.5 M/MB
TEMPUS LCA: NORMAL
TEMPUS MSI NOTE: NORMAL
TEMPUS PORTAL: NORMAL
TEMPUS TREATMENT IMPLICATIONS NOTE: NORMAL
TEMPUS TRIALCOUNT: 1
TEMPUS TRIALMATCHES1: NORMAL

## 2025-05-19 ENCOUNTER — HOSPITAL ENCOUNTER (OUTPATIENT)
Dept: RADIOLOGY | Facility: HOSPITAL | Age: 70
Discharge: HOME | End: 2025-05-19
Payer: MEDICARE

## 2025-05-19 ENCOUNTER — TELEPHONE (OUTPATIENT)
Dept: HEMATOLOGY/ONCOLOGY | Facility: CLINIC | Age: 70
End: 2025-05-19

## 2025-05-19 VITALS
TEMPERATURE: 98.6 F | BODY MASS INDEX: 37.47 KG/M2 | SYSTOLIC BLOOD PRESSURE: 139 MMHG | HEIGHT: 65 IN | HEART RATE: 108 BPM | DIASTOLIC BLOOD PRESSURE: 74 MMHG | WEIGHT: 224.87 LBS | RESPIRATION RATE: 16 BRPM | OXYGEN SATURATION: 95 %

## 2025-05-19 DIAGNOSIS — R16.0 LIVER MASS: ICD-10-CM

## 2025-05-19 DIAGNOSIS — C22.1 CHOLANGIOCARCINOMA (MULTI): ICD-10-CM

## 2025-05-19 DIAGNOSIS — C22.1 CHOLANGIOCARCINOMA (MULTI): Primary | ICD-10-CM

## 2025-05-19 LAB
DNA RANGE(S) EXAMINED NAR: NORMAL
GENE DIS ANL INTERP-IMP: POSITIVE
GENE DIS ASSESSED: NORMAL
GENE MUT TESTED BLD/T: 0.5 M/MB
MSI CA SPEC-IMP: NORMAL
REASON FOR STUDY: NORMAL
TEMPUS GERMLINE NOTE: NORMAL
TEMPUS LCA: NORMAL
TEMPUS PORTAL: NORMAL
TEMPUS TREATMENT IMPLICATIONS NOTE: NORMAL
TEMPUS TRIALCOUNT: 1
TEMPUS TRIALMATCHES1: NORMAL

## 2025-05-19 PROCEDURE — 99152 MOD SED SAME PHYS/QHP 5/>YRS: CPT | Performed by: STUDENT IN AN ORGANIZED HEALTH CARE EDUCATION/TRAINING PROGRAM

## 2025-05-19 PROCEDURE — C1788 PORT, INDWELLING, IMP: HCPCS

## 2025-05-19 PROCEDURE — 7100000009 HC PHASE TWO TIME - INITIAL BASE CHARGE

## 2025-05-19 PROCEDURE — 99153 MOD SED SAME PHYS/QHP EA: CPT

## 2025-05-19 PROCEDURE — 2780000003 HC OR 278 NO HCPCS

## 2025-05-19 PROCEDURE — 2500000004 HC RX 250 GENERAL PHARMACY W/ HCPCS (ALT 636 FOR OP/ED): Mod: JZ | Performed by: STUDENT IN AN ORGANIZED HEALTH CARE EDUCATION/TRAINING PROGRAM

## 2025-05-19 PROCEDURE — 99152 MOD SED SAME PHYS/QHP 5/>YRS: CPT

## 2025-05-19 PROCEDURE — 99153 MOD SED SAME PHYS/QHP EA: CPT | Performed by: STUDENT IN AN ORGANIZED HEALTH CARE EDUCATION/TRAINING PROGRAM

## 2025-05-19 PROCEDURE — 36010 PLACE CATHETER IN VEIN: CPT

## 2025-05-19 PROCEDURE — 77001 FLUOROGUIDE FOR VEIN DEVICE: CPT

## 2025-05-19 PROCEDURE — 2500000005 HC RX 250 GENERAL PHARMACY W/O HCPCS: Performed by: STUDENT IN AN ORGANIZED HEALTH CARE EDUCATION/TRAINING PROGRAM

## 2025-05-19 PROCEDURE — 36561 INSERT TUNNELED CV CATH: CPT | Mod: RT

## 2025-05-19 PROCEDURE — 7100000010 HC PHASE TWO TIME - EACH INCREMENTAL 1 MINUTE

## 2025-05-19 PROCEDURE — 76937 US GUIDE VASCULAR ACCESS: CPT

## 2025-05-19 RX ORDER — HEPARIN 100 UNIT/ML
SYRINGE INTRAVENOUS
Status: COMPLETED | OUTPATIENT
Start: 2025-05-19 | End: 2025-05-19

## 2025-05-19 RX ORDER — LIDOCAINE HYDROCHLORIDE AND EPINEPHRINE 10; 10 UG/ML; MG/ML
INJECTION, SOLUTION INFILTRATION; PERINEURAL
Status: COMPLETED | OUTPATIENT
Start: 2025-05-19 | End: 2025-05-19

## 2025-05-19 RX ORDER — SODIUM CHLORIDE 9 MG/ML
INJECTION, SOLUTION INTRAVENOUS CONTINUOUS PRN
Status: COMPLETED | OUTPATIENT
Start: 2025-05-19 | End: 2025-05-19

## 2025-05-19 RX ORDER — MIDAZOLAM HYDROCHLORIDE 1 MG/ML
INJECTION INTRAMUSCULAR; INTRAVENOUS
Status: COMPLETED | OUTPATIENT
Start: 2025-05-19 | End: 2025-05-19

## 2025-05-19 RX ORDER — HYDRALAZINE HYDROCHLORIDE 20 MG/ML
INJECTION INTRAMUSCULAR; INTRAVENOUS
Status: COMPLETED | OUTPATIENT
Start: 2025-05-19 | End: 2025-05-19

## 2025-05-19 RX ORDER — OXYCODONE HYDROCHLORIDE 5 MG/1
5 TABLET ORAL EVERY 8 HOURS PRN
Qty: 60 TABLET | Refills: 0 | Status: SHIPPED | OUTPATIENT
Start: 2025-05-19 | End: 2025-06-18

## 2025-05-19 RX ORDER — FENTANYL CITRATE 50 UG/ML
INJECTION, SOLUTION INTRAMUSCULAR; INTRAVENOUS
Status: COMPLETED | OUTPATIENT
Start: 2025-05-19 | End: 2025-05-19

## 2025-05-19 RX ORDER — CEFAZOLIN SODIUM 1 G/50ML
1 SOLUTION INTRAVENOUS ONCE
Status: COMPLETED | OUTPATIENT
Start: 2025-05-19 | End: 2025-05-19

## 2025-05-19 RX ORDER — ONDANSETRON HYDROCHLORIDE 2 MG/ML
INJECTION, SOLUTION INTRAVENOUS
Status: COMPLETED | OUTPATIENT
Start: 2025-05-19 | End: 2025-05-19

## 2025-05-19 RX ADMIN — Medication 3 L/MIN: at 08:20

## 2025-05-19 RX ADMIN — HYDRALAZINE HYDROCHLORIDE 5 MG: 20 INJECTION INTRAMUSCULAR; INTRAVENOUS at 09:08

## 2025-05-19 RX ADMIN — LIDOCAINE HYDROCHLORIDE,EPINEPHRINE BITARTRATE 3 ML: 10; .01 INJECTION, SOLUTION INFILTRATION; PERINEURAL at 08:46

## 2025-05-19 RX ADMIN — SODIUM CHLORIDE 50 ML/HR: 9 INJECTION, SOLUTION INTRAVENOUS at 08:20

## 2025-05-19 RX ADMIN — MIDAZOLAM HYDROCHLORIDE 1 MG: 1 INJECTION, SOLUTION INTRAMUSCULAR; INTRAVENOUS at 08:45

## 2025-05-19 RX ADMIN — LIDOCAINE HYDROCHLORIDE,EPINEPHRINE BITARTRATE 5 ML: 10; .01 INJECTION, SOLUTION INFILTRATION; PERINEURAL at 08:51

## 2025-05-19 RX ADMIN — CEFAZOLIN SODIUM 1 G: 1 INJECTION, SOLUTION INTRAVENOUS at 08:45

## 2025-05-19 RX ADMIN — ONDANSETRON 4 MG: 2 INJECTION INTRAMUSCULAR; INTRAVENOUS at 08:44

## 2025-05-19 RX ADMIN — FENTANYL CITRATE 50 MCG: 50 INJECTION, SOLUTION INTRAMUSCULAR; INTRAVENOUS at 08:45

## 2025-05-19 RX ADMIN — HEPARIN 400 UNITS: 100 SYRINGE at 08:58

## 2025-05-19 RX ADMIN — HYDRALAZINE HYDROCHLORIDE 5 MG: 20 INJECTION INTRAMUSCULAR; INTRAVENOUS at 09:02

## 2025-05-19 RX ADMIN — FENTANYL CITRATE 50 MCG: 50 INJECTION, SOLUTION INTRAMUSCULAR; INTRAVENOUS at 08:54

## 2025-05-19 RX ADMIN — MIDAZOLAM HYDROCHLORIDE 1 MG: 1 INJECTION, SOLUTION INTRAMUSCULAR; INTRAVENOUS at 08:54

## 2025-05-19 ASSESSMENT — PAIN - FUNCTIONAL ASSESSMENT
PAIN_FUNCTIONAL_ASSESSMENT: 0-10

## 2025-05-19 ASSESSMENT — PAIN SCALES - GENERAL
PAINLEVEL_OUTOF10: 4
PAINLEVEL_OUTOF10: 0 - NO PAIN

## 2025-05-19 NOTE — TELEPHONE ENCOUNTER
Reason for Conversation  No chief complaint on file.    Background   Pt called-Having a lot of pain in her lower back and belly area. Had port placed today. Asking for oxycodone prescription, states she does not have anymore from her last prescription from her gastro doctor. PET scheduled on 5/27. No follow up scheduled for Dr. Willis yet. She said her pain has increased since she last saw Dr. Willis.       Disposition   Checked with Dr. Willis, she had been seen on 5.7.  Asked him to fill pain meds. Also noted that covering RN let me know a referral to GI Oncology was placed. Not scheduled yet, I placed a new urgent order and emailed the central scheduling team to get her in ASAP. Message left for patient regarding both items.

## 2025-05-19 NOTE — TELEPHONE ENCOUNTER
Pt called-Having a lot of pain in her lower back and belly area. Had port placed today. Asking for oxycodone prescription, states she does not have anymore from her last prescription from her gastro doctor. PET scheduled on 5/27. No follow up scheduled for Dr. Willis yet. She said her pain has increased since she last saw Dr. Willis.     Last visit 5/7/25

## 2025-05-19 NOTE — PRE-PROCEDURE NOTE
Interventional Radiology Preprocedure Note    Indication for procedure: The encounter diagnosis was Liver mass.    Relevant review of systems: NA    Relevant Labs:   Lab Results   Component Value Date    CREATININE 1.28 (H) 05/07/2025    EGFR 46 (L) 05/07/2025    INR 1.2 (H) 05/07/2025    PROTIME 13.8 (H) 05/07/2025       Planned Sedation/Anesthesia: Moderate    Airway assessment: normal    Directed physical examination:    GENERAL: awake, no acute distress  HEENT: AT/NC  NECK: supple  CV: RRR  PULM: non-labored breathing  ABDOMEN: soft, ND  NEURO: AAOx3  MSK: full ROM  SKIN: no rash      Mallampati: II (hard and soft palate, upper portion of tonsils and uvula visible)    ASA Score: ASA 2 - Patient with mild systemic disease with no functional limitations    Benefits, risks and alternatives of procedure and planned sedation have been discussed with the patient and/or their representative. All questions answered and they agree to proceed.

## 2025-05-19 NOTE — POST-PROCEDURE NOTE
Interventional Radiology Brief Postprocedure Note    Attending: Skinny Franks    Diagnosis: Pancreatic cancer    Description of procedure: Right chest single lumen port placement via the right internal jugular vein     Anesthesia:  Local and MAC Moderate    Complications: None    Estimated Blood Loss: minimal    Medications (Filter: Administrations occurring from 0820 to 0913 on 05/19/25) As of 05/19/25 0913      oxygen (O2) therapy (L/min) Total volume:  Not documented* Dosing weight:  102   *Total volume has not been documented. View each administration to see the amount administered.     Date/Time Rate/Dose/Volume Action       05/19/25  0820 3 L/min Start      0908  Stopped               ceFAZolin (Ancef) 1 g in dextrose (iso) IV 50 mL (mL/hr) Total volume:  Not documented* Dosing weight:  102   *Total volume has not been documented. View each administration to see the amount administered.     Date/Time Rate/Dose/Volume Action       05/19/25  0845 1 g - 100 mL/hr (over 30 min) New Bag               fentaNYL PF (Sublimaze) injection (mcg) Total dose:  100 mcg      Date/Time Rate/Dose/Volume Action       05/19/25  0845 50 mcg Given      0854 50 mcg Given               midazolam (Versed) injection (mg) Total dose:  2 mg      Date/Time Rate/Dose/Volume Action       05/19/25  0845 1 mg Given      0854 1 mg Given               ondansetron (Zofran) injection (mg) Total dose:  4 mg      Date/Time Rate/Dose/Volume Action       05/19/25  0844 4 mg Given               lidocaine-epinephrine (Xylocaine W/EPI) 1 %-1:100,000 injection (mL) Total volume:  8 mL      Date/Time Rate/Dose/Volume Action       05/19/25  0846 3 mL Given      0851 5 mL Given               heparin flush 100 unit/mL syringe (Units) Total dose:  400 Units      Date/Time Rate/Dose/Volume Action       05/19/25  0858 400 Units Given               hydrALAZINE (Apresoline) injection (mg) Total dose:  10 mg      Date/Time Rate/Dose/Volume Action        05/19/25  0902 5 mg Given      0908 5 mg Given               sodium chloride 0.9% infusion (mL/hr) Total volume:  Not documented*   *Total volume has not been documented. View each administration to see the amount administered.     Date/Time Rate/Dose/Volume Action       05/19/25  0820 50 mL/hr New Bag      0908  Stopped                   No specimens collected      See detailed result report with images in PACS.    The patient tolerated the procedure well without incident or complication and is in stable condition.

## 2025-05-22 ENCOUNTER — APPOINTMENT (OUTPATIENT)
Dept: PRIMARY CARE | Facility: CLINIC | Age: 70
End: 2025-05-22
Payer: MEDICARE

## 2025-05-27 ENCOUNTER — HOSPITAL ENCOUNTER (OUTPATIENT)
Dept: RADIOLOGY | Facility: CLINIC | Age: 70
Discharge: HOME | End: 2025-05-27
Payer: MEDICARE

## 2025-05-27 DIAGNOSIS — C22.1 CHOLANGIOCARCINOMA (MULTI): ICD-10-CM

## 2025-05-27 LAB — GLUCOSE BLD MANUAL STRIP-MCNC: 104 MG/DL (ref 74–99)

## 2025-05-27 PROCEDURE — 78816 PET IMAGE W/CT FULL BODY: CPT | Mod: PET TUMOR INIT TX STRAT | Performed by: STUDENT IN AN ORGANIZED HEALTH CARE EDUCATION/TRAINING PROGRAM

## 2025-05-27 PROCEDURE — 3430000001 HC RX 343 DIAGNOSTIC RADIOPHARMACEUTICALS: Performed by: INTERNAL MEDICINE

## 2025-05-27 PROCEDURE — A9552 F18 FDG: HCPCS | Performed by: INTERNAL MEDICINE

## 2025-05-27 PROCEDURE — 82947 ASSAY GLUCOSE BLOOD QUANT: CPT

## 2025-05-27 PROCEDURE — 78816 PET IMAGE W/CT FULL BODY: CPT | Mod: PI

## 2025-05-27 RX ORDER — FLUDEOXYGLUCOSE F 18 200 MCI/ML
13.21 INJECTION, SOLUTION INTRAVENOUS
Status: COMPLETED | OUTPATIENT
Start: 2025-05-27 | End: 2025-05-27

## 2025-05-27 RX ADMIN — FLUDEOXYGLUCOSE F 18 13.21 MILLICURIE: 200 INJECTION, SOLUTION INTRAVENOUS at 12:56

## 2025-05-28 ENCOUNTER — APPOINTMENT (OUTPATIENT)
Dept: PRIMARY CARE | Facility: CLINIC | Age: 70
End: 2025-05-28
Payer: MEDICARE

## 2025-05-28 VITALS — BODY MASS INDEX: 34.28 KG/M2 | DIASTOLIC BLOOD PRESSURE: 70 MMHG | WEIGHT: 206 LBS | SYSTOLIC BLOOD PRESSURE: 132 MMHG

## 2025-05-28 DIAGNOSIS — I10 HYPERTENSION, UNSPECIFIED TYPE: ICD-10-CM

## 2025-05-28 DIAGNOSIS — C25.9 PANCREATIC CARCINOMA METASTATIC TO LIVER: ICD-10-CM

## 2025-05-28 DIAGNOSIS — G47.00 INSOMNIA, UNSPECIFIED TYPE: Primary | ICD-10-CM

## 2025-05-28 DIAGNOSIS — F32.A DEPRESSION, UNSPECIFIED DEPRESSION TYPE: ICD-10-CM

## 2025-05-28 DIAGNOSIS — C78.7 PANCREATIC CARCINOMA METASTATIC TO LIVER: ICD-10-CM

## 2025-05-28 DIAGNOSIS — K21.00 GASTROESOPHAGEAL REFLUX DISEASE WITH ESOPHAGITIS, UNSPECIFIED WHETHER HEMORRHAGE: ICD-10-CM

## 2025-05-28 DIAGNOSIS — E11.9 TYPE 2 DIABETES MELLITUS WITHOUT COMPLICATION, WITHOUT LONG-TERM CURRENT USE OF INSULIN: ICD-10-CM

## 2025-05-28 DIAGNOSIS — E78.5 HYPERLIPIDEMIA, UNSPECIFIED HYPERLIPIDEMIA TYPE: ICD-10-CM

## 2025-05-28 DIAGNOSIS — R11.0 NAUSEA: ICD-10-CM

## 2025-05-28 PROCEDURE — G2211 COMPLEX E/M VISIT ADD ON: HCPCS | Performed by: STUDENT IN AN ORGANIZED HEALTH CARE EDUCATION/TRAINING PROGRAM

## 2025-05-28 PROCEDURE — 3075F SYST BP GE 130 - 139MM HG: CPT | Performed by: STUDENT IN AN ORGANIZED HEALTH CARE EDUCATION/TRAINING PROGRAM

## 2025-05-28 PROCEDURE — 3078F DIAST BP <80 MM HG: CPT | Performed by: STUDENT IN AN ORGANIZED HEALTH CARE EDUCATION/TRAINING PROGRAM

## 2025-05-28 PROCEDURE — 99214 OFFICE O/P EST MOD 30 MIN: CPT | Performed by: STUDENT IN AN ORGANIZED HEALTH CARE EDUCATION/TRAINING PROGRAM

## 2025-05-28 PROCEDURE — 1159F MED LIST DOCD IN RCRD: CPT | Performed by: STUDENT IN AN ORGANIZED HEALTH CARE EDUCATION/TRAINING PROGRAM

## 2025-05-28 RX ORDER — TRAZODONE HYDROCHLORIDE 100 MG/1
100 TABLET ORAL NIGHTLY PRN
Qty: 30 TABLET | Refills: 3 | Status: SHIPPED | OUTPATIENT
Start: 2025-05-28 | End: 2026-05-28

## 2025-05-28 NOTE — PROGRESS NOTES
Subjective   Patient ID: Jordyn Neal is a 70 y.o. female who presents for Follow-up and discuss meds.    HPI   Routine fu.    Accompanied by her .    At our last visit, patient's losartan and hydrochlorothiazide were stopped in setting of hypotension. BP has been stable at home. She also has not been taking metformin, blood sugar is usually 100s at home.    Appetite has been poor, she is routinely nauseous, but has not been taking Zofran, as it made her constipated.     Review of Systems  12-point ROS reviewed and was negative unless otherwise noted in HPI.    Objective   /70   Wt 93.4 kg (206 lb)   BMI 34.28 kg/m²     Physical Exam  GEN: conversant, NAD  HEENT: PERRL, EOMI, MMM  NECK: supple  CV: S1, S2, RRR  PULM: CTAB  ABD: soft, NT, ND  NEURO: no new gross focal deficits  EXT: no sig LE edema  PSYCH: appropriate affect    Assessment/Plan     #Metastatic pancreaticobiliary carcinoma: seeing Dr. Willis from oncology, planning to start chemotherapy. She has appointment tomorrow with GI oncology also.    #S/p biliary stent placement 4/2025: follows with Dr. Torrez.     #Nausea: advised routine use of Zofran with bowel regimen.    #Insomnia: start trial of trazodone, discussed SE profile     #Hypertension: losartan and hydrochlorothiazide have been stopped in setting of hypotension     #NIDDM: Lifestyle modifications discussed. Weight loss. She stopped metformin in setting of weight loss.      #HLD: she stopped statin.      #GERD: PPI as needed.     #Depression: Generally stable. Continue Paroxetine.      #CKD 3: stopped ARB in setting of hypotension     #Health maintenance: Following with GYN. Mammogram done 4/2024. TDaP ~2016. Has had pneumovax. Colonoscopy performed 2021, 5 year f/u advised. FIT negative 7/2024. Received Shingrix. Advised RSV vaccine. Advised yearly flu shot. Up to date on COVID and booster. Longitudinal care.     RTC 1-2 mo

## 2025-05-29 ENCOUNTER — OFFICE VISIT (OUTPATIENT)
Dept: HEMATOLOGY/ONCOLOGY | Facility: HOSPITAL | Age: 70
End: 2025-05-29
Payer: MEDICARE

## 2025-05-29 VITALS
WEIGHT: 202.82 LBS | BODY MASS INDEX: 33.79 KG/M2 | HEART RATE: 101 BPM | OXYGEN SATURATION: 96 % | TEMPERATURE: 97.7 F | SYSTOLIC BLOOD PRESSURE: 146 MMHG | HEIGHT: 65 IN | DIASTOLIC BLOOD PRESSURE: 86 MMHG | RESPIRATION RATE: 18 BRPM

## 2025-05-29 DIAGNOSIS — C22.1 CHOLANGIOCARCINOMA (MULTI): Primary | ICD-10-CM

## 2025-05-29 PROCEDURE — 3008F BODY MASS INDEX DOCD: CPT | Performed by: INTERNAL MEDICINE

## 2025-05-29 PROCEDURE — 1125F AMNT PAIN NOTED PAIN PRSNT: CPT | Performed by: INTERNAL MEDICINE

## 2025-05-29 PROCEDURE — 99215 OFFICE O/P EST HI 40 MIN: CPT | Performed by: INTERNAL MEDICINE

## 2025-05-29 PROCEDURE — 1036F TOBACCO NON-USER: CPT | Performed by: INTERNAL MEDICINE

## 2025-05-29 ASSESSMENT — PAIN SCALES - GENERAL: PAINLEVEL_OUTOF10: 10-WORST PAIN EVER

## 2025-05-29 NOTE — PROGRESS NOTES
Patient ID: Jordyn Neal is a 70 y.o. female.    Diagnoses: Metastatic poorly differentiated carcinoma     Genomic profile: pMMR   Tempus xT 4/14/25: TP53, TERT, TMB 0.5   Tempus xF 5/7/25: FOXO1, TP53, TERT, TMB 5.5     Assessment and Plan:  Jordyn Neal is 70 y.o. female with newly diagnosed metastatic poorly differentiated carcinoma from a hilar lymph node biopsy. 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 She was hospitalized in April 2025 and required ERCP with stent placement in CBD. PET/CT 5/28/25 with pancreatic head/uncinate lesion, hepatic metastasis, melony metastasis, and soft tissue metastasis in the upper back and right shoulder.     Serum tumor markers show elevated . CEA, CA 19-9, and AFP are not elevated. Her NGS was reviewed as above, ultimately non specific.      The tumor of origin for her cancer is uncertain. We will plan to discuss at tumor board next week to review her case in detail.     Follow up plan-  Telephone visit on 6/5 to review tumor board recommendations. Will share updates with her primary medical oncologist Dr. Willis as well     I have placed all orders as outlined above. I advised the patient to schedule the tests and follow-up appointment as discussed by contacting the  on the way out or calling by phone.    Providers:  Surgeon:   PerezOnc: Dr. Willis   [facility]    Chief complaint:    HPI:  ONCOLOGIC HISTORY-  She was admitted at [facility] in April 2025 with complaint of severe vomiting and right upper quadrant pain.  There was also history of 20-30 pound weight loss over the last 6 months.  Initial workup including CT scan revealed a large hepatic mass along with extensive intra-abdominal lymphadenopathy causing compression of the cystic duct resulting in intrahepatic biliary ductal dilatation and gallbladder distention.  There was also finding of mildly heterogenicity/thickening of the endometrium.  Follow-up liver MRI confirmed 8 x 5.4 cm  heterogeneous mass along with extensive lymphadenopathy.  Pelvic ultrasound mainly showed uterine hyperplasia.  She was transferred to [facility] and had ERCP with stent placement in CBD and fluoroscopic biopsy of the hilar lymph node confirming poorly differentiated carcinoma     Interval history:  She presents with her  today    Reports lower back pain that is improved since biliary stent was placed. Still having nausea which limits PO intake. She reports she was not taking zofran correctly and is going to try the advice her PCP gave her.       Past Medical History:   Past Medical History:  No date: CKD (chronic kidney disease) stage 3, GFR 30-59 ml/min   (Multi)  No date: Diabetes mellitus (Multi)  No date: Fatty liver  No date: GERD (gastroesophageal reflux disease)  No date: HLD (hyperlipidemia)  No date: Hypertension  No date: Personal history of other benign neoplasm      Comment:  History of uterine leiomyoma   Surgical History:    Surgical History[1]   Family History:    Family History[2]  No family history of breast or ovarian cancer   Family Oncology History:    Cancer-related family history includes Pancreatic cancer in her mother; Uterine cancer in her sister.  Social History:    Social History[3]     Allergies  RX Allergies[4]     Medications  Current Outpatient Medications   Medication Instructions    amLODIPine (NORVASC) 10 mg, oral, Daily    fluticasone (Flonase) 50 mcg/actuation nasal spray Administer into affected nostril(s).    multivitamin tablet 1 tablet, Daily    omeprazole (PRILOSEC) 40 mg, oral, Daily    ondansetron ODT (ZOFRAN-ODT) 4 mg, oral, Every 8 hours PRN    oxyCODONE (ROXICODONE) 5 mg, oral, Every 8 hours PRN    PARoxetine (PAXIL) 20 mg, oral, Daily    traZODone (DESYREL) 100 mg, oral, Nightly PRN          Objective   VS: /86 (BP Location: Left arm, Patient Position: Sitting, BP Cuff Size: Small adult) Comment (BP Location): Forearm  Pulse 101   Temp 36.5 °C  "(97.7 °F) (Temporal)   Resp 18   Ht (S) 1.663 m (5' 5.47\")   Wt 92 kg (202 lb 13.2 oz)   SpO2 96%   BMI 33.27 kg/m²   Weight:   Vitals:    05/29/25 1133   Weight: 92 kg (202 lb 13.2 oz)        PHYSICAL EXAMINATION  ECOG performance status-3  VS:  /86 (BP Location: Left arm, Patient Position: Sitting, BP Cuff Size: Small adult) Comment (BP Location): Forearm  Pulse 101   Temp 36.5 °C (97.7 °F) (Temporal)   Resp 18   Ht (S) 1.663 m (5' 5.47\")   Wt 92 kg (202 lb 13.2 oz)   SpO2 96%   BMI 33.27 kg/m²   Weight:   Vitals:    05/29/25 1133   Weight: 92 kg (202 lb 13.2 oz)       BSA: 2.06 meters squared    Constitutional: Awake/alert/oriented x3, cooperative and answers questions appropriately. In wheelchair      Eyes: No pallor, conjunctival injection, clear sclera.    Mouth: No ulceration. No thrush.     Head/Neck: Neck supple, no apparent injury, thyroid without mass or tenderness, No JVD, trachea midline, no bruits.     Respiratory/Thorax: Normal breath sounds with bilaterally symmetrical chest expansion. No dullness.      Cardiovascular: No audible murmurs, normal heart sounds. No pericardial rub.     Gastrointestinal: Nondistended, soft, non-tender, no rebound tenderness or guarding, no masses palpable, no organomegaly, +BS, no bruits. No ascites.      Musculoskeletal: No joint swelling, redness.      Extremities: normal extremities, no cyanosis edema, contusions or wounds, no clubbing.     Lymphatic: No significant lymphadenopathy.     Skin: Warm and dry, no lesions, no rashes. Mediport present      Labs   Latest Reference Range & Units 04/10/25 11:41   Carcinoembryonic AG ug/L 3.1   Cancer  0.0 - 30.2 U/mL 271.8 (H)   Cancer AG 19-9 <35.00 U/mL 27.20   Alpha-Fetoprotein 0 - 9 ng/mL 9   CA 27.29 0.0 - 38.6 U/mL 132.9 (H)   (H): Data is abnormally high    Pathology 4/14/25  FINAL DIAGNOSIS   A.  Hilar lymph node, biopsy:  - Poorly differentiated carcinoma.  - No lymphoid tissue identified.  - " See comment.     Comment: Immunostains show that the tumor cells are positive for CKAE1/3 and CK7, while negative for p40, INSM1, TTF-1, and TRPS1. An immunostain for PAX8  shows predominantly negative staining in the tumor cells.  Overall, the immunoprofile is most consistent with poorly differentiated adenocarcinoma; and a pancreatobiliary primary (including cholangiocarcinoma) remains a possibility, among others.  Clinical correlation with imaging studies recommended.   MISMATCH REPAIR PROTEIN EXPRESSION                    Protein:           Result                 MLH-1:             Expression Present                                                   PMS-2:            Expression Present                                                   MSH-2:            Expression Present                                                   MSH-6:            Expression Present                                       INTERPRETATION: Neoplasm with normal mismatch repair protein expression.    Image  NM PET CT FDG wholebody  Result Date: 5/28/2025  Impression: 1. A large FDG avid lesion with central necrosis in the pancreatic head/uncinate process, consistent with known biopsy-proven pancreaticobiliary carcinoma. 2. Multiple FDG avid lesions scattered in the right and left hepatic lobes as well as a subcapsular lesion, likely representing liver metastases 3. Innumerable FDG avid left supraclavicular, paraesophageal, retrocrural, abdominopelvic nodes as described above, likely representing melony metastases. 4. Two FDG avid subcutaneous soft tissue densities in the upper back, posterior to right shoulder, suggestive of metastases. 5. Heterogeneous FDG uptake within the bone marrow throughout the axial and appendicular skeleton without discrete lesion, likely reactive.     I personally reviewed the image(s) / study and agree with the findings and interpretation as stated. This study was interpreted at [facility].   MACRO: None       Signed by: Janneth Love 2025 6:20 AM Dictation workstation:   [id number]    IR CVC port placement  Result Date: 2025  Impression: 1. Uncomplicated placement of a right infraclavicular single-lumen Mediport- 8-Georgian catheter tip residing at the cavoatrial junction. 2. CT power-injection compatible Mediport. 3. MRI-compatible Mediport. 4. Optimal functioning device and ready for utilization.   Performed and dictated at [facility].   MACRO: None.   Signed by: Amor Franks 2025 9:24 AM Dictation workstation:   [id number]    Patient seen and discussed with Dr. Mauri Diaz MD  Hematology-Oncology Fellow, PGY4    I have interviewed and examined this patient along with Dr. Diaz.  I have reviewed the note and edited as appropriate.  I agree with the documentation and the plan.    Magda Dotson MD.                 [1]   Past Surgical History:  Procedure Laterality Date    ESOPHAGOSCOPY / EGD  2025    ENDOSCOPIC ULTRASOUND (UPPER)      ERCP    OTHER SURGICAL HISTORY  2022     section    OTHER SURGICAL HISTORY  2020    Lumpectomy   [2]   Family History  Problem Relation Name Age of Onset    Pancreatic cancer Mother      Uterine cancer Sister     [3]   Social History  Tobacco Use    Smoking status: Never    Smokeless tobacco: Never   Vaping Use    Vaping status: Never Used   Substance Use Topics    Alcohol use: Yes     Comment: social    Drug use: Not Currently   [4] No Known Allergies

## 2025-05-30 ENCOUNTER — PATIENT OUTREACH (OUTPATIENT)
Dept: PRIMARY CARE | Facility: CLINIC | Age: 70
End: 2025-05-30
Payer: MEDICARE

## 2025-06-04 ENCOUNTER — APPOINTMENT (OUTPATIENT)
Dept: HEMATOLOGY/ONCOLOGY | Facility: HOSPITAL | Age: 70
End: 2025-06-04
Payer: MEDICARE

## 2025-06-05 ENCOUNTER — TELEMEDICINE (OUTPATIENT)
Dept: HEMATOLOGY/ONCOLOGY | Facility: HOSPITAL | Age: 70
End: 2025-06-05
Payer: MEDICARE

## 2025-06-05 DIAGNOSIS — F32.A DEPRESSION, UNSPECIFIED DEPRESSION TYPE: ICD-10-CM

## 2025-06-05 DIAGNOSIS — K21.00 GASTROESOPHAGEAL REFLUX DISEASE WITH ESOPHAGITIS, UNSPECIFIED WHETHER HEMORRHAGE: ICD-10-CM

## 2025-06-05 DIAGNOSIS — C22.1 CHOLANGIOCARCINOMA (MULTI): ICD-10-CM

## 2025-06-05 PROCEDURE — 99213 OFFICE O/P EST LOW 20 MIN: CPT | Performed by: INTERNAL MEDICINE

## 2025-06-05 NOTE — PROGRESS NOTES
Patient ID: Jordyn Neal is a 70 y.o. female.  This was a phone appointment.  Consent obtained.  Diagnoses: Metastatic poorly differentiated carcinoma     Genomic profile: pMMR   Tempus xT 4/14/25: TP53, TERT, TMB 0.5   Tempus xF 5/7/25: FOXO1, TP53, TERT, TMB 5.5     Assessment and Plan:  Jordyn Neal is 70 y.o. female with newly diagnosed metastatic poorly differentiated carcinoma from a hilar lymph node biopsy. 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 She was hospitalized in April 2025 and required ERCP with stent placement in CBD. PET/CT 5/28/25 with pancreatic head/uncinate lesion, hepatic metastasis, melony metastasis, and soft tissue metastasis in the upper back and right shoulder.     Serum tumor markers show elevated . CEA, CA 19-9, and AFP are not elevated. Her NGS was reviewed as above, ultimately non specific.  The tumor of origin for her cancer is uncertain.     I discussed her in the tumor board on June 4, 2025.  After reviewing her scans and the pathology, it seemed she has features that are consistent with cholangiocarcinoma.  Tumor board unanimously recommended first-line treatment with gemcitabine plus cisplatin plus durvalumab.  She also has several subcutaneous lesions which are FDG avid.  Some of the tumor board members also thought if those lesions are due to a second cancer like melanoma.  Tumor board recommended biopsy of one of the subcutaneous lesions.  All these information has been passed on to her primary oncologist Dr. Willis.  I spoke to her today and I discussed the tumor board recommendation.  She will follow-up with Dr. Willis.    Providers:  Surgeon:   MedOnc: Dr. Willis   [facility]    Chief complaint: Poorly differentiated carcinoma.    HPI:  ONCOLOGIC HISTORY-  She was admitted at [facility] in April 2025 with complaint of severe vomiting and right upper quadrant pain.  There was also history of 20-30 pound weight loss over the last 6 months.  Initial  workup including CT scan revealed a large hepatic mass along with extensive intra-abdominal lymphadenopathy causing compression of the cystic duct resulting in intrahepatic biliary ductal dilatation and gallbladder distention.  There was also finding of mildly heterogenicity/thickening of the endometrium.  Follow-up liver MRI confirmed 8 x 5.4 cm heterogeneous mass along with extensive lymphadenopathy.  Pelvic ultrasound mainly showed uterine hyperplasia.  She was transferred to [facility] and had ERCP with stent placement in CBD and fluoroscopic biopsy of the hilar lymph node confirming poorly differentiated carcinoma     Interval history:  No new symptoms.      Past Medical History:   Past Medical History:  No date: CKD (chronic kidney disease) stage 3, GFR 30-59 ml/min   (Multi)  No date: Diabetes mellitus (Multi)  No date: Fatty liver  No date: GERD (gastroesophageal reflux disease)  No date: HLD (hyperlipidemia)  No date: Hypertension  No date: Personal history of other benign neoplasm      Comment:  History of uterine leiomyoma   Surgical History:    Surgical History[1]   Family History:    Family History[2]  No family history of breast or ovarian cancer   Family Oncology History:    Cancer-related family history includes Pancreatic cancer in her mother; Uterine cancer in her sister.  Social History:    Social History[3]     Allergies  RX Allergies[4]     Medications  Current Outpatient Medications   Medication Instructions    amLODIPine (NORVASC) 10 mg, oral, Daily    fluticasone (Flonase) 50 mcg/actuation nasal spray Administer into affected nostril(s).    multivitamin tablet 1 tablet, Daily    omeprazole (PRILOSEC) 40 mg, oral, Daily    ondansetron ODT (ZOFRAN-ODT) 4 mg, oral, Every 8 hours PRN    oxyCODONE (ROXICODONE) 5 mg, oral, Every 8 hours PRN    PARoxetine (PAXIL) 20 mg, oral, Daily    traZODone (DESYREL) 100 mg, oral, Nightly PRN          Objective   VS: There were no vitals taken for this  visit.  Weight:   There were no vitals filed for this visit.       PHYSICAL EXAMINATION      Labs   Latest Reference Range & Units 04/10/25 11:41   Carcinoembryonic AG ug/L 3.1   Cancer  0.0 - 30.2 U/mL 271.8 (H)   Cancer AG 19-9 <35.00 U/mL 27.20   Alpha-Fetoprotein 0 - 9 ng/mL 9   CA 27.29 0.0 - 38.6 U/mL 132.9 (H)   (H): Data is abnormally high    Pathology 4/14/25  FINAL DIAGNOSIS   A.  Hilar lymph node, biopsy:  - Poorly differentiated carcinoma.  - No lymphoid tissue identified.  - See comment.     Comment: Immunostains show that the tumor cells are positive for CKAE1/3 and CK7, while negative for p40, INSM1, TTF-1, and TRPS1. An immunostain for PAX8  shows predominantly negative staining in the tumor cells.  Overall, the immunoprofile is most consistent with poorly differentiated adenocarcinoma; and a pancreatobiliary primary (including cholangiocarcinoma) remains a possibility, among others.  Clinical correlation with imaging studies recommended.   MISMATCH REPAIR PROTEIN EXPRESSION                    Protein:           Result                 MLH-1:             Expression Present                                                   PMS-2:            Expression Present                                                   MSH-2:            Expression Present                                                   MSH-6:            Expression Present                                       INTERPRETATION: Neoplasm with normal mismatch repair protein expression.    Image  NM PET CT FDG wholebody  Result Date: 5/28/2025  Impression: 1. A large FDG avid lesion with central necrosis in the pancreatic head/uncinate process, consistent with known biopsy-proven pancreaticobiliary carcinoma. 2. Multiple FDG avid lesions scattered in the right and left hepatic lobes as well as a subcapsular lesion, likely representing liver metastases 3. Innumerable FDG avid left supraclavicular, paraesophageal, retrocrural, abdominopelvic  nodes as described above, likely representing melony metastases. 4. Two FDG avid subcutaneous soft tissue densities in the upper back, posterior to right shoulder, suggestive of metastases. 5. Heterogeneous FDG uptake within the bone marrow throughout the axial and appendicular skeleton without discrete lesion, likely reactive.     I personally reviewed the image(s) / study and agree with the findings and interpretation as stated. This study was interpreted at [facility].   MACRO: None       Signed by: Janneth Love 2025 6:20 AM Dictation workstation:   [id number]    IR CVC port placement  Result Date: 2025  Impression: 1. Uncomplicated placement of a right infraclavicular single-lumen Mediport- 8-Latvian catheter tip residing at the cavoatrial junction. 2. CT power-injection compatible Mediport. 3. MRI-compatible Mediport. 4. Optimal functioning device and ready for utilization.   Performed and dictated at [facility].   MACRO: None.   Signed by: Amor Franks 2025 9:24 AM Dictation workstation:   [id number]    Total time spent 20 minutes.  Magda Dotson MD.                 [1]   Past Surgical History:  Procedure Laterality Date    ESOPHAGOSCOPY / EGD  2025    ENDOSCOPIC ULTRASOUND (UPPER)      ERCP    OTHER SURGICAL HISTORY  2022     section    OTHER SURGICAL HISTORY  2020    Lumpectomy   [2]   Family History  Problem Relation Name Age of Onset    Pancreatic cancer Mother      Uterine cancer Sister     [3]   Social History  Tobacco Use    Smoking status: Never    Smokeless tobacco: Never   Vaping Use    Vaping status: Never Used   Substance Use Topics    Alcohol use: Yes     Comment: social    Drug use: Not Currently   [4] No Known Allergies

## 2025-06-06 RX ORDER — PAROXETINE 20 MG/1
20 TABLET, FILM COATED ORAL DAILY
Qty: 30 TABLET | Refills: 0 | Status: SHIPPED | OUTPATIENT
Start: 2025-06-06

## 2025-06-06 RX ORDER — OMEPRAZOLE 40 MG/1
40 CAPSULE, DELAYED RELEASE ORAL DAILY
Qty: 90 CAPSULE | Refills: 0 | Status: SHIPPED | OUTPATIENT
Start: 2025-06-06

## 2025-06-09 LAB
DNA RANGE(S) EXAMINED NAR: NORMAL
GENE DIS ANL INTERP-IMP: POSITIVE
GENE DIS ASSESSED: NORMAL
GENE MUT TESTED BLD/T: 0.5 M/MB
MSI CA SPEC-IMP: NORMAL
REASON FOR STUDY: NORMAL
TEMPUS GERMLINE NOTE: NORMAL
TEMPUS LCA: NORMAL
TEMPUS PORTAL: NORMAL
TEMPUS TREATMENT IMPLICATIONS NOTE: NORMAL
TEMPUS TRIALCOUNT: 1
TEMPUS TRIALMATCHES1: NORMAL
TEMPUS XR RESULT 1: NORMAL

## 2025-06-13 RX ORDER — HEPARIN 100 UNIT/ML
500 SYRINGE INTRAVENOUS AS NEEDED
OUTPATIENT
Start: 2025-06-13

## 2025-06-13 RX ORDER — HEPARIN SODIUM,PORCINE/PF 10 UNIT/ML
50 SYRINGE (ML) INTRAVENOUS AS NEEDED
OUTPATIENT
Start: 2025-06-13

## 2025-06-16 ENCOUNTER — APPOINTMENT (OUTPATIENT)
Dept: HEMATOLOGY/ONCOLOGY | Facility: CLINIC | Age: 70
End: 2025-06-16
Payer: MEDICARE

## 2025-06-16 ENCOUNTER — INFUSION (OUTPATIENT)
Dept: HEMATOLOGY/ONCOLOGY | Facility: CLINIC | Age: 70
End: 2025-06-16
Payer: MEDICARE

## 2025-06-16 ENCOUNTER — DOCUMENTATION (OUTPATIENT)
Dept: HEMATOLOGY/ONCOLOGY | Facility: CLINIC | Age: 70
End: 2025-06-16

## 2025-06-16 ENCOUNTER — OFFICE VISIT (OUTPATIENT)
Dept: HEMATOLOGY/ONCOLOGY | Facility: CLINIC | Age: 70
End: 2025-06-16
Payer: MEDICARE

## 2025-06-16 VITALS
HEART RATE: 102 BPM | SYSTOLIC BLOOD PRESSURE: 138 MMHG | BODY MASS INDEX: 32.14 KG/M2 | HEIGHT: 65 IN | DIASTOLIC BLOOD PRESSURE: 87 MMHG | RESPIRATION RATE: 20 BRPM | TEMPERATURE: 96.1 F | WEIGHT: 192.9 LBS | OXYGEN SATURATION: 98 %

## 2025-06-16 DIAGNOSIS — C22.1 CHOLANGIOCARCINOMA METASTATIC TO LIVER: Primary | ICD-10-CM

## 2025-06-16 DIAGNOSIS — R59.0 RETROPERITONEAL LYMPHADENOPATHY: ICD-10-CM

## 2025-06-16 DIAGNOSIS — C78.7 CHOLANGIOCARCINOMA METASTATIC TO LIVER: ICD-10-CM

## 2025-06-16 DIAGNOSIS — C22.1 CHOLANGIOCARCINOMA METASTATIC TO LIVER: ICD-10-CM

## 2025-06-16 DIAGNOSIS — C78.7 CHOLANGIOCARCINOMA METASTATIC TO LIVER: Primary | ICD-10-CM

## 2025-06-16 DIAGNOSIS — C22.1 CHOLANGIOCARCINOMA (MULTI): ICD-10-CM

## 2025-06-16 LAB
CORTIS AM PEAK SERPL-MSCNC: 30 UG/DL (ref 5–20)
HBV CORE AB SER QL: NONREACTIVE
HBV SURFACE AB SER-ACNC: <3.1 MIU/ML
HBV SURFACE AG SERPL QL IA: NONREACTIVE
MAGNESIUM SERPL-MCNC: 1.74 MG/DL (ref 1.6–2.4)
TSH SERPL-ACNC: 2.77 MIU/L (ref 0.44–3.98)

## 2025-06-16 PROCEDURE — 1159F MED LIST DOCD IN RCRD: CPT | Performed by: INTERNAL MEDICINE

## 2025-06-16 PROCEDURE — 99215 OFFICE O/P EST HI 40 MIN: CPT | Mod: 25 | Performed by: INTERNAL MEDICINE

## 2025-06-16 PROCEDURE — 99215 OFFICE O/P EST HI 40 MIN: CPT | Performed by: INTERNAL MEDICINE

## 2025-06-16 PROCEDURE — 3008F BODY MASS INDEX DOCD: CPT | Performed by: INTERNAL MEDICINE

## 2025-06-16 PROCEDURE — 96361 HYDRATE IV INFUSION ADD-ON: CPT | Mod: INF

## 2025-06-16 PROCEDURE — 87340 HEPATITIS B SURFACE AG IA: CPT | Mod: PARLAB

## 2025-06-16 PROCEDURE — 82024 ASSAY OF ACTH: CPT

## 2025-06-16 PROCEDURE — 1036F TOBACCO NON-USER: CPT | Performed by: INTERNAL MEDICINE

## 2025-06-16 PROCEDURE — 96360 HYDRATION IV INFUSION INIT: CPT | Mod: INF

## 2025-06-16 PROCEDURE — 82533 TOTAL CORTISOL: CPT | Mod: PARLAB

## 2025-06-16 PROCEDURE — 83735 ASSAY OF MAGNESIUM: CPT

## 2025-06-16 PROCEDURE — 84443 ASSAY THYROID STIM HORMONE: CPT

## 2025-06-16 PROCEDURE — 86706 HEP B SURFACE ANTIBODY: CPT | Mod: PARLAB

## 2025-06-16 PROCEDURE — 2500000004 HC RX 250 GENERAL PHARMACY W/ HCPCS (ALT 636 FOR OP/ED): Performed by: INTERNAL MEDICINE

## 2025-06-16 PROCEDURE — 86704 HEP B CORE ANTIBODY TOTAL: CPT | Mod: PARLAB

## 2025-06-16 PROCEDURE — 96374 THER/PROPH/DIAG INJ IV PUSH: CPT | Mod: INF

## 2025-06-16 PROCEDURE — 1125F AMNT PAIN NOTED PAIN PRSNT: CPT | Performed by: INTERNAL MEDICINE

## 2025-06-16 PROCEDURE — 2500000004 HC RX 250 GENERAL PHARMACY W/ HCPCS (ALT 636 FOR OP/ED): Mod: JZ,TB | Performed by: INTERNAL MEDICINE

## 2025-06-16 RX ORDER — HEPARIN 100 UNIT/ML
500 SYRINGE INTRAVENOUS AS NEEDED
OUTPATIENT
Start: 2025-06-16

## 2025-06-16 RX ORDER — PROCHLORPERAZINE EDISYLATE 5 MG/ML
10 INJECTION INTRAMUSCULAR; INTRAVENOUS EVERY 6 HOURS PRN
OUTPATIENT
Start: 2025-07-07

## 2025-06-16 RX ORDER — FAMOTIDINE 10 MG/ML
20 INJECTION, SOLUTION INTRAVENOUS ONCE AS NEEDED
OUTPATIENT
Start: 2025-06-16

## 2025-06-16 RX ORDER — HYDROMORPHONE HYDROCHLORIDE 1 MG/ML
1 INJECTION, SOLUTION INTRAMUSCULAR; INTRAVENOUS; SUBCUTANEOUS ONCE
Status: CANCELLED | OUTPATIENT
Start: 2025-06-16 | End: 2025-06-16

## 2025-06-16 RX ORDER — SODIUM CHLORIDE 9 MG/ML
1000 INJECTION, SOLUTION INTRAVENOUS CONTINUOUS
Status: CANCELLED | OUTPATIENT
Start: 2025-06-16

## 2025-06-16 RX ORDER — ALBUTEROL SULFATE 0.83 MG/ML
3 SOLUTION RESPIRATORY (INHALATION) AS NEEDED
Status: CANCELLED | OUTPATIENT
Start: 2025-06-16

## 2025-06-16 RX ORDER — HEPARIN SODIUM,PORCINE/PF 10 UNIT/ML
50 SYRINGE (ML) INTRAVENOUS AS NEEDED
OUTPATIENT
Start: 2025-06-16

## 2025-06-16 RX ORDER — OXYCODONE HYDROCHLORIDE 5 MG/1
10 TABLET ORAL EVERY 6 HOURS PRN
Qty: 120 TABLET | Refills: 0 | Status: ON HOLD | OUTPATIENT
Start: 2025-06-16 | End: 2025-07-16

## 2025-06-16 RX ORDER — EPINEPHRINE 0.3 MG/.3ML
0.3 INJECTION SUBCUTANEOUS EVERY 5 MIN PRN
OUTPATIENT
Start: 2025-07-07

## 2025-06-16 RX ORDER — PROCHLORPERAZINE MALEATE 5 MG
10 TABLET ORAL EVERY 6 HOURS PRN
OUTPATIENT
Start: 2025-07-07

## 2025-06-16 RX ORDER — FAMOTIDINE 10 MG/ML
20 INJECTION, SOLUTION INTRAVENOUS ONCE AS NEEDED
OUTPATIENT
Start: 2025-06-23

## 2025-06-16 RX ORDER — DIPHENHYDRAMINE HYDROCHLORIDE 50 MG/ML
50 INJECTION, SOLUTION INTRAMUSCULAR; INTRAVENOUS AS NEEDED
OUTPATIENT
Start: 2025-07-07

## 2025-06-16 RX ORDER — ALBUTEROL SULFATE 0.83 MG/ML
3 SOLUTION RESPIRATORY (INHALATION) AS NEEDED
OUTPATIENT
Start: 2025-06-16

## 2025-06-16 RX ORDER — HEPARIN SODIUM,PORCINE/PF 10 UNIT/ML
50 SYRINGE (ML) INTRAVENOUS AS NEEDED
Status: DISCONTINUED | OUTPATIENT
Start: 2025-06-16 | End: 2025-06-16 | Stop reason: HOSPADM

## 2025-06-16 RX ORDER — ALBUTEROL SULFATE 0.83 MG/ML
3 SOLUTION RESPIRATORY (INHALATION) AS NEEDED
OUTPATIENT
Start: 2025-07-07

## 2025-06-16 RX ORDER — PROCHLORPERAZINE MALEATE 10 MG
10 TABLET ORAL EVERY 6 HOURS PRN
Qty: 30 TABLET | Refills: 2 | Status: ON HOLD | OUTPATIENT
Start: 2025-06-16

## 2025-06-16 RX ORDER — PROCHLORPERAZINE MALEATE 5 MG
10 TABLET ORAL EVERY 6 HOURS PRN
OUTPATIENT
Start: 2025-06-23

## 2025-06-16 RX ORDER — FAMOTIDINE 10 MG/ML
20 INJECTION, SOLUTION INTRAVENOUS ONCE AS NEEDED
OUTPATIENT
Start: 2025-07-07

## 2025-06-16 RX ORDER — DIPHENHYDRAMINE HYDROCHLORIDE 50 MG/ML
50 INJECTION, SOLUTION INTRAMUSCULAR; INTRAVENOUS AS NEEDED
OUTPATIENT
Start: 2025-06-16

## 2025-06-16 RX ORDER — OLANZAPINE 5 MG/1
5 TABLET, FILM COATED ORAL NIGHTLY
Qty: 30 TABLET | Refills: 1 | Status: ON HOLD | OUTPATIENT
Start: 2025-06-16

## 2025-06-16 RX ORDER — PALONOSETRON 0.05 MG/ML
0.25 INJECTION, SOLUTION INTRAVENOUS ONCE
OUTPATIENT
Start: 2025-06-23

## 2025-06-16 RX ORDER — DIPHENHYDRAMINE HYDROCHLORIDE 50 MG/ML
50 INJECTION, SOLUTION INTRAMUSCULAR; INTRAVENOUS AS NEEDED
OUTPATIENT
Start: 2025-06-23

## 2025-06-16 RX ORDER — EPINEPHRINE 0.3 MG/.3ML
0.3 INJECTION SUBCUTANEOUS EVERY 5 MIN PRN
OUTPATIENT
Start: 2025-06-16

## 2025-06-16 RX ORDER — PROCHLORPERAZINE EDISYLATE 5 MG/ML
10 INJECTION INTRAMUSCULAR; INTRAVENOUS EVERY 6 HOURS PRN
OUTPATIENT
Start: 2025-06-23

## 2025-06-16 RX ORDER — SODIUM CHLORIDE 9 MG/ML
1000 INJECTION, SOLUTION INTRAVENOUS CONTINUOUS
OUTPATIENT
Start: 2025-06-16

## 2025-06-16 RX ORDER — HEPARIN 100 UNIT/ML
500 SYRINGE INTRAVENOUS AS NEEDED
Status: DISCONTINUED | OUTPATIENT
Start: 2025-06-16 | End: 2025-06-16 | Stop reason: HOSPADM

## 2025-06-16 RX ORDER — EPINEPHRINE 0.3 MG/.3ML
0.3 INJECTION SUBCUTANEOUS EVERY 5 MIN PRN
Status: CANCELLED | OUTPATIENT
Start: 2025-06-16

## 2025-06-16 RX ORDER — SODIUM CHLORIDE 9 MG/ML
1000 INJECTION, SOLUTION INTRAVENOUS CONTINUOUS
Status: DISCONTINUED | OUTPATIENT
Start: 2025-06-16 | End: 2025-06-16 | Stop reason: HOSPADM

## 2025-06-16 RX ORDER — PALONOSETRON 0.05 MG/ML
0.25 INJECTION, SOLUTION INTRAVENOUS ONCE
OUTPATIENT
Start: 2025-07-07

## 2025-06-16 RX ORDER — ALBUTEROL SULFATE 0.83 MG/ML
3 SOLUTION RESPIRATORY (INHALATION) AS NEEDED
OUTPATIENT
Start: 2025-06-23

## 2025-06-16 RX ORDER — DIPHENHYDRAMINE HYDROCHLORIDE 50 MG/ML
50 INJECTION, SOLUTION INTRAMUSCULAR; INTRAVENOUS AS NEEDED
Status: CANCELLED | OUTPATIENT
Start: 2025-06-16

## 2025-06-16 RX ORDER — EPINEPHRINE 0.3 MG/.3ML
0.3 INJECTION SUBCUTANEOUS EVERY 5 MIN PRN
OUTPATIENT
Start: 2025-06-23

## 2025-06-16 RX ORDER — FAMOTIDINE 10 MG/ML
20 INJECTION, SOLUTION INTRAVENOUS ONCE AS NEEDED
Status: CANCELLED | OUTPATIENT
Start: 2025-06-16

## 2025-06-16 RX ORDER — HYDROMORPHONE HYDROCHLORIDE 1 MG/ML
1 INJECTION, SOLUTION INTRAMUSCULAR; INTRAVENOUS; SUBCUTANEOUS ONCE
Status: COMPLETED | OUTPATIENT
Start: 2025-06-16 | End: 2025-06-16

## 2025-06-16 RX ADMIN — HYDROMORPHONE HYDROCHLORIDE 1 MG: 1 INJECTION, SOLUTION INTRAMUSCULAR; INTRAVENOUS; SUBCUTANEOUS at 10:54

## 2025-06-16 RX ADMIN — HEPARIN 500 UNITS: 100 SYRINGE at 11:23

## 2025-06-16 RX ADMIN — SODIUM CHLORIDE 1000 ML/HR: 9 INJECTION, SOLUTION INTRAVENOUS at 10:57

## 2025-06-16 RX ADMIN — DEXAMETHASONE SODIUM PHOSPHATE 4 MG: 4 INJECTION, SOLUTION INTRA-ARTICULAR; INTRALESIONAL; INTRAMUSCULAR; INTRAVENOUS; SOFT TISSUE at 10:53

## 2025-06-16 ASSESSMENT — PAIN SCALES - GENERAL: PAINLEVEL_OUTOF10: 10-WORST PAIN EVER

## 2025-06-16 NOTE — PROGRESS NOTES
Patient ID: : Jordyn Neal            Primary Care Provider: Kit Fleming MD    LOCATION:  [facility]    HEMATOLOGY ONCOLOGY PROBLEMS:  1.  Metastatic poorly differentiated cholangiocarcinoma.    CHIEF COMPLAINTS:  Patient is in the clinic today for evaluation of metastatic poorly differentiated primary cholangiocarcinoma.    HISTORY:  Jordyn Neal is a 70 y.o. 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  She was admitted at [facility] in April 2025 with complaint of severe vomiting and right upper quadrant pain.  There was also history of 20-30 pound weight loss over the last 6 months.  Initial workup including CT scan revealed a large hepatic mass along with extensive intra-abdominal lymphadenopathy causing compression of the cystic duct resulting in intrahepatic biliary ductal dilatation and gallbladder distention.  There was also finding of mildly heterogenicity/thickening of the endometrium.  Follow-up liver MRI confirmed 8 x 5.4 cm heterogeneous mass along with extensive lymphadenopathy.  Pelvic ultrasound mainly showed uterine hyperplasia.  She was transferred to [facility] and had ERCP with stent placement in CBD and fluoroscopic biopsy of the hilar lymph node confirming poorly differentiated carcinoma with histochemical findings suggestive of most probable pancreaticobiliary origin with possibility of primary cholangiocarcinoma.  Extensive tumor NGS panel with Tempus xT and Tempus xF were unremarkable.  She was further evaluated by Dr. Walters in GI oncology clinic at [facility] and after extensive further evaluation and as per tumor board recommendation she has been advised trial of systemic chemotherapy with gemcitabine/cisplatin/durvalumab.    INTERVAL HISTORY:  As per the patient she is having problem with progressive pain, nausea, weakness, fatigue, anorexia and weight loss.  Overall there is functional decline.       PAST MEDICAL HISTORY:  1.  Metastatic pancreaticobiliary carcinoma.  2.  Hypertension  3.  Diabetes mellitus  4.  Hyperlipidemia  5.  CKD  6.  GERD  7.  Depression  8.  Uterine leiomyoma  9.  History of right shoulder replacement surgery.    SOCIAL HISTORY:   for 45 years and lives in [place].  Non-smoker.  Rare social alcohol intake.  She is retired and used to work as a  for a mortgage company.  Born and raised in [place].    FAMILY HISTORY:  Father  at age 52 from MS related complication.  Mother  at age 84 from pancreatic cancer.  2 siblings.  A sister  from uterine cancer.  2 children.  One of her son  from leukemia at age 29.  No other family history of bleeding, clotting or malignant disorders in the family history.    REVIEW OF SYSTEMS:   Pertinent finding as per the history above.  All other systems have been reviewed and generally negative and noncontributory.    VITAL SIGNS  BSA: There is no height or weight on file to calculate BSA.  /87   Pulse 102   Temp 35.6 °C (96.1 °F)   Resp 20   SpO2 98%     PHYSICAL EXAMINATION:  Detailed physical examination not done.    LAB DATA:  Repeat blood work from today are pending.  Latest CBC from 2025 showed a white cell count of 7.4 with hemoglobin of 11.8 and platelets of 220.  Metabolic profile showed a baseline creatinine of 1.2 with alkaline phosphatase of 183 and AST of 41.  Total bilirubin was 1.2.    RADIOLOGICAL DATA:  PET scan 2025  Impression:  1. A large FDG avid lesion with central necrosis in the pancreatic head/uncinate process, consistent with known biopsy-proven pancreaticobiliary carcinoma.  2. Multiple FDG avid lesions scattered in the right and left hepatic lobes as well as a subcapsular lesion, likely representing liver metastases  3. Innumerable FDG avid left supraclavicular, paraesophageal, retrocrural, abdominopelvic nodes as described above, likely representing melony  metastases.  4. Two FDG avid subcutaneous soft tissue densities in the upper back, posterior to right shoulder, suggestive of metastases.   5. Heterogeneous FDG uptake within the bone marrow throughout the axial and appendicular skeleton without discrete lesion, likely reactive.    ASSESSMENT / PLAN:  1.  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  Please refer the details of initial presentation and management as ordered above.  In summary she was admitted to [facility] in April 2025 with complaining of severe nausea and vomiting along with right upper quadrant pain.  CT scans showed large heterogeneous liver mass along with extensive intra-abdominal and intrathoracic lymphadenopathy.  She was transferred to [facility] and had ERCP with stent placement in CBD and fluoroscopic biopsy of the hilar lymph node confirming poorly differentiated carcinoma with histochemical findings suggestive of most probable pancreaticobiliary origin with possibility of primary cholangiocarcinoma.  Extensive tumor NGS panel with Tempus xT and Tempus xF were unremarkable.  She was further evaluated by Dr. Walters in GI oncology clinic at [facility] and after extensive further evaluation and as per tumor board recommendation she has been advised trial of systemic chemotherapy with gemcitabine/cisplatin/durvalumab.    Long discussion with the patient and her  and they were explained above the additional molecular results, tumor board recommendations and Dr. Walters suggestion regarding start of the treatment with gemcitabine/cisplatin/durvalumab.  For ease of administration and side effect management will treat her on day 1 day 15 split schedule.  Clinically she is having progressive disease progression and functional decline in and as expected her overall prognosis is guarded.  Probable side effects of treatment mainly myelosuppression, alopecia, weakness, fatigue, dehydration,  ototoxicity, neurotoxicity, colitis, pneumonitis, endocrinopathies, rash etc. were explained to her in detail.  Informed consent was obtained today.    2.  Dehydration.  She appears to be dehydrated today and will be supported with IV hydration in the clinic.  Will also support her with steroid for help with nausea.  Of note she is already taking Zofran at home.    3.  Cancer related pain.  Will increase the oxycodone dose to 10 mg every 6 hours for better pain control.  She is educated about gastritis and constipation prophylaxis.  She will be given a dose of Dilaudid in the office today for acute pain.    4.  Follow-up.  She will return to clinic in the next few weeks to start the treatment as detailed above.    This note has been transcribed using Dragon voice recognition system and there is a possibility of unintentional typing misprints.

## 2025-06-16 NOTE — PROGRESS NOTES
Learner: family and patient  Educated on: Imfinzi, Cisplatin and Gemcitabine   Readiness: eager  Preferred learning method: preferred: reading and listening  Method used: explanation and handout  Response: demonstrated understanding and needs reinforcement  Barriers: Acuteness of the illness  Preferred language: English  Resources given: Imfinzi, Gemcitabine and Cisplatin education; Lexicomp and IV Chemotherapy sheets from [facility] given / reviewed. Minimal review as patient is feeling quite ill and went over to treatment for IVF, pain meds and nausea medication. Red chemotherapy bag given. Schedule set and will email  a calendar of treatment schedule, per request, to [email or web address].

## 2025-06-17 ENCOUNTER — NUTRITION (OUTPATIENT)
Dept: RADIATION ONCOLOGY | Facility: CLINIC | Age: 70
End: 2025-06-17
Payer: MEDICARE

## 2025-06-17 VITALS — BODY MASS INDEX: 32.14 KG/M2 | WEIGHT: 192.9 LBS | HEIGHT: 65 IN

## 2025-06-17 DIAGNOSIS — C78.7 CHOLANGIOCARCINOMA METASTATIC TO LIVER: ICD-10-CM

## 2025-06-17 DIAGNOSIS — R53.0 NEOPLASTIC MALIGNANT RELATED FATIGUE: ICD-10-CM

## 2025-06-17 DIAGNOSIS — C22.1 CHOLANGIOCARCINOMA METASTATIC TO LIVER: ICD-10-CM

## 2025-06-17 NOTE — PROGRESS NOTES
Spoke to  lala.    Not eating so well, eats a few bites then full. Same with fluid.  Cookies, soup (creamy), chicken with noodles    Nausea   Sleeping though the meds  Constipation intermittently  Dizzy from dehydration, fluids yesterday  Denies abdominal distension  Remeron for appetite  Can't make it out to  TwoCal    NUTRITION Follow Up NOTE    Nutrition Assessment     Reason for Visit:  Jordyn Neal is a 70 y.o. 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 Treatment pending with gemcitabine, cisplatin, durvalumab.     RD consulted for urgent nutrition assessment by nurse partner, Lucita. RD was able to reach patients  via phone (she was sleeping).  reports patient will feel very hungry and be full in one or two bites. She may have a small cookie, or a few bites of soup. He is also concerned she is not keeping up with fluids as she is sleeping much of the time. She was having issues with intermittent nausea which as resolved with anti-emetics, but she sometimes misses pills from sleeping.     RD recommending small, frequent meals as able and supplementing with a high calorie nutrition drink such as TwoCal or Boost VHC. Offered samples,  does not think he will have time to pick them up. He will go to the store and purchase Ensure/Boost Plus for now. RD recommends working up to three per day, small portions often.  is taking care of patient and patients sister who has dementia. Suspect this is difficult given the degree of care required, LISW-S updated on case and will assess for needs when appropriate. RD will continue to assist as able.     Problem List[1]    Nutrition Significant labs:  Lab Results   Component Value Date/Time    GLUCOSE 103 (H) 05/07/2025 1400    GLUCOSE 116 (H) 04/22/2025 1223     05/07/2025 1400     (L) 04/22/2025 1223    K 3.6 05/07/2025 1400    K 3.5 04/22/2025 1223     05/07/2025 1400    CL 96 (L) 04/22/2025  "1223    CO2 26 05/07/2025 1400    CO2 23 04/22/2025 1223    ANIONGAP 13 05/07/2025 1400    ANIONGAP 15 04/22/2025 1223    BUN 14 05/07/2025 1400    BUN 20 04/22/2025 1223    CREATININE 1.28 (H) 05/07/2025 1400    CREATININE 1.87 (H) 04/22/2025 1223    EGFR 46 (L) 05/07/2025 1400    EGFR 29 (L) 04/22/2025 1223    CALCIUM 9.6 05/07/2025 1400    CALCIUM 9.8 04/22/2025 1223    ALBUMIN 3.5 05/07/2025 1400    ALBUMIN 4.0 04/22/2025 1223    ALKPHOS 183 (H) 05/07/2025 1400    ALKPHOS 217 (H) 04/22/2025 1223    PROT 6.2 (L) 05/07/2025 1400    PROT 7.3 04/22/2025 1223    AST 41 (H) 05/07/2025 1400    AST 77 (H) 04/22/2025 1223    BILITOT 1.2 05/07/2025 1400    BILITOT 0.8 04/22/2025 1223    ALT 20 05/07/2025 1400    ALT 42 (H) 04/22/2025 1223    MG 1.74 06/16/2025 1019     Lab Results   Component Value Date/Time    VITD25 37 11/14/2018 1616     Anthropometrics:  Height: 166.3 cm (5' 5.47\")   Weight: 87.5 kg (192 lb 14.4 oz)   BMI (Calculated): 31.64               Weight History:   Daily Weight  06/17/25 : 87.5 kg (192 lb 14.4 oz)  06/16/25 : 87.5 kg (192 lb 14.4 oz)  05/29/25 : 92 kg (202 lb 13.2 oz)  05/28/25 : 93.4 kg (206 lb)  05/19/25 : 102 kg (224 lb 13.9 oz)  05/07/25 : 102 kg (224 lb 13.9 oz)  04/11/25 : 99.7 kg (219 lb 12.8 oz)  04/10/25 : 99.9 kg (220 lb 3.8 oz)  11/05/24 : 105 kg (232 lb)  04/29/24 : 106 kg (233 lb)    Weight Change %: 17.5% weight loss x 7.5 weeks, 14.2% x 1 month- significant     Nutrition History:  Food and Nutrient History  Food and Nutrient History: as above    Food Supplement Intake  Oral Nutrition Supplements:  (none currently)  Vitamin Intake: Multivitamin    Food Preparation  Cooking: Spouse/Significant Other  Grocery Shopping: Spouse/Significant Other    Current Medications[2]     Nutrition Focused Physical Exam Findings:    Subcutaneous Fat Loss  Defer Subcutaneous Fat Loss Assessment: Defer all  Defer All Reason:  (phone visit)                   Estimated Needs:  Weight Used for Equation " Calculations: 87.5 kg (192 lb 14.4 oz)    2013-2188kcals (23-25kcals/kg)  88-105g protein (1-1.2g/kg)  1mL/kg fluid    Nutrition Diagnosis   Malnutrition Diagnosis  Patient has Malnutrition Diagnosis: Yes  Diagnosis Status: New  Malnutrition Diagnosis: Severe malnutrition related to chronic disease or condition  Related to: metastatic cancer  As Evidenced by: significant weight loss, PO intake meeting less than 50% estimated nutrient needs for greater than 1 month    Nutrition Diagnosis  Patient has Nutrition Diagnosis: Yes  Diagnosis Status (1): New  Nutrition Diagnosis 1: Increased nutrient needs  Related to (1): catabolic disease and treatment  As Evidenced by (1): metastatic cancer, upcoming chemotherapy     Nutrition Interventions/Recommendations   Nutrition Prescription: Individualized Nutrition Prescription Provided for : Oral nutrition     Recommendations: Individualized Nutrition Prescription Provided for : high calorie, high protein diet    Nutrition Interventions:   Food and Nutrient Delivery: Food and Nutrition Delivery  Meals & Snacks: Energy-modified diet, Protein-modified diet  Goals: 5-6 small meals per day, 5-6 2oz portions of ONS daily to meet nutrient needs. High calorie, high protein diet  Medical Food Supplement: Commercial beverage medical food supplement therapy  Goals: When able, trasition to high calorie supplement such as TwoCal or Boost VHC  Other:: Fluid  Goals: Patient will meet RD recommended fluid needs through fluids and high fluid foods.   Consider Remeron for appetite and supportive onc for other symptoms, team notified of recommendations.    Coordination of Care: Coordination of Nutrition Care by a Nutrition Professional  Goals: RD will continue to work with oncology team to ensure best outcomes possible     Nutrition Education:   Nutrition Education Content: Nutrition Education Content: Content related nutrition education  Goals: patient will understand the role diet plays in  cancer care, recovery, and survivorship    Nutrition Monitoring and Evaluation   Food and Nutrient Intake  Monitoring and Evaluation Plan: Energy intake, Protein intake, Meal/snack pattern  Energy Intake: Estimated energy intake  Criteria: diet recall, 75% of needs  Estimated protein intake: Estimated protein intake  Criteria: diet recall, 75% of needs  Additional Plan: Fluid intake- diet recall, 75% of needs    Anthropometric measurements  Monitoring and Evaluation Plan: Weight change  Criteria: patient will maintain weight throughout treatment    Biochemical Data, Medical Tests and Procedures  Monitoring and Evaluation Plan: Electrolyte/renal panel  Electrolyte and Renal Panel: BUN, Calcium, serum, Chloride, Creatinine, Magnesium, Phosphorus, Potassium, Sodium         [1]   Patient Active Problem List  Diagnosis    Cholecystitis    Retroperitoneal lymphadenopathy    Liver mass    Acute kidney injury superimposed on CKD    Leukocytosis    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   [2]   Current Outpatient Medications:     amLODIPine (Norvasc) 10 mg tablet, Take 1 tablet (10 mg) by mouth once daily., Disp: 90 tablet, Rfl: 1    fluticasone (Flonase) 50 mcg/actuation nasal spray, Administer into affected nostril(s)., Disp: , Rfl:     multivitamin tablet, Take 1 tablet by mouth once daily. (Patient not taking: Reported on 5/7/2025), Disp: , Rfl:     OLANZapine (ZyPREXA) 5 mg tablet, Take 1 tablet (5 mg) by mouth once daily at bedtime. 1 tab at bedtime for 4 days, starting the night of chemo, Disp: 30 tablet, Rfl: 1    omeprazole (PriLOSEC) 40 mg DR capsule, TAKE 1 CAPSULE BY MOUTH ONCE DAILY., Disp: 90 capsule, Rfl: 0    ondansetron ODT (Zofran-ODT) 4 mg disintegrating tablet, Dissolve 1 tablet (4 mg) in the mouth every 8 hours if needed for nausea or vomiting., Disp: 60 tablet, Rfl: 2    oxyCODONE (Roxicodone) 5 mg immediate release tablet, Take 2 tablets (10 mg) by mouth every 6 hours if needed for severe pain (7 -  10)., Disp: 120 tablet, Rfl: 0    PARoxetine (Paxil) 20 mg tablet, TAKE 1 TABLET BY MOUTH ONCE DAILY., Disp: 30 tablet, Rfl: 0    prochlorperazine (Compazine) 10 mg tablet, Take 1 tablet (10 mg) by mouth every 6 hours if needed for nausea or vomiting., Disp: 30 tablet, Rfl: 2    traZODone (Desyrel) 100 mg tablet, Take 1 tablet (100 mg) by mouth as needed at bedtime for sleep., Disp: 30 tablet, Rfl: 3

## 2025-06-18 LAB — ACTH PLAS-MCNC: 273 PG/ML (ref 7.2–63.3)

## 2025-06-19 DIAGNOSIS — C78.7 CHOLANGIOCARCINOMA METASTATIC TO LIVER: Primary | ICD-10-CM

## 2025-06-19 DIAGNOSIS — C22.1 CHOLANGIOCARCINOMA METASTATIC TO LIVER: Primary | ICD-10-CM

## 2025-06-19 RX ORDER — MIRTAZAPINE 15 MG/1
15 TABLET, FILM COATED ORAL NIGHTLY
Qty: 30 TABLET | Refills: 3 | Status: ON HOLD | OUTPATIENT
Start: 2025-06-19

## 2025-06-20 ENCOUNTER — HOSPITAL ENCOUNTER (INPATIENT)
Facility: HOSPITAL | Age: 70
DRG: 435 | End: 2025-06-20
Attending: EMERGENCY MEDICINE | Admitting: STUDENT IN AN ORGANIZED HEALTH CARE EDUCATION/TRAINING PROGRAM
Payer: MEDICARE

## 2025-06-20 ENCOUNTER — APPOINTMENT (OUTPATIENT)
Dept: CARDIOLOGY | Facility: HOSPITAL | Age: 70
DRG: 435 | End: 2025-06-20
Payer: MEDICARE

## 2025-06-20 ENCOUNTER — APPOINTMENT (OUTPATIENT)
Dept: RADIOLOGY | Facility: HOSPITAL | Age: 70
DRG: 435 | End: 2025-06-20
Payer: MEDICARE

## 2025-06-20 ENCOUNTER — APPOINTMENT (OUTPATIENT)
Dept: HEMATOLOGY/ONCOLOGY | Facility: CLINIC | Age: 70
End: 2025-06-20
Payer: MEDICARE

## 2025-06-20 ENCOUNTER — TELEPHONE (OUTPATIENT)
Dept: HEMATOLOGY/ONCOLOGY | Facility: CLINIC | Age: 70
End: 2025-06-20
Payer: MEDICARE
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
## 2025-06-20 DIAGNOSIS — E87.1 HYPONATREMIA: ICD-10-CM

## 2025-06-20 DIAGNOSIS — R11.0 NAUSEA: ICD-10-CM

## 2025-06-20 DIAGNOSIS — R53.1 GENERALIZED WEAKNESS: Primary | ICD-10-CM
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
## 2025-06-20 LAB
ALBUMIN SERPL BCP-MCNC: 2.8 G/DL (ref 3.4–5)
ALP SERPL-CCNC: 247 U/L (ref 33–136)
ALT SERPL W P-5'-P-CCNC: 51 U/L (ref 7–45)
ANION GAP SERPL CALC-SCNC: 15 MMOL/L (ref 10–20)
APPEARANCE UR: CLEAR
AST SERPL W P-5'-P-CCNC: 72 U/L (ref 9–39)
BASOPHILS # BLD AUTO: 0 X10*3/UL (ref 0–0.1)
BASOPHILS NFR BLD AUTO: 0 %
BILIRUB SERPL-MCNC: 3.3 MG/DL (ref 0–1.2)
BILIRUB UR STRIP.AUTO-MCNC: ABNORMAL MG/DL
BNP SERPL-MCNC: 142 PG/ML (ref 0–99)
BUN SERPL-MCNC: 23 MG/DL (ref 6–23)
CALCIUM SERPL-MCNC: 9 MG/DL (ref 8.6–10.3)
CARDIAC TROPONIN I PNL SERPL HS: 13 NG/L (ref 0–13)
CHLORIDE SERPL-SCNC: 96 MMOL/L (ref 98–107)
CO2 SERPL-SCNC: 22 MMOL/L (ref 21–32)
COLOR UR: ABNORMAL
CREAT SERPL-MCNC: 1.19 MG/DL (ref 0.5–1.05)
EGFRCR SERPLBLD CKD-EPI 2021: 50 ML/MIN/1.73M*2
EOSINOPHIL # BLD AUTO: 0 X10*3/UL (ref 0–0.7)
EOSINOPHIL NFR BLD AUTO: 0 %
ERYTHROCYTE [DISTWIDTH] IN BLOOD BY AUTOMATED COUNT: 15.9 % (ref 11.5–14.5)
FLUAV RNA RESP QL NAA+PROBE: NOT DETECTED
FLUBV RNA RESP QL NAA+PROBE: NOT DETECTED
GLUCOSE BLD MANUAL STRIP-MCNC: 84 MG/DL (ref 74–99)
GLUCOSE SERPL-MCNC: 103 MG/DL (ref 74–99)
GLUCOSE UR STRIP.AUTO-MCNC: NORMAL MG/DL
HCT VFR BLD AUTO: 37 % (ref 36–46)
HGB BLD-MCNC: 12.4 G/DL (ref 12–16)
HYALINE CASTS #/AREA URNS AUTO: ABNORMAL /LPF
IMM GRANULOCYTES # BLD AUTO: 0.05 X10*3/UL (ref 0–0.7)
IMM GRANULOCYTES NFR BLD AUTO: 0.6 % (ref 0–0.9)
KETONES UR STRIP.AUTO-MCNC: ABNORMAL MG/DL
LEUKOCYTE ESTERASE UR QL STRIP.AUTO: ABNORMAL
LYMPHOCYTES # BLD AUTO: 0.45 X10*3/UL (ref 1.2–4.8)
LYMPHOCYTES NFR BLD AUTO: 5.2 %
MAGNESIUM SERPL-MCNC: 1.93 MG/DL (ref 1.6–2.4)
MCH RBC QN AUTO: 29.7 PG (ref 26–34)
MCHC RBC AUTO-ENTMCNC: 33.5 G/DL (ref 32–36)
MCV RBC AUTO: 89 FL (ref 80–100)
MONOCYTES # BLD AUTO: 0.71 X10*3/UL (ref 0.1–1)
MONOCYTES NFR BLD AUTO: 8.2 %
NEUTROPHILS # BLD AUTO: 7.45 X10*3/UL (ref 1.2–7.7)
NEUTROPHILS NFR BLD AUTO: 86 %
NITRITE UR QL STRIP.AUTO: NEGATIVE
NRBC BLD-RTO: 0 /100 WBCS (ref 0–0)
PH UR STRIP.AUTO: 6 [PH]
PLATELET # BLD AUTO: 178 X10*3/UL (ref 150–450)
POTASSIUM SERPL-SCNC: 3.6 MMOL/L (ref 3.5–5.3)
PROT SERPL-MCNC: 5.8 G/DL (ref 6.4–8.2)
PROT UR STRIP.AUTO-MCNC: ABNORMAL MG/DL
RBC # BLD AUTO: 4.18 X10*6/UL (ref 4–5.2)
RBC # UR STRIP.AUTO: NEGATIVE MG/DL
RBC #/AREA URNS AUTO: ABNORMAL /HPF
SARS-COV-2 RNA RESP QL NAA+PROBE: NOT DETECTED
SODIUM SERPL-SCNC: 129 MMOL/L (ref 136–145)
SP GR UR STRIP.AUTO: 1.02
SQUAMOUS #/AREA URNS AUTO: ABNORMAL /HPF
UROBILINOGEN UR STRIP.AUTO-MCNC: ABNORMAL MG/DL
WBC # BLD AUTO: 8.7 X10*3/UL (ref 4.4–11.3)
WBC #/AREA URNS AUTO: ABNORMAL /HPF

## 2025-06-20 PROCEDURE — 96375 TX/PRO/DX INJ NEW DRUG ADDON: CPT

## 2025-06-20 PROCEDURE — 83735 ASSAY OF MAGNESIUM: CPT

## 2025-06-20 PROCEDURE — 87636 SARSCOV2 & INF A&B AMP PRB: CPT

## 2025-06-20 PROCEDURE — 80053 COMPREHEN METABOLIC PANEL: CPT

## 2025-06-20 PROCEDURE — 93005 ELECTROCARDIOGRAM TRACING: CPT

## 2025-06-20 PROCEDURE — 99285 EMERGENCY DEPT VISIT HI MDM: CPT | Mod: 25 | Performed by: EMERGENCY MEDICINE

## 2025-06-20 PROCEDURE — 71046 X-RAY EXAM CHEST 2 VIEWS: CPT | Performed by: RADIOLOGY

## 2025-06-20 PROCEDURE — 82947 ASSAY GLUCOSE BLOOD QUANT: CPT

## 2025-06-20 PROCEDURE — 2500000001 HC RX 250 WO HCPCS SELF ADMINISTERED DRUGS (ALT 637 FOR MEDICARE OP)

## 2025-06-20 PROCEDURE — 2500000005 HC RX 250 GENERAL PHARMACY W/O HCPCS

## 2025-06-20 PROCEDURE — 2500000004 HC RX 250 GENERAL PHARMACY W/ HCPCS (ALT 636 FOR OP/ED)

## 2025-06-20 PROCEDURE — 83880 ASSAY OF NATRIURETIC PEPTIDE: CPT

## 2025-06-20 PROCEDURE — 96361 HYDRATE IV INFUSION ADD-ON: CPT

## 2025-06-20 PROCEDURE — 84484 ASSAY OF TROPONIN QUANT: CPT

## 2025-06-20 PROCEDURE — 71046 X-RAY EXAM CHEST 2 VIEWS: CPT

## 2025-06-20 PROCEDURE — 81001 URINALYSIS AUTO W/SCOPE: CPT

## 2025-06-20 PROCEDURE — 96374 THER/PROPH/DIAG INJ IV PUSH: CPT | Mod: 59

## 2025-06-20 PROCEDURE — G0378 HOSPITAL OBSERVATION PER HR: HCPCS

## 2025-06-20 PROCEDURE — 99222 1ST HOSP IP/OBS MODERATE 55: CPT

## 2025-06-20 PROCEDURE — 87086 URINE CULTURE/COLONY COUNT: CPT | Mod: PARLAB

## 2025-06-20 PROCEDURE — 96372 THER/PROPH/DIAG INJ SC/IM: CPT

## 2025-06-20 PROCEDURE — 85025 COMPLETE CBC W/AUTO DIFF WBC: CPT

## 2025-06-20 RX ORDER — ENOXAPARIN SODIUM 100 MG/ML
40 INJECTION SUBCUTANEOUS EVERY 24 HOURS
Status: DISCONTINUED | OUTPATIENT
Start: 2025-06-20 | End: 2025-06-26 | Stop reason: HOSPADM

## 2025-06-20 RX ORDER — PANTOPRAZOLE SODIUM 40 MG/1
40 TABLET, DELAYED RELEASE ORAL
Status: DISCONTINUED | OUTPATIENT
Start: 2025-06-21 | End: 2025-06-26 | Stop reason: HOSPADM

## 2025-06-20 RX ORDER — MORPHINE SULFATE 4 MG/ML
4 INJECTION, SOLUTION INTRAMUSCULAR; INTRAVENOUS ONCE
Status: COMPLETED | OUTPATIENT
Start: 2025-06-20 | End: 2025-06-20

## 2025-06-20 RX ORDER — OXYCODONE HYDROCHLORIDE 5 MG/1
5 TABLET ORAL EVERY 6 HOURS PRN
Refills: 0 | Status: DISCONTINUED | OUTPATIENT
Start: 2025-06-20 | End: 2025-06-26 | Stop reason: HOSPADM

## 2025-06-20 RX ORDER — ACETAMINOPHEN 325 MG/1
650 TABLET ORAL EVERY 4 HOURS PRN
Status: DISCONTINUED | OUTPATIENT
Start: 2025-06-20 | End: 2025-06-20

## 2025-06-20 RX ORDER — MIRTAZAPINE 15 MG/1
15 TABLET, FILM COATED ORAL NIGHTLY
Status: DISCONTINUED | OUTPATIENT
Start: 2025-06-20 | End: 2025-06-26 | Stop reason: HOSPADM

## 2025-06-20 RX ORDER — ONDANSETRON HYDROCHLORIDE 2 MG/ML
4 INJECTION, SOLUTION INTRAVENOUS ONCE
Status: COMPLETED | OUTPATIENT
Start: 2025-06-20 | End: 2025-06-20

## 2025-06-20 RX ORDER — AMLODIPINE BESYLATE 10 MG/1
10 TABLET ORAL DAILY
Status: DISCONTINUED | OUTPATIENT
Start: 2025-06-20 | End: 2025-06-26 | Stop reason: HOSPADM

## 2025-06-20 RX ORDER — AMLODIPINE BESYLATE 10 MG/1
10 TABLET ORAL DAILY
Status: CANCELLED | OUTPATIENT
Start: 2025-06-20

## 2025-06-20 RX ORDER — ACETAMINOPHEN 325 MG/1
650 TABLET ORAL EVERY 4 HOURS
Status: DISCONTINUED | OUTPATIENT
Start: 2025-06-20 | End: 2025-06-25

## 2025-06-20 RX ORDER — OLANZAPINE 5 MG/1
5 TABLET, FILM COATED ORAL NIGHTLY
Status: DISCONTINUED | OUTPATIENT
Start: 2025-06-20 | End: 2025-06-20

## 2025-06-20 RX ORDER — OXYCODONE HYDROCHLORIDE 5 MG/1
10 TABLET ORAL EVERY 6 HOURS PRN
Refills: 0 | Status: DISCONTINUED | OUTPATIENT
Start: 2025-06-20 | End: 2025-06-26 | Stop reason: HOSPADM

## 2025-06-20 RX ORDER — POLYETHYLENE GLYCOL 3350 17 G/17G
17 POWDER, FOR SOLUTION ORAL DAILY
Status: DISCONTINUED | OUTPATIENT
Start: 2025-06-20 | End: 2025-06-26 | Stop reason: HOSPADM

## 2025-06-20 RX ORDER — ONDANSETRON HYDROCHLORIDE 2 MG/ML
4 INJECTION, SOLUTION INTRAVENOUS EVERY 8 HOURS PRN
Status: DISCONTINUED | OUTPATIENT
Start: 2025-06-20 | End: 2025-06-26 | Stop reason: HOSPADM

## 2025-06-20 RX ORDER — DEXTROSE 50 % IN WATER (D50W) INTRAVENOUS SYRINGE
25
Status: DISCONTINUED | OUTPATIENT
Start: 2025-06-20 | End: 2025-06-26 | Stop reason: HOSPADM

## 2025-06-20 RX ORDER — DEXTROSE 50 % IN WATER (D50W) INTRAVENOUS SYRINGE
12.5
Status: DISCONTINUED | OUTPATIENT
Start: 2025-06-20 | End: 2025-06-26 | Stop reason: HOSPADM

## 2025-06-20 RX ORDER — TRAZODONE HYDROCHLORIDE 50 MG/1
100 TABLET ORAL NIGHTLY PRN
Status: DISCONTINUED | OUTPATIENT
Start: 2025-06-20 | End: 2025-06-26 | Stop reason: HOSPADM

## 2025-06-20 RX ORDER — FLUTICASONE PROPIONATE 50 MCG
1 SPRAY, SUSPENSION (ML) NASAL DAILY PRN
Status: DISCONTINUED | OUTPATIENT
Start: 2025-06-20 | End: 2025-06-26 | Stop reason: HOSPADM

## 2025-06-20 RX ORDER — PAROXETINE 20 MG/1
20 TABLET, FILM COATED ORAL DAILY
Status: DISCONTINUED | OUTPATIENT
Start: 2025-06-20 | End: 2025-06-26 | Stop reason: HOSPADM

## 2025-06-20 RX ORDER — LIDOCAINE 560 MG/1
1 PATCH PERCUTANEOUS; TOPICAL; TRANSDERMAL DAILY
Status: DISCONTINUED | OUTPATIENT
Start: 2025-06-20 | End: 2025-06-26 | Stop reason: HOSPADM

## 2025-06-20 RX ADMIN — SODIUM CHLORIDE 1000 ML: 0.9 INJECTION, SOLUTION INTRAVENOUS at 12:19

## 2025-06-20 RX ADMIN — SODIUM CHLORIDE 500 ML: 0.9 INJECTION, SOLUTION INTRAVENOUS at 21:01

## 2025-06-20 RX ADMIN — MIRTAZAPINE 15 MG: 15 TABLET, FILM COATED ORAL at 21:02

## 2025-06-20 RX ADMIN — ENOXAPARIN SODIUM 40 MG: 100 INJECTION SUBCUTANEOUS at 21:09

## 2025-06-20 RX ADMIN — ONDANSETRON 4 MG: 2 INJECTION INTRAMUSCULAR; INTRAVENOUS at 15:49

## 2025-06-20 RX ADMIN — ACETAMINOPHEN 650 MG: 325 TABLET ORAL at 21:02

## 2025-06-20 RX ADMIN — MORPHINE SULFATE 4 MG: 4 INJECTION, SOLUTION INTRAMUSCULAR; INTRAVENOUS at 15:49

## 2025-06-20 RX ADMIN — LIDOCAINE 1 PATCH: 4 PATCH TOPICAL at 21:10

## 2025-06-20 SDOH — ECONOMIC STABILITY: INCOME INSECURITY: IN THE PAST 12 MONTHS HAS THE ELECTRIC, GAS, OIL, OR WATER COMPANY THREATENED TO SHUT OFF SERVICES IN YOUR HOME?: NO

## 2025-06-20 SDOH — ECONOMIC STABILITY: FOOD INSECURITY: WITHIN THE PAST 12 MONTHS, YOU WORRIED THAT YOUR FOOD WOULD RUN OUT BEFORE YOU GOT THE MONEY TO BUY MORE.: NEVER TRUE

## 2025-06-20 SDOH — ECONOMIC STABILITY: FOOD INSECURITY: WITHIN THE PAST 12 MONTHS, THE FOOD YOU BOUGHT JUST DIDN'T LAST AND YOU DIDN'T HAVE MONEY TO GET MORE.: NEVER TRUE

## 2025-06-20 SDOH — SOCIAL STABILITY: SOCIAL INSECURITY: WITHIN THE LAST YEAR, HAVE YOU BEEN HUMILIATED OR EMOTIONALLY ABUSED IN OTHER WAYS BY YOUR PARTNER OR EX-PARTNER?: NO

## 2025-06-20 SDOH — SOCIAL STABILITY: SOCIAL INSECURITY: WITHIN THE LAST YEAR, HAVE YOU BEEN AFRAID OF YOUR PARTNER OR EX-PARTNER?: NO

## 2025-06-20 SDOH — SOCIAL STABILITY: SOCIAL INSECURITY: ARE YOU OR HAVE YOU BEEN THREATENED OR ABUSED PHYSICALLY, EMOTIONALLY, OR SEXUALLY BY ANYONE?: NO

## 2025-06-20 SDOH — SOCIAL STABILITY: SOCIAL INSECURITY: ABUSE: ADULT

## 2025-06-20 SDOH — SOCIAL STABILITY: SOCIAL INSECURITY: DO YOU FEEL UNSAFE GOING BACK TO THE PLACE WHERE YOU ARE LIVING?: NO

## 2025-06-20 SDOH — SOCIAL STABILITY: SOCIAL INSECURITY: ARE THERE ANY APPARENT SIGNS OF INJURIES/BEHAVIORS THAT COULD BE RELATED TO ABUSE/NEGLECT?: NO

## 2025-06-20 SDOH — SOCIAL STABILITY: SOCIAL INSECURITY: HAVE YOU HAD THOUGHTS OF HARMING ANYONE ELSE?: NO

## 2025-06-20 SDOH — SOCIAL STABILITY: SOCIAL INSECURITY: DOES ANYONE TRY TO KEEP YOU FROM HAVING/CONTACTING OTHER FRIENDS OR DOING THINGS OUTSIDE YOUR HOME?: NO

## 2025-06-20 SDOH — SOCIAL STABILITY: SOCIAL INSECURITY: DO YOU FEEL ANYONE HAS EXPLOITED OR TAKEN ADVANTAGE OF YOU FINANCIALLY OR OF YOUR PERSONAL PROPERTY?: NO

## 2025-06-20 SDOH — SOCIAL STABILITY: SOCIAL INSECURITY: HAVE YOU HAD ANY THOUGHTS OF HARMING ANYONE ELSE?: NO

## 2025-06-20 SDOH — SOCIAL STABILITY: SOCIAL INSECURITY: WERE YOU ABLE TO COMPLETE ALL THE BEHAVIORAL HEALTH SCREENINGS?: YES

## 2025-06-20 SDOH — SOCIAL STABILITY: SOCIAL INSECURITY: HAS ANYONE EVER THREATENED TO HURT YOUR FAMILY OR YOUR PETS?: NO

## 2025-06-20 ASSESSMENT — ACTIVITIES OF DAILY LIVING (ADL)
BATHING: NEEDS ASSISTANCE
HEARING - LEFT EAR: FUNCTIONAL
BEDSIDE_CLEANING: NO
FEEDING YOURSELF: INDEPENDENT
LACK_OF_TRANSPORTATION: NO
JUDGMENT_ADEQUATE_SAFELY_COMPLETE_DAILY_ACTIVITIES: YES
AMBULATION_ASSISTANCE: 2 STAFF ASSIST
WALKS IN HOME: NEEDS ASSISTANCE
TOILETING: MODERATE ASSIST
TOILETING: NEEDS ASSISTANCE
DRESSING YOURSELF: INDEPENDENT
ADEQUATE_TO_COMPLETE_ADL: YES
GROOMING: INDEPENDENT
PATIENT'S MEMORY ADEQUATE TO SAFELY COMPLETE DAILY ACTIVITIES?: YES
HEARING - RIGHT EAR: FUNCTIONAL

## 2025-06-20 ASSESSMENT — COGNITIVE AND FUNCTIONAL STATUS - GENERAL
DRESSING REGULAR LOWER BODY CLOTHING: A LITTLE
PATIENT BASELINE BEDBOUND: NO
TOILETING: A LITTLE
DAILY ACTIVITIY SCORE: 21
CLIMB 3 TO 5 STEPS WITH RAILING: A LOT
WALKING IN HOSPITAL ROOM: A LOT
STANDING UP FROM CHAIR USING ARMS: A LITTLE
HELP NEEDED FOR BATHING: A LITTLE
MOVING TO AND FROM BED TO CHAIR: A LITTLE
MOBILITY SCORE: 16
TURNING FROM BACK TO SIDE WHILE IN FLAT BAD: A LITTLE
MOVING FROM LYING ON BACK TO SITTING ON SIDE OF FLAT BED WITH BEDRAILS: A LITTLE

## 2025-06-20 ASSESSMENT — LIFESTYLE VARIABLES
PRESCIPTION_ABUSE_PAST_12_MONTHS: NO
AUDIT-C TOTAL SCORE: 0
HOW OFTEN DO YOU HAVE 6 OR MORE DRINKS ON ONE OCCASION: NEVER
SKIP TO QUESTIONS 9-10: 1
AUDIT-C TOTAL SCORE: 0
HOW OFTEN DO YOU HAVE A DRINK CONTAINING ALCOHOL: NEVER
SUBSTANCE_ABUSE_PAST_12_MONTHS: NO
HOW MANY STANDARD DRINKS CONTAINING ALCOHOL DO YOU HAVE ON A TYPICAL DAY: PATIENT DOES NOT DRINK

## 2025-06-20 ASSESSMENT — PATIENT HEALTH QUESTIONNAIRE - PHQ9
2. FEELING DOWN, DEPRESSED OR HOPELESS: NOT AT ALL
SUM OF ALL RESPONSES TO PHQ9 QUESTIONS 1 & 2: 0
1. LITTLE INTEREST OR PLEASURE IN DOING THINGS: NOT AT ALL

## 2025-06-20 ASSESSMENT — PAIN DESCRIPTION - LOCATION: LOCATION: BACK

## 2025-06-20 ASSESSMENT — PAIN DESCRIPTION - PROGRESSION: CLINICAL_PROGRESSION: GRADUALLY IMPROVING

## 2025-06-20 ASSESSMENT — PAIN SCALES - GENERAL
PAINLEVEL_OUTOF10: 5 - MODERATE PAIN
PAINLEVEL_OUTOF10: 3

## 2025-06-20 ASSESSMENT — PAIN DESCRIPTION - PAIN TYPE: TYPE: ACUTE PAIN

## 2025-06-20 ASSESSMENT — PAIN - FUNCTIONAL ASSESSMENT: PAIN_FUNCTIONAL_ASSESSMENT: 0-10

## 2025-06-20 NOTE — ED TRIAGE NOTES
Pt to ED via EMS from home due to weakness and dehydration. Per EMS, pt had fall yesterday and has a hx of liver and pancreatic cancer with chest port placement

## 2025-06-20 NOTE — TELEPHONE ENCOUNTER
called that patient seems very dehydrated, able to fit her in at 1100 today for IVF and  is agreeable to that time. Appointment scheduled.

## 2025-06-20 NOTE — TELEPHONE ENCOUNTER
called back, he's waiting for his sone to come as patient can't get out of bed without help. Advised to go to ED immediately and I will update Dr. Willis.

## 2025-06-20 NOTE — ED PROVIDER NOTES
HPI   Chief Complaint   Patient presents with    Dehydration    Weakness, Gen       70 y.o. female with past medical history of HTN, T2DM, HLD, fatty liver, GERD, CKD Stage III, metastatic pancreaticobiliary carcinoma to the emergency department by EMS from home due to generalized weakness.  Patient reports she has not been feeling well over the past several weeks after her recent cancer diagnosis.  Endorses poor p.o. intake with concerns of dehydration as well as generalized weakness with recent fall at home.  States last night she was attempting to go to the bathroom and slid off of the bed, then had EMS come to the home and help put her back into bed.  Denies hitting her head or loss of consciousness.  She is not anticoagulated.  States she has occasional nausea and vomiting which is not new and she is not currently experiencing any abdominal pain or diarrhea.  Reports increased dyspnea on exertion but does not utilize supplemental oxygen.  Denies fevers, chills, headache, lightheadedness, dizziness, cough/cold symptoms, chest pain, or urinary symptoms.  Her cardiologist is Dr. Willis, she has not started chemotherapy/radiation yet.              Patient History   Medical History[1]  Surgical History[2]  Family History[3]  Social History[4]    Physical Exam   ED Triage Vitals [06/20/25 1114]   Temperature Heart Rate Respirations BP   36.4 °C (97.5 °F) (!) 102 18 142/63      Pulse Ox Temp Source Heart Rate Source Patient Position   97 % Temporal Monitor --      BP Location FiO2 (%)     -- --       Physical Exam  Vitals and nursing note reviewed.   Constitutional:       General: She is not in acute distress.     Appearance: She is ill-appearing. She is not toxic-appearing.   HENT:      Head: Atraumatic.      Nose: Nose normal.      Mouth/Throat:      Mouth: Mucous membranes are dry.   Eyes:      Extraocular Movements: Extraocular movements intact.      Conjunctiva/sclera: Conjunctivae normal.      Pupils: Pupils  are equal, round, and reactive to light.   Cardiovascular:      Rate and Rhythm: Normal rate and regular rhythm.   Pulmonary:      Effort: Pulmonary effort is normal.      Breath sounds: Normal breath sounds.   Abdominal:      General: Abdomen is flat. Bowel sounds are normal.      Palpations: Abdomen is soft.      Tenderness: There is no abdominal tenderness.   Musculoskeletal:         General: Normal range of motion.      Cervical back: Normal range of motion and neck supple. No rigidity.      Comments: No midspinal or paraspinal cervical, thoracic, or lumbar tenderness to palpation. No crepitus, step-offs, or obvious deformity. Symmetric strength 5/5 in the upper and lower extremities bilaterally. Sensation intact, pedal pulses 2+ bilaterally.    Skin:     General: Skin is warm and dry.      Capillary Refill: Capillary refill takes less than 2 seconds.   Neurological:      Mental Status: She is alert and oriented to person, place, and time.   Psychiatric:         Mood and Affect: Mood normal.           ED Course & MDM   Diagnoses as of 06/20/25 1620   Generalized weakness   Fall, initial encounter   Hyponatremia   Nausea                 No data recorded                                 Medical Decision Making  70 y.o. female with past medical history of HTN, T2DM, HLD, fatty liver, GERD, CKD Stage III, metastatic pancreaticobiliary carcinoma to the emergency department by EMS from home due to generalized weakness.  Vital signs reviewed and stable.  Patient is borderline tachycardic at 102 on arrival.  1L NS bolus administered.  EKG interpreted by me showing sinus tachycardia, rate 108, , QRS 76, QTc 434.  No acute injury pattern.  High-sensitivity troponin negative making ACS unlikely.  Slight elevation of BNP at 142.  Viral testing negative.  CBC without leukocytosis, leukopenia, or anemia.  CMP remarkable for hyponatremia 129, hypochloremia 96, and creatinine 1.19 and EGFR 54 presence of CKD.   Transaminitis present as well secondary to pancreaticobiliary carcinoma. Mag WNL.  UA showing 25 leukocyte esterase but negative nitrites and no WBC or bacteria on microscopic, low suspicion for UTI contributing to patient's symptoms.  CXR showing low inspiratory volume with mild right basilar atelectasis. No consolidation or infiltrate concerning for underlying pneumonia, pleural effusions, pleural edema, or pneumothorax.  Patient and her family were informed of all lab and imaging findings, all questions and concerns were answered.  She is agreeable to admission for observation and rehydration.  She is requesting pain medication at this time for mild low back pain, 4 mg morphine and 4 mg Zofran administered intravenously.  Discussed admission with Dr. Alston who is covering for the patient's PCP Dr. Kit Fleming.         Procedure  Procedures         [1]   Past Medical History:  Diagnosis Date    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 2025    CKD (chronic kidney disease) stage 3, GFR 30-59 ml/min (Multi)     Diabetes mellitus (Multi)     Fatty liver     GERD (gastroesophageal reflux disease)     HLD (hyperlipidemia)     Hypertension     Personal history of other benign neoplasm     History of uterine leiomyoma   [2]   Past Surgical History:  Procedure Laterality Date    ESOPHAGOSCOPY / EGD  2025    ENDOSCOPIC ULTRASOUND (UPPER)      ERCP    OTHER SURGICAL HISTORY  2022     section    OTHER SURGICAL HISTORY  2020    Lumpectomy   [3]   Family History  Problem Relation Name Age of Onset    Pancreatic cancer Mother      Uterine cancer Sister     [4]   Social History  Tobacco Use    Smoking status: Never    Smokeless tobacco: Never   Vaping Use    Vaping status: Never Used   Substance Use Topics    Alcohol use: Yes     Comment: social    Drug use: Not Currently        Sadaf Fowler PA-C  [phone]

## 2025-06-20 NOTE — H&P
Internal Medicine Progress Note         Jordyn Neal is a 70 y.o. female on day 0 of admission presenting with Fall at home, initial encounter.    SUBJECTIVE    ***  OBJECTIVE    Vitals:    06/20/25 1319 06/20/25 1400 06/20/25 1555 06/20/25 1600   BP: 144/86 152/70 141/77 (!) 155/92   Pulse: (!) 102 93 83 98   Resp: 20 15 18 15   Temp:       TempSrc:       SpO2: 95% 96% 98% 95%   Weight:       Height:          Results from last 7 days   Lab Units 06/20/25  1216   WBC AUTO x10*3/uL 8.7   HEMOGLOBIN g/dL 12.4   HEMATOCRIT % 37.0   PLATELETS AUTO x10*3/uL 178   NEUTROS PCT AUTO % 86.0   LYMPHS PCT AUTO % 5.2   MONOS PCT AUTO % 8.2   EOS PCT AUTO % 0.0     Results from last 7 days   Lab Units 06/20/25  1216   SODIUM mmol/L 129*   POTASSIUM mmol/L 3.6   CHLORIDE mmol/L 96*   CO2 mmol/L 22   BUN mg/dL 23   CREATININE mg/dL 1.19*   CALCIUM mg/dL 9.0   PROTEIN TOTAL g/dL 5.8*   BILIRUBIN TOTAL mg/dL 3.3*   ALK PHOS U/L 247*   ALT U/L 51*   AST U/L 72*   GLUCOSE mg/dL 103*       Scheduled Medications  Scheduled Medications[1]       Physical Exam  ***               ASSESSMENT & PLAN    Daily Progress      #Generalized weakness  #Mechanical fall   #Metastatic pancreaticobiliary carcinoma  -Follows with Dr. Willis, has not started chemotherapy/radiation yet.   PLAN:  -    Hyponatremia  -  PLAN:  -***    ***  -  PLAN:  -***    ***  -  PLAN:  -***    Chronic Conditions    DVT ppx: ***  GI ppx: ***  IVF: ***  Diet: ***  Consults: ***  CODE STATUS: ***    Addie Verma, DO   Please SecureChat for any further questions  This is a preliminary note, please await attending attestation for final A/P            [1]

## 2025-06-20 NOTE — H&P
Internal Medicine History & Physical             PATIENT NAME: Jordyn Neal  MRN: [id number]    SERVICE DATE:  2025  SERVICE TIME:  5:58 PM    Jordyn Neal is a 70 y.o. female on day 0 of admission presenting with Fall at home, initial encounter.    HPI  70 y.o. female with past medical history of HTN, T2DM, HLD, fatty liver, GERD, CKD Stage III, metastatic pancreaticobiliary carcinoma to the emergency department by EMS from home due to generalized weakness.  Patient states this morning she slid off her bed this morning.  Patient and her  contacted EMS who helped her put her back into bed.  Patient denies head trauma.  Patient states since her diagnosis of cancer, she is had poor oral intake and subsequent weakness.  She states she has vomiting and decreased appetite.  Patient states her  helps her to the bathroom and notes no difficulty urination.  Due to poor oral intake patient has not had a bowel movement.  Patient follows with Dr. Willis and is for appointment next week.  She states she will be starting chemotherapy at that time.     Past Medical History: as listed above  Surgical History: port placement, EGD, , lumpectomy  Social History: never smoker, social but rare alcohol use  Family History: pancreatic cancer in mother, uterine cancer in sister    ED course: On arrival, patient was afebrile, tachycardic 102, RR 18, 142/63, 97% on room air.  CBC unremarkable.  CMP remarkable for hyponatremia 129, creatinine 1.19 (baseline 1), alkaline phosphatase 247, AST 72, bilirubin 3.3, ALT 51.  UA negative for UTI.  Received morphine, Zofran, 1 L bolus.  Patient was admitted to the medical team for further management.    ROS  ROS performed and negative except for those listed in the HPI    OBJECTIVE  Vitals:    25 1400 25 1555 25 1600 25 1700   BP: 152/70 141/77 (!) 155/92 151/81   Pulse: 93 83 98 97    Resp: 15 18 15 12   Temp:       TempSrc:       SpO2: 96% 98% 95% 96%   Weight:       Height:          Results from last 7 days   Lab Units 06/20/25  1216   WBC AUTO x10*3/uL 8.7   HEMOGLOBIN g/dL 12.4   HEMATOCRIT % 37.0   PLATELETS AUTO x10*3/uL 178   NEUTROS PCT AUTO % 86.0   LYMPHS PCT AUTO % 5.2   MONOS PCT AUTO % 8.2   EOS PCT AUTO % 0.0      Results from last 7 days   Lab Units 06/20/25  1216   SODIUM mmol/L 129*   POTASSIUM mmol/L 3.6   CHLORIDE mmol/L 96*   CO2 mmol/L 22   BUN mg/dL 23   CREATININE mg/dL 1.19*   CALCIUM mg/dL 9.0   PROTEIN TOTAL g/dL 5.8*   BILIRUBIN TOTAL mg/dL 3.3*   ALK PHOS U/L 247*   ALT U/L 51*   AST U/L 72*   GLUCOSE mg/dL 103*       Physical Exam  General: No acute distress. Fatigued  HEENT: EOMI  CV: Tachycardic  Pulm: Clear to auscultation bilaterally, no wheezings, rales or rhonchi. Port present on chest.   Abd: Nondistended, nontender  Ext: No cyanosis or edema  Skin: No rashes, warm and dry  Neuro: no focal deficit       ASSESSMENT & PLAN  70 y.o. female with past medical history of HTN, T2DM, HLD, fatty liver, GERD, CKD Stage III, metastatic pancreaticobiliary carcinoma to the emergency department by EMS from home due to generalized weakness.      #Generalized weakness  #Metastatic pancreaticobiliary carcinoma  -Follows with Dr. Willis. Appointment for next week to start chemo.   PLAN:  -zofran PRN  -pain management with scheduled tylenol, lidocaine patches, and oxycodone PRN  -PT/OT   -Trend CMP    #Hyponatremia, asymptomatic  -likely 2/2 poor oral intake   PLAN:  -will give 500 ml bolus  -Trend BMP  -Encourage oral intake    #Low back pain  -Likely referred pain from cancer. Patient takes oxycodone at home.   PLAN:  -scheduled tylenol  -scheduled lidocaine patches  -oxycodone PRN for moderate and severe pain    Chronic Conditions  #HTN- continue home amlodipine  #T2DM- hypoglycemia protocol  #HLD  #Depression/anxiety- paroxetine 20 mg  #Fatty liver  #GERD- continue home  PPI  #CKD stage III      DVT ppx: lovenox subcutaneous  GI ppx: PPI  IVF:  500 ml bolus  Diet: regular  Consults: --  CODE STATUS: DNR/DNI    Addie Verma DO PGY-1 Internal Medicine  Please SecureChat for any further questions  This is a preliminary note, please await attending attestation for final A/P

## 2025-06-21 LAB
ALBUMIN SERPL BCP-MCNC: 2.6 G/DL (ref 3.4–5)
ALP SERPL-CCNC: 250 U/L (ref 33–136)
ALT SERPL W P-5'-P-CCNC: 50 U/L (ref 7–45)
ANION GAP SERPL CALC-SCNC: 13 MMOL/L (ref 10–20)
AST SERPL W P-5'-P-CCNC: 89 U/L (ref 9–39)
BILIRUB SERPL-MCNC: 3.1 MG/DL (ref 0–1.2)
BUN SERPL-MCNC: 20 MG/DL (ref 6–23)
CALCIUM SERPL-MCNC: 8.6 MG/DL (ref 8.6–10.3)
CHLORIDE SERPL-SCNC: 98 MMOL/L (ref 98–107)
CO2 SERPL-SCNC: 23 MMOL/L (ref 21–32)
CREAT SERPL-MCNC: 1.01 MG/DL (ref 0.5–1.05)
EGFRCR SERPLBLD CKD-EPI 2021: 61 ML/MIN/1.73M*2
ERYTHROCYTE [DISTWIDTH] IN BLOOD BY AUTOMATED COUNT: 16.3 % (ref 11.5–14.5)
GLUCOSE BLD MANUAL STRIP-MCNC: 109 MG/DL (ref 74–99)
GLUCOSE BLD MANUAL STRIP-MCNC: 132 MG/DL (ref 74–99)
GLUCOSE BLD MANUAL STRIP-MCNC: 82 MG/DL (ref 74–99)
GLUCOSE BLD MANUAL STRIP-MCNC: 86 MG/DL (ref 74–99)
GLUCOSE SERPL-MCNC: 79 MG/DL (ref 74–99)
HCT VFR BLD AUTO: 34.8 % (ref 36–46)
HGB BLD-MCNC: 11.7 G/DL (ref 12–16)
MCH RBC QN AUTO: 29.7 PG (ref 26–34)
MCHC RBC AUTO-ENTMCNC: 33.6 G/DL (ref 32–36)
MCV RBC AUTO: 88 FL (ref 80–100)
NRBC BLD-RTO: 0 /100 WBCS (ref 0–0)
PLATELET # BLD AUTO: 154 X10*3/UL (ref 150–450)
POTASSIUM SERPL-SCNC: 3.5 MMOL/L (ref 3.5–5.3)
PROT SERPL-MCNC: 5.3 G/DL (ref 6.4–8.2)
RBC # BLD AUTO: 3.94 X10*6/UL (ref 4–5.2)
SODIUM SERPL-SCNC: 130 MMOL/L (ref 136–145)
WBC # BLD AUTO: 5.1 X10*3/UL (ref 4.4–11.3)

## 2025-06-21 PROCEDURE — 2500000004 HC RX 250 GENERAL PHARMACY W/ HCPCS (ALT 636 FOR OP/ED)

## 2025-06-21 PROCEDURE — 85027 COMPLETE CBC AUTOMATED: CPT

## 2025-06-21 PROCEDURE — 2500000005 HC RX 250 GENERAL PHARMACY W/O HCPCS

## 2025-06-21 PROCEDURE — 97161 PT EVAL LOW COMPLEX 20 MIN: CPT | Mod: GP

## 2025-06-21 PROCEDURE — 2500000002 HC RX 250 W HCPCS SELF ADMINISTERED DRUGS (ALT 637 FOR MEDICARE OP, ALT 636 FOR OP/ED)

## 2025-06-21 PROCEDURE — 2500000001 HC RX 250 WO HCPCS SELF ADMINISTERED DRUGS (ALT 637 FOR MEDICARE OP)

## 2025-06-21 PROCEDURE — G0378 HOSPITAL OBSERVATION PER HR: HCPCS

## 2025-06-21 PROCEDURE — 84075 ASSAY ALKALINE PHOSPHATASE: CPT

## 2025-06-21 PROCEDURE — 82947 ASSAY GLUCOSE BLOOD QUANT: CPT

## 2025-06-21 PROCEDURE — 96372 THER/PROPH/DIAG INJ SC/IM: CPT

## 2025-06-21 PROCEDURE — 99232 SBSQ HOSP IP/OBS MODERATE 35: CPT

## 2025-06-21 PROCEDURE — 87081 CULTURE SCREEN ONLY: CPT | Mod: PARLAB

## 2025-06-21 RX ORDER — SODIUM CHLORIDE 9 MG/ML
75 INJECTION, SOLUTION INTRAVENOUS CONTINUOUS
Status: ACTIVE | OUTPATIENT
Start: 2025-06-21 | End: 2025-06-22

## 2025-06-21 RX ADMIN — ACETAMINOPHEN 650 MG: 325 TABLET ORAL at 13:54

## 2025-06-21 RX ADMIN — ONDANSETRON 4 MG: 2 INJECTION INTRAMUSCULAR; INTRAVENOUS at 16:48

## 2025-06-21 RX ADMIN — PAROXETINE HYDROCHLORIDE 20 MG: 20 TABLET, FILM COATED ORAL at 09:33

## 2025-06-21 RX ADMIN — ENOXAPARIN SODIUM 40 MG: 100 INJECTION SUBCUTANEOUS at 21:31

## 2025-06-21 RX ADMIN — ACETAMINOPHEN 650 MG: 325 TABLET ORAL at 09:33

## 2025-06-21 RX ADMIN — MIRTAZAPINE 15 MG: 15 TABLET, FILM COATED ORAL at 21:31

## 2025-06-21 RX ADMIN — AMLODIPINE BESYLATE 10 MG: 10 TABLET ORAL at 09:32

## 2025-06-21 RX ADMIN — ACETAMINOPHEN 650 MG: 325 TABLET ORAL at 21:31

## 2025-06-21 RX ADMIN — LIDOCAINE 1 PATCH: 4 PATCH TOPICAL at 10:05

## 2025-06-21 RX ADMIN — ACETAMINOPHEN 650 MG: 325 TABLET ORAL at 18:30

## 2025-06-21 RX ADMIN — ACETAMINOPHEN 650 MG: 325 TABLET ORAL at 06:08

## 2025-06-21 RX ADMIN — SODIUM CHLORIDE 75 ML/HR: 0.9 INJECTION, SOLUTION INTRAVENOUS at 09:33

## 2025-06-21 RX ADMIN — POLYETHYLENE GLYCOL 3350 17 G: 17 POWDER, FOR SOLUTION ORAL at 09:32

## 2025-06-21 RX ADMIN — PANTOPRAZOLE SODIUM 40 MG: 40 TABLET, DELAYED RELEASE ORAL at 06:08

## 2025-06-21 ASSESSMENT — COGNITIVE AND FUNCTIONAL STATUS - GENERAL
EATING MEALS: A LITTLE
STANDING UP FROM CHAIR USING ARMS: A LOT
CLIMB 3 TO 5 STEPS WITH RAILING: A LOT
DAILY ACTIVITIY SCORE: 19
TOILETING: A LITTLE
HELP NEEDED FOR BATHING: A LITTLE
MOVING FROM LYING ON BACK TO SITTING ON SIDE OF FLAT BED WITH BEDRAILS: A LITTLE
WALKING IN HOSPITAL ROOM: TOTAL
CLIMB 3 TO 5 STEPS WITH RAILING: TOTAL
MOVING FROM LYING ON BACK TO SITTING ON SIDE OF FLAT BED WITH BEDRAILS: A LOT
WALKING IN HOSPITAL ROOM: A LOT
TURNING FROM BACK TO SIDE WHILE IN FLAT BAD: A LOT
MOVING TO AND FROM BED TO CHAIR: A LOT
MOBILITY SCORE: 10
MOBILITY SCORE: 13
MOVING TO AND FROM BED TO CHAIR: A LOT
TURNING FROM BACK TO SIDE WHILE IN FLAT BAD: A LOT
DRESSING REGULAR UPPER BODY CLOTHING: A LITTLE
STANDING UP FROM CHAIR USING ARMS: A LOT
DRESSING REGULAR LOWER BODY CLOTHING: A LITTLE

## 2025-06-21 ASSESSMENT — PAIN - FUNCTIONAL ASSESSMENT: PAIN_FUNCTIONAL_ASSESSMENT: 0-10

## 2025-06-21 ASSESSMENT — ACTIVITIES OF DAILY LIVING (ADL): ADL_ASSISTANCE: INDEPENDENT

## 2025-06-21 ASSESSMENT — PAIN SCALES - GENERAL
PAINLEVEL_OUTOF10: 0 - NO PAIN
PAINLEVEL_OUTOF10: 0 - NO PAIN

## 2025-06-21 NOTE — PROGRESS NOTES
Physical Therapy    Physical Therapy    Physical Therapy Evaluation    Patient Name: Jordyn Neal  MRN: [id number]  Today's Date: 6/21/2025   Time Calculation  Start Time: 1033  Stop Time: 1053  Time Calculation (min): 20 min  724/724-A    Assessment/Plan   PT Assessment  PT Assessment Results: Decreased strength, Impaired balance, Decreased mobility, Decreased endurance  Rehab Prognosis: Good  Barriers to Discharge Home: Caregiver assistance, Physical needs  Caregiver Assistance: Caregiver assistance needed per identified barriers - however, level of patient's required assistance exceeds assistance available at home  Physical Needs: 24hr mobility assistance needed, Ambulating household distances limited by function/safety  Evaluation/Treatment Tolerance: Patient limited by fatigue  Medical Staff Made Aware: Yes  Strengths: Ability to acquire knowledge, Premorbid level of function, Support and attitude of living partners  Barriers to Participation: Comorbidities  End of Session Communication: Bedside nurse  Assessment Comment: Pt. would benefit from continued physical therapy at moderate intensity level to address impaired strength, balance, and gait instability.  End of Session Patient Position: Alarm on, Bed, 2 rail up ( at bedside, call bell in reach)  IP OR SWING BED PT PLAN  Inpatient or Swing Bed: Inpatient  PT Plan  Treatment/Interventions: Bed mobility, Transfer training, Gait training, Balance training, Strengthening, Therapeutic exercise, Therapeutic activity  PT Plan: Ongoing PT  PT Frequency: 4 times per week  PT Discharge Recommendations: Moderate intensity level of continued care  Equipment Recommended upon Discharge: Wheeled walker  PT Recommended Transfer Status: Assist x2, Assistive device  PT - OK to Discharge:  (when medically cleared by physician to next level of care)    Subjective     Current Problem:  1. Generalized weakness        2. Fall, initial encounter        3. Hyponatremia         4. Nausea          Problem List[1]    General Visit Information:  General  Reason for Referral: impaired functional mobility; PT eval and treat; oob with assist  Referred By: Luci  Past Medical History Relevant to Rehab: HTN, DMII, HLD, fatty liver, GERD, CKDIII, depression, anxiety, Newly DX'd metastatic pancreaticobiliary cancer (supposed to start chemo next week)  Family/Caregiver Present: Yes  Caregiver Feedback:  present, supportive of pt.; distraught over her condition and reports he cannot take care of her at home in this current state.  Prior to Session Communication: Bedside nurse ([place])  Patient Position Received: Alarm off, not on at start of session, Bed, 2 rail up  General Comment: Pt. is a 71 y/o female adm. due to s/p fall at home, slid off her bed; also with generalized weakness, recent vomiting and decreased appetite.  DX:Weakness; CA; Hyponatremia;  LBP    Home Living:  Home Living  Type of Home: House  Lives With: Spouse  Home Layout: One level, Laundry in basement  Home Access: Stairs to enter without rails  Entrance Stairs-Number of Steps: 3    Prior Level of Function:  Prior Function Per Pt/Caregiver Report  Level of [place]: Independent with ADLs and functional transfers, Independent with homemaking with ambulation  Receives Help From: Family  ADL Assistance: Independent  Homemaking Assistance: Needs assistance (recently  assisting; normally she manages)  Ambulatory Assistance: Independent (no ad)    Precautions:  Precautions  Hearing/Visual Limitations: glasses  Medical Precautions: Fall precautions     Objective     Pain:  Pain Assessment  Pain Assessment: 0-10  0-10 (Numeric) Pain Score: 0 - No pain    Cognition:  Cognition  Overall Cognitive Status: Unable to assess (appears to follow directions but minimal verbalizations at this time due to lethargy; baseline wfl)    General Assessments:  General Observation  General Observation: Pt. is lethargic; minimal  verbalizations this date.   states she has been so fatigued and getting weaker at home for past wk.   Activity Tolerance  Endurance: Decreased tolerance for upright activites  Sensation  Light Touch: No apparent deficits  Strength  Strength Comments: grossly preethi shlds 3-/5, elbows >=3/5,  4-/5; grossly preethi hips 3-/5, knees 3/5, ankles 3+/5      Static Sitting Balance  Static Sitting-Balance Support: Feet supported  Static Sitting-Level of Assistance: Minimum assistance, Moderate assistance, Contact guard (fluctuated, initially mod assist>> progressed to cga w/ preethi ue support and hha of therapist to maintain midline and trunk erect)  Static Sitting-Comment/Number of Minutes: 4  Static Standing Balance  Static Standing-Balance Support: Bilateral upper extremity supported  Static Standing-Level of Assistance: Moderate assistance, Minimum assistance  Static Standing-Comment/Number of Minutes: 2  Dynamic Standing Balance  Dynamic Standing-Balance Support: Bilateral upper extremity supported  Dynamic Standing-Level of Assistance: Moderate assistance  Dynamic Standing-Comments: P+    Functional Assessments:     Bed Mobility  Bed Mobility:  (supine>sit req. max assist x1; able to initiate les to side of bed but full trunk assist for elevation.   sit>supine req. mod/max assist for support of preethi les and trunk lowering.)  Transfers  Transfer:  (sit<>stand from eob req. max assist x1; cues for hand placement and prepping feet; manual WS'ing forward; full assist to get trunk upright)  Ambulation/Gait Training  Ambulation/Gait Training Performed:  (Pt. able to negotiate few sidesteps along eob with WW and mod/max assist x1.  Assist to WS laterally, short/shuffling steps.  Marked fatigue overall.)     Extremity/Trunk Assessments:  RUE   RUE : Within Functional Limits  LUE   LUE: Within Functional Limits  RLE   RLE : Within Functional Limits  LLE   LLE : Within Functional Limits    Outcome Measures:     [facility] Basic  Mobility  Turning from your back to your side while in a flat bed without using bedrails: A lot  Moving from lying on your back to sitting on the side of a flat bed without using bedrails: A lot  Moving to and from bed to chair (including a wheelchair): A lot  Standing up from a chair using your arms (e.g. wheelchair or bedside chair): A lot  To walk in hospital room: Total  Climbing 3-5 steps with railing: Total  Basic Mobility - Total Score: 10     Goals:  Encounter Problems       Encounter Problems (Active)       PT Problem       Patient will perform bed mobility supine<>sit with sba (Progressing)       Start:  06/21/25    Expected End:  07/05/25            Patient will transfer sit<>stand with cga (Progressing)       Start:  06/21/25    Expected End:  07/05/25            Patient will ambulate >=40 ft with WW and min assist x1 (Progressing)       Start:  06/21/25    Expected End:  07/05/25            Patient will demonstrate >= G- static stand balance; maintain/tolerate x >= 4 mins   (Progressing)       Start:  06/21/25    Expected End:  07/05/25            Pt.will perform BLE therapeutic exercises x 20reps x 2 sets for increased functional strength/endurance  (Progressing)       Start:  06/21/25    Expected End:  07/05/25                          Education Documentation  Mobility Training, taught by Elva Ng, PT at 6/21/2025 12:03 PM.  Learner: Family, Patient  Readiness: Acceptance  Method: Explanation  Response: Needs Reinforcement  Comment: safety for all fxl mob; PT POC         [1]   Patient Active Problem List  Diagnosis    Cholecystitis    Retroperitoneal lymphadenopathy    Liver mass    Acute kidney injury superimposed on CKD    Leukocytosis    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    Fall at home, initial encounter

## 2025-06-21 NOTE — CARE PLAN
The patient's goals for the shift include      The clinical goals for the shift include rest    Over the shift, the patient did make progress toward the following goals. Barriers to progression include . Recommendations to address these barriers include .  Problem: Skin  Goal: Decreased wound size/increased tissue granulation at next dressing change  Outcome: Progressing  Goal: Participates in plan/prevention/treatment measures  Outcome: Progressing  Goal: Prevent/manage excess moisture  Outcome: Progressing  Goal: Prevent/minimize sheer/friction injuries  Outcome: Progressing  Goal: Promote/optimize nutrition  Outcome: Progressing  Goal: Promote skin healing  Outcome: Progressing     Problem: Pain - Adult  Goal: Verbalizes/displays adequate comfort level or baseline comfort level  Outcome: Progressing     Problem: Safety - Adult  Goal: Free from fall injury  Outcome: Progressing     Problem: Discharge Planning  Goal: Discharge to home or other facility with appropriate resources  Outcome: Progressing     Problem: Chronic Conditions and Co-morbidities  Goal: Patient's chronic conditions and co-morbidity symptoms are monitored and maintained or improved  Outcome: Progressing     Problem: Nutrition  Goal: Nutrient intake appropriate for maintaining nutritional needs  Outcome: Progressing

## 2025-06-21 NOTE — PROGRESS NOTES
Internal Medicine Progress Note         Jordyn Neal is a 70 y.o. female on day 0 of admission presenting with Fall at home, initial encounter.    SUBJECTIVE    Patient seen and examined at bedside this morning. Feeling a little bit better this morning. No new complaints or concerns.    OBJECTIVE    Vitals:    06/20/25 2029 06/20/25 2300 06/21/25 0530 06/21/25 0833   BP: 133/62 138/74 140/82 176/87   BP Location: Right arm Right arm Right arm Right arm   Patient Position: Lying  Lying Lying   Pulse: 100 98 100 100   Resp: 16 16 16 16   Temp: 36.9 °C (98.4 °F) 36.8 °C (98.2 °F) 37 °C (98.6 °F) 36.3 °C (97.3 °F)   TempSrc: Temporal Temporal Temporal Temporal   SpO2: 95% 93% 94% 96%   Weight:       Height:          Results from last 7 days   Lab Units 06/21/25  0629 06/20/25  1216   WBC AUTO x10*3/uL 5.1 8.7   HEMOGLOBIN g/dL 11.7* 12.4   HEMATOCRIT % 34.8* 37.0   PLATELETS AUTO x10*3/uL 154 178   NEUTROS PCT AUTO %  --  86.0   LYMPHS PCT AUTO %  --  5.2   MONOS PCT AUTO %  --  8.2   EOS PCT AUTO %  --  0.0     Results from last 7 days   Lab Units 06/21/25  0629   SODIUM mmol/L 130*   POTASSIUM mmol/L 3.5   CHLORIDE mmol/L 98   CO2 mmol/L 23   BUN mg/dL 20   CREATININE mg/dL 1.01   CALCIUM mg/dL 8.6   PROTEIN TOTAL g/dL 5.3*   BILIRUBIN TOTAL mg/dL 3.1*   ALK PHOS U/L 250*   ALT U/L 50*   AST U/L 89*   GLUCOSE mg/dL 79       Scheduled Medications  Scheduled Medications[1]       Physical Exam  Constitutional: no acute distress, alert and cooperative  Eyes: EOMI, clear sclera  Respiratory/Thorax: CTAB, good chest expansion. Chest port present  Cardiovascular: Regular rate, regular rhythm  Gastrointestinal: Nondistended, soft, non-tender  Extremities: No cyanosis or edema. 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     Daily Progress  -LFTs persistently worse as compared to lab draw from 1 month ago. Will check CT A/P to evaluate biliary stent but more likely this is secondary to disease progression. Sodium up to 130 this morning, will continue mIVF today.     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 S/p biliary stent placement for recently diagnosed cholangiocarcinoma.   PLAN:  -Check CT A/P to evaluate biliary stent placement  -NS 75cc/hr today, encourage PO intake  -Continue mirtazapine  -Trend LFTs and electrolytes  -Pain management with scheduled acetaminopeh, lidocaine patches, PRN oxycodone  -Will need to continue OP oncology follow-up with Dr. Willis for chemotherapy initiation    Chronic Conditions  #HTN- continue amlodipine  #GERD- continue pantoprazole  #MDD, #LUIS FELIPE- continue paroxetine  #T2DM- insulin held given poor PO intake and normoglycemia. Hypoglycemia protocol in place    DVT ppx: LMWH  IVF: NS 75cc/hr  Diet: Regular  Consults: none  CODE STATUS: DNR    Solis Frost, DO   Please SecureChat for any further questions  This is a preliminary note, please await attending attestation for final A/P            [1] acetaminophen, 650 mg, oral, q4h  amLODIPine, 10 mg, oral, Daily  enoxaparin, 40 mg, subcutaneous, q24h  lidocaine, 1 patch, transdermal, Daily  mirtazapine, 15 mg, oral, Nightly  pantoprazole, 40 mg, oral, Daily before breakfast  PARoxetine, 20 mg, oral, Daily  polyethylene glycol, 17 g, oral, Daily

## 2025-06-21 NOTE — CARE PLAN
The patient's goals for the shift include  sleep    The clinical goals for the shift include rest

## 2025-06-22 ENCOUNTER — APPOINTMENT (OUTPATIENT)
Dept: RADIOLOGY | Facility: HOSPITAL | Age: 70
DRG: 435 | End: 2025-06-22
Payer: MEDICARE

## 2025-06-22 ENCOUNTER — APPOINTMENT (OUTPATIENT)
Dept: CARDIOLOGY | Facility: HOSPITAL | Age: 70
DRG: 435 | End: 2025-06-22
Payer: MEDICARE

## 2025-06-22 PROBLEM — R53.1 GENERALIZED WEAKNESS: Status: ACTIVE | Noted: 2025-06-22

## 2025-06-22 LAB
ALBUMIN SERPL BCP-MCNC: 2.5 G/DL (ref 3.4–5)
ALP SERPL-CCNC: 353 U/L (ref 33–136)
ALT SERPL W P-5'-P-CCNC: 65 U/L (ref 7–45)
ANION GAP SERPL CALC-SCNC: 11 MMOL/L (ref 10–20)
AST SERPL W P-5'-P-CCNC: 132 U/L (ref 9–39)
BACTERIA UR CULT: NORMAL
BILIRUB SERPL-MCNC: 3.6 MG/DL (ref 0–1.2)
BUN SERPL-MCNC: 15 MG/DL (ref 6–23)
CALCIUM SERPL-MCNC: 8.4 MG/DL (ref 8.6–10.3)
CHLORIDE SERPL-SCNC: 102 MMOL/L (ref 98–107)
CO2 SERPL-SCNC: 21 MMOL/L (ref 21–32)
CREAT SERPL-MCNC: 0.93 MG/DL (ref 0.5–1.05)
EGFRCR SERPLBLD CKD-EPI 2021: 67 ML/MIN/1.73M*2
ERYTHROCYTE [DISTWIDTH] IN BLOOD BY AUTOMATED COUNT: 15.9 % (ref 11.5–14.5)
GLUCOSE BLD MANUAL STRIP-MCNC: 115 MG/DL (ref 74–99)
GLUCOSE BLD MANUAL STRIP-MCNC: 123 MG/DL (ref 74–99)
GLUCOSE BLD MANUAL STRIP-MCNC: 99 MG/DL (ref 74–99)
GLUCOSE SERPL-MCNC: 94 MG/DL (ref 74–99)
HCT VFR BLD AUTO: 36.2 % (ref 36–46)
HGB BLD-MCNC: 11.8 G/DL (ref 12–16)
MCH RBC QN AUTO: 28.9 PG (ref 26–34)
MCHC RBC AUTO-ENTMCNC: 32.6 G/DL (ref 32–36)
MCV RBC AUTO: 89 FL (ref 80–100)
NRBC BLD-RTO: 0 /100 WBCS (ref 0–0)
PLATELET # BLD AUTO: 168 X10*3/UL (ref 150–450)
POTASSIUM SERPL-SCNC: 3.3 MMOL/L (ref 3.5–5.3)
PROT SERPL-MCNC: 5.4 G/DL (ref 6.4–8.2)
RBC # BLD AUTO: 4.09 X10*6/UL (ref 4–5.2)
SODIUM SERPL-SCNC: 131 MMOL/L (ref 136–145)
STAPHYLOCOCCUS SPEC CULT: ABNORMAL
WBC # BLD AUTO: 6.3 X10*3/UL (ref 4.4–11.3)

## 2025-06-22 PROCEDURE — 93005 ELECTROCARDIOGRAM TRACING: CPT

## 2025-06-22 PROCEDURE — 99232 SBSQ HOSP IP/OBS MODERATE 35: CPT

## 2025-06-22 PROCEDURE — 36415 COLL VENOUS BLD VENIPUNCTURE: CPT

## 2025-06-22 PROCEDURE — 2500000001 HC RX 250 WO HCPCS SELF ADMINISTERED DRUGS (ALT 637 FOR MEDICARE OP)

## 2025-06-22 PROCEDURE — 93010 ELECTROCARDIOGRAM REPORT: CPT | Performed by: INTERNAL MEDICINE

## 2025-06-22 PROCEDURE — 85027 COMPLETE CBC AUTOMATED: CPT

## 2025-06-22 PROCEDURE — 97165 OT EVAL LOW COMPLEX 30 MIN: CPT | Mod: GO

## 2025-06-22 PROCEDURE — 74177 CT ABD & PELVIS W/CONTRAST: CPT | Performed by: RADIOLOGY

## 2025-06-22 PROCEDURE — 97530 THERAPEUTIC ACTIVITIES: CPT | Mod: GP

## 2025-06-22 PROCEDURE — 2550000001 HC RX 255 CONTRASTS: Performed by: STUDENT IN AN ORGANIZED HEALTH CARE EDUCATION/TRAINING PROGRAM

## 2025-06-22 PROCEDURE — 71045 X-RAY EXAM CHEST 1 VIEW: CPT | Performed by: RADIOLOGY

## 2025-06-22 PROCEDURE — 71045 X-RAY EXAM CHEST 1 VIEW: CPT

## 2025-06-22 PROCEDURE — 02HV33Z INSERTION OF INFUSION DEVICE INTO SUPERIOR VENA CAVA, PERCUTANEOUS APPROACH: ICD-10-PCS

## 2025-06-22 PROCEDURE — 82947 ASSAY GLUCOSE BLOOD QUANT: CPT

## 2025-06-22 PROCEDURE — 2500000005 HC RX 250 GENERAL PHARMACY W/O HCPCS

## 2025-06-22 PROCEDURE — 1100000001 HC PRIVATE ROOM DAILY

## 2025-06-22 PROCEDURE — 2500000002 HC RX 250 W HCPCS SELF ADMINISTERED DRUGS (ALT 637 FOR MEDICARE OP, ALT 636 FOR OP/ED)

## 2025-06-22 PROCEDURE — 74177 CT ABD & PELVIS W/CONTRAST: CPT

## 2025-06-22 PROCEDURE — 2500000004 HC RX 250 GENERAL PHARMACY W/ HCPCS (ALT 636 FOR OP/ED)

## 2025-06-22 PROCEDURE — 3C1ZX8Z IRRIGATION OF INDWELLING DEVICE USING IRRIGATING SUBSTANCE, EXTERNAL APPROACH: ICD-10-PCS

## 2025-06-22 PROCEDURE — 80053 COMPREHEN METABOLIC PANEL: CPT

## 2025-06-22 RX ORDER — POTASSIUM CHLORIDE 14.9 MG/ML
20 INJECTION INTRAVENOUS ONCE
Status: COMPLETED | OUTPATIENT
Start: 2025-06-22 | End: 2025-06-22

## 2025-06-22 RX ADMIN — ENOXAPARIN SODIUM 40 MG: 100 INJECTION SUBCUTANEOUS at 21:41

## 2025-06-22 RX ADMIN — PAROXETINE HYDROCHLORIDE 20 MG: 20 TABLET, FILM COATED ORAL at 10:16

## 2025-06-22 RX ADMIN — IOHEXOL 75 ML: 350 INJECTION, SOLUTION INTRAVENOUS at 06:45

## 2025-06-22 RX ADMIN — POLYETHYLENE GLYCOL 3350 17 G: 17 POWDER, FOR SOLUTION ORAL at 10:16

## 2025-06-22 RX ADMIN — ACETAMINOPHEN 350 MG: 325 TABLET ORAL at 10:16

## 2025-06-22 RX ADMIN — PANTOPRAZOLE SODIUM 40 MG: 40 TABLET, DELAYED RELEASE ORAL at 05:47

## 2025-06-22 RX ADMIN — ACETAMINOPHEN 650 MG: 325 TABLET ORAL at 05:47

## 2025-06-22 RX ADMIN — LIDOCAINE 1 PATCH: 4 PATCH TOPICAL at 10:23

## 2025-06-22 RX ADMIN — ACETAMINOPHEN 325 MG: 325 TABLET ORAL at 18:21

## 2025-06-22 RX ADMIN — POTASSIUM CHLORIDE 20 MEQ: 14.9 INJECTION, SOLUTION INTRAVENOUS at 10:16

## 2025-06-22 RX ADMIN — AMLODIPINE BESYLATE 10 MG: 10 TABLET ORAL at 10:16

## 2025-06-22 RX ADMIN — MIRTAZAPINE 15 MG: 15 TABLET, FILM COATED ORAL at 21:41

## 2025-06-22 RX ADMIN — SODIUM CHLORIDE 75 ML/HR: 0.9 INJECTION, SOLUTION INTRAVENOUS at 05:51

## 2025-06-22 ASSESSMENT — COGNITIVE AND FUNCTIONAL STATUS - GENERAL
TOILETING: TOTAL
DRESSING REGULAR UPPER BODY CLOTHING: A LOT
MOVING TO AND FROM BED TO CHAIR: A LOT
WALKING IN HOSPITAL ROOM: A LOT
STANDING UP FROM CHAIR USING ARMS: A LOT
PERSONAL GROOMING: A LITTLE
MOVING FROM LYING ON BACK TO SITTING ON SIDE OF FLAT BED WITH BEDRAILS: A LOT
DAILY ACTIVITIY SCORE: 12
TURNING FROM BACK TO SIDE WHILE IN FLAT BAD: A LOT
HELP NEEDED FOR BATHING: TOTAL
CLIMB 3 TO 5 STEPS WITH RAILING: TOTAL
MOBILITY SCORE: 11
DRESSING REGULAR LOWER BODY CLOTHING: TOTAL

## 2025-06-22 ASSESSMENT — PAIN - FUNCTIONAL ASSESSMENT: PAIN_FUNCTIONAL_ASSESSMENT: 0-10

## 2025-06-22 ASSESSMENT — PAIN SCALES - GENERAL
PAINLEVEL_OUTOF10: 0 - NO PAIN
PAINLEVEL_OUTOF10: 0 - NO PAIN

## 2025-06-22 ASSESSMENT — PAIN SCALES - WONG BAKER: WONGBAKER_NUMERICALRESPONSE: NO HURT

## 2025-06-22 NOTE — PROGRESS NOTES
Internal Medicine Progress Note         Jordyn Neal is a 70 y.o. female on day 0 of admission presenting with Fall at home, initial encounter.    SUBJECTIVE    Patient seen and examined at bedside this morning. Feeling slightly better than admission today. No new complaints or concerns.    OBJECTIVE    Vitals:    06/21/25 1605 06/21/25 1947 06/22/25 0436 06/22/25 0732   BP: 134/78 135/74 141/81 169/80   BP Location: Left arm Left arm Left arm Left arm   Patient Position: Lying Lying Lying Lying   Pulse: 89 105 100 105   Resp: 14 16 16 16   Temp: 36.4 °C (97.5 °F) 36.4 °C (97.5 °F) 36.8 °C (98.2 °F) 36.5 °C (97.7 °F)   TempSrc: Temporal Temporal Temporal Temporal   SpO2: 93% 94% 94% 94%   Weight:       Height:          Results from last 7 days   Lab Units 06/22/25  0651 06/21/25  0629 06/20/25  1216   WBC AUTO x10*3/uL 6.3   < > 8.7   HEMOGLOBIN g/dL 11.8*   < > 12.4   HEMATOCRIT % 36.2   < > 37.0   PLATELETS AUTO x10*3/uL 168   < > 178   NEUTROS PCT AUTO %  --   --  86.0   LYMPHS PCT AUTO %  --   --  5.2   MONOS PCT AUTO %  --   --  8.2   EOS PCT AUTO %  --   --  0.0    < > = values in this interval not displayed.     Results from last 7 days   Lab Units 06/22/25  0651   SODIUM mmol/L 131*   POTASSIUM mmol/L 3.3*   CHLORIDE mmol/L 102   CO2 mmol/L 21   BUN mg/dL 15   CREATININE mg/dL 0.93   CALCIUM mg/dL 8.4*   PROTEIN TOTAL g/dL 5.4*   BILIRUBIN TOTAL mg/dL 3.6*   ALK PHOS U/L 353*   ALT U/L 65*   AST U/L 132*   GLUCOSE mg/dL 94     Scheduled Medications  Scheduled Medications[1]     Physical Exam  Constitutional: no acute distress, alert and cooperative  Eyes: EOMI, clear sclera  Respiratory/Thorax: CTAB, good chest expansion. Chest port present  Cardiovascular: Regular rate, regular rhythm  Gastrointestinal: Nondistended, soft, non-tender  Extremities: No cyanosis or edema. 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     Daily Progress  -CT A/P showing overall progression of metastatic disease, new innumerable tiny masses throughout the liver, small-moderate ascites and pleural effusions, and CBD stent in place without biliary dilatation. LFTs continue to slightly worsen. Discussed [facility] score of 10 and disposition options, will continue conversations in coming days.     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 S/p biliary stent placement for recently diagnosed cholangiocarcinoma.   -CT A/P showed overall progression of metastatic disease, new innumerable tiny masses throughout the liver, small-moderate ascites and pleural effusions, and CBD stent in place without biliary dilatation  PLAN:  -Encourage PO intake  -Continue mirtazapine  -Trend LFTs and electrolytes  -Pain management with scheduled acetaminophen, lidocaine patches, PRN oxycodone  -Will need to continue OP oncology follow-up with Dr. Willis for chemotherapy initiation     Chronic Conditions  #HTN- continue amlodipine  #GERD- continue pantoprazole  #MDD, #LUIS FELIPE- continue paroxetine  #T2DM- insulin held given poor PO intake and normoglycemia. Hypoglycemia protocol in place     DVT ppx: LMWH  IVF: none  Diet: Regular  Consults: none  CODE STATUS: DNR    Solis Frost, DO   Please SecureChat for any further questions  This is a preliminary note, please await attending attestation for  final A/P            [1] acetaminophen, 650 mg, oral, q4h  amLODIPine, 10 mg, oral, Daily  enoxaparin, 40 mg, subcutaneous, q24h  lidocaine, 1 patch, transdermal, Daily  mirtazapine, 15 mg, oral, Nightly  pantoprazole, 40 mg, oral, Daily before breakfast  PARoxetine, 20 mg, oral, Daily  polyethylene glycol, 17 g, oral, Daily  potassium chloride, 20 mEq, intravenous, Once

## 2025-06-22 NOTE — CARE PLAN
The patient's goals for the shift include rest    The clinical goals for the shift include rest and comfort    Over the shift, the patient did  make progress toward the following goals. Barriers to progression include . Recommendations to address these barriers include .  Problem: Skin  Goal: Decreased wound size/increased tissue granulation at next dressing change  Outcome: Progressing  Goal: Participates in plan/prevention/treatment measures  Outcome: Progressing  Goal: Prevent/manage excess moisture  Outcome: Progressing  Goal: Prevent/minimize sheer/friction injuries  Outcome: Progressing  Goal: Promote/optimize nutrition  Outcome: Progressing  Goal: Promote skin healing  Outcome: Progressing     Problem: Pain - Adult  Goal: Verbalizes/displays adequate comfort level or baseline comfort level  Outcome: Progressing     Problem: Safety - Adult  Goal: Free from fall injury  Outcome: Progressing     Problem: Discharge Planning  Goal: Discharge to home or other facility with appropriate resources  Outcome: Progressing     Problem: Chronic Conditions and Co-morbidities  Goal: Patient's chronic conditions and co-morbidity symptoms are monitored and maintained or improved  Outcome: Progressing     Problem: Nutrition  Goal: Nutrient intake appropriate for maintaining nutritional needs  Outcome: Progressing

## 2025-06-22 NOTE — PROGRESS NOTES
Occupational Therapy    Evaluation    Patient Name: Jordyn Neal  MRN: [id number]  Department: PAR CT  Room: [address] [place] [address]  Today's Date: 6/22/2025  Time Calculation  Start Time: 1151  Stop Time: 1209  Time Calculation (min): 18 min    Assessment  IP OT Assessment  OT Assessment: Pt. presents with a decline in self-care, mobility and safety and would benefit from skilled OT services to maximize independence and promote return to prior level of function.  Prognosis: Good  Barriers to Discharge Home: Physical needs  End of Session Communication: Bedside nurse  End of Session Patient Position: Alarm on, Up in chair  Plan:  Treatment Interventions: ADL retraining, Functional transfer training, Endurance training, Compensatory technique education  OT Frequency: 3 times per week  OT Discharge Recommendations: Moderate intensity level of continued care  OT Recommended Transfer Status: Assist of 1  OT - OK to Discharge: Yes (to next level of care when cleared by medical team)    Subjective   Current Problem:  1. Generalized weakness        2. Fall, initial encounter        3. Hyponatremia        4. Nausea          OT Visit Info:  OT Received On: 06/22/25  General Visit Info:  General  Reason for Referral: impaired adl; pt. admitted with hyponatremia, falls, ct a/p:  metastatic disease, new innumerable tiny massess throughout the liver,ascites  Referred By: Luci  Past Medical History Relevant to Rehab: HTN, DMII, HLD, fatty liver, GERD, CKDIII, depression, anxiety, Newly DX'd metastatic pancreaticobiliary cancer (supposed to start chemo next week)  Family/Caregiver Present: Yes  Caregiver Feedback: spouse present  Co-Treatment: PT  Co-Treatment Reason: to maximize patient safety/abilities  Prior to Session Communication: Bedside nurse  Patient Position Received: Bed, 2 rail up, Alarm on  General Comment: pt. agreeable to therapy intervention  Precautions:  Precautions Comment: falls, metastatic disease, purewick            Pain:  Pain Assessment  Pain Assessment: 0-10  0-10 (Numeric) Pain Score: 0 - No pain    Objective   Cognition:  Overall Cognitive Status: Within Functional Limits           Home Living:  Home Living Comments: pt. lives with spouse, 1 floor, 3 step entry without rails, laundry on basement level   Prior Function:  Prior Function Comments: pt. is independent with adl, no use of device for ambulation, recently assist for iadl's from spouse, ordinarily pt. manages  IADL History:     ADL:  ADL Comments: would estimate dependent assist for LB dress/bathe/toileting, mod assist for UB bathe/dress, min assist for grooming, set up for feeding  Activity Tolerance:  Endurance: Decreased tolerance for upright activites  Bed Mobility/Transfers: Bed Mobility  Bed Mobility:  (supine to sit;  mod assist x 1)    Transfers  Transfer:  (sit to stand from eob, stand to sit into chair:  mod assist x 1)      Functional Mobility:  Functional Mobility  Functional Mobility Performed:  (mobility bed to chair:  mod assist x 1, cues for walker safety)  Sensation:  Sensation Comment: sensation intact  Strength:  Strength Comments: bue's at least 3/5 per observation     Coordination:  Movements are Fluid and Coordinated: Yes   Outcome Measures: [facility] Daily Activity  Putting on and taking off regular lower body clothing: Total  Bathing (including washing, rinsing, drying): Total  Putting on and taking off regular upper body clothing: A lot  Toileting, which includes using toilet, bedpan or urinal: Total  Taking care of personal grooming such as brushing teeth: A little  Eating Meals: None  Daily Activity - Total Score: 12      Education Documentation  Precautions, taught by Stephanie Bauman OT at 6/22/2025  4:09 PM.  Learner: Patient  Readiness: Acceptance  Method: Explanation  Response: Verbalizes Understanding, Needs Reinforcement  Comment: OT POC    ADL Training, taught by Stephanie Bauman OT at 6/22/2025  4:09 PM.  Learner: Patient  Readiness:  Acceptance  Method: Explanation  Response: Verbalizes Understanding, Needs Reinforcement  Comment: OT POC        Goals:   Encounter Problems       Encounter Problems (Active)       OT Goals       Increase functional mobility and  functional transfers to supervision for bed/chair/toilet with dme prn   (Progressing)       Start:  06/22/25    Expected End:  07/06/25            increase bue ther ex/activity x 7-10 minutes and increase standing tolerance x 3-5 minutes with supervision to promote greater activity tolerance for assist with adl.   (Progressing)       Start:  06/22/25    Expected End:  07/06/25            Increase ub/lb dressing to supervision with dme prn  (Progressing)       Start:  06/22/25    Expected End:  07/06/25            Increase toileting to supervision with dme prn  (Progressing)       Start:  06/22/25    Expected End:  07/06/25            pt. to apply ec/ws techniques with minimal cues to all mobility/transfer/adl to decrease fatigue/promote efficient use of energy toward completion of functional tasks.  (Progressing)       Start:  06/22/25    Expected End:  07/06/25

## 2025-06-22 NOTE — PROGRESS NOTES
Physical Therapy    Physical Therapy Evaluation    Patient Name: Jordyn Neal  MRN: [id number]  Today's Date: 6/22/2025   Time Calculation  Start Time: 1152  Stop Time: 1209  Time Calculation (min): 17 min  724/724-A    Assessment/Plan   PT Assessment  PT Assessment Results: Decreased strength, Decreased endurance, Impaired balance, Decreased mobility  Rehab Prognosis: Good  Barriers to Discharge Home: Caregiver assistance, Physical needs  Caregiver Assistance: Caregiver assistance needed per identified barriers - however, level of patient's required assistance exceeds assistance available at home  Physical Needs: 24hr mobility assistance needed, Ambulating household distances limited by function/safety  Evaluation/Treatment Tolerance: Patient limited by fatigue  Medical Staff Made Aware: Yes  Strengths: Ability to acquire knowledge, Premorbid level of function, Support and attitude of living partners  Barriers to Participation: Comorbidities  End of Session Communication: Bedside nurse  Assessment Comment: Pt continuing to demonstrate impaired functional mobility and requires increased level of assistance during therapy session. Pt not at baseline and will continue to benefit from acute skilled PT services and s/p discharge.  End of Session Patient Position: Alarm on, Up in chair (all needs in reach and no complaints noted)  IP OR SWING BED PT PLAN  Inpatient or Swing Bed: Inpatient  PT Plan  Treatment/Interventions: Bed mobility, Transfer training, Gait training, Balance training, Strengthening, Therapeutic exercise, Therapeutic activity  PT Plan: Ongoing PT  PT Frequency: 4 times per week  PT Discharge Recommendations: Moderate intensity level of continued care  Equipment Recommended upon Discharge: Wheeled walker  PT Recommended Transfer Status: Assist x2, Assistive device  PT - OK to Discharge:  (when medically cleared by physician to next level of care)    Subjective     Current Problem:  1. Generalized  weakness        2. Fall, initial encounter        3. Hyponatremia        4. Nausea          Problem List[1]    General Visit Information:  General  Family/Caregiver Present: Yes ( present)  Co-Treatment: OT  Co-Treatment Reason: maximize safety and functional mobility  Prior to Session Communication: Bedside nurse  Patient Position Received: Bed, 2 rail up, Alarm on  General Comment: Agreeable to PT.    Home Living:  Home Living  Type of Home: House  Lives With: Spouse  Home Layout: One level, Laundry in basement  Home Access: Stairs to enter without rails  Entrance Stairs-Number of Steps: 3    Prior Level of Function:  Prior Function Per Pt/Caregiver Report  Level of [place]: Independent with ADLs and functional transfers, Independent with homemaking with ambulation  Receives Help From: Family  ADL Assistance: Independent  Homemaking Assistance: Needs assistance (recently  assisting; normally she manages)  Ambulatory Assistance: Independent (no ad)    Precautions:  Precautions  Hearing/Visual Limitations: glasses  Medical Precautions: Fall precautions  Precautions Comment: fall precautions    Vital Signs:  Vital Signs  Vital Signs Comment: VSS    Objective     Pain:  Pain Assessment  Pain Assessment:  (0/10)    Cognition:  Cognition  Overall Cognitive Status: Within Functional Limits    General Assessments:  Static Sitting Balance  Static Sitting-Level of Assistance: Close supervision  Dynamic Sitting Balance  Dynamic Sitting-Level of Assistance: Contact guard  Static Standing Balance  Static Standing-Level of Assistance: Moderate assistance  Dynamic Standing Balance  Dynamic Standing-Level of Assistance: Moderate assistance    Functional Assessments:  Bed Mobility  Bed Mobility:  (supine to sitting: mod A x 1)  Transfers  Transfer:  (STS from EOB x 2: mod A x 1)  Ambulation/Gait Training  Ambulation/Gait Training Performed:  (Pt amb 8' x 2 using RW and another 3' from EOB to bedside chair, each  with mod A x 1 due to B LE weakness and impaired balance noted. Pt continues to be  high fall risk.)     Outcome Measures:  [facility] Basic Mobility  Turning from your back to your side while in a flat bed without using bedrails: A lot  Moving from lying on your back to sitting on the side of a flat bed without using bedrails: A lot  Moving to and from bed to chair (including a wheelchair): A lot  Standing up from a chair using your arms (e.g. wheelchair or bedside chair): A lot  To walk in hospital room: A lot  Climbing 3-5 steps with railing: Total  Basic Mobility - Total Score: 11     Goals:  Encounter Problems       Encounter Problems (Active)       PT Problem       Patient will perform bed mobility supine<>sit with sba (Progressing)       Start:  06/21/25    Expected End:  07/05/25            Patient will transfer sit<>stand with cga (Progressing)       Start:  06/21/25    Expected End:  07/05/25            Patient will ambulate >=40 ft with WW and min assist x1 (Progressing)       Start:  06/21/25    Expected End:  07/05/25            Patient will demonstrate >= G- static stand balance; maintain/tolerate x >= 4 mins   (Progressing)       Start:  06/21/25    Expected End:  07/05/25            Pt.will perform BLE therapeutic exercises x 20reps x 2 sets for increased functional strength/endurance  (Progressing)       Start:  06/21/25    Expected End:  07/05/25               Pain - Adult            Education Documentation  Mobility Training, taught by Arline Gray, PT at 6/22/2025  2:28 PM.  Learner: Patient  Readiness: Acceptance  Method: Explanation  Response: Verbalizes Understanding  Comment: PT POC    Education Comments  No comments found.               [1]   Patient Active Problem List  Diagnosis    Cholecystitis    Retroperitoneal lymphadenopathy    Liver mass    Acute kidney injury superimposed on CKD    Leukocytosis    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    Fall at home, initial encounter    Generalized  weakness

## 2025-06-23 ENCOUNTER — TELEPHONE (OUTPATIENT)
Dept: HEMATOLOGY/ONCOLOGY | Facility: CLINIC | Age: 70
End: 2025-06-23
Payer: MEDICARE

## 2025-06-23 ENCOUNTER — APPOINTMENT (OUTPATIENT)
Dept: RADIOLOGY | Facility: HOSPITAL | Age: 70
DRG: 435 | End: 2025-06-23
Payer: MEDICARE

## 2025-06-23 ENCOUNTER — SOCIAL WORK (OUTPATIENT)
Dept: HEMATOLOGY/ONCOLOGY | Facility: CLINIC | Age: 70
End: 2025-06-23
Payer: MEDICARE

## 2025-06-23 VITALS
HEART RATE: 67 BPM | WEIGHT: 194 LBS | DIASTOLIC BLOOD PRESSURE: 79 MMHG | TEMPERATURE: 98.1 F | RESPIRATION RATE: 20 BRPM | OXYGEN SATURATION: 93 % | HEIGHT: 66 IN | BODY MASS INDEX: 31.18 KG/M2 | SYSTOLIC BLOOD PRESSURE: 133 MMHG

## 2025-06-23 LAB
ALBUMIN SERPL BCP-MCNC: 2.7 G/DL (ref 3.4–5)
ALBUMIN SERPL BCP-MCNC: 2.8 G/DL (ref 3.4–5)
ALP SERPL-CCNC: 578 U/L (ref 33–136)
ALP SERPL-CCNC: 602 U/L (ref 33–136)
ALT SERPL W P-5'-P-CCNC: 101 U/L (ref 7–45)
ALT SERPL W P-5'-P-CCNC: 106 U/L (ref 7–45)
ANION GAP SERPL CALC-SCNC: 17 MMOL/L (ref 10–20)
ANION GAP SERPL CALC-SCNC: 17 MMOL/L (ref 10–20)
AST SERPL W P-5'-P-CCNC: 177 U/L (ref 9–39)
AST SERPL W P-5'-P-CCNC: 230 U/L (ref 9–39)
BILIRUB SERPL-MCNC: 4.6 MG/DL (ref 0–1.2)
BILIRUB SERPL-MCNC: 6.1 MG/DL (ref 0–1.2)
BUN SERPL-MCNC: 17 MG/DL (ref 6–23)
BUN SERPL-MCNC: 19 MG/DL (ref 6–23)
CALCIUM SERPL-MCNC: 8.9 MG/DL (ref 8.6–10.3)
CALCIUM SERPL-MCNC: 8.9 MG/DL (ref 8.6–10.3)
CHLORIDE SERPL-SCNC: 101 MMOL/L (ref 98–107)
CHLORIDE SERPL-SCNC: 99 MMOL/L (ref 98–107)
CO2 SERPL-SCNC: 16 MMOL/L (ref 21–32)
CO2 SERPL-SCNC: 18 MMOL/L (ref 21–32)
CREAT SERPL-MCNC: 1.1 MG/DL (ref 0.5–1.05)
CREAT SERPL-MCNC: 1.32 MG/DL (ref 0.5–1.05)
EGFRCR SERPLBLD CKD-EPI 2021: 44 ML/MIN/1.73M*2
EGFRCR SERPLBLD CKD-EPI 2021: 55 ML/MIN/1.73M*2
ERYTHROCYTE [DISTWIDTH] IN BLOOD BY AUTOMATED COUNT: 16.8 % (ref 11.5–14.5)
GLUCOSE BLD MANUAL STRIP-MCNC: 125 MG/DL (ref 74–99)
GLUCOSE BLD MANUAL STRIP-MCNC: 128 MG/DL (ref 74–99)
GLUCOSE BLD MANUAL STRIP-MCNC: 138 MG/DL (ref 74–99)
GLUCOSE BLD MANUAL STRIP-MCNC: 154 MG/DL (ref 74–99)
GLUCOSE SERPL-MCNC: 105 MG/DL (ref 74–99)
GLUCOSE SERPL-MCNC: 124 MG/DL (ref 74–99)
HCT VFR BLD AUTO: 40.9 % (ref 36–46)
HGB BLD-MCNC: 13.5 G/DL (ref 12–16)
HOLD SPECIMEN: NORMAL
MCH RBC QN AUTO: 29.6 PG (ref 26–34)
MCHC RBC AUTO-ENTMCNC: 33 G/DL (ref 32–36)
MCV RBC AUTO: 90 FL (ref 80–100)
NRBC BLD-RTO: 0 /100 WBCS (ref 0–0)
PLATELET # BLD AUTO: 230 X10*3/UL (ref 150–450)
POTASSIUM SERPL-SCNC: 4 MMOL/L (ref 3.5–5.3)
POTASSIUM SERPL-SCNC: 4.7 MMOL/L (ref 3.5–5.3)
PROT SERPL-MCNC: 5.4 G/DL (ref 6.4–8.2)
PROT SERPL-MCNC: 6 G/DL (ref 6.4–8.2)
RBC # BLD AUTO: 4.56 X10*6/UL (ref 4–5.2)
SODIUM SERPL-SCNC: 129 MMOL/L (ref 136–145)
SODIUM SERPL-SCNC: 130 MMOL/L (ref 136–145)
WBC # BLD AUTO: 16 X10*3/UL (ref 4.4–11.3)

## 2025-06-23 PROCEDURE — 36415 COLL VENOUS BLD VENIPUNCTURE: CPT

## 2025-06-23 PROCEDURE — 80053 COMPREHEN METABOLIC PANEL: CPT

## 2025-06-23 PROCEDURE — 85027 COMPLETE CBC AUTOMATED: CPT

## 2025-06-23 PROCEDURE — 82947 ASSAY GLUCOSE BLOOD QUANT: CPT

## 2025-06-23 PROCEDURE — 2500000001 HC RX 250 WO HCPCS SELF ADMINISTERED DRUGS (ALT 637 FOR MEDICARE OP)

## 2025-06-23 PROCEDURE — 84075 ASSAY ALKALINE PHOSPHATASE: CPT

## 2025-06-23 PROCEDURE — 99233 SBSQ HOSP IP/OBS HIGH 50: CPT

## 2025-06-23 PROCEDURE — 1100000001 HC PRIVATE ROOM DAILY

## 2025-06-23 PROCEDURE — 2500000002 HC RX 250 W HCPCS SELF ADMINISTERED DRUGS (ALT 637 FOR MEDICARE OP, ALT 636 FOR OP/ED)

## 2025-06-23 PROCEDURE — 2500000004 HC RX 250 GENERAL PHARMACY W/ HCPCS (ALT 636 FOR OP/ED)

## 2025-06-23 RX ORDER — SENNOSIDES 8.6 MG/1
1 TABLET ORAL NIGHTLY
Status: DISCONTINUED | OUTPATIENT
Start: 2025-06-23 | End: 2025-06-26 | Stop reason: HOSPADM

## 2025-06-23 RX ADMIN — MIRTAZAPINE 15 MG: 15 TABLET, FILM COATED ORAL at 21:59

## 2025-06-23 RX ADMIN — SODIUM CHLORIDE, SODIUM LACTATE, POTASSIUM CHLORIDE, AND CALCIUM CHLORIDE 1000 ML: .6; .31; .03; .02 INJECTION, SOLUTION INTRAVENOUS at 09:16

## 2025-06-23 RX ADMIN — ACETAMINOPHEN 650 MG: 325 TABLET ORAL at 06:08

## 2025-06-23 RX ADMIN — PANTOPRAZOLE SODIUM 40 MG: 40 TABLET, DELAYED RELEASE ORAL at 06:08

## 2025-06-23 RX ADMIN — ACETAMINOPHEN 650 MG: 325 TABLET ORAL at 21:59

## 2025-06-23 RX ADMIN — SENNOSIDES 8.6 MG: 8.6 TABLET, FILM COATED ORAL at 21:59

## 2025-06-23 RX ADMIN — ACETAMINOPHEN 650 MG: 325 TABLET ORAL at 18:07

## 2025-06-23 RX ADMIN — ACETAMINOPHEN 650 MG: 325 TABLET ORAL at 09:17

## 2025-06-23 RX ADMIN — ACETAMINOPHEN 650 MG: 325 TABLET ORAL at 13:43

## 2025-06-23 RX ADMIN — PAROXETINE HYDROCHLORIDE 20 MG: 20 TABLET, FILM COATED ORAL at 08:24

## 2025-06-23 RX ADMIN — ENOXAPARIN SODIUM 40 MG: 100 INJECTION SUBCUTANEOUS at 21:59

## 2025-06-23 RX ADMIN — POLYETHYLENE GLYCOL 3350 17 G: 17 POWDER, FOR SOLUTION ORAL at 08:26

## 2025-06-23 RX ADMIN — AMLODIPINE BESYLATE 10 MG: 10 TABLET ORAL at 08:25

## 2025-06-23 ASSESSMENT — PAIN SCALES - GENERAL
PAINLEVEL_OUTOF10: 0 - NO PAIN
PAINLEVEL_OUTOF10: 0 - NO PAIN

## 2025-06-23 NOTE — CARE PLAN
The patient's goals for the shift include rest    The clinical goals for the shift include rest and comfort  Problem: Skin  Goal: Decreased wound size/increased tissue granulation at next dressing change  Outcome: Progressing  Goal: Participates in plan/prevention/treatment measures  Outcome: Progressing  Goal: Prevent/manage excess moisture  Outcome: Progressing  Goal: Prevent/minimize sheer/friction injuries  Outcome: Progressing  Goal: Promote/optimize nutrition  Outcome: Progressing  Goal: Promote skin healing  Outcome: Progressing     Problem: Pain - Adult  Goal: Verbalizes/displays adequate comfort level or baseline comfort level  Outcome: Progressing     Problem: Safety - Adult  Goal: Free from fall injury  Outcome: Progressing     Problem: Discharge Planning  Goal: Discharge to home or other facility with appropriate resources  Outcome: Progressing     Problem: Chronic Conditions and Co-morbidities  Goal: Patient's chronic conditions and co-morbidity symptoms are monitored and maintained or improved  Outcome: Progressing     Problem: Nutrition  Goal: Nutrient intake appropriate for maintaining nutritional needs  Outcome: Progressing

## 2025-06-23 NOTE — PROGRESS NOTES
Internal Medicine Progress Note         Jordyn Neal is a 70 y.o. female on day 1 of admission presenting with Fall at home, initial encounter.    SUBJECTIVE    Patient seen and examined at bedside.  Patient observed to be drinking orange juice this morning.  She states she has been urinating more frequently due to increased fluid intake.  She states last bowel movement was the day before yesterday.  She denies any complaints or concerns at this time.  OBJECTIVE    Vitals:    06/1955 06/23/25 0006 06/23/25 0300 06/23/25 0700   BP: 127/76 133/79 143/80 122/70   BP Location: Right arm  Right arm Right arm   Patient Position: Lying  Lying Lying   Pulse: (!) 121 67 (!) 121 (!) 130   Resp: 20 22 18   Temp: 36.1 °C (97 °F) 36.7 °C (98.1 °F) 36.3 °C (97.3 °F) 35.9 °C (96.6 °F)   TempSrc: Temporal  Temporal Temporal   SpO2: 96% 93% 94% 94%   Weight:       Height:          Results from last 7 days   Lab Units 06/23/25  0541 06/21/25  0629 06/20/25  1216   WBC AUTO x10*3/uL 16.0*   < > 8.7   HEMOGLOBIN g/dL 13.5   < > 12.4   HEMATOCRIT % 40.9   < > 37.0   PLATELETS AUTO x10*3/uL 230   < > 178   NEUTROS PCT AUTO %  --   --  86.0   LYMPHS PCT AUTO %  --   --  5.2   MONOS PCT AUTO %  --   --  8.2   EOS PCT AUTO %  --   --  0.0    < > = values in this interval not displayed.     Results from last 7 days   Lab Units 06/23/25  0541   SODIUM mmol/L 130*   POTASSIUM mmol/L 4.0   CHLORIDE mmol/L 101   CO2 mmol/L 16*   BUN mg/dL 17   CREATININE mg/dL 1.10*   CALCIUM mg/dL 8.9   PROTEIN TOTAL g/dL 6.0*   BILIRUBIN TOTAL mg/dL 6.1*   ALK PHOS U/L 578*   ALT U/L 106*   AST U/L 230*   GLUCOSE mg/dL 105*       Scheduled Medications  Scheduled Medications[1]       Physical Exam  General: No acute distress  HEENT: EOMI  CV: Regular rate and rhythm  Pulm: Clear to auscultation bilaterally, no wheezings, rales or rhonchi  Abd: Nondistended, nontender  Ext: No cyanosis or  edema  Skin: No rashes, warm and dry  Neuro: no focal deficit       ASSESSMENT & PLAN  Jordyn Neal is a 70 year old female with PMH significant for HTN, T2DM, fatty liver, GERD, CKD3, and 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     Daily Progress  -LFTs significantly elevated compared to yesterday. Consult hematology oncology. Consult IR for evaluation of port.     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 S/p biliary stent placement for recently diagnosed cholangiocarcinoma.   -CT A/P showed overall progression of metastatic disease, new innumerable tiny masses throughout the liver, small-moderate ascites and pleural effusions, and CBD stent in place without biliary dilatation  PLAN:  -Encourage PO intake  -giving additional 1 L bolus, recheck CMP in the afternoon  -Continue mirtazapine  -Trend LFTs and electrolytes  -Pain management with scheduled acetaminophen, lidocaine patches, PRN oxycodone  -Heme/onc consulted for progression of metastatic disease and uptrending LFTs. Evaluation for emergent chemotherapy   -[facility] 11/12, T[facility] following    #Port dysfunction  -Per nursing, having difficulty accessing site.   PLAN:  -IR consulted for evaluation     Chronic Conditions  #HTN- continue amlodipine  #GERD- continue pantoprazole  #MDD, #LUIS FELIPE- continue paroxetine  #T2DM- insulin held given poor PO intake and normoglycemia. Hypoglycemia protocol in place     DVT ppx: LMWH  IVF: none  Diet: Regular  Consults: none  CODE STATUS: DNR    Addie Verma, DO PGY-1 Internal Medicine  Please  SecureChat for any further questions  This is a preliminary note, please await attending attestation for final A/P              [1] acetaminophen, 650 mg, oral, q4h  amLODIPine, 10 mg, oral, Daily  enoxaparin, 40 mg, subcutaneous, q24h  lactated Ringer's, 1,000 mL, intravenous, Once  lidocaine, 1 patch, transdermal, Daily  mirtazapine, 15 mg, oral, Nightly  pantoprazole, 40 mg, oral, Daily before breakfast  PARoxetine, 20 mg, oral, Daily  polyethylene glycol, 17 g, oral, Daily  sennosides, 1 tablet, oral, Nightly

## 2025-06-23 NOTE — PROGRESS NOTES
Physical Therapy                 Therapy Communication Note    Patient Name: Jordyn Neal  MRN: [id number]  Department: [facility]  Room: [address] [place] [address]  Today's Date: 6/23/2025     Discipline: Physical Therapy    Missed Visit: PT Missed Visit:  (cancel per RN.)     Missed Visit Reason:      Missed Time: Cancel    Comment:

## 2025-06-23 NOTE — CONSULTS
"Nutrition Initial Assessment:   Nutrition Assessment    Reason for Assessment: Admission nursing screening    Patient is a 70 y.o. female presenting with fall at home.       Nutrition History:  Energy Intake: Poor < 50 %  Pain affecting nutrition status: N/A  Food and Nutrient History: Reviewed recent oncology nutrition therapy note. Pt out of room at time of assessment,  in room waiting for pt's return. States pt has ahd poor PO intake x 10-11 weeks since being diagnosed with cancer. Had a more drastic decrease in PO intake in the last 4-5 weeks and has only been able to eat bites of food here and there. Has had N/V - has medications prescribed.  picked up ensure/boost for pt but she will not drink them, said they are \"too thick\", per . Suggested ensure clear while admitted - will add to meal trays. Also provided number for kitchen while admitted and encouraged pt/family to call down if she gets hungry for something specific. Encouraged family to bring in food from home.  Vitamin/Herbal Supplement Use: none listed in home med list       Anthropometrics:  Height: 167.6 cm (5' 6\")   Weight: 88 kg (194 lb)   BMI (Calculated): 31.33  IBW/kg (Dietitian Calculated): 59.1 kg                         Weight History:   Wt Readings from Last 10 Encounters:   06/20/25 88 kg (194 lb)   06/17/25 87.5 kg (192 lb 14.4 oz)   06/16/25 87.5 kg (192 lb 14.4 oz)   05/29/25 92 kg (202 lb 13.2 oz)   05/28/25 93.4 kg (206 lb)   05/19/25 102 kg (224 lb 13.9 oz)   05/07/25 102 kg (224 lb 13.9 oz)   04/11/25 99.7 kg (219 lb 12.8 oz)   04/10/25 99.9 kg (220 lb 3.8 oz)   11/05/24 105 kg (232 lb)      Weight Change %:  Weight History / % Weight Change:  reports pt has likely lost 40-50# since cancer diagnosis (~3 months). Based on available wt records in EMR, pt with 14 kg (13.7%) wt loss x 1 month.  Significant Weight Loss: Yes  Interpretation of Weight Loss: >5% in 1 month    Nutrition Focused Physical Exam " Findings:    Subcutaneous Fat Loss:   Defer Subcutaneous Fat Loss Assessment: Defer all  Defer All Reason: Out of room  Muscle Wasting:  Defer Muscle Wasting Assessment: Defer all  Defer All Reason: Out of room  Edema:  Edema: none  Physical Findings:  Skin: Negative  Digestive System Findings: Early satiety, Nausea, Vomiting    Nutrition Significant Labs:    Reviewed   Nutrition Specific Medications:  Reviewed     I/O:   Last BM Date: 06/21/25;      Dietary Orders (From admission, onward)       Start     Ordered    06/23/25 1515  Oral nutritional supplements  Until discontinued        Question Answer Comment   Deliver with All meals    Select supplement: Ensure Clear        06/23/25 1514    06/20/25 1806  Adult diet Regular  Diet effective now        Question:  Diet type  Answer:  Regular    06/20/25 1805    06/20/25 1717  May Participate in Room Service  ( ROOM SERVICE MAY PARTICIPATE)  Once        Question:  .  Answer:  Yes    06/20/25 1716                     Estimated Needs:      Method for Estimating Needs: 1773 kcal/d (30 kcal/kg IBW)     Method for Estimating 24 Hour Protein Needs: 71 g/d (1.2 g/kg IBW) or as renal function permits     Method for Estimating 24 Hour Fluid Needs: 1773 ml/d (1 ml/kcal or per MD)  Patient on Order Fluid Restriction: No        Nutrition Diagnosis   Malnutrition Diagnosis  Patient has Malnutrition Diagnosis: Yes  Diagnosis Status: New  Malnutrition Diagnosis: Severe malnutrition related to chronic disease or condition  Related to: metastatic cancer  As Evidenced by: 13.7% wt loss x 1 month; reports of poor PO intake meeting <75% of estimated needs x 3 months            Nutrition Interventions/Recommendations   Nutrition prescription for oral nutrition    Nutrition Recommendations:  Individualized Nutrition Prescription Provided for : Regular diet with ensure clear TID    Nutrition Interventions/Goals:   Meals and Snacks: General healthful diet  Goal: Consumes 3 meals per  day  Medical Food Supplement: Commercial beverage medical food supplement therapy  Goal: Ensure Clear TID (provides 240 kcal, 8 g protein per serving).      Education Documentation  No documentation found.     Family with no nutrition related questions at this time.        Nutrition Monitoring and Evaluation   Estimated Energy Intake: Energy intake 50 -75% of estimated energy needs  Intake / Amount of food: Consumes at least 50% or more of meals/snacks/supplements    Body Weight: Body weight - Maintain stable weight    Electrolyte and Renal Panel: Electrolytes within normal limits  Glucose/Endocrine Profile: Glucose within normal limits ( mg/dL)    Digestive System Finding: Nausea, Vomiting, Early satiety  Criteria: Improvement in GI function/symptoms    Goal Status: New goal(s) identified    Time Spent (min): 30 minutes

## 2025-06-23 NOTE — TELEPHONE ENCOUNTER
Patient scheduled to start outpatient treatment on Friday.  called frustrated that she hasn't started yet and now is admitted. He understands we can not do chemotherapy inpatient at [place] as no floor is equipped / trained for that. Has questions for Dr. Willis. Advised him I would speak to Dr. Willis in the AM, when he's back in the office, and see if he can come see Jordyn and address their concerns.  He verbalized understanding.

## 2025-06-23 NOTE — NURSING NOTE
Patient in IR pre-procedure room. Dr Mcelroy able to access chest port at bedside without complication. Patient tolerated well and port flushed with heparin and de-accessed. Handoff report given to Esther Leavitt LPN.

## 2025-06-23 NOTE — PROGRESS NOTES
Son has been emailing Onc KRYSTYNA-S in regards to needing [place] paperwork completed. Awaiting son to answer where he wants completed paperwork sent.

## 2025-06-23 NOTE — PROGRESS NOTES
06/23/25 1539   Discharge Planning   Living Arrangements Spouse/significant other   Support Systems Spouse/significant other   Assistance Needed ADL's   Type of Residence Private residence   Number of Stairs to Enter Residence 3   Number of Stairs Within Residence   (basement)   Do you have animals or pets at home? Yes   Type of Animals or Pets 1 cat   Home or Post Acute Services Post acute facilities (Rehab/SNF/etc)   Type of Post Acute Facility Services Skilled nursing   Expected Discharge Disposition SNF   Does the patient need discharge transport arranged? Yes   RoundTrip coordination needed? Yes   Has discharge transport been arranged? No   Intensity of Service   Intensity of Service 0-30 min     Spoke with spouse on the phone with number provided in the chart. Introduced self and role as care coordinator. Demographics verified. Patient PCP is [facility]. Insurance is [facility]. Patient lives with Spouse. Spouse assists patient with ADL's. Spouse is concerned with when patient will be starting cancer treatments and does not want to talk about any further plan until he speaks with Oncology Doctor. Care coordinator will follow for discharge planning.

## 2025-06-24 ENCOUNTER — DOCUMENTATION (OUTPATIENT)
Dept: INTERNAL MEDICINE | Facility: HOSPITAL | Age: 70
End: 2025-06-24
Payer: MEDICARE

## 2025-06-24 LAB
ALBUMIN SERPL BCP-MCNC: 2.8 G/DL (ref 3.4–5)
ALP SERPL-CCNC: 524 U/L (ref 33–136)
ALT SERPL W P-5'-P-CCNC: 81 U/L (ref 7–45)
ANION GAP SERPL CALC-SCNC: 13 MMOL/L (ref 10–20)
AST SERPL W P-5'-P-CCNC: 116 U/L (ref 9–39)
ATRIAL RATE: 108 BPM
BILIRUB SERPL-MCNC: 3.8 MG/DL (ref 0–1.2)
BUN SERPL-MCNC: 26 MG/DL (ref 6–23)
CALCIUM SERPL-MCNC: 9 MG/DL (ref 8.6–10.3)
CHLORIDE SERPL-SCNC: 101 MMOL/L (ref 98–107)
CO2 SERPL-SCNC: 19 MMOL/L (ref 21–32)
CREAT SERPL-MCNC: 1.42 MG/DL (ref 0.5–1.05)
EGFRCR SERPLBLD CKD-EPI 2021: 40 ML/MIN/1.73M*2
ERYTHROCYTE [DISTWIDTH] IN BLOOD BY AUTOMATED COUNT: 17.2 % (ref 11.5–14.5)
GLUCOSE BLD MANUAL STRIP-MCNC: 101 MG/DL (ref 74–99)
GLUCOSE BLD MANUAL STRIP-MCNC: 118 MG/DL (ref 74–99)
GLUCOSE BLD MANUAL STRIP-MCNC: 122 MG/DL (ref 74–99)
GLUCOSE BLD MANUAL STRIP-MCNC: 133 MG/DL (ref 74–99)
GLUCOSE BLD MANUAL STRIP-MCNC: 142 MG/DL (ref 74–99)
GLUCOSE SERPL-MCNC: 117 MG/DL (ref 74–99)
HCT VFR BLD AUTO: 40.3 % (ref 36–46)
HGB BLD-MCNC: 13.3 G/DL (ref 12–16)
MCH RBC QN AUTO: 29.5 PG (ref 26–34)
MCHC RBC AUTO-ENTMCNC: 33 G/DL (ref 32–36)
MCV RBC AUTO: 89 FL (ref 80–100)
NRBC BLD-RTO: 0.1 /100 WBCS (ref 0–0)
P AXIS: 46 DEGREES
P OFFSET: 210 MS
P ONSET: 155 MS
PLATELET # BLD AUTO: 253 X10*3/UL (ref 150–450)
POTASSIUM SERPL-SCNC: 4.1 MMOL/L (ref 3.5–5.3)
PR INTERVAL: 124 MS
PROT SERPL-MCNC: 6 G/DL (ref 6.4–8.2)
Q ONSET: 217 MS
QRS COUNT: 18 BEATS
QRS DURATION: 76 MS
QT INTERVAL: 324 MS
QTC CALCULATION(BAZETT): 434 MS
QTC FREDERICIA: 394 MS
R AXIS: 51 DEGREES
RBC # BLD AUTO: 4.51 X10*6/UL (ref 4–5.2)
SODIUM SERPL-SCNC: 129 MMOL/L (ref 136–145)
T AXIS: 20 DEGREES
T OFFSET: 379 MS
VENTRICULAR RATE: 108 BPM
WBC # BLD AUTO: 14.3 X10*3/UL (ref 4.4–11.3)

## 2025-06-24 PROCEDURE — 2500000001 HC RX 250 WO HCPCS SELF ADMINISTERED DRUGS (ALT 637 FOR MEDICARE OP)

## 2025-06-24 PROCEDURE — 99239 HOSP IP/OBS DSCHRG MGMT >30: CPT

## 2025-06-24 PROCEDURE — 2500000002 HC RX 250 W HCPCS SELF ADMINISTERED DRUGS (ALT 637 FOR MEDICARE OP, ALT 636 FOR OP/ED)

## 2025-06-24 PROCEDURE — 85027 COMPLETE CBC AUTOMATED: CPT

## 2025-06-24 PROCEDURE — 99223 1ST HOSP IP/OBS HIGH 75: CPT | Performed by: INTERNAL MEDICINE

## 2025-06-24 PROCEDURE — 84075 ASSAY ALKALINE PHOSPHATASE: CPT

## 2025-06-24 PROCEDURE — 1100000001 HC PRIVATE ROOM DAILY

## 2025-06-24 PROCEDURE — 2500000004 HC RX 250 GENERAL PHARMACY W/ HCPCS (ALT 636 FOR OP/ED)

## 2025-06-24 PROCEDURE — 82947 ASSAY GLUCOSE BLOOD QUANT: CPT

## 2025-06-24 PROCEDURE — 36415 COLL VENOUS BLD VENIPUNCTURE: CPT

## 2025-06-24 RX ORDER — NYSTATIN 100000 [USP'U]/ML
4 SUSPENSION ORAL 4 TIMES DAILY
Status: DISCONTINUED | OUTPATIENT
Start: 2025-06-24 | End: 2025-06-26 | Stop reason: HOSPADM

## 2025-06-24 RX ADMIN — ACETAMINOPHEN 325 MG: 325 TABLET ORAL at 06:27

## 2025-06-24 RX ADMIN — MIRTAZAPINE 15 MG: 15 TABLET, FILM COATED ORAL at 21:20

## 2025-06-24 RX ADMIN — ACETAMINOPHEN 650 MG: 325 TABLET ORAL at 21:20

## 2025-06-24 RX ADMIN — NYSTATIN 400000 UNITS: 100000 SUSPENSION ORAL at 17:14

## 2025-06-24 RX ADMIN — ACETAMINOPHEN 650 MG: 325 TABLET ORAL at 17:14

## 2025-06-24 RX ADMIN — ACETAMINOPHEN 650 MG: 325 TABLET ORAL at 12:43

## 2025-06-24 RX ADMIN — PAROXETINE HYDROCHLORIDE 20 MG: 20 TABLET, FILM COATED ORAL at 08:46

## 2025-06-24 RX ADMIN — SENNOSIDES 8.6 MG: 8.6 TABLET, FILM COATED ORAL at 21:20

## 2025-06-24 RX ADMIN — ENOXAPARIN SODIUM 40 MG: 100 INJECTION SUBCUTANEOUS at 21:19

## 2025-06-24 RX ADMIN — PANTOPRAZOLE SODIUM 40 MG: 40 TABLET, DELAYED RELEASE ORAL at 06:27

## 2025-06-24 RX ADMIN — NYSTATIN 400000 UNITS: 100000 SUSPENSION ORAL at 12:43

## 2025-06-24 RX ADMIN — AMLODIPINE BESYLATE 10 MG: 10 TABLET ORAL at 08:46

## 2025-06-24 RX ADMIN — NYSTATIN 400000 UNITS: 100000 SUSPENSION ORAL at 21:20

## 2025-06-24 ASSESSMENT — PAIN - FUNCTIONAL ASSESSMENT: PAIN_FUNCTIONAL_ASSESSMENT: 0-10

## 2025-06-24 ASSESSMENT — PAIN SCALES - GENERAL
PAINLEVEL_OUTOF10: 0 - NO PAIN
PAINLEVEL_OUTOF10: 0 - NO PAIN

## 2025-06-24 NOTE — DISCHARGE SUMMARY
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   fluticasone 50 mcg/actuation nasal spray; Commonly known as: Flonase   mirtazapine 15 mg tablet; Commonly known as: Remeron; Take 1 tablet (15   mg) by mouth once daily at bedtime. Take half tablet every night for 7   days and then 1 tablet every night   multivitamin tablet   OLANZapine 5 mg tablet; Commonly known as: ZyPREXA; Take 1 tablet (5 mg)   by mouth once daily at bedtime. 1 tab at bedtime for 4 days, starting the   night of chemo   omeprazole 40 mg DR capsule; Commonly known as: PriLOSEC; TAKE 1 CAPSULE   BY MOUTH ONCE DAILY.   ondansetron ODT 4 mg disintegrating tablet; Commonly known as:   Zofran-ODT; Dissolve 1 tablet (4 mg) in the mouth every 8 hours if needed   for nausea or vomiting.; Ask about: Should I take this medication?   oxyCODONE 5 mg immediate release tablet; Commonly known as: Roxicodone;   Take 2 tablets (10 mg) by mouth every 6 hours if needed for severe pain (7   - 10).   PARoxetine 20 mg tablet; Commonly known as: Paxil; TAKE 1 TABLET BY   MOUTH ONCE DAILY.   prochlorperazine 10 mg tablet; Commonly known as: Compazine; Take 1    tablet (10 mg) by mouth every 6 hours if needed for nausea or vomiting.   traZODone 100 mg tablet; Commonly known as: Desyrel; Take 1 tablet (100   mg) by mouth as needed at bedtime for sleep.       Test Results Pending At Discharge  Pending Labs       No current pending labs.            Hospital Course   Jordyn Neal is a 70-year-old female with past medical history of hypertension, type 2 diabetes, hyperlipidemia, fatty liver, GERD, CKD stage III, metastatic poorly differentiated pancreaticobiliary carcinoma who presented to the emergency department for generalized weakness.  Since her diagnosis, patient reports poor oral intake and subsequent weakness.  Patient follows with Dr. Willis and was supposed to start chemotherapy the week of 6/24.  During patient's hospitalization, LFTs worse compared to baseline.  Patient had biliary stent placement in April 2025 and there was concern for obstruction.  CT abdomen pelvis was ordered and showed overall progression of metastatic disease, new innumerable tiny masses throughout the liver, small-moderate ascites and pleural effusions.  CBD stent was visualized in place without biliary dilatation.  Patient was given IV fluids due to poor oral intake which diluted the LFTs.  However creatinine began to uptrend.  Suspect there is a component of hepatorenal.  Hematology oncology was consulted and evaluated patient.  Dr. Willis spoke to the patient and her  who would like to attempt chemotherapy although they are aware of her poor prognosis.  Transfer was initiated and attending [facility] physician accepted patient. She will be discharged from [facility] to [facility] for urgent chemotherapy.     Pertinent Physical Exam At Time of Discharge  Physical Exam  Constitutional:       Appearance: Normal appearance.   HENT:      Mouth/Throat:      Pharynx: Oropharyngeal exudate present.   Eyes:      Extraocular Movements: Extraocular movements intact.      Pupils: Pupils are equal, round, and  reactive to light.   Cardiovascular:      Rate and Rhythm: Tachycardia present.   Pulmonary:      Effort: Pulmonary effort is normal.      Breath sounds: Normal breath sounds.   Abdominal:      Palpations: Abdomen is soft.   Musculoskeletal:      Right lower leg: No edema.      Left lower leg: No edema.   Skin:     General: Skin is warm and dry.      Coloration: Skin is pale.   Neurological:      Mental Status: She is alert. Mental status is at baseline.         Outpatient Follow-Up  Future Appointments   Date Time Provider Department Center   6/26/2025 12:30 PM INF 03 [facility] [id number] [facility]   6/27/2025  9:00 AM INF 01 [facility] [id number] [facility]   7/2/2025  1:30 PM INF 02 [facility] [id number] [facility]   7/10/2025 11:30 AM INF 03 [facility] [id number] [facility]   7/11/2025  9:00 AM INF 07 [facility] [id number] [facility]   7/16/2025  1:30 PM INF 02 [facility] [id number] [facility]   7/24/2025 11:00 AM INF 03 [facility] [id number] [facility]   7/24/2025 11:40 AM Archie Willis MD [id number] [facility]   7/25/2025  9:00 AM INF 07 [facility] [id number] [facility]   7/30/2025  1:30 PM INF 05 [facility] [id number] [facility]   8/7/2025 11:30 AM INF 03 [facility] [id number] [facility]   8/8/2025  9:00 AM INF 09 [facility] [id number] [facility]   8/13/2025  1:30 PM INF 01 [facility] [id number] [facility]         Addie Verma DO PGY-1 Internal Medicine  This is a preliminary note, please await attending attestation for final A/P.

## 2025-06-24 NOTE — PROGRESS NOTES
Internal Medicine Progress Note         Jordyn Neal is a 70 y.o. female on day 2 of admission presenting with Fall at home, initial encounter.    SUBJECTIVE    Patient seen and examined at bedside.  Patient reports feeling well this morning.  She has minimal appetite but has been trying to drink fluids.  OBJECTIVE    Vitals:    06/23/25 1930 06/24/25 0113 06/24/25 0606 06/24/25 0803   BP: 139/73 124/77 147/87 134/83   BP Location: Left arm  Left arm Left arm   Patient Position: Lying  Lying Sitting   Pulse: (!) 120 (!) 118 (!) 112 (!) 118   Resp: 16  16 18   Temp: 36 °C (96.8 °F) 36.2 °C (97.2 °F) 36 °C (96.8 °F) 35.8 °C (96.4 °F)   TempSrc: Temporal  Temporal Temporal   SpO2: 94% 93% 93% 97%   Weight:       Height:          Results from last 7 days   Lab Units 06/24/25  0539 06/21/25  0629 06/20/25  1216   WBC AUTO x10*3/uL 14.3*   < > 8.7   HEMOGLOBIN g/dL 13.3   < > 12.4   HEMATOCRIT % 40.3   < > 37.0   PLATELETS AUTO x10*3/uL 253   < > 178   NEUTROS PCT AUTO %  --   --  86.0   LYMPHS PCT AUTO %  --   --  5.2   MONOS PCT AUTO %  --   --  8.2   EOS PCT AUTO %  --   --  0.0    < > = values in this interval not displayed.     Results from last 7 days   Lab Units 06/24/25  0539   SODIUM mmol/L 129*   POTASSIUM mmol/L 4.1   CHLORIDE mmol/L 101   CO2 mmol/L 19*   BUN mg/dL 26*   CREATININE mg/dL 1.42*   CALCIUM mg/dL 9.0   PROTEIN TOTAL g/dL 6.0*   BILIRUBIN TOTAL mg/dL 3.8*   ALK PHOS U/L 524*   ALT U/L 81*   AST U/L 116*   GLUCOSE mg/dL 117*       Scheduled Medications  Scheduled Medications[1]       Physical Exam  General: No acute distress  HEENT: EOMI  CV: tachycarida  Pulm: Clear to auscultation bilaterally, no wheezings, rales or rhonchi  Abd: Nondistended, nontender  Ext: No cyanosis or edema  Skin: No rashes, warm and dry  Neuro: no focal deficit       ASSESSMENT & PLAN  Jordyn Neal is a 70 year old female with PMH significant for HTN, T2DM,  fatty liver, GERD, CKD3, and recently diagnosed metastatic poorly differentiated pancreaticobiliary carcinoma who presents to [facility] for chief complaint of generalized weakness and admitted to the general medical service.     Daily Progress  -S/p 1 L bolus yesterday with improvement of LFTs. Worsening of creatinine. Heme/onc to see patient today.     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 S/p biliary stent placement for recently diagnosed cholangiocarcinoma.   -CT A/P showed overall progression of metastatic disease, new innumerable tiny masses throughout the liver, small-moderate ascites and pleural effusions, and CBD stent in place without biliary dilatation  PLAN:  -Encourage PO intake  -Continue mirtazapine  -Improvement of LFTs after fluid bolus. Trend LFTs and electrolytes  -Pain management with scheduled acetaminophen, lidocaine patches, PRN oxycodone  -Heme/onc consulted for progression of metastatic disease and uptrending LFTs. Evaluation for emergent chemotherapy   -[facility] 11/12, TCC following     #Creatinine elevation  #CKD3  -worsened with fluid bolus. There may be a component of hepatorenal.   PLAN:  -will hold off on further fluids  -Monitor CMP    #Port dysfunction  -Per nursing, having difficulty accessing site.   PLAN:  -IR consulted for evaluation     Chronic Conditions  #HTN- continue amlodipine  #GERD- continue pantoprazole  #MDD, #LUIS FELIPE- continue paroxetine  #T2DM- insulin held given poor PO intake. Monitor POCT glucose. Hypoglycemia protocol in place.     DVT ppx: LMWH  IVF: none  Diet: Regular  Consults: none  CODE STATUS: DNR    Addie Verma, DO PGY-1 Internal Medicine  Please SecureChat for any further questions  This is a preliminary note, please await attending attestation for final A/P             [1] acetaminophen, 650 mg, oral, q4h  amLODIPine, 10 mg, oral, Daily  enoxaparin, 40 mg, subcutaneous, q24h  lidocaine, 1 patch, transdermal, Daily  mirtazapine, 15 mg, oral, Nightly  pantoprazole, 40 mg, oral, Daily before breakfast  PARoxetine, 20 mg, oral, Daily  polyethylene glycol, 17 g, oral, Daily  sennosides, 1 tablet, oral, Nightly

## 2025-06-24 NOTE — DOCUMENTATION CLARIFICATION NOTE
"    PATIENT:               TOY ESCALANTE  ACCT #:                  [id number]  MRN:                       [id number]  :                       1955  ADMIT DATE:       2025 11:09 AM  DISCH DATE:  RESPONDING PROVIDER #:        [id number]          PROVIDER RESPONSE TEXT:    I agree with dietician diagnosis of Severe Malnutrition on 2025    CDI QUERY TEXT:    Clarification        Instruction:    Based on your assessment of the patient and the clinical information, please provide the requested documentation by clicking on the appropriate radio button and enter any additional information if prompted.    Question: Please further clarify this patient nutritional status as    When answering this query, please exercise your independent professional judgment. The fact that a question is being asked, does not imply that any particular answer is desired or expected.    The patient's clinical indicators include:  Clinical Information:  70 year old female presented with generalized weakness.    Clinical Indicators:  2025 Nutrition Consult:  \"Malnutrition Diagnosis: Severe malnutrition related to chronic disease or condition  Related to: metastatic cancer  As Evidenced by: 13.7% wt loss x 1 month; reports of poor PO intake meeting <75% of estimated needs x 3 months\"    Treatment:  - Nutrition consult  - Oral nutritional supplements, Ensure Clear TID    Risk Factors:  cholangiocarcinoma, progression of metastatic disease  Options provided:  -- I agree with dietician diagnosis of Severe Malnutrition on 2025  -- Other - I will add my own diagnosis  -- Refer to Clinical Documentation Reviewer    Query created by: Yamilka Fitzpatrick on 2025 1:02 PM      Electronically signed by:  MOUSTAPHA AQUINO DO 2025 4:36 PM          "

## 2025-06-24 NOTE — H&P
History Of Present Illness  Jordyn Neal is a 70F w/ a PMH of obstructive biliary cholecystitis s/p biliary sten (04/2025), CKD3, T2DM, GERD, HLD, HTN, MDD, LUIS FELIPE and newly diagnosed malnutrition who is presenting from [facility] for initiation of chemotherapy for pancreaticobiliary carcinoma w/ liver metastasis (4/14/25). She was initially scheduled to begin chemotherapy with Dr. Willis on 6/24/25.Pt presented to [facility] ED for generalized weakness on 6/20/25 and CTAP (6/22/25) showed innumerable new hypoenhancing tiny masses scattered throughout every segment.  While at [facility] Ms. Neal had worsening LFTs compared to baseline. IV NS was given which diluted LFTs but there was a subsequent bump in creatinine. Port was placed while at [facility] and was transferred here to start chemotherapy.     Surgical History  ********     Social History  ********    Family History  Family History[1]     Allergies  Patient has no known allergies.    Review of Systems     Physical Exam     Last Recorded Vitals  There were no vitals taken for this visit.    Relevant Results  Labs:  Results for orders placed or performed during the hospital encounter of 06/20/25 (from the past 24 hours)   POCT GLUCOSE   Result Value Ref Range    POCT Glucose 154 (H) 74 - 99 mg/dL   CBC   Result Value Ref Range    WBC 14.3 (H) 4.4 - 11.3 x10*3/uL    nRBC 0.1 (H) 0.0 - 0.0 /100 WBCs    RBC 4.51 4.00 - 5.20 x10*6/uL    Hemoglobin 13.3 12.0 - 16.0 g/dL    Hematocrit 40.3 36.0 - 46.0 %    MCV 89 80 - 100 fL    MCH 29.5 26.0 - 34.0 pg    MCHC 33.0 32.0 - 36.0 g/dL    RDW 17.2 (H) 11.5 - 14.5 %    Platelets 253 150 - 450 x10*3/uL   Comprehensive Metabolic Panel   Result Value Ref Range    Glucose 117 (H) 74 - 99 mg/dL    Sodium 129 (L) 136 - 145 mmol/L    Potassium 4.1 3.5 - 5.3 mmol/L    Chloride 101 98 - 107 mmol/L    Bicarbonate 19 (L) 21 - 32 mmol/L    Anion Gap 13 10 - 20 mmol/L    Urea Nitrogen 26 (H) 6 - 23 mg/dL    Creatinine 1.42 (H) 0.50 - 1.05  mg/dL    eGFR 40 (L) >60 mL/min/1.73m*2    Calcium 9.0 8.6 - 10.3 mg/dL    Albumin 2.8 (L) 3.4 - 5.0 g/dL    Alkaline Phosphatase 524 (H) 33 - 136 U/L    Total Protein 6.0 (L) 6.4 - 8.2 g/dL     (H) 9 - 39 U/L    Bilirubin, Total 3.8 (H) 0.0 - 1.2 mg/dL    ALT 81 (H) 7 - 45 U/L   POCT GLUCOSE   Result Value Ref Range    POCT Glucose 122 (H) 74 - 99 mg/dL   POCT GLUCOSE   Result Value Ref Range    POCT Glucose 142 (H) 74 - 99 mg/dL   POCT GLUCOSE   Result Value Ref Range    POCT Glucose 133 (H) 74 - 99 mg/dL   POCT GLUCOSE   Result Value Ref Range    POCT Glucose 118 (H) 74 - 99 mg/dL     Imaging:  IR CVC check  Result Date: 6/23/2025  Normal functioning right IJ chest port. Port is ready to use.   I was present for and/or performed the critical portions of the procedure and immediately available throughout the entire procedure.   I personally reviewed the image(s)/study and interpretation. I agree with the findings as stated.   Performed and dictated at [facility].   MACRO: None   Signed by: Chris Mcelroy 6/23/2025 4:32 PM Dictation workstation:   [id number]    CT abdomen pelvis w IV contrast  Result Date: 6/22/2025  1.  Overall progression of metastatic disease compared to prior exam just over 2 months ago. 2. Right hepatic lobe mass roughly unchanged. There are otherwise innumerable new tiny masses scattered throughout every segment of the liver. 3. There is a mixed treatment response in extensive melony metastatic disease. Some nodes are unchanged, some are smaller, and some of enlarged compared to prior exam. 4. New small to moderate volume ascites. 5. Interval satisfactory placement of common bile duct stent without significant upstream biliary dilatation. 6. New small pleural effusions.   MACRO: None.   Signed by: Xavier Rocha 6/22/2025 8:25 AM Dictation workstation:   [id number]    NM PET CT FDG wholebody  Result Date: 5/28/2025  1. A large FDG avid lesion with  central necrosis in the pancreatic head/uncinate process, consistent with known biopsy-proven pancreaticobiliary carcinoma. 2. Multiple FDG avid lesions scattered in the right and left hepatic lobes as well as a subcapsular lesion, likely representing liver metastases 3. Innumerable FDG avid left supraclavicular, paraesophageal, retrocrural, abdominopelvic nodes as described above, likely representing melony metastases. 4. Two FDG avid subcutaneous soft tissue densities in the upper back, posterior to right shoulder, suggestive of metastases. 5. Heterogeneous FDG uptake within the bone marrow throughout the axial and appendicular skeleton without discrete lesion, likely reactive.     I personally reviewed the image(s) / study and agree with the findings and interpretation as stated. This study was interpreted at [facility].   MACRO: None       Signed by: Janneth Love 5/28/2025 6:20 AM Dictation workstation:   [id number]     Surgical Pathology Exam  Result Date: 4/14/2025  Comment: Immunostains show that the tumor cells are positive for CKAE1/3 and CK7, while negative for p40, INSM1, TTF-1, and TRPS1. An immunostain for PAX8 shows predominantly negative staining in the tumor cells. Overall, the immunoprofile is most consistent with poorly differentiated adenocarcinoma; and a pancreatobiliary primary (including cholangiocarcinoma) remains a possibility, among others. Clinical correlation with imaging studies recommended.  Electronically signed by Janina Scott MD PhD on 4/22/2025 at 1005 EDT  By the signature on this report, the individual or group listed as making the Final Interpretation/Diagnosis certifies that they have reviewed this case.  This report has been issued to add results of immunohistochemical stains.    Original diagnosis remains unchanged.    An immunostain for HepPar1 shows focal positive staining; an immunostain for arginase 1 is negative.    MISMATCH REPAIR PROTEIN  EXPRESSION    Protein: Result  MLH-1: Expression Present  PMS-2: Expression Present  MSH-2: Expression Present  MSH-6: Expression Present    INTERPRETATION: Neoplasm with normal mismatch repair protein expression.  The immunohistochemistry study of DNA mismatch repair protein expression reveals the normal presence of hMLH-1, hMSH2, hMSH6 and hPMS2 in the tumor (normal internal controls stain appropriately).    CTAP  Result Date: 4/10/2025  IMPRESSION:  1. Cirrhotic morphology of the liver. Ill-defined lesion in segment 7  measuring 6.2 x 6.8 cm is suspicious for malignancy with differential  diagnosis including primary infiltrative HCC, cholangiocarcinoma, or  metastatic disease.  2. Mild heterogeneity/thickening of the endometrium is indeterminate.  This may represent hyperplasia versus endometrial carcinoma,  recommend pelvic ultrasound for further evaluation.  3. Extensive tim hepatis, upper abdominal, and retroperitoneal  metastatic lymphadenopathy.  4. The lymph node conglomerate in the tim hepatis results in  narrowing of the main portal vein and has some mass effect on the  cystic duct and CBD resulting in mild intrahepatic bile duct dilation  and distention of the gallbladder. If there is right upper quadrant  pain on exam, findings could represent cholecystitis secondary to  biliary obstruction by the tim hepatis mass.       Assessment/Plan   Assessment & Plan    Jordyn Neal is a 70F with PMH significant for HTN, T2DM, fatty liver, GERD, CKD3, and recently diagnosed 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 who presents from [facility] for initiation of chemotherapy.     #Generalized weakness  #Hyponatremia, suspected hypovolemic  #Elevated LFTs  #Pancreaticobiliary carcinoma w/ liver metastasis    #CHIO  #CKD3    #Malnutrition     Chronic Conditions  #HTN  Plan  - c/w home amlodipine    #GERD  - c/w home pantoprazole    #MDD  #LUIS FELIPE  Plan  - c/w paroxetine    #T2DM  Plan           Yakov Vargas MD          [1]   Family History  Problem Relation Name Age of Onset    Pancreatic cancer Mother      Uterine cancer Sister

## 2025-06-24 NOTE — CARE PLAN
The patient's goals for the shift include rest    The clinical goals for the shift include rest    Over the shift, the patient did make progress toward the following goals. Barriers to progression include . Recommendations to address these barriers include .  Problem: Skin  Goal: Decreased wound size/increased tissue granulation at next dressing change  Outcome: Progressing  Goal: Participates in plan/prevention/treatment measures  Outcome: Progressing  Goal: Prevent/manage excess moisture  Outcome: Progressing  Goal: Prevent/minimize sheer/friction injuries  Outcome: Progressing  Goal: Promote/optimize nutrition  Outcome: Progressing  Goal: Promote skin healing  Outcome: Progressing     Problem: Pain - Adult  Goal: Verbalizes/displays adequate comfort level or baseline comfort level  Outcome: Progressing     Problem: Safety - Adult  Goal: Free from fall injury  Outcome: Progressing     Problem: Discharge Planning  Goal: Discharge to home or other facility with appropriate resources  Outcome: Progressing     Problem: Chronic Conditions and Co-morbidities  Goal: Patient's chronic conditions and co-morbidity symptoms are monitored and maintained or improved  Outcome: Progressing     Problem: Nutrition  Goal: Nutrient intake appropriate for maintaining nutritional needs  Outcome: Progressing

## 2025-06-25 LAB
ALBUMIN SERPL BCP-MCNC: 2.7 G/DL (ref 3.4–5)
ALP SERPL-CCNC: 529 U/L (ref 33–136)
ALT SERPL W P-5'-P-CCNC: 66 U/L (ref 7–45)
ANION GAP SERPL CALC-SCNC: 14 MMOL/L (ref 10–20)
AST SERPL W P-5'-P-CCNC: 93 U/L (ref 9–39)
BILIRUB SERPL-MCNC: 2.9 MG/DL (ref 0–1.2)
BUN SERPL-MCNC: 33 MG/DL (ref 6–23)
CALCIUM SERPL-MCNC: 9.1 MG/DL (ref 8.6–10.3)
CHLORIDE SERPL-SCNC: 99 MMOL/L (ref 98–107)
CO2 SERPL-SCNC: 18 MMOL/L (ref 21–32)
CREAT SERPL-MCNC: 1.28 MG/DL (ref 0.5–1.05)
EGFRCR SERPLBLD CKD-EPI 2021: 46 ML/MIN/1.73M*2
ERYTHROCYTE [DISTWIDTH] IN BLOOD BY AUTOMATED COUNT: 17.2 % (ref 11.5–14.5)
GLUCOSE BLD MANUAL STRIP-MCNC: 107 MG/DL (ref 74–99)
GLUCOSE BLD MANUAL STRIP-MCNC: 129 MG/DL (ref 74–99)
GLUCOSE BLD MANUAL STRIP-MCNC: 131 MG/DL (ref 74–99)
GLUCOSE BLD MANUAL STRIP-MCNC: 93 MG/DL (ref 74–99)
GLUCOSE SERPL-MCNC: 99 MG/DL (ref 74–99)
HCT VFR BLD AUTO: 40.2 % (ref 36–46)
HGB BLD-MCNC: 12.7 G/DL (ref 12–16)
MCH RBC QN AUTO: 29.1 PG (ref 26–34)
MCHC RBC AUTO-ENTMCNC: 31.6 G/DL (ref 32–36)
MCV RBC AUTO: 92 FL (ref 80–100)
NRBC BLD-RTO: 0.3 /100 WBCS (ref 0–0)
PLATELET # BLD AUTO: 303 X10*3/UL (ref 150–450)
POTASSIUM SERPL-SCNC: 4.3 MMOL/L (ref 3.5–5.3)
PROT SERPL-MCNC: 6.1 G/DL (ref 6.4–8.2)
RBC # BLD AUTO: 4.37 X10*6/UL (ref 4–5.2)
SODIUM SERPL-SCNC: 127 MMOL/L (ref 136–145)
WBC # BLD AUTO: 13.3 X10*3/UL (ref 4.4–11.3)

## 2025-06-25 PROCEDURE — 85027 COMPLETE CBC AUTOMATED: CPT

## 2025-06-25 PROCEDURE — 2500000004 HC RX 250 GENERAL PHARMACY W/ HCPCS (ALT 636 FOR OP/ED)

## 2025-06-25 PROCEDURE — 99233 SBSQ HOSP IP/OBS HIGH 50: CPT

## 2025-06-25 PROCEDURE — 1100000001 HC PRIVATE ROOM DAILY

## 2025-06-25 PROCEDURE — 2500000001 HC RX 250 WO HCPCS SELF ADMINISTERED DRUGS (ALT 637 FOR MEDICARE OP)

## 2025-06-25 PROCEDURE — 82947 ASSAY GLUCOSE BLOOD QUANT: CPT

## 2025-06-25 PROCEDURE — 2500000002 HC RX 250 W HCPCS SELF ADMINISTERED DRUGS (ALT 637 FOR MEDICARE OP, ALT 636 FOR OP/ED)

## 2025-06-25 PROCEDURE — 36415 COLL VENOUS BLD VENIPUNCTURE: CPT

## 2025-06-25 PROCEDURE — 84075 ASSAY ALKALINE PHOSPHATASE: CPT

## 2025-06-25 RX ORDER — ACETAMINOPHEN 325 MG/1
325 TABLET ORAL EVERY 4 HOURS
Status: DISCONTINUED | OUTPATIENT
Start: 2025-06-25 | End: 2025-06-26 | Stop reason: HOSPADM

## 2025-06-25 RX ADMIN — ACETAMINOPHEN 650 MG: 325 TABLET ORAL at 06:43

## 2025-06-25 RX ADMIN — NYSTATIN 400000 UNITS: 100000 SUSPENSION ORAL at 20:33

## 2025-06-25 RX ADMIN — PAROXETINE HYDROCHLORIDE 20 MG: 20 TABLET, FILM COATED ORAL at 08:43

## 2025-06-25 RX ADMIN — SENNOSIDES 8.6 MG: 8.6 TABLET, FILM COATED ORAL at 20:33

## 2025-06-25 RX ADMIN — NYSTATIN 400000 UNITS: 100000 SUSPENSION ORAL at 12:14

## 2025-06-25 RX ADMIN — MIRTAZAPINE 15 MG: 15 TABLET, FILM COATED ORAL at 20:33

## 2025-06-25 RX ADMIN — ACETAMINOPHEN 325 MG: 325 TABLET ORAL at 16:00

## 2025-06-25 RX ADMIN — ACETAMINOPHEN 325 MG: 325 TABLET ORAL at 20:33

## 2025-06-25 RX ADMIN — AMLODIPINE BESYLATE 10 MG: 10 TABLET ORAL at 08:43

## 2025-06-25 RX ADMIN — NYSTATIN 400000 UNITS: 100000 SUSPENSION ORAL at 06:43

## 2025-06-25 RX ADMIN — ACETAMINOPHEN 650 MG: 325 TABLET ORAL at 03:13

## 2025-06-25 RX ADMIN — ENOXAPARIN SODIUM 40 MG: 100 INJECTION SUBCUTANEOUS at 20:33

## 2025-06-25 RX ADMIN — ACETAMINOPHEN 325 MG: 325 TABLET ORAL at 23:31

## 2025-06-25 RX ADMIN — NYSTATIN 400000 UNITS: 100000 SUSPENSION ORAL at 16:00

## 2025-06-25 RX ADMIN — ACETAMINOPHEN 325 MG: 325 TABLET ORAL at 12:14

## 2025-06-25 RX ADMIN — PANTOPRAZOLE SODIUM 40 MG: 40 TABLET, DELAYED RELEASE ORAL at 06:43

## 2025-06-25 ASSESSMENT — PAIN - FUNCTIONAL ASSESSMENT: PAIN_FUNCTIONAL_ASSESSMENT: 0-10

## 2025-06-25 ASSESSMENT — PAIN SCALES - GENERAL: PAINLEVEL_OUTOF10: 0 - NO PAIN

## 2025-06-25 NOTE — PROGRESS NOTES
"Nutrition Follow Up Assessment:   Nutrition Assessment         Patient is a 70 y.o. female presenting with fall at home.       Nutrition History:  Energy Intake: Poor < 50 %  Pain affecting nutrition status: N/A  Food and Nutrient History: Pt reports poor appetite continues, states partly due to \"fungal infection\" on her tongue. Physician changed ONS order to ensure plus high protein - pt states she does not want these but is ok with the ensure clear supplements - will change order back to ensure clear. Awaiting trasnfer to [facility] [place].       Anthropometrics:  Height: 167.6 cm (5' 6\")   Weight: 88 kg (194 lb)   BMI (Calculated): 31.33  IBW/kg (Dietitian Calculated): 59.1 kg                         Weight History:     Weight Change %:  Weight History / % Weight Change: No new wt to assess    Nutrition Focused Physical Exam Findings:    Subcutaneous Fat Loss:   Orbital Fat Pads: Mild-Moderate (slight dark circles and slight hollowing)  Buccal Fat Pads: Well nourished (full, rounded cheeks)  Triceps: Well nourished (ample fat tissue)  Muscle Wasting:  Temporalis: Mild-Moderate (slight depression)  Pectoralis (Clavicular Region): Mild-Moderate (some protrusion of clavicle)  Deltoid/Trapezius: Mild-Moderate (slight protrusion of acromion process)  Edema:  Edema: none  Physical Findings:  Skin: Negative  Digestive System Findings: Early satiety, Nausea, Vomiting  Mouth Findings: Oral candidiasis (nystatin ordered)    Nutrition Significant Labs:    Reviewed   Nutrition Specific Medications:  Reviewed     I/O:   Last BM Date: 06/21/25;      Dietary Orders (From admission, onward)       Start     Ordered    06/25/25 1430  Oral nutritional supplements  Until discontinued        Question Answer Comment   Deliver with All meals    Select supplement: Ensure Clear        06/25/25 1429    06/20/25 1806  Adult diet Regular  Diet effective now        Question:  Diet type  Answer:  Regular    06/20/25 1805    06/20/25 1717  May Participate " in Room Service  ( ROOM SERVICE MAY PARTICIPATE)  Once        Question:  .  Answer:  Yes    06/20/25 2083                     Estimated Needs:      Method for Estimating Needs: 1773 kcal/d (30 kcal/kg IBW)     Method for Estimating 24 Hour Protein Needs: 71 g/d (1.2 g/kg IBW) or as renal function permits     Method for Estimating 24 Hour Fluid Needs: 1773 ml/d (1 ml/kcal or per MD)  Patient on Order Fluid Restriction: No        Nutrition Diagnosis   Malnutrition Diagnosis  Patient has Malnutrition Diagnosis: Yes  Diagnosis Status: Active  Malnutrition Diagnosis: Severe malnutrition related to chronic disease or condition  Related to: metastatic cancer  As Evidenced by: 13.7% wt loss x 1 month; reports of poor PO intake meeting <75% of estimated needs x 3 months            Nutrition Interventions/Recommendations   Nutrition prescription for oral nutrition    Nutrition Recommendations:  Individualized Nutrition Prescription Provided for : Regular diet with ensure clear TID    Nutrition Interventions/Goals:   Meals and Snacks: General healthful diet  Goal: Consumes 3 meals per day  Medical Food Supplement: Commercial beverage medical food supplement therapy  Goal: Ensure Clear TID (provides 240 kcal, 8 g protein per serving).      Education Documentation  No documentation found.     Pt with no nutrition questions/concerns at this time.         Nutrition Monitoring and Evaluation   Estimated Energy Intake: Energy intake 50 -75% of estimated energy needs  Intake / Amount of food: Consumes at least 50% or more of meals/snacks/supplements    Body Weight: Body weight - Maintain stable weight    Electrolyte and Renal Panel: Electrolytes within normal limits  Glucose/Endocrine Profile: Glucose within normal limits ( mg/dL)    Digestive System Finding: Nausea, Vomiting, Early satiety  Criteria: Improvement in GI function/symptoms  Criteria: Improvement in oral candidiasis    Goal Status: Some progress toward  goal(s)    Time Spent (min): 30 minutes

## 2025-06-25 NOTE — CARE PLAN
The patient's goals for the shift include Rest    The clinical goals for the shift include Comfort and Safety    Skin  Decreased wound size/increased tissue granulation at next dressing change  Add  Today at 0012 - Progressing by Kelly Cristina RN  Add  Flowsheets  Taken yesterday at 2040  Decreased wound size/increased tissue granulation at next dressing change  Promote sleep for wound healing  Participates in plan/prevention/treatment measures  Add  Today at 0012 - Progressing by Kelly Cristina RN  Add  Flowsheets  Taken yesterday at 2040  Participates in plan/prevention/treatment measures  Elevate heels  Prevent/manage excess moisture  Add  Today at 0012 - Progressing by Kelly Cristina RN  Add  Flowsheets  Taken yesterday at 2040  Prevent/manage excess moisture  Moisturize dry skin  Prevent/minimize sheer/friction injuries  Add  Today at 0012 - Progressing by Kelly Cristina RN  Add  Flowsheets  Taken yesterday at 2040  Prevent/minimize sheer/friction injuries  Use pull sheet  Promote/optimize nutrition  Add  Today at 0012 - Progressing by Kelly Cristina RN  Add  Flowsheets  Taken yesterday at 2040  Promote/optimize nutrition  Consume > 50% meals/supplements;Offer water/supplements/favorite foods  Promote skin healing  Add  Today at 0012 - Progressing by Kelly Cristina RN  Add  Flowsheets  Taken yesterday at 2040  Promote skin healing  Turn/reposition every 2 hours/use positioning/transfer devices

## 2025-06-25 NOTE — CARE PLAN
The patient's goals for the shift include rest    The clinical goals for the shift include Comfort and Safety

## 2025-06-25 NOTE — PROGRESS NOTES
Internal Medicine Progress Note         Jordyn Neal is a 70 y.o. female on day 3 of admission presenting with Fall at home, initial encounter.    SUBJECTIVE    Patient seen and examined at bedside.  Patient feeling fatigued but okay this morning.  Appetite remains low and she has been trying to eat.  OBJECTIVE    Vitals:    06/24/25 1509 06/24/25 2001 06/25/25 0345 06/25/25 0747   BP: 166/83 135/62 146/75 137/83   BP Location: Left arm Left arm Left arm Right arm   Patient Position: Sitting Lying Lying Lying   Pulse: (!) 113 (!) 114 (!) 113 (!) 112   Resp: 16 18 18 16   Temp: (!) 33.4 °C (92.1 °F) 36.8 °C (98.2 °F) 36.3 °C (97.3 °F) 36 °C (96.8 °F)   TempSrc: Temporal Temporal Temporal Temporal   SpO2: 94% 94% 94% 96%   Weight:       Height:          Results from last 7 days   Lab Units 06/25/25  0547 06/21/25  0629 06/20/25  1216   WBC AUTO x10*3/uL 13.3*   < > 8.7   HEMOGLOBIN g/dL 12.7   < > 12.4   HEMATOCRIT % 40.2   < > 37.0   PLATELETS AUTO x10*3/uL 303   < > 178   NEUTROS PCT AUTO %  --   --  86.0   LYMPHS PCT AUTO %  --   --  5.2   MONOS PCT AUTO %  --   --  8.2   EOS PCT AUTO %  --   --  0.0    < > = values in this interval not displayed.     Results from last 7 days   Lab Units 06/25/25  0547   SODIUM mmol/L 127*   POTASSIUM mmol/L 4.3   CHLORIDE mmol/L 99   CO2 mmol/L 18*   BUN mg/dL 33*   CREATININE mg/dL 1.28*   CALCIUM mg/dL 9.1   PROTEIN TOTAL g/dL 6.1*   BILIRUBIN TOTAL mg/dL 2.9*   ALK PHOS U/L 529*   ALT U/L 66*   AST U/L 93*   GLUCOSE mg/dL 99       Scheduled Medications  Scheduled Medications[1]       Physical Exam  General: No acute distress  HEENT: EOMI  CV: tachycardic  Pulm: Clear to auscultation bilaterally, no wheezings, rales or rhonchi  Abd: Nondistended, nontender  Ext: No cyanosis or edema  Skin: No rashes, warm and dry  Neuro: at baseline       ASSESSMENT & PLAN  Jordyn Neal is a 70 year old female with PMH significant  for HTN, T2DM, fatty liver, GERD, CKD3, and 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      Daily Progress  -Awaiting transfer to [facility] [place]     #Generalized weakness  #Hyponatremia, suspect hypovolemic  #Elevated LFTs  #Metastatic poorly differentiated pancreaticobiliary carcinoma  -Patient presents with generalized weakness and poor PO intake  -Na 129, improved to 130 with IVF boluses, suspect hypovolemic in setting of poor PO intake  -LFTs worsened from lab draw ~1mo ago. S/p biliary stent placement for recently diagnosed cholangiocarcinoma.   -CT A/P showed overall progression of metastatic disease, new innumerable tiny masses throughout the liver, small-moderate ascites and pleural effusions, and CBD stent in place without biliary dilatation  PLAN:  -Encourage PO intake  -Continue mirtazapine  -Trend LFTs and electrolytes  -Pain management with scheduled acetaminophen, lidocaine patches, PRN oxycodone  -[facility] oncology accepted patient, waiting on open bed     #Port dysfunction, resolved  PLAN:  -IR access port on 6/23/25     Chronic Conditions  #HTN- continue amlodipine  #GERD- continue pantoprazole  #MDD, #LUIS FELIPE- continue paroxetine  #T2DM- insulin held given poor PO intake and normoglycemia. Hypoglycemia protocol in place     DVT ppx: LMWH  IVF: none  Diet: Regular  Consults: none  CODE STATUS: DNR    Addie Verma, DO PGY-1 Internal Medicine  Please SecureChat for any further questions  This is a preliminary note, please await attending attestation for final A/P            [1] acetaminophen, 325 mg, oral, q4h  amLODIPine, 10 mg, oral, Daily  enoxaparin, 40 mg, subcutaneous, q24h  lidocaine, 1 patch, transdermal, Daily  mirtazapine, 15 mg, oral, Nightly  nystatin, 4 mL, Swish & Swallow, 4x daily  pantoprazole, 40 mg, oral, Daily before breakfast  PARoxetine, 20 mg, oral,  Daily  polyethylene glycol, 17 g, oral, Daily  sennosides, 1 tablet, oral, Nightly

## 2025-06-25 NOTE — CONSULTS
Patient ID: : Jordyn Neal            Primary Care Provider: Kit Fleming MD    REFERRING PHYSICIAN:  Dr. 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  2.   Weakness / fatigue.    HISTORY OF PRESENT ILLNESS:  Jordyn Neal is a 70 y.o. 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  She was admitted at [facility] in April 2025 with complaint of severe vomiting and right upper quadrant pain.  There was also history of 20-30 pound weight loss over the last 6 months.  Initial workup including CT scan revealed a large hepatic mass along with extensive intra-abdominal lymphadenopathy causing compression of the cystic duct resulting in intrahepatic biliary ductal dilatation and gallbladder distention.  There was also finding of mildly heterogenicity/thickening of the endometrium.  Follow-up liver MRI confirmed 8 x 5.4 cm heterogeneous mass along with extensive lymphadenopathy.  Pelvic ultrasound mainly showed uterine hyperplasia.  She was transferred to [facility] and had ERCP with stent placement in CBD and fluoroscopic biopsy of the hilar lymph node confirming poorly differentiated carcinoma with histochemical findings suggestive of most probable pancreaticobiliary origin with possibility of primary cholangiocarcinoma.  Extensive tumor NGS panel with Tempus xT and Tempus xF were unremarkable.  She was further evaluated by Dr. Walters in GI oncology clinic at [facility] and after extensive further evaluation and as per tumor board recommendation she has been advised trial of systemic chemotherapy with gemcitabine/cisplatin/ durvalumab.     INTERVAL HISTORY:  She is currently admitted in the hospital with worsening weakness and progressive functional decline. Blood work is concerning for hyponatremia, Cr of 1.4, Alk 524, , T preethi of 3.8.  CT scan results are concerning for overall progression of the  "metastatic disease compared to previous scans.    PAST MEDICAL HISTORY:  1.  Metastatic pancreaticobiliary carcinoma.  2.  Hypertension  3.  Diabetes mellitus  4.  Hyperlipidemia  5.  CKD  6.  GERD  7.  Depression  8.  Uterine leiomyoma  9.  History of right shoulder replacement surgery.     SOCIAL HISTORY:   for 45 years and lives in [place].  Non-smoker.  Rare social alcohol intake.  She is retired and used to work as a  for a mortgage company.  Born and raised in [place].     FAMILY HISTORY:  Father  at age 52 from MS related complication.  Mother  at age 84 from pancreatic cancer.  2 siblings.  A sister  from uterine cancer.  2 children.  One of her son  from leukemia at age 29.  No other family history of bleeding, clotting or malignant disorders in the family history.     REVIEW OF SYSTEMS:   Pertinent finding as per the history above.  All other systems have been reviewed and generally negative and noncontributory.    VITALS:  BSA: 2.02 meters squared  /71 (BP Location: Right arm, Patient Position: Lying)   Pulse (!) 114   Temp 36.7 °C (98.1 °F) (Temporal)   Resp 18   Ht 1.676 m (5' 6\")   Wt 88 kg (194 lb)   SpO2 94%   BMI 31.31 kg/m²     PHYSICAL EXAMINATION:  Detailed examination not done.    RELEVANT RESULTS:  CT chest abdomen pelvis 2025   IMPRESSION:  1.  Overall progression of metastatic disease compared to prior exam just over 2 months ago.  2. Right hepatic lobe mass roughly unchanged. There are otherwise innumerable new tiny masses scattered throughout every segment of the liver.  3. There is a mixed treatment response in extensive melony metastatic disease. Some nodes are unchanged, some are smaller, and some of enlarged compared to prior exam.  4. New small to moderate volume ascites.  5. Interval satisfactory placement of common bile duct stent without significant upstream biliary dilatation.  6. New small pleural effusions.  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  Please refer the   details of initial presentation as outlined above.  In summary, patient with finding of poorly differentiated widely metastatic malignancy with overall clinical and histological impression of primary pancreaticobiliary origin.  She was evaluated in the GI oncology clinic at [facility] (Dr. Walters)  and was not considered a candidate for any ongoing clinical trial.  Overall plan was to start her on salvage chemotherapy with gemcitabine/cisplatin/Imfinzi combination.      She was scheduled to start the treatment this coming Friday but is currently admitted in the hospital with worsening weakness, fatigue, fall and other issues.  Workup is suggestive of worsening metastatic disease burden along with progressive liver dysfunction.    Long discussion with the patient and her  and they are aware of the overall poor prognosis.  Chances of significant meaningful benefit from trial of chemotherapy appears to be less considering the aggressive nature of poorly differentiated malignancy.  At the same time, they are hoping to at least try the treatment as soon as possible.  Unfortunately, they had lost their son to acute leukemia and at the time of his diagnosis there was similar kind of scenario regarding trial of chemo due to poor prognosis.  He was transferred from [facility] to [facility] and was started on treatment and lived for 2 years.  I told the patient and her  that we are talking to different disease entities but I can understand their frustration and feeling regarding the trial of treatment as soon as possible.    Inpatient chemotherapy can only be done at [facility].  At this time she is not a candidate for immediate discharge from the hospital and as such, if possible, we will request our [facility] team for help regarding inpatient transfer for consideration for  further evaluation and administration of 4 cycle of chemotherapy ASAP.  In the meantime we will continue with the aggressive supportive care as already initiated.    A total of 60 minutes were spent in evaluation - performing physical examination, history taking, review of the previous extensive records/information and formulating current and future management plan. Majority of the time was spend in consultation and discussion.    This note has been transcribed using Dragon voice recognition system and there is a possibility of unintentional typing misprints.

## 2025-06-26 ENCOUNTER — HOSPITAL ENCOUNTER (INPATIENT)
Facility: HOSPITAL | Age: 70
End: 2025-06-26
Attending: HOSPITALIST
Payer: MEDICARE

## 2025-06-26 VITALS
BODY MASS INDEX: 31.18 KG/M2 | WEIGHT: 194 LBS | DIASTOLIC BLOOD PRESSURE: 62 MMHG | TEMPERATURE: 96.1 F | OXYGEN SATURATION: 94 % | HEIGHT: 66 IN | HEART RATE: 114 BPM | SYSTOLIC BLOOD PRESSURE: 125 MMHG | RESPIRATION RATE: 18 BRPM

## 2025-06-26 DIAGNOSIS — R68.89 OTHER GENERAL SYMPTOMS AND SIGNS: ICD-10-CM

## 2025-06-26 DIAGNOSIS — C78.7 CHOLANGIOCARCINOMA METASTATIC TO LIVER: ICD-10-CM

## 2025-06-26 DIAGNOSIS — Z13.6 ENCOUNTER FOR SCREENING FOR CARDIOVASCULAR DISORDERS: ICD-10-CM

## 2025-06-26 DIAGNOSIS — C22.1 CHOLANGIOCARCINOMA (MULTI): ICD-10-CM

## 2025-06-26 DIAGNOSIS — E87.1 HYPONATREMIA: Primary | ICD-10-CM

## 2025-06-26 DIAGNOSIS — C22.1 CHOLANGIOCARCINOMA METASTATIC TO LIVER: ICD-10-CM

## 2025-06-26 LAB
ALBUMIN SERPL BCP-MCNC: 2.5 G/DL (ref 3.4–5)
ALP SERPL-CCNC: 530 U/L (ref 33–136)
ALT SERPL W P-5'-P-CCNC: 53 U/L (ref 7–45)
ANION GAP SERPL CALC-SCNC: 15 MMOL/L (ref 10–20)
AST SERPL W P-5'-P-CCNC: 83 U/L (ref 9–39)
BILIRUB SERPL-MCNC: 2.6 MG/DL (ref 0–1.2)
BUN SERPL-MCNC: 30 MG/DL (ref 6–23)
CALCIUM SERPL-MCNC: 8.9 MG/DL (ref 8.6–10.3)
CHLORIDE SERPL-SCNC: 98 MMOL/L (ref 98–107)
CHLORIDE UR-SCNC: <15 MMOL/L
CHLORIDE/CREATININE (MMOL/G) IN URINE: NORMAL
CO2 SERPL-SCNC: 18 MMOL/L (ref 21–32)
CREAT SERPL-MCNC: 1.17 MG/DL (ref 0.5–1.05)
CREAT UR-MCNC: 197.9 MG/DL (ref 20–320)
EGFRCR SERPLBLD CKD-EPI 2021: 51 ML/MIN/1.73M*2
ERYTHROCYTE [DISTWIDTH] IN BLOOD BY AUTOMATED COUNT: 17.2 % (ref 11.5–14.5)
GLUCOSE BLD MANUAL STRIP-MCNC: 100 MG/DL (ref 74–99)
GLUCOSE BLD MANUAL STRIP-MCNC: 116 MG/DL (ref 74–99)
GLUCOSE SERPL-MCNC: 88 MG/DL (ref 74–99)
HCT VFR BLD AUTO: 38.8 % (ref 36–46)
HGB BLD-MCNC: 13 G/DL (ref 12–16)
MCH RBC QN AUTO: 29.9 PG (ref 26–34)
MCHC RBC AUTO-ENTMCNC: 33.5 G/DL (ref 32–36)
MCV RBC AUTO: 89 FL (ref 80–100)
NRBC BLD-RTO: 0.6 /100 WBCS (ref 0–0)
OSMOLALITY SERPL: 275 MOSM/KG (ref 280–300)
OSMOLALITY UR: 777 MOSM/KG (ref 200–1200)
PHOSPHATE SERPL-MCNC: 3 MG/DL (ref 2.5–4.9)
PLATELET # BLD AUTO: 323 X10*3/UL (ref 150–450)
POTASSIUM SERPL-SCNC: 4.4 MMOL/L (ref 3.5–5.3)
POTASSIUM UR-SCNC: 45 MMOL/L
POTASSIUM/CREAT UR-RTO: 23 MMOL/G CREAT
PROT SERPL-MCNC: 5.7 G/DL (ref 6.4–8.2)
RBC # BLD AUTO: 4.35 X10*6/UL (ref 4–5.2)
SODIUM SERPL-SCNC: 127 MMOL/L (ref 136–145)
SODIUM UR-SCNC: <10 MMOL/L
SODIUM/CREAT UR-RTO: NORMAL
WBC # BLD AUTO: 10.7 X10*3/UL (ref 4.4–11.3)

## 2025-06-26 PROCEDURE — 84100 ASSAY OF PHOSPHORUS: CPT

## 2025-06-26 PROCEDURE — 80053 COMPREHEN METABOLIC PANEL: CPT

## 2025-06-26 PROCEDURE — 2500000005 HC RX 250 GENERAL PHARMACY W/O HCPCS

## 2025-06-26 PROCEDURE — 2500000004 HC RX 250 GENERAL PHARMACY W/ HCPCS (ALT 636 FOR OP/ED)

## 2025-06-26 PROCEDURE — 85027 COMPLETE CBC AUTOMATED: CPT

## 2025-06-26 PROCEDURE — 36415 COLL VENOUS BLD VENIPUNCTURE: CPT

## 2025-06-26 PROCEDURE — 2500000001 HC RX 250 WO HCPCS SELF ADMINISTERED DRUGS (ALT 637 FOR MEDICARE OP)

## 2025-06-26 PROCEDURE — 84133 ASSAY OF URINE POTASSIUM: CPT

## 2025-06-26 PROCEDURE — 83935 ASSAY OF URINE OSMOLALITY: CPT

## 2025-06-26 PROCEDURE — 1170000001 HC PRIVATE ONCOLOGY ROOM DAILY

## 2025-06-26 PROCEDURE — 99232 SBSQ HOSP IP/OBS MODERATE 35: CPT

## 2025-06-26 PROCEDURE — 2500000002 HC RX 250 W HCPCS SELF ADMINISTERED DRUGS (ALT 637 FOR MEDICARE OP, ALT 636 FOR OP/ED)

## 2025-06-26 PROCEDURE — 82947 ASSAY GLUCOSE BLOOD QUANT: CPT

## 2025-06-26 PROCEDURE — 82436 ASSAY OF URINE CHLORIDE: CPT

## 2025-06-26 PROCEDURE — 83930 ASSAY OF BLOOD OSMOLALITY: CPT

## 2025-06-26 PROCEDURE — 99222 1ST HOSP IP/OBS MODERATE 55: CPT

## 2025-06-26 RX ORDER — FLUTICASONE PROPIONATE 50 MCG
1 SPRAY, SUSPENSION (ML) NASAL DAILY PRN
Status: ACTIVE | OUTPATIENT
Start: 2025-06-26

## 2025-06-26 RX ORDER — AMLODIPINE BESYLATE 10 MG/1
10 TABLET ORAL DAILY
Status: DISPENSED | OUTPATIENT
Start: 2025-06-27

## 2025-06-26 RX ORDER — ONDANSETRON 4 MG/1
4 TABLET, ORALLY DISINTEGRATING ORAL EVERY 8 HOURS PRN
Status: CANCELLED | OUTPATIENT
Start: 2025-06-26

## 2025-06-26 RX ORDER — NYSTATIN 100000 [USP'U]/ML
5 SUSPENSION ORAL 4 TIMES DAILY
Status: DISCONTINUED | OUTPATIENT
Start: 2025-06-26 | End: 2025-06-29

## 2025-06-26 RX ORDER — MIRTAZAPINE 15 MG/1
15 TABLET, FILM COATED ORAL NIGHTLY
Status: DISCONTINUED | OUTPATIENT
Start: 2025-06-26 | End: 2025-06-28

## 2025-06-26 RX ORDER — ACETAMINOPHEN 325 MG/1
650 TABLET ORAL EVERY 6 HOURS PRN
Status: DISPENSED | OUTPATIENT
Start: 2025-06-26

## 2025-06-26 RX ORDER — PAROXETINE 20 MG/1
20 TABLET, FILM COATED ORAL DAILY
Status: DISPENSED | OUTPATIENT
Start: 2025-06-27

## 2025-06-26 RX ORDER — ENOXAPARIN SODIUM 100 MG/ML
40 INJECTION SUBCUTANEOUS EVERY 24 HOURS
Status: DISCONTINUED | OUTPATIENT
Start: 2025-06-26 | End: 2025-06-29

## 2025-06-26 RX ORDER — PROCHLORPERAZINE MALEATE 10 MG
10 TABLET ORAL EVERY 6 HOURS PRN
Status: DISCONTINUED | OUTPATIENT
Start: 2025-06-26 | End: 2025-06-29

## 2025-06-26 RX ORDER — OXYCODONE HYDROCHLORIDE 10 MG/1
10 TABLET ORAL EVERY 6 HOURS PRN
Status: ACTIVE | OUTPATIENT
Start: 2025-06-26

## 2025-06-26 RX ORDER — PANTOPRAZOLE SODIUM 40 MG/1
40 TABLET, DELAYED RELEASE ORAL
Status: DISPENSED | OUTPATIENT
Start: 2025-06-27

## 2025-06-26 RX ORDER — MUPIROCIN 20 MG/G
OINTMENT TOPICAL 3 TIMES DAILY
Status: DISCONTINUED | OUTPATIENT
Start: 2025-06-26 | End: 2025-06-26 | Stop reason: HOSPADM

## 2025-06-26 RX ADMIN — ACETAMINOPHEN 325 MG: 325 TABLET ORAL at 06:24

## 2025-06-26 RX ADMIN — PANTOPRAZOLE SODIUM 40 MG: 40 TABLET, DELAYED RELEASE ORAL at 06:24

## 2025-06-26 RX ADMIN — NYSTATIN 500000 UNITS: 100000 SUSPENSION ORAL at 16:56

## 2025-06-26 RX ADMIN — PAROXETINE HYDROCHLORIDE 20 MG: 20 TABLET, FILM COATED ORAL at 08:19

## 2025-06-26 RX ADMIN — MIRTAZAPINE 15 MG: 15 TABLET, FILM COATED ORAL at 20:02

## 2025-06-26 RX ADMIN — AMLODIPINE BESYLATE 10 MG: 10 TABLET ORAL at 08:19

## 2025-06-26 RX ADMIN — SODIUM CHLORIDE 500 ML: 0.9 INJECTION, SOLUTION INTRAVENOUS at 08:19

## 2025-06-26 RX ADMIN — NYSTATIN 500000 UNITS: 100000 SUSPENSION ORAL at 20:02

## 2025-06-26 RX ADMIN — ACETAMINOPHEN 325 MG: 325 TABLET ORAL at 11:00

## 2025-06-26 RX ADMIN — MUPIROCIN: 20 OINTMENT TOPICAL at 11:00

## 2025-06-26 RX ADMIN — ACETAMINOPHEN 650 MG: 325 TABLET ORAL at 20:02

## 2025-06-26 RX ADMIN — ENOXAPARIN SODIUM 40 MG: 100 INJECTION SUBCUTANEOUS at 16:56

## 2025-06-26 RX ADMIN — NYSTATIN 400000 UNITS: 100000 SUSPENSION ORAL at 06:24

## 2025-06-26 SDOH — SOCIAL STABILITY: SOCIAL INSECURITY: WITHIN THE LAST YEAR, HAVE YOU BEEN HUMILIATED OR EMOTIONALLY ABUSED IN OTHER WAYS BY YOUR PARTNER OR EX-PARTNER?: NO

## 2025-06-26 SDOH — ECONOMIC STABILITY: FOOD INSECURITY: WITHIN THE PAST 12 MONTHS, THE FOOD YOU BOUGHT JUST DIDN'T LAST AND YOU DIDN'T HAVE MONEY TO GET MORE.: NEVER TRUE

## 2025-06-26 SDOH — ECONOMIC STABILITY: FOOD INSECURITY: WITHIN THE PAST 12 MONTHS, YOU WORRIED THAT YOUR FOOD WOULD RUN OUT BEFORE YOU GOT THE MONEY TO BUY MORE.: NEVER TRUE

## 2025-06-26 SDOH — ECONOMIC STABILITY: HOUSING INSECURITY: AT ANY TIME IN THE PAST 12 MONTHS, WERE YOU HOMELESS OR LIVING IN A SHELTER (INCLUDING NOW)?: NO

## 2025-06-26 SDOH — ECONOMIC STABILITY: TRANSPORTATION INSECURITY: IN THE PAST 12 MONTHS, HAS LACK OF TRANSPORTATION KEPT YOU FROM MEDICAL APPOINTMENTS OR FROM GETTING MEDICATIONS?: NO

## 2025-06-26 SDOH — ECONOMIC STABILITY: HOUSING INSECURITY: IN THE PAST 12 MONTHS, HOW MANY TIMES HAVE YOU MOVED WHERE YOU WERE LIVING?: 1

## 2025-06-26 SDOH — SOCIAL STABILITY: SOCIAL INSECURITY: HAVE YOU HAD THOUGHTS OF HARMING ANYONE ELSE?: NO

## 2025-06-26 SDOH — ECONOMIC STABILITY: INCOME INSECURITY: IN THE PAST 12 MONTHS HAS THE ELECTRIC, GAS, OIL, OR WATER COMPANY THREATENED TO SHUT OFF SERVICES IN YOUR HOME?: NO

## 2025-06-26 SDOH — ECONOMIC STABILITY: FOOD INSECURITY: HOW HARD IS IT FOR YOU TO PAY FOR THE VERY BASICS LIKE FOOD, HOUSING, MEDICAL CARE, AND HEATING?: VERY HARD

## 2025-06-26 SDOH — SOCIAL STABILITY: SOCIAL INSECURITY: WITHIN THE LAST YEAR, HAVE YOU BEEN AFRAID OF YOUR PARTNER OR EX-PARTNER?: NO

## 2025-06-26 SDOH — SOCIAL STABILITY: SOCIAL INSECURITY: HAVE YOU HAD ANY THOUGHTS OF HARMING ANYONE ELSE?: NO

## 2025-06-26 SDOH — ECONOMIC STABILITY: HOUSING INSECURITY: IN THE LAST 12 MONTHS, WAS THERE A TIME WHEN YOU WERE NOT ABLE TO PAY THE MORTGAGE OR RENT ON TIME?: NO

## 2025-06-26 SDOH — SOCIAL STABILITY: SOCIAL INSECURITY: ARE YOU OR HAVE YOU BEEN THREATENED OR ABUSED PHYSICALLY, EMOTIONALLY, OR SEXUALLY BY ANYONE?: NO

## 2025-06-26 SDOH — SOCIAL STABILITY: SOCIAL INSECURITY: ABUSE: ADULT

## 2025-06-26 SDOH — SOCIAL STABILITY: SOCIAL INSECURITY: HAS ANYONE EVER THREATENED TO HURT YOUR FAMILY OR YOUR PETS?: NO

## 2025-06-26 SDOH — SOCIAL STABILITY: SOCIAL INSECURITY: DOES ANYONE TRY TO KEEP YOU FROM HAVING/CONTACTING OTHER FRIENDS OR DOING THINGS OUTSIDE YOUR HOME?: NO

## 2025-06-26 SDOH — SOCIAL STABILITY: SOCIAL INSECURITY: WERE YOU ABLE TO COMPLETE ALL THE BEHAVIORAL HEALTH SCREENINGS?: YES

## 2025-06-26 SDOH — SOCIAL STABILITY: SOCIAL INSECURITY: ARE THERE ANY APPARENT SIGNS OF INJURIES/BEHAVIORS THAT COULD BE RELATED TO ABUSE/NEGLECT?: NO

## 2025-06-26 SDOH — SOCIAL STABILITY: SOCIAL INSECURITY: DO YOU FEEL ANYONE HAS EXPLOITED OR TAKEN ADVANTAGE OF YOU FINANCIALLY OR OF YOUR PERSONAL PROPERTY?: NO

## 2025-06-26 SDOH — SOCIAL STABILITY: SOCIAL INSECURITY: DO YOU FEEL UNSAFE GOING BACK TO THE PLACE WHERE YOU ARE LIVING?: NO

## 2025-06-26 ASSESSMENT — COGNITIVE AND FUNCTIONAL STATUS - GENERAL
CLIMB 3 TO 5 STEPS WITH RAILING: TOTAL
STANDING UP FROM CHAIR USING ARMS: A LITTLE
WALKING IN HOSPITAL ROOM: TOTAL
TOILETING: A LITTLE
MOBILITY SCORE: 14
DRESSING REGULAR UPPER BODY CLOTHING: A LITTLE
STANDING UP FROM CHAIR USING ARMS: A LITTLE
TOILETING: A LITTLE
HELP NEEDED FOR BATHING: A LITTLE
DRESSING REGULAR LOWER BODY CLOTHING: A LITTLE
MOBILITY SCORE: 14
TURNING FROM BACK TO SIDE WHILE IN FLAT BAD: A LITTLE
MOBILITY SCORE: 16
EATING MEALS: A LITTLE
EATING MEALS: A LITTLE
MOVING TO AND FROM BED TO CHAIR: A LITTLE
MOVING TO AND FROM BED TO CHAIR: A LITTLE
CLIMB 3 TO 5 STEPS WITH RAILING: TOTAL
DAILY ACTIVITIY SCORE: 19
MOVING TO AND FROM BED TO CHAIR: A LITTLE
WALKING IN HOSPITAL ROOM: A LOT
DAILY ACTIVITIY SCORE: 19
CLIMB 3 TO 5 STEPS WITH RAILING: A LOT
MOVING FROM LYING ON BACK TO SITTING ON SIDE OF FLAT BED WITH BEDRAILS: A LITTLE
EATING MEALS: A LITTLE
MOVING FROM LYING ON BACK TO SITTING ON SIDE OF FLAT BED WITH BEDRAILS: A LITTLE
DAILY ACTIVITIY SCORE: 19
CLIMB 3 TO 5 STEPS WITH RAILING: TOTAL
DRESSING REGULAR LOWER BODY CLOTHING: A LITTLE
TURNING FROM BACK TO SIDE WHILE IN FLAT BAD: A LITTLE
PERSONAL GROOMING: A LITTLE
DRESSING REGULAR LOWER BODY CLOTHING: A LITTLE
WALKING IN HOSPITAL ROOM: TOTAL
STANDING UP FROM CHAIR USING ARMS: A LITTLE
TOILETING: A LITTLE
HELP NEEDED FOR BATHING: A LITTLE
TURNING FROM BACK TO SIDE WHILE IN FLAT BAD: A LITTLE
HELP NEEDED FOR BATHING: A LITTLE
WALKING IN HOSPITAL ROOM: TOTAL
MOVING TO AND FROM BED TO CHAIR: A LITTLE
PATIENT BASELINE BEDBOUND: NO
STANDING UP FROM CHAIR USING ARMS: A LITTLE
DRESSING REGULAR LOWER BODY CLOTHING: A LITTLE
DRESSING REGULAR UPPER BODY CLOTHING: A LITTLE
MOVING FROM LYING ON BACK TO SITTING ON SIDE OF FLAT BED WITH BEDRAILS: A LITTLE
MOVING FROM LYING ON BACK TO SITTING ON SIDE OF FLAT BED WITH BEDRAILS: A LITTLE
TURNING FROM BACK TO SIDE WHILE IN FLAT BAD: A LITTLE
EATING MEALS: A LITTLE
HELP NEEDED FOR BATHING: A LITTLE
TOILETING: A LITTLE
DRESSING REGULAR UPPER BODY CLOTHING: A LITTLE
DRESSING REGULAR UPPER BODY CLOTHING: A LITTLE
DAILY ACTIVITIY SCORE: 18
MOBILITY SCORE: 14

## 2025-06-26 ASSESSMENT — ENCOUNTER SYMPTOMS
NEUROLOGICAL NEGATIVE: 1
HEMATOLOGIC/LYMPHATIC NEGATIVE: 1
CONSTIPATION: 0
VOMITING: 1
CHILLS: 0
ACTIVITY CHANGE: 1
FEVER: 0
ABDOMINAL PAIN: 0
APPETITE CHANGE: 1
DIARRHEA: 0
NAUSEA: 0
ABDOMINAL DISTENTION: 1
FATIGUE: 1
EYES NEGATIVE: 1
MUSCULOSKELETAL NEGATIVE: 1
SORE THROAT: 1
RESPIRATORY NEGATIVE: 1
PSYCHIATRIC NEGATIVE: 1
ENDOCRINE NEGATIVE: 1
BLOOD IN STOOL: 0
CARDIOVASCULAR NEGATIVE: 1

## 2025-06-26 ASSESSMENT — PAIN - FUNCTIONAL ASSESSMENT
PAIN_FUNCTIONAL_ASSESSMENT: 0-10

## 2025-06-26 ASSESSMENT — LIFESTYLE VARIABLES
HOW MANY STANDARD DRINKS CONTAINING ALCOHOL DO YOU HAVE ON A TYPICAL DAY: PATIENT DOES NOT DRINK
AUDIT-C TOTAL SCORE: 0
HOW OFTEN DO YOU HAVE 6 OR MORE DRINKS ON ONE OCCASION: NEVER
HOW OFTEN DO YOU HAVE A DRINK CONTAINING ALCOHOL: NEVER
AUDIT-C TOTAL SCORE: 0
SKIP TO QUESTIONS 9-10: 1

## 2025-06-26 ASSESSMENT — ACTIVITIES OF DAILY LIVING (ADL)
HEARING - RIGHT EAR: FUNCTIONAL
WALKS IN HOME: NEEDS ASSISTANCE
LACK_OF_TRANSPORTATION: NO
LACK_OF_TRANSPORTATION: NO
DRESSING YOURSELF: NEEDS ASSISTANCE
GROOMING: NEEDS ASSISTANCE
PATIENT'S MEMORY ADEQUATE TO SAFELY COMPLETE DAILY ACTIVITIES?: YES
TOILETING: NEEDS ASSISTANCE
ADEQUATE_TO_COMPLETE_ADL: YES
HEARING - LEFT EAR: FUNCTIONAL
JUDGMENT_ADEQUATE_SAFELY_COMPLETE_DAILY_ACTIVITIES: YES
FEEDING YOURSELF: INDEPENDENT
BATHING: NEEDS ASSISTANCE

## 2025-06-26 ASSESSMENT — PATIENT HEALTH QUESTIONNAIRE - PHQ9
1. LITTLE INTEREST OR PLEASURE IN DOING THINGS: NOT AT ALL
2. FEELING DOWN, DEPRESSED OR HOPELESS: NOT AT ALL
SUM OF ALL RESPONSES TO PHQ9 QUESTIONS 1 & 2: 0

## 2025-06-26 ASSESSMENT — PAIN SCALES - GENERAL
PAINLEVEL_OUTOF10: 3
PAINLEVEL_OUTOF10: 1
PAINLEVEL_OUTOF10: 0 - NO PAIN
PAINLEVEL_OUTOF10: 2

## 2025-06-26 NOTE — CARE PLAN
The patient's goals for the shift include rest    The clinical goals for the shift include comfort and safety    Problem: Skin  Goal: Decreased wound size/increased tissue granulation at next dressing change  Outcome: Progressing  Flowsheets (Taken 6/26/2025 0045)  Decreased wound size/increased tissue granulation at next dressing change:   Promote sleep for wound healing   Protective dressings over bony prominences   Utilize specialty bed per algorithm  Goal: Participates in plan/prevention/treatment measures  Outcome: Progressing  Goal: Prevent/manage excess moisture  Outcome: Progressing  Goal: Prevent/minimize sheer/friction injuries  Outcome: Progressing  Goal: Promote/optimize nutrition  Outcome: Progressing  Goal: Promote skin healing  Outcome: Progressing     Problem: Pain - Adult  Goal: Verbalizes/displays adequate comfort level or baseline comfort level  Outcome: Progressing     Problem: Safety - Adult  Goal: Free from fall injury  Outcome: Progressing     Problem: Discharge Planning  Goal: Discharge to home or other facility with appropriate resources  Outcome: Progressing     Problem: Chronic Conditions and Co-morbidities  Goal: Patient's chronic conditions and co-morbidity symptoms are monitored and maintained or improved  Outcome: Progressing     Problem: Nutrition  Goal: Nutrient intake appropriate for maintaining nutritional needs  Outcome: Progressing

## 2025-06-26 NOTE — PROGRESS NOTES
"                                                                Internal Medicine Progress Note         Jordyn Neal is a 70 y.o. female on day 4 of admission presenting with Fall at home, initial encounter.    SUBJECTIVE    Patient seen and examined at bedside.  Patient feels \"run down\". Breakfast was untouched. She continues to have poor appetite. Patient expresses disappointment in the transfer taking longer than expected.    OBJECTIVE    Vitals:    06/25/25 1540 06/25/25 1947 06/26/25 0448 06/26/25 0700   BP: 144/71 139/78 141/66 125/62   BP Location: Right arm Right arm Right arm Left arm   Patient Position: Lying Lying Lying Lying   Pulse: (!) 114 (!) 116 (!) 111 (!) 114   Resp: 18 18 18 18   Temp: 36.7 °C (98.1 °F) 36.8 °C (98.2 °F) 36.3 °C (97.3 °F) 35.6 °C (96.1 °F)   TempSrc: Temporal Temporal Temporal Temporal   SpO2: 94% 92% 92% 94%   Weight:       Height:          Results from last 7 days   Lab Units 06/26/25  0559 06/21/25  0629 06/20/25  1216   WBC AUTO x10*3/uL 10.7   < > 8.7   HEMOGLOBIN g/dL 13.0   < > 12.4   HEMATOCRIT % 38.8   < > 37.0   PLATELETS AUTO x10*3/uL 323   < > 178   NEUTROS PCT AUTO %  --   --  86.0   LYMPHS PCT AUTO %  --   --  5.2   MONOS PCT AUTO %  --   --  8.2   EOS PCT AUTO %  --   --  0.0    < > = values in this interval not displayed.     Results from last 7 days   Lab Units 06/26/25  0559   SODIUM mmol/L 127*   POTASSIUM mmol/L 4.4   CHLORIDE mmol/L 98   CO2 mmol/L 18*   BUN mg/dL 30*   CREATININE mg/dL 1.17*   CALCIUM mg/dL 8.9   PROTEIN TOTAL g/dL 5.7*   BILIRUBIN TOTAL mg/dL 2.6*   ALK PHOS U/L 530*   ALT U/L 53*   AST U/L 83*   GLUCOSE mg/dL 88       Scheduled Medications  Scheduled Medications[1]       Physical Exam  General: No acute distress. Fatigued. Ill-appearing.   HEENT: EOMI  CV: tachycardic  Pulm: Clear to auscultation bilaterally, no wheezings, rales or rhonchi  Abd: Nondistended, nontender  Ext: No cyanosis or edema  Skin: Pale,no rashes, warm and dry  Neuro: " at baseline.        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      Daily Progress  -[place] [facility]oke to [place] team, unit she was accepted to is full, pending discharges.      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 S/p biliary stent placement for recently diagnosed cholangiocarcinoma.   -CT A/P showed overall progression of metastatic disease, new innumerable tiny masses throughout the liver, small-moderate ascites and pleural effusions, and CBD stent in place without biliary dilatation  PLAN:  -Encourage PO intake  -Continue mirtazapine  -Trend LFTs and electrolytes  -Pain management with scheduled acetaminophen, lidocaine patches, PRN oxycodone  -[facility] [place], waiting on open bed     #Port dysfunction, resolved  PLAN:  -IR access port on 6/23/25     Chronic Conditions  #HTN- continue amlodipine  #GERD- continue pantoprazole  #MDD, #LUIS FELIPE- continue paroxetine  #T2DM- insulin held given poor PO intake and normoglycemia. Hypoglycemia protocol in place     DVT ppx: LMWH  IVF: none  Diet: Regular  Consults: none  CODE STATUS: DNR    Addie Verma, DO PGY-1 Internal Medicine  Please SecureChat for any further questions  This is a preliminary note, please await attending attestation for final A/P            [1] acetaminophen, 325 mg, oral, q4h  amLODIPine, 10 mg, oral, Daily  enoxaparin, 40 mg, subcutaneous, q24h  lidocaine, 1 patch,  transdermal, Daily  mirtazapine, 15 mg, oral, Nightly  mupirocin, , Topical, TID  nystatin, 4 mL, Swish & Swallow, 4x daily  pantoprazole, 40 mg, oral, Daily before breakfast  PARoxetine, 20 mg, oral, Daily  polyethylene glycol, 17 g, oral, Daily  sennosides, 1 tablet, oral, Nightly

## 2025-06-26 NOTE — H&P
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     Patient initially presented to [facility] on 6/20 for generalized weakness, poor PO intake and N/V. Labs notable for hyponatremia 129 which was initially thought to be hypovolemic due to decreased PO intake, however, Na unchanged s/p multiple liters of fluid resucuitation. LFTs also uptrended during hospotal course. CT A/P (6/22) showed overall progression of metestatic disease, new small volume ascites, satisfactory placement of CBD stetnt without significant upstream bilairy dilatation, new small pleual effusion. Heme / onc was consulted for progression of metastic disease, uptrending LFTs, and evaluation for emergent chemotherpy. Patient's primary med-oncologist at [place], Dr. Willis, who receommended transfer to [facility] for urgernt chemotherapy with Duvalumab + Gemcitabine / Cisplatin.     Upon arrival to [facility] reports feeling weak, unchanged from admission to [facility] 6 days ago. She continues to have decreased PO intake and intermitted /Vm controlled with PRN N/V. Reports that she was being treated for thrush with oral solution at OSH which contributed to decreased appetite. Feels that thrush has improved over the past few days. She denies any pain at time of interview but does state that she prefers tylenol for pain over oxycodone. We discussed plan for oncology consult and further correction of electrolytes, patient agreeable to plan. Otherwise denies shortness of breath, chest pain, abdominal pain, N/V/D, F/C, H/A.     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    Surgical History  She has a past surgical history that includes Other surgical history (08/29/2022); Other surgical history (06/30/2020); and Esophagoscopy / EGD (04/14/2025).    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 Willis  She was admitted at [facility] in April 2025 with complaint of severe vomiting and right upper quadrant pain. There was also history of 20-30 pound weight loss over the last 6 months. Initial workup including CT scan revealed a large hepatic mass along with extensive intra-abdominal lymphadenopathy causing compression of the cystic duct resulting in intrahepatic biliary ductal dilatation and gallbladder distention. There was also finding of mildly heterogenicity/thickening of the endometrium. Follow-up liver MRI confirmed 8 x 5.4 cm heterogeneous mass along with extensive lymphadenopathy. Pelvic ultrasound mainly showed uterine hyperplasia. She was transferred to [facility] and had ERCP with stent placement in CBD and fluoroscopic biopsy of the hilar lymph node confirming poorly differentiated carcinoma    Social History  She reports that she has never smoked. She has never used smokeless tobacco. She reports current alcohol use. She reports that she does not currently use drugs.     Allergies  Patient has no known  allergies.    Review of Systems   Constitutional:  Positive for activity change, appetite change and fatigue. Negative for chills and fever.   HENT:  Positive for sore throat.    Eyes: Negative.    Respiratory: Negative.     Cardiovascular: Negative.    Gastrointestinal:  Positive for abdominal distention and vomiting. Negative for abdominal pain, blood in stool, constipation, diarrhea and nausea.   Endocrine: Negative.    Genitourinary: Negative.    Musculoskeletal: Negative.    Neurological: Negative.    Hematological: Negative.    Psychiatric/Behavioral: Negative.          Physical Exam  Constitutional:       Appearance: She is obese. She is ill-appearing.   HENT:      Mouth/Throat:      Mouth: Mucous membranes are moist.      Comments: Erythematous oropharynx   Eyes:      Pupils: Pupils are equal, round, and reactive to light.   Cardiovascular:      Rate and Rhythm: Regular rhythm. Tachycardia present.      Pulses: Normal pulses.      Heart sounds: Normal heart sounds.   Pulmonary:      Effort: Pulmonary effort is normal.      Breath sounds: Normal breath sounds. No wheezing, rhonchi or rales.   Abdominal:      General: There is distension.      Tenderness: There is no abdominal tenderness.      Comments: Abdominal is distended, no tenderness or guarding. Normoactive bowel sounds in all 4 quadrants    Musculoskeletal:         General: No swelling.      Right lower leg: No edema.      Left lower leg: No edema.   Skin:     General: Skin is warm.   Neurological:      General: No focal deficit present.   Psychiatric:         Mood and Affect: Mood normal.          Last Recorded Vitals  Blood pressure 139/90, pulse (!) 120, temperature 36.4 °C (97.5 °F), temperature source Temporal, resp. rate 20, SpO2 93%.    Relevant Results        Scheduled medications  Scheduled Medications[1]  Continuous medications  Continuous Medications[2]  PRN medications  PRN Medications[3]      Results for orders placed or performed during  the hospital encounter of 06/20/25 (from the past 24 hours)   POCT GLUCOSE   Result Value Ref Range    POCT Glucose 129 (H) 74 - 99 mg/dL   POCT GLUCOSE   Result Value Ref Range    POCT Glucose 107 (H) 74 - 99 mg/dL   CBC   Result Value Ref Range    WBC 10.7 4.4 - 11.3 x10*3/uL    nRBC 0.6 (H) 0.0 - 0.0 /100 WBCs    RBC 4.35 4.00 - 5.20 x10*6/uL    Hemoglobin 13.0 12.0 - 16.0 g/dL    Hematocrit 38.8 36.0 - 46.0 %    MCV 89 80 - 100 fL    MCH 29.9 26.0 - 34.0 pg    MCHC 33.5 32.0 - 36.0 g/dL    RDW 17.2 (H) 11.5 - 14.5 %    Platelets 323 150 - 450 x10*3/uL   Comprehensive Metabolic Panel   Result Value Ref Range    Glucose 88 74 - 99 mg/dL    Sodium 127 (L) 136 - 145 mmol/L    Potassium 4.4 3.5 - 5.3 mmol/L    Chloride 98 98 - 107 mmol/L    Bicarbonate 18 (L) 21 - 32 mmol/L    Anion Gap 15 10 - 20 mmol/L    Urea Nitrogen 30 (H) 6 - 23 mg/dL    Creatinine 1.17 (H) 0.50 - 1.05 mg/dL    eGFR 51 (L) >60 mL/min/1.73m*2    Calcium 8.9 8.6 - 10.3 mg/dL    Albumin 2.5 (L) 3.4 - 5.0 g/dL    Alkaline Phosphatase 530 (H) 33 - 136 U/L    Total Protein 5.7 (L) 6.4 - 8.2 g/dL    AST 83 (H) 9 - 39 U/L    Bilirubin, Total 2.6 (H) 0.0 - 1.2 mg/dL    ALT 53 (H) 7 - 45 U/L   POCT GLUCOSE   Result Value Ref Range    POCT Glucose 100 (H) 74 - 99 mg/dL   POCT GLUCOSE   Result Value Ref Range    POCT Glucose 116 (H) 74 - 99 mg/dL          Assessment/Plan   Assessment & Plan  Hyponatremia      Jordyn Manzano is a 69yo female with a PMHx of 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 Oncology consulted on admission, recs pending. For hyponatremia, 1L fluid restriction in place, urine lytes and osmo pending. DC pending onc  recommendation, hyponatremia work-up, PT / OT recs.     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 Willis  - Diagnosed in 5/2025  - Discussed at [facility] on 6/[facility] and rec treatment with Durvalumab + Gemcitabine / Cisplatin   - Treatment was scheduled to begin on 6/22/2025, however, unable to due to admission   - CT A/P (6/22) showed overall progression of metestatic disease, new small volume ascites, satisfactory placement of CBD stent without significant upstream bilairy dilatation, new small pleual effusion  - Primary oncologist kailash petersen to [facility] for intiation of emergent inpatient chemotherapy   - Oncology consulted upon arrival to [facility], recs pending     # Hyponatremia   - Na 131 on admit to [place] (6/20) --> Na 127 (6/26)  - initially though to be hypovolemic hyponatremia in setting of poor PO intake at OSH, s/p multiple L of fluid resuscitation with downtrend of Na  - Urine electrolytes, urine osmo and serum osmo ordered on admit and pending  - 1L fluid restriction   - Consider nephro consult if Na continues to down trend     # Elevated LFTs  - likely in setting of metastasis to liver   - Alk phos 530, ALT 53, AST 83, Tbili 2.6 upon admission (6/26)   - CT A/P (6/22) showed overall progression of metestatic disease, new small volume ascites, satisfactory placement of CBD stent without significant upstream bilairy dilatation     # Neoplasm related pain   # FTT  # Poor PO intake   # N/V  - Continue with home oxycodone 10mg q6 hours PRN severe pain   - Continue with home zofran 4mg q6 hours PRN first line N/V, continue with home compazine 10mg q6 hours q6 hours PRN second line N/V   - Continue with home mitazapine 15mg nightly   - Nutrition consulted on admit, recs pending   - PT / OT consulted on admit, recs pending   - Consider supp onc consult if needed     # Oral thrush   - Continue with oral nystatin 5mL QID  swish and spit     # CKD III   - sCR 1.17 (6/26), recent sCr baseline ~1.2-1.3     # HTN  - Continue with home amlodipine 10mg daily     # MDD  # LUIS FELIPE  - Continue with home paroxetine 20mg daily     # Type 2 DM  -  last Hgb A1c 5.9 (4/29/24), repeat ordered on admit and pending   - due to decreased PO intake and normal blood sugars, insulin not ordered on admit, CTM  - hypoglycemic protocol in place     # GERD  - Protonix 40mg daily     # DVT / prophy   - Lovenox subcutaneous, SCDs    # Dispo   - DNR, DNI - no ICU - confirmed on admit   - DC pending onc recommendation, hyponatremia work-up, PT / OT recs   - NOK: Natalio Neal [phone]   - FUV with Dr. Willis (oncology) on 7/24        I spent 100 minutes in the professional and overall care of this patient.    Patient to be formally staffed with attending physician, Dr. Alvarez on 6/27    Arline Huddleston PA-C         [1] [START ON 6/27/2025] amLODIPine, 10 mg, oral, Daily  enoxaparin, 40 mg, subcutaneous, q24h  mirtazapine, 15 mg, oral, Nightly  nystatin, 5 mL, Swish & Spit, 4x daily  [START ON 6/27/2025] pantoprazole, 40 mg, oral, Daily before breakfast  [START ON 6/27/2025] PARoxetine, 20 mg, oral, Daily  [2]    [3] PRN medications: acetaminophen, alteplase, fluticasone, oxyCODONE, prochlorperazine

## 2025-06-26 NOTE — CARE PLAN
The patient's goals for the shift include comfort and safety  Problem: Skin  Goal: Decreased wound size/increased tissue granulation at next dressing change  Outcome: Progressing  Goal: Participates in plan/prevention/treatment measures  Outcome: Progressing  Goal: Prevent/manage excess moisture  Outcome: Progressing  Goal: Prevent/minimize sheer/friction injuries  Outcome: Progressing  Goal: Promote/optimize nutrition  Outcome: Progressing  Goal: Promote skin healing  Outcome: Progressing     Problem: Pain - Adult  Goal: Verbalizes/displays adequate comfort level or baseline comfort level  Outcome: Progressing     Problem: Safety - Adult  Goal: Free from fall injury  Outcome: Progressing     Problem: Discharge Planning  Goal: Discharge to home or other facility with appropriate resources  Outcome: Progressing     Problem: Chronic Conditions and Co-morbidities  Goal: Patient's chronic conditions and co-morbidity symptoms are monitored and maintained or improved  Outcome: Progressing     Problem: Nutrition  Goal: Nutrient intake appropriate for maintaining nutritional needs  Outcome: Progressing     Problem: Diabetes  Goal: Achieve decreasing blood glucose levels by end of shift  Outcome: Progressing  Goal: Increase stability of blood glucose readings by end of shift  Outcome: Progressing  Goal: Decrease in ketones present in urine by end of shift  Outcome: Progressing  Goal: Maintain electrolyte levels within acceptable range throughout shift  Outcome: Progressing  Goal: Maintain glucose levels >70mg/dl to <250mg/dl throughout shift  Outcome: Progressing  Goal: No changes in neurological exam by end of shift  Outcome: Progressing  Goal: Learn about and adhere to nutrition recommendations by end of shift  Outcome: Progressing  Goal: Vital signs within normal range for age by end of shift  Outcome: Progressing  Goal: Increase self care and/or family involovement by end of shift  Outcome: Progressing  Goal: Receive  DSME education by end of shift  Outcome: Progressing     Problem: Fall/Injury  Goal: Not fall by end of shift  Outcome: Progressing  Goal: Be free from injury by end of the shift  Outcome: Progressing  Goal: Verbalize understanding of personal risk factors for fall in the hospital  Outcome: Progressing  Goal: Verbalize understanding of risk factor reduction measures to prevent injury from fall in the home  Outcome: Progressing  Goal: Use assistive devices by end of the shift  Outcome: Progressing  Goal: Pace activities to prevent fatigue by end of the shift  Outcome: Progressing     The clinical goals for the shift include Pt will remain safe and comfortable during the shift

## 2025-06-27 ENCOUNTER — APPOINTMENT (OUTPATIENT)
Dept: HEMATOLOGY/ONCOLOGY | Facility: CLINIC | Age: 70
End: 2025-06-27
Payer: MEDICARE

## 2025-06-27 LAB
ALBUMIN SERPL BCP-MCNC: 3 G/DL (ref 3.4–5)
ALP SERPL-CCNC: 649 U/L (ref 33–136)
ALT SERPL W P-5'-P-CCNC: 50 U/L (ref 7–45)
ANION GAP SERPL CALC-SCNC: 18 MMOL/L (ref 10–20)
ANION GAP SERPL CALC-SCNC: 18 MMOL/L (ref 10–20)
AST SERPL W P-5'-P-CCNC: 91 U/L (ref 9–39)
BASOPHILS # BLD AUTO: 0.02 X10*3/UL (ref 0–0.1)
BASOPHILS NFR BLD AUTO: 0.1 %
BILIRUB SERPL-MCNC: 2.5 MG/DL (ref 0–1.2)
BUN SERPL-MCNC: 31 MG/DL (ref 6–23)
BUN SERPL-MCNC: 33 MG/DL (ref 6–23)
CALCIUM SERPL-MCNC: 9.2 MG/DL (ref 8.6–10.6)
CALCIUM SERPL-MCNC: 9.3 MG/DL (ref 8.6–10.6)
CHLORIDE SERPL-SCNC: 96 MMOL/L (ref 98–107)
CHLORIDE SERPL-SCNC: 97 MMOL/L (ref 98–107)
CHLORIDE UR-SCNC: <15 MMOL/L
CHLORIDE/CREATININE (MMOL/G) IN URINE: NORMAL
CO2 SERPL-SCNC: 15 MMOL/L (ref 21–32)
CO2 SERPL-SCNC: 15 MMOL/L (ref 21–32)
CREAT SERPL-MCNC: 1.08 MG/DL (ref 0.5–1.05)
CREAT SERPL-MCNC: 1.2 MG/DL (ref 0.5–1.05)
CREAT UR-MCNC: 211.1 MG/DL (ref 20–320)
EGFRCR SERPLBLD CKD-EPI 2021: 49 ML/MIN/1.73M*2
EGFRCR SERPLBLD CKD-EPI 2021: 56 ML/MIN/1.73M*2
EOSINOPHIL # BLD AUTO: 0.14 X10*3/UL (ref 0–0.7)
EOSINOPHIL NFR BLD AUTO: 1 %
ERYTHROCYTE [DISTWIDTH] IN BLOOD BY AUTOMATED COUNT: 17.4 % (ref 11.5–14.5)
GLUCOSE SERPL-MCNC: 80 MG/DL (ref 74–99)
GLUCOSE SERPL-MCNC: 80 MG/DL (ref 74–99)
HCT VFR BLD AUTO: 40.2 % (ref 36–46)
HGB BLD-MCNC: 13 G/DL (ref 12–16)
IMM GRANULOCYTES # BLD AUTO: 0.27 X10*3/UL (ref 0–0.7)
IMM GRANULOCYTES NFR BLD AUTO: 2 % (ref 0–0.9)
LYMPHOCYTES # BLD AUTO: 2.16 X10*3/UL (ref 1.2–4.8)
LYMPHOCYTES NFR BLD AUTO: 15.8 %
MAGNESIUM SERPL-MCNC: 2.26 MG/DL (ref 1.6–2.4)
MCH RBC QN AUTO: 29.4 PG (ref 26–34)
MCHC RBC AUTO-ENTMCNC: 32.3 G/DL (ref 32–36)
MCV RBC AUTO: 91 FL (ref 80–100)
MONOCYTES # BLD AUTO: 0.71 X10*3/UL (ref 0.1–1)
MONOCYTES NFR BLD AUTO: 5.2 %
NEUTROPHILS # BLD AUTO: 10.34 X10*3/UL (ref 1.2–7.7)
NEUTROPHILS NFR BLD AUTO: 75.9 %
NRBC BLD-RTO: 0.7 /100 WBCS (ref 0–0)
OSMOLALITY UR: 922 MOSM/KG (ref 200–1200)
PLATELET # BLD AUTO: 419 X10*3/UL (ref 150–450)
POTASSIUM SERPL-SCNC: 4.7 MMOL/L (ref 3.5–5.3)
POTASSIUM SERPL-SCNC: 5 MMOL/L (ref 3.5–5.3)
POTASSIUM UR-SCNC: 53 MMOL/L
POTASSIUM/CREAT UR-RTO: 25 MMOL/G CREAT
PROT SERPL-MCNC: 6.2 G/DL (ref 6.4–8.2)
RBC # BLD AUTO: 4.42 X10*6/UL (ref 4–5.2)
SODIUM SERPL-SCNC: 124 MMOL/L (ref 136–145)
SODIUM SERPL-SCNC: 125 MMOL/L (ref 136–145)
SODIUM UR-SCNC: 10 MMOL/L
SODIUM/CREAT UR-RTO: 5 MMOL/G CREAT
WBC # BLD AUTO: 13.6 X10*3/UL (ref 4.4–11.3)

## 2025-06-27 PROCEDURE — 2500000001 HC RX 250 WO HCPCS SELF ADMINISTERED DRUGS (ALT 637 FOR MEDICARE OP)

## 2025-06-27 PROCEDURE — 2500000002 HC RX 250 W HCPCS SELF ADMINISTERED DRUGS (ALT 637 FOR MEDICARE OP, ALT 636 FOR OP/ED)

## 2025-06-27 PROCEDURE — 82436 ASSAY OF URINE CHLORIDE: CPT

## 2025-06-27 PROCEDURE — 2500000004 HC RX 250 GENERAL PHARMACY W/ HCPCS (ALT 636 FOR OP/ED)

## 2025-06-27 PROCEDURE — 36415 COLL VENOUS BLD VENIPUNCTURE: CPT

## 2025-06-27 PROCEDURE — 97162 PT EVAL MOD COMPLEX 30 MIN: CPT | Mod: GP

## 2025-06-27 PROCEDURE — 83735 ASSAY OF MAGNESIUM: CPT

## 2025-06-27 PROCEDURE — 80053 COMPREHEN METABOLIC PANEL: CPT

## 2025-06-27 PROCEDURE — 80051 ELECTROLYTE PANEL: CPT

## 2025-06-27 PROCEDURE — 3E04305 INTRODUCTION OF OTHER ANTINEOPLASTIC INTO CENTRAL VEIN, PERCUTANEOUS APPROACH: ICD-10-PCS

## 2025-06-27 PROCEDURE — 83935 ASSAY OF URINE OSMOLALITY: CPT

## 2025-06-27 PROCEDURE — 85025 COMPLETE CBC W/AUTO DIFF WBC: CPT

## 2025-06-27 PROCEDURE — 99233 SBSQ HOSP IP/OBS HIGH 50: CPT | Performed by: INTERNAL MEDICINE

## 2025-06-27 PROCEDURE — 87081 CULTURE SCREEN ONLY: CPT

## 2025-06-27 PROCEDURE — 99223 1ST HOSP IP/OBS HIGH 75: CPT

## 2025-06-27 PROCEDURE — 1170000001 HC PRIVATE ONCOLOGY ROOM DAILY

## 2025-06-27 PROCEDURE — 99221 1ST HOSP IP/OBS SF/LOW 40: CPT | Performed by: INTERNAL MEDICINE

## 2025-06-27 PROCEDURE — 2500000004 HC RX 250 GENERAL PHARMACY W/ HCPCS (ALT 636 FOR OP/ED): Performed by: INTERNAL MEDICINE

## 2025-06-27 PROCEDURE — 84300 ASSAY OF URINE SODIUM: CPT

## 2025-06-27 RX ORDER — DEXAMETHASONE 4 MG/1
8 TABLET ORAL DAILY
Qty: 12 TABLET | Refills: 5 | Status: SHIPPED | OUTPATIENT
Start: 2025-06-27

## 2025-06-27 RX ORDER — EPINEPHRINE 1 MG/ML
0.3 INJECTION, SOLUTION, CONCENTRATE INTRAVENOUS EVERY 5 MIN PRN
Status: ACTIVE | OUTPATIENT
Start: 2025-06-27

## 2025-06-27 RX ORDER — FAMOTIDINE 10 MG/ML
20 INJECTION, SOLUTION INTRAVENOUS ONCE AS NEEDED
Status: DISPENSED | OUTPATIENT
Start: 2025-06-27

## 2025-06-27 RX ORDER — PROCHLORPERAZINE MALEATE 10 MG
10 TABLET ORAL EVERY 6 HOURS PRN
Qty: 30 TABLET | Refills: 5 | Status: SHIPPED | OUTPATIENT
Start: 2025-06-27

## 2025-06-27 RX ORDER — ALBUTEROL SULFATE 0.83 MG/ML
3 SOLUTION RESPIRATORY (INHALATION) AS NEEDED
Status: ACTIVE | OUTPATIENT
Start: 2025-06-27

## 2025-06-27 RX ORDER — PROCHLORPERAZINE EDISYLATE 5 MG/ML
10 INJECTION INTRAMUSCULAR; INTRAVENOUS EVERY 6 HOURS PRN
Status: DISPENSED | OUTPATIENT
Start: 2025-06-27

## 2025-06-27 RX ORDER — OLANZAPINE 5 MG/1
5 TABLET, FILM COATED ORAL NIGHTLY
Qty: 8 TABLET | Refills: 5 | Status: SHIPPED | OUTPATIENT
Start: 2025-06-27

## 2025-06-27 RX ORDER — HEPARIN SODIUM,PORCINE/PF 10 UNIT/ML
50 SYRINGE (ML) INTRAVENOUS AS NEEDED
Status: ACTIVE | OUTPATIENT
Start: 2025-06-27

## 2025-06-27 RX ORDER — PALONOSETRON 0.05 MG/ML
0.25 INJECTION, SOLUTION INTRAVENOUS ONCE
Status: COMPLETED | OUTPATIENT
Start: 2025-06-27 | End: 2025-06-27

## 2025-06-27 RX ORDER — ONDANSETRON 8 MG/1
8 TABLET, FILM COATED ORAL EVERY 8 HOURS PRN
Qty: 30 TABLET | Refills: 5 | Status: SHIPPED | OUTPATIENT
Start: 2025-06-27

## 2025-06-27 RX ORDER — DIPHENHYDRAMINE HYDROCHLORIDE 50 MG/ML
50 INJECTION, SOLUTION INTRAMUSCULAR; INTRAVENOUS AS NEEDED
Status: DISPENSED | OUTPATIENT
Start: 2025-06-27

## 2025-06-27 RX ORDER — PROCHLORPERAZINE MALEATE 10 MG
10 TABLET ORAL EVERY 6 HOURS PRN
Status: ACTIVE | OUTPATIENT
Start: 2025-06-27

## 2025-06-27 RX ORDER — HEPARIN SODIUM,PORCINE/PF 10 UNIT/ML
50 SYRINGE (ML) INTRAVENOUS EVERY 12 HOURS
Status: DISPENSED | OUTPATIENT
Start: 2025-06-27

## 2025-06-27 RX ADMIN — NYSTATIN 500000 UNITS: 100000 SUSPENSION ORAL at 08:28

## 2025-06-27 RX ADMIN — NYSTATIN 500000 UNITS: 100000 SUSPENSION ORAL at 17:08

## 2025-06-27 RX ADMIN — DEXAMETHASONE SODIUM PHOSPHATE 12 MG: 100 INJECTION INTRAMUSCULAR; INTRAVENOUS at 22:07

## 2025-06-27 RX ADMIN — SODIUM CHLORIDE 1000 ML: 0.9 INJECTION, SOLUTION INTRAVENOUS at 12:21

## 2025-06-27 RX ADMIN — MAGNESIUM SULFATE HEPTAHYDRATE 999 ML/HR: 500 INJECTION, SOLUTION INTRAMUSCULAR; INTRAVENOUS at 21:00

## 2025-06-27 RX ADMIN — MIRTAZAPINE 15 MG: 15 TABLET, FILM COATED ORAL at 20:53

## 2025-06-27 RX ADMIN — NYSTATIN 500000 UNITS: 100000 SUSPENSION ORAL at 12:20

## 2025-06-27 RX ADMIN — GEMCITABINE 2059.6 MG: 38 INJECTION, SOLUTION INTRAVENOUS at 23:41

## 2025-06-27 RX ADMIN — PANTOPRAZOLE SODIUM 40 MG: 40 TABLET, DELAYED RELEASE ORAL at 08:28

## 2025-06-27 RX ADMIN — PAROXETINE HYDROCHLORIDE 20 MG: 20 TABLET, FILM COATED ORAL at 08:29

## 2025-06-27 RX ADMIN — NYSTATIN 500000 UNITS: 100000 SUSPENSION ORAL at 20:53

## 2025-06-27 RX ADMIN — ENOXAPARIN SODIUM 40 MG: 100 INJECTION SUBCUTANEOUS at 17:08

## 2025-06-27 RX ADMIN — Medication 15 ML: at 20:53

## 2025-06-27 RX ADMIN — AMLODIPINE BESYLATE 10 MG: 10 TABLET ORAL at 08:28

## 2025-06-27 RX ADMIN — Medication 15 ML: at 12:20

## 2025-06-27 RX ADMIN — PROCHLORPERAZINE MALEATE 10 MG: 10 TABLET ORAL at 19:31

## 2025-06-27 RX ADMIN — PALONOSETRON HYDROCHLORIDE 0.25 MG: 0.25 INJECTION INTRAVENOUS at 22:07

## 2025-06-27 ASSESSMENT — COGNITIVE AND FUNCTIONAL STATUS - GENERAL
CLIMB 3 TO 5 STEPS WITH RAILING: TOTAL
MOBILITY SCORE: 8
TURNING FROM BACK TO SIDE WHILE IN FLAT BAD: A LOT
MOBILITY SCORE: 12
DRESSING REGULAR LOWER BODY CLOTHING: A LITTLE
TURNING FROM BACK TO SIDE WHILE IN FLAT BAD: A LITTLE
WALKING IN HOSPITAL ROOM: TOTAL
CLIMB 3 TO 5 STEPS WITH RAILING: TOTAL
MOVING FROM LYING ON BACK TO SITTING ON SIDE OF FLAT BED WITH BEDRAILS: A LOT
TOILETING: A LITTLE
MOVING TO AND FROM BED TO CHAIR: TOTAL
WALKING IN HOSPITAL ROOM: TOTAL
MOBILITY SCORE: 14
MOVING TO AND FROM BED TO CHAIR: A LOT
STANDING UP FROM CHAIR USING ARMS: A LITTLE
STANDING UP FROM CHAIR USING ARMS: A LOT
CLIMB 3 TO 5 STEPS WITH RAILING: TOTAL
PERSONAL GROOMING: A LITTLE
MOVING FROM LYING ON BACK TO SITTING ON SIDE OF FLAT BED WITH BEDRAILS: A LITTLE
TURNING FROM BACK TO SIDE WHILE IN FLAT BAD: A LITTLE
HELP NEEDED FOR BATHING: A LITTLE
STANDING UP FROM CHAIR USING ARMS: TOTAL
WALKING IN HOSPITAL ROOM: TOTAL
DRESSING REGULAR UPPER BODY CLOTHING: A LITTLE
MOVING TO AND FROM BED TO CHAIR: A LITTLE
MOVING FROM LYING ON BACK TO SITTING ON SIDE OF FLAT BED WITH BEDRAILS: A LITTLE
EATING MEALS: A LITTLE
DAILY ACTIVITIY SCORE: 18

## 2025-06-27 ASSESSMENT — PAIN SCALES - GENERAL
PAINLEVEL_OUTOF10: 0 - NO PAIN

## 2025-06-27 ASSESSMENT — ACTIVITIES OF DAILY LIVING (ADL): LACK_OF_TRANSPORTATION: NO

## 2025-06-27 ASSESSMENT — PAIN - FUNCTIONAL ASSESSMENT
PAIN_FUNCTIONAL_ASSESSMENT: 0-10

## 2025-06-27 NOTE — CARE PLAN
Problem: Pain - Adult  Goal: Verbalizes/displays adequate comfort level or baseline comfort level  Outcome: Progressing     Problem: Safety - Adult  Goal: Free from fall injury  Outcome: Progressing     Problem: Discharge Planning  Goal: Discharge to home or other facility with appropriate resources  Outcome: Progressing     Problem: Chronic Conditions and Co-morbidities  Goal: Patient's chronic conditions and co-morbidity symptoms are monitored and maintained or improved  Outcome: Progressing     Problem: Nutrition  Goal: Nutrient intake appropriate for maintaining nutritional needs  Outcome: Progressing     Problem: Skin  Goal: Decreased wound size/increased tissue granulation at next dressing change  Outcome: Progressing  Flowsheets (Taken 6/26/2025 2233)  Decreased wound size/increased tissue granulation at next dressing change: Promote sleep for wound healing  Goal: Participates in plan/prevention/treatment measures  Outcome: Progressing  Flowsheets (Taken 6/26/2025 2233)  Participates in plan/prevention/treatment measures: Discuss with provider PT/OT consult  Goal: Prevent/manage excess moisture  Outcome: Progressing  Flowsheets (Taken 6/26/2025 2233)  Prevent/manage excess moisture: Cleanse incontinence/protect with barrier cream  Goal: Prevent/minimize sheer/friction injuries  Outcome: Progressing  Flowsheets (Taken 6/26/2025 2233)  Prevent/minimize sheer/friction injuries: Turn/reposition every 2 hours/use positioning/transfer devices  Goal: Promote/optimize nutrition  Outcome: Progressing  Flowsheets (Taken 6/26/2025 2233)  Promote/optimize nutrition: Offer water/supplements/favorite foods  Goal: Promote skin healing  Outcome: Progressing  Flowsheets (Taken 6/26/2025 2233)  Promote skin healing: Assess skin/pad under line(s)/device(s)     The clinical goals for the shift include Pt will be safe and free from injury and fall throughout shift.

## 2025-06-27 NOTE — CONSULTS
Name: Jordyn eNal  MRN: [id number]  Admit Date: 6/26/2025  Encounter Date: 6/27/2025  PCP: Kit Fleming MD    Reason for consult: For initiation of inpatient chemotherapy for new stage IV cholangiocarcinoma  Attending provider: Avtar Alvarez MD  Consult attending provider: Dr Recio    Oncology Consult Note      History of Present Illness   Jordyn Neal is a 70 y.o. female with a PMHx of T2DM, CKD 3, HTN, HLD, fatty liver, GERD, and recently diagnosed metastatic panceaticobiliary carcinoma (diagnosed on 4/14/25 on Bx, s/p ERCP with plastic stent placement to the CBD 4/14/25 (planned to do FU ERCP 6/9)), initially presented to [place] on 6/20 with worsening weakness/deconditioning and was found to have hyponatremia 129/worsened LFTs (247/72/51/3.3) and APCT 6/22 showing progression of disease with new innumerable tiny masses. Her primary oncologist Dr Willis recommended transferring here to initiate inpatient chemotherapy.    Notably, tumor board (TB) was held on 6/4 and based on imaging/pathology, her cancer is likely intrahepatic cholangiocarcinoma and the decision was made to start gemcitabine/cisplatin+durvalumab which she's supposed to start on 6/20 in the outpatient clinic.     She's seen on the bedside. She says that although she still feel weak with no appetite, her pain/N/V has been controlled well with medications that she has been given since admission. No other complaints and she says that she wants to start treatment as soon as possible.    Onc History   Reviewed    Oncology History   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   6/16/2025 Initial Diagnosis    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     6/27/2025 -  Chemotherapy    Durvalumab + Gemcitabine / CISplatin, 28 Day Cycles         Past Medical History   Medical History[1]      Past Surgical History   Surgical History[2]      Family History    Family History[3]      Social History   Social History[4]      Allergies  "  Allergies[5]    Medications   amLODIPine, 10 mg, Daily  enoxaparin, 40 mg, q24h  mirtazapine, 15 mg, Nightly  moisturizing mouth, 15 mL, TID  nystatin, 5 mL, 4x daily  pantoprazole, 40 mg, Daily before breakfast  PARoxetine, 20 mg, Daily  sodium chloride, 1,000 mL, Once         acetaminophen, 650 mg, q6h PRN  alteplase, 2 mg, PRN  fluticasone, 1 spray, Daily PRN  oxyCODONE, 10 mg, q6h PRN  prochlorperazine, 10 mg, q6h PRN        Review of Systems   12-point ROS negative, except as specified in the HPI.    Physical Exam   BP (!) 138/92 (BP Location: Right arm, Patient Position: Lying)   Pulse 110   Temp 36.1 °C (97 °F) (Temporal)   Resp 18   Ht 1.676 m (5' 6\")   Wt 92.9 kg (204 lb 14.4 oz)   SpO2 94%   BMI 33.07 kg/m²   Weight:   Vitals:    06/26/25 1415   Weight: 92.9 kg (204 lb 14.4 oz)       BSA: 2.08 meters squared    ECOG 3    Alert and oriented x3  Clear lung sound w/o rale, no wheezing  RHB w/o murmur  Soft and flat abdomen w/o tenderness  Trace-+1 pitting edema on bilateral legs    Labs   Reviewed      Imaging   C/A/P CT (6/22/25)  IMPRESSION:  1.  Overall progression of metastatic disease compared to prior exam  just over 2 months ago.  2. Right hepatic lobe mass roughly unchanged. There are otherwise  innumerable new tiny masses scattered throughout every segment of the  liver.  3. There is a mixed treatment response in extensive melony metastatic  disease. Some nodes are unchanged, some are smaller, and some of  enlarged compared to prior exam.  4. New small to moderate volume ascites.  5. Interval satisfactory placement of common bile duct stent without  significant upstream biliary dilatation.  6. New small pleural effusions.    PET/CT (5/27/25)  IMPRESSION:  1. A large FDG avid lesion with central necrosis in the pancreatic  head/uncinate process, consistent with known biopsy-proven  pancreaticobiliary carcinoma.  2. Multiple FDG avid lesions scattered in the right and left hepatic  lobes as " well as a subcapsular lesion, likely representing liver  metastases  3. Innumerable FDG avid left supraclavicular, paraesophageal,  retrocrural, abdominopelvic nodes as described above, likely  representing melony metastases.  4. Two FDG avid subcutaneous soft tissue densities in the upper back,  posterior to right shoulder, suggestive of metastases.  5. Heterogeneous FDG uptake within the bone marrow throughout the  axial and appendicular skeleton without discrete lesion, likely  reactive.    MRI liver (4/10/25)  IMPRESSION:  1.  8 cm malignant-appearing, centrally necrotic indeterminate right  hepatic lobe mass. Although there are morphologic changes of  cirrhosis, the enhancement pattern is not suggestive of  hepatocellular carcinoma. There is some delayed progressive central  enhancement and cholangiocarcinoma may be a diagnostic consideration.  2. Scattered punctate foci restricted diffusion involving both lobes  of the liver which could be additional metastatic lesions.  3. Extensive tim hepatis and retroperitoneal lymphadenopathy  demonstrating varying degrees of necrosis. Enlarged tim hepatis  lymph nodes appear to compress the mid common bile duct causing mild  upstream intrahepatic dilatation.    ERCP 4/14/25     Impression  Normal EGD  Extrinsic compression of the proximal common bile duct with normal appearing biliary tissue on cholangioscopy  Following sphincterotomy, a 10 Fr x 9 cm plastic biliary stent was placed.    Recommendation  Schedule repeat ERCP, due: 6/9/2025   Monitor LFTs  Proceed with EUS     EUS 4/14/25  Impression  Normal pancreas  Heterogenous appearing hepatic parenchyma suggestive of diffuse malignancy in the right lobe  Innumerable enlarged lymph nodes throughout the upper abdomen with a particularly enlarged lymph node in the portal region. The lymph node was causing biliary stricturing with evidence of a plastic biliary stent traversing the region of obstruction  Multiple passes  were obtained of the malignant appearing portal lymph node for diagnosis.   The gallbladder was visualized and appeared distended. The gallbladder contained stones and biliary sludge.    Pathology 4/14/25  FINAL DIAGNOSIS   A.  Hilar lymph node, biopsy:  - Poorly differentiated carcinoma.  - No lymphoid tissue identified.  - See comment.     Comment: Immunostains show that the tumor cells are positive for CKAE1/3 and CK7, while negative for p40, INSM1, TTF-1, and TRPS1. An immunostain for PAX8  shows predominantly negative staining in the tumor cells.  Overall, the immunoprofile is most consistent with poorly differentiated adenocarcinoma; and a pancreatobiliary primary (including cholangiocarcinoma) remains a possibility, among others.  Clinical correlation with imaging studies recommended.     MISMATCH REPAIR PROTEIN EXPRESSION                    Protein:           Result                 MLH-1:             Expression Present                                                   PMS-2:            Expression Present                                                   MSH-2:            Expression Present                                                   MSH-6:            Expression Present       Genomic profile: pMMR   Tempus xT 4/14/25: TP53, TERT, TMB 0.5   Tempus xF 5/7/25: FOXO1, TP53, TERT, TMB 5.5     Assessment/Plan   F/70 w PMHx of T2DM, CKD 3, HTN, HLD, fatty liver, GERD, and recently diagnosed metastatic   cholangiocarcinoma, presenting with deconditioning related to her cancer. Planning to initiate C1 Gemcitabine/Cisplatin inpatient today 6/27.    # Metastatic cholangiocarcinoma with multiple liver/LN (L supraclavicular, thoracic, abdominal-pelvic)/subcutaneous soft tissue (posterior R shoulder/upper back) mets    Recommendations:  -planning to initiate C1 Gemcitabine/Cisplatin inpatient today 6/27; will add Durvalumab from her 2nd cycle  -recommend consulting supportive oncology for symptom control  -daily CBC,  CMP with strict I&O  -other management including IV fluid per primary team    Thank you for this consult, and Oncology will continue to follow.   Patient was seen, examined, and discussed with Dr. Recio.    Marek Cuellar MD, PhD  Hematology/Oncology Fellow [id number] or Epic  2025       [1]   Past Medical History:  Diagnosis Date    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 2025    CKD (chronic kidney disease) stage 3, GFR 30-59 ml/min (Multi)     Diabetes mellitus (Multi)     Fatty liver     GERD (gastroesophageal reflux disease)     HLD (hyperlipidemia)     Hypertension     Personal history of other benign neoplasm     History of uterine leiomyoma   [2]   Past Surgical History:  Procedure Laterality Date    ESOPHAGOSCOPY / EGD  2025    ENDOSCOPIC ULTRASOUND (UPPER)      ERCP    OTHER SURGICAL HISTORY  2022     section    OTHER SURGICAL HISTORY  2020    Lumpectomy   [3]   Family History  Problem Relation Name Age of Onset    Pancreatic cancer Mother      Uterine cancer Sister     [4]   Social History  Socioeconomic History    Marital status:    Tobacco Use    Smoking status: Never    Smokeless tobacco: Never   Vaping Use    Vaping status: Never Used   Substance and Sexual Activity    Alcohol use: Yes     Comment: social    Drug use: Not Currently     Social Drivers of Health     Financial Resource Strain: Low Risk  (2025)    Overall Financial Resource Strain (CARDIA)     Difficulty of Paying Living Expenses: Not very hard   Recent Concern: Financial Resource Strain - High Risk (2025)    Overall Financial Resource Strain (CARDIA)     Difficulty of Paying Living Expenses: Very hard   Food Insecurity: No Food Insecurity (2025)    Hunger Vital Sign     Worried About Running Out of Food in the Last Year: Never true     Ran Out of Food in the Last Year: Never true   Transportation Needs: No Transportation Needs (2025)    PRAPARE - Transportation     Lack of  Transportation (Medical): No     Lack of Transportation (Non-Medical): No   Intimate Partner Violence: Not At Risk (6/26/2025)    Humiliation, Afraid, Rape, and Kick questionnaire     Fear of Current or Ex-Partner: No     Emotionally Abused: No     Physically Abused: No     Sexually Abused: No   Housing Stability: Low Risk  (6/27/2025)    Housing Stability Vital Sign     Unable to Pay for Housing in the Last Year: No     Number of Times Moved in the Last Year: 1     Homeless in the Last Year: No   [5] No Known Allergies

## 2025-06-27 NOTE — PROGRESS NOTES
06/27/25 1200   Discharge Planning   Living Arrangements Spouse/significant other   Support Systems Spouse/significant other   Assistance Needed PT/OT Pending   Type of Residence Private residence   Number of Stairs to Enter Residence 2   Number of Stairs Within Residence 0   Do you have animals or pets at home? Yes   Type of Animals or Pets 1 cat   Home or Post Acute Services Post acute facilities (Rehab/SNF/etc)   Type of Post Acute Facility Services Skilled nursing   Expected Discharge Disposition SNF   Does the patient need discharge transport arranged? Yes   RoundTrip coordination needed? Yes   Has discharge transport been arranged? No   Financial Resource Strain   How hard is it for you to pay for the very basics like food, housing, medical care, and heating? Not very   Housing Stability   In the last 12 months, was there a time when you were not able to pay the mortgage or rent on time? N   At any time in the past 12 months, were you homeless or living in a shelter (including now)? N   Transportation Needs   In the past 12 months, has lack of transportation kept you from medical appointments or from getting medications? no   In the past 12 months, has lack of transportation kept you from meetings, work, or from getting things needed for daily living? No   Patient Choice   Provider Choice list and CMS website (https://medicare.gov/care-compare#search) for post-acute Quality and Resource Measure Data were provided and reviewed with: Patient   Patient / Family choosing to utilize agency / facility established prior to hospitalization No   Intensity of Service   Intensity of Service 0-30 min     Care Transitions Note  06/27/25  Plan per Medical/Surgical Team: Recent dx pancreaticobiliary carcinoma, weakness, fall at home  Status: Inpatient  Payor Source: [employer] Medicare  Discharge disposition: PT/OT Pending, likely SNF  Expected date of discharge: TBD, next week  PCP / Primary Oncologist:  Alba    Met with patient at bedside, introduced self and role. Demographics and insurance verifed with patient. [place] She has one son Eric who assists occasionally. Pt has a cane, walker, and shower chair at home. No home O2, no home health. She states she has been to  acute rehab at [place] in the past. Pt's  drives her to [email or web address]. She is retired. No SDOH concerns when prompted.     Pt was a transfer from [place], says she has been in bed for about a week and feels deconditioned. She is agreeable to Snf if recommended. PT/OT at [facility] recommended SNF. A list was provided at bedside by this LSW. Snf process was explained as well. Will continue to follow for dispo planning.   Christiana Lyn, MSW, LSW

## 2025-06-27 NOTE — PROGRESS NOTES
"Jordyn Neal is a 70 y.o. female on day 1 of admission presenting with Hyponatremia.    Subjective   Pt seen at bedside. Reports feeling tired today. We reviewed labs and plan for Neph consult today as well as awaiting plan from Onc today if +/- chemo occurring, she is agreeable. Reports nausea ongoing and dry mouth.        Objective     Physical Exam  HENT:      Head: Normocephalic.      Right Ear: External ear normal.      Left Ear: External ear normal.      Nose: Nose normal.      Mouth/Throat:      Mouth: Mucous membranes are dry.   Eyes:      Extraocular Movements: Extraocular movements intact.   Cardiovascular:      Rate and Rhythm: Normal rate and regular rhythm.   Pulmonary:      Effort: Pulmonary effort is normal.   Abdominal:      General: Bowel sounds are normal.      Palpations: Abdomen is soft.   Musculoskeletal:      Cervical back: Normal range of motion.   Skin:     General: Skin is warm and dry.   Neurological:      Mental Status: She is alert and oriented to person, place, and time.   Psychiatric:         Mood and Affect: Mood normal.         Behavior: Behavior normal.         Thought Content: Thought content normal.         Last Recorded Vitals  Blood pressure (!) 138/92, pulse 110, temperature 36.1 °C (97 °F), temperature source Temporal, resp. rate 18, height 1.676 m (5' 6\"), weight 92.9 kg (204 lb 14.4 oz), SpO2 94%.  Intake/Output last 3 Shifts:  I/O last 3 completed shifts:  In: 320 (3.4 mL/kg) [P.O.:320]  Out: 200 (2.2 mL/kg) [Urine:200 (0.1 mL/kg/hr)]  Weight: 92.9 kg     Relevant Results     This patient has a central line   Reason for the central line remaining today? Parenteral medication  .Scheduled medications  Scheduled Medications[1]  Continuous medications  Continuous Medications[2]  PRN medications  PRN Medications[3]    .  Results for orders placed or performed during the hospital encounter of 06/26/25 (from the past 24 hours)   Osmolality   Result Value Ref Range    Osmolality, " Serum 275 (L) 280 - 300 mOsm/kg   Phosphorus   Result Value Ref Range    Phosphorus 3.0 2.5 - 4.9 mg/dL   Urine electrolytes   Result Value Ref Range    Sodium, Urine Random <10 mmol/L    Sodium/Creatinine Ratio      Potassium, Urine Random 45 mmol/L    Potassium/Creatinine Ratio 23 Not established mmol/g Creat    Chloride, Urine Random <15 mmol/L    Chloride/Creatinine Ratio      Creatinine, Urine Random 197.9 20.0 - 320.0 mg/dL   Osmolality, urine   Result Value Ref Range    Osmolality, Urine Random 777 200 - 1,200 mOsm/kg   CBC and Auto Differential   Result Value Ref Range    WBC 13.6 (H) 4.4 - 11.3 x10*3/uL    nRBC 0.7 (H) 0.0 - 0.0 /100 WBCs    RBC 4.42 4.00 - 5.20 x10*6/uL    Hemoglobin 13.0 12.0 - 16.0 g/dL    Hematocrit 40.2 36.0 - 46.0 %    MCV 91 80 - 100 fL    MCH 29.4 26.0 - 34.0 pg    MCHC 32.3 32.0 - 36.0 g/dL    RDW 17.4 (H) 11.5 - 14.5 %    Platelets 419 150 - 450 x10*3/uL    Neutrophils % 75.9 40.0 - 80.0 %    Immature Granulocytes %, Automated 2.0 (H) 0.0 - 0.9 %    Lymphocytes % 15.8 13.0 - 44.0 %    Monocytes % 5.2 2.0 - 10.0 %    Eosinophils % 1.0 0.0 - 6.0 %    Basophils % 0.1 0.0 - 2.0 %    Neutrophils Absolute 10.34 (H) 1.20 - 7.70 x10*3/uL    Immature Granulocytes Absolute, Automated 0.27 0.00 - 0.70 x10*3/uL    Lymphocytes Absolute 2.16 1.20 - 4.80 x10*3/uL    Monocytes Absolute 0.71 0.10 - 1.00 x10*3/uL    Eosinophils Absolute 0.14 0.00 - 0.70 x10*3/uL    Basophils Absolute 0.02 0.00 - 0.10 x10*3/uL   Comprehensive Metabolic Panel   Result Value Ref Range    Glucose 80 74 - 99 mg/dL    Sodium 124 (L) 136 - 145 mmol/L    Potassium 4.7 3.5 - 5.3 mmol/L    Chloride 96 (L) 98 - 107 mmol/L    Bicarbonate 15 (L) 21 - 32 mmol/L    Anion Gap 18 10 - 20 mmol/L    Urea Nitrogen 33 (H) 6 - 23 mg/dL    Creatinine 1.08 (H) 0.50 - 1.05 mg/dL    eGFR 56 (L) >60 mL/min/1.73m*2    Calcium 9.3 8.6 - 10.6 mg/dL    Albumin 3.0 (L) 3.4 - 5.0 g/dL    Alkaline Phosphatase 649 (H) 33 - 136 U/L    Total Protein  6.2 (L) 6.4 - 8.2 g/dL    AST 91 (H) 9 - 39 U/L    Bilirubin, Total 2.5 (H) 0.0 - 1.2 mg/dL    ALT 50 (H) 7 - 45 U/L   Magnesium   Result Value Ref Range    Magnesium 2.26 1.60 - 2.40 mg/dL               Malnutrition Diagnosis Status: New  Malnutrition Diagnosis: Severe malnutrition related to chronic disease or condition  Related to: poor intake in setting of oral thrush and catabolic metastatic disease process  As Evidenced by: severe weight loss greater than 7.5% in the last 3 months and energy intake less than 50% of estimated needs for greater than 5 days.  I agree with the dietitian's malnutrition diagnosis.      Assessment & Plan  Hyponatremia    Jordyn Manzano is a 71yo female with a PMHx of recently diagnosed metastatic pancreaticobiliary carcinoma with mets to the pancreas, liver, and soft tissue (s/p ERCP with stent placement in the CBD, plans to start Durvalumab + Gemcitabine / Cisplatin), HTN, Type 2 DM, HLD, fatty liver, GERD, CKD stage III who presented to [facility] 6[facility]26[facility]  as a transfer from OSH for initiaition of inpatient chemotherapy per her oncologist request. [facility] consulted on admission, recs pending. Labs noteable for new hyponatremia w/o neuro s/s, workup indicative of hypovolemic hyponatremia however downtrending with IVF, will trial one more 1L fluid bolus today 6/27 per attending and repeat evening BMP. 6/27 Nephrology consulted, recs pending. DC pending onc recommendation, hyponatremia work-up, neph recs, and PT/OT recs.      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 Willis  - Diagnosed in 5/2025  - Discussed at [facility] on 6/4/2025 and rec treatment with Durvalumab + Gemcitabine / Cisplatin   - Treatment was scheduled to begin on 6/22/2025, however, unable to due to admission   - CT A/P (6/22) showed overall progression of metestatic disease, new small volume ascites, satisfactory  placement of CBD stent without significant upstream bilairy dilatation, new small pleual effusion  - Primary oncologist rec transfer to [facility] for intiation of emergent inpatient chemotherapy   - Oncology consulted upon arrival to [facility], plan for Gemcitabine/Cisplatin (Dr. Recio to place orders today) 6/27     # Hyponatremia   - Na 131 on admit to [place] (6/20) --> Na 124 (6/27)  - initially though to be hypovolemic hyponatremia in setting of poor PO intake at OSH, s/p multiple L of fluid resuscitation with downtrend of Na  - serum osm 275, urine Cr 197, urine Na <10, urine osm 777  - s/p 1L fluid restriction 6/26 -- per Dr. Alvarez hold off for now   - 6/27 Nephrology consulted, recs pending      # Elevated LFTs  - likely in setting of metastasis to liver   - Alk phos 530, ALT 53, AST 83, Tbili 2.6 upon admission (6/26)   - CT A/P (6/22) showed overall progression of metestatic disease, new small volume ascites, satisfactory placement of CBD stent without significant upstream bilairy dilatation      # Neoplasm related pain   # FTT  # Poor PO intake   # N/V  - Continue with home oxycodone 10mg q6 hours PRN severe pain   - Continue with home zofran 4mg q6 hours PRN first line N/V, continue with home compazine 10mg q6 hours q6 hours PRN second line N/V   - Continue with home mirtazapine 15mg nightly   - Nutrition consulted on admit, recs pending   - PT / OT consulted on admit, recs pending   - Consider supp onc consult if needed      # Oral thrush, improving  - Continue with oral nystatin 5mL QID swish and spit      # CKD III   - sCR 1.17 (6/26), recent sCr baseline ~1.2-1.3      # HTN  - Continue with home amlodipine 10mg daily      # MDD  # LUIS FELIPE  - Continue with home paroxetine 20mg daily      # Type 2 DM  -  last Hgb A1c 5.9 (4/29/24), repeat ordered on admit and pending   - due to decreased PO intake and normal blood sugars, insulin not ordered on admit, CTM  - hypoglycemic protocol in place      # GERD  -  Protonix 40mg daily      # DVT / prophy   - Lovenox subcutaneous, SCDs     # Dispo   - DNR, DNI - no ICU - confirmed on admit   - DC pending completion of chemo, hyponatremia work-up, Neph recs, and PT/OT recs   - NOK: Natalio Neal [phone]   - FUV with Dr. Willis (oncology) 7/24      I spent > 60 minutes in the professional and overall care of this patient.      Sharee Valdovinos, APRN-CNP         [1] amLODIPine, 10 mg, oral, Daily  enoxaparin, 40 mg, subcutaneous, q24h  mirtazapine, 15 mg, oral, Nightly  moisturizing mouth, 15 mL, Swish & Spit, TID  nystatin, 5 mL, Swish & Spit, 4x daily  pantoprazole, 40 mg, oral, Daily before breakfast  PARoxetine, 20 mg, oral, Daily  sodium chloride, 1,000 mL, intravenous, Once  [2]    [3] PRN medications: acetaminophen, alteplase, fluticasone, oxyCODONE, prochlorperazine

## 2025-06-27 NOTE — CONSULTS
"NEPHROLOGY NEW CONSULT NOTE   Jordyn Neal   70 y.o.    @WT@  MRN/Room: [id number]    Reason for consult: Hyponatremia    HPI:  Jordyn Neal is a 70 y.o. 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 Presented to OSH 6/20 with N, V, Poor Intake, Transferred to [facility] [place]26 to start Chemotherapy. Consult for Hyponatremia.    Patient confirmed reduced appetite and intake. As well as vomiting twice.    In The ER: /79 (BP Location: Right arm, Patient Position: Lying)   Pulse (!) 112   Temp 36.3 °C (97.3 °F) (Temporal)   Resp 16   Ht 1.676 m (5' 6\")   Wt 95.5 kg (210 lb 8.6 oz)   SpO2 94%   BMI 33.98 kg/m²      Medical History[1]   Surgical History[2]   Family History[3]  Social History     Socioeconomic History    Marital status:      Spouse name: Not on file    Number of children: Not on file    Years of education: Not on file    Highest education level: Not on file   Occupational History    Not on file   Tobacco Use    Smoking status: Never    Smokeless tobacco: Never   Vaping Use    Vaping status: Never Used   Substance and Sexual Activity    Alcohol use: Yes     Comment: social    Drug use: Not Currently    Sexual activity: Not on file   Other Topics Concern    Not on file   Social History Narrative    Not on file     Social Drivers of Health     Financial Resource Strain: Low Risk  (6/27/2025)    Overall Financial Resource Strain (CARDIA)     Difficulty of Paying Living Expenses: Not very hard   Recent Concern: Financial Resource Strain - High Risk (6/26/2025)    Overall Financial Resource Strain (CARDIA)     Difficulty of Paying Living Expenses: Very hard   Food Insecurity: No Food Insecurity (6/26/2025)    Hunger Vital Sign     Worried About Running Out of Food " in the Last Year: Never true     Ran Out of Food in the Last Year: Never true   Transportation Needs: No Transportation Needs (6/27/2025)    PRAPARE - Transportation     Lack of Transportation (Medical): No     Lack of Transportation (Non-Medical): No   Physical Activity: Not on file   Stress: Not on file   Social Connections: Not on file   Intimate Partner Violence: Not At Risk (6/26/2025)    Humiliation, Afraid, Rape, and Kick questionnaire     Fear of Current or Ex-Partner: No     Emotionally Abused: No     Physically Abused: No     Sexually Abused: No   Housing Stability: Low Risk  (6/27/2025)    Housing Stability Vital Sign     Unable to Pay for Housing in the Last Year: No     Number of Times Moved in the Last Year: 1     Homeless in the Last Year: No       Allergies[4]     Prescriptions Prior to Admission[5]     Meds:   amLODIPine, 10 mg, Daily  [START ON 6/28/2025] CISplatin, 25 mg/m2 (Treatment Plan Recorded), Once  dexAMETHasone, 12 mg, Once  enoxaparin, 40 mg, q24h  gemcitabine, 1,000 mg/m2 (Treatment Plan Recorded), Once  heparin flush, 50 Units, q12h  mirtazapine, 15 mg, Nightly  moisturizing mouth, 15 mL, TID  nystatin, 5 mL, 4x daily  palonosetron, 0.25 mg, Once  pantoprazole, 40 mg, Daily before breakfast  PARoxetine, 20 mg, Daily  [START ON 6/28/2025] sodium chloride, 1,000 mL, Once  sodium chloride 0.9% 1,000 mL with potassium chloride 20 mEq, magnesium sulfate 1 g infusion, 999 mL/hr, Once         acetaminophen, 650 mg, q6h PRN  albuterol, 3 mL, PRN  alteplase, 2 mg, PRN  alteplase, 2 mg, PRN  dextrose, 500 mL, PRN  diphenhydrAMINE, 50 mg, PRN  EPINEPHrine HCl, 0.3 mg, q5 min PRN  famotidine, 20 mg, Once PRN  fluticasone, 1 spray, Daily PRN  heparin flush, 50 Units, PRN  methylPREDNISolone sodium succinate (PF), 40 mg, PRN  oxyCODONE, 10 mg, q6h PRN  prochlorperazine, 10 mg, q6h PRN  prochlorperazine, 10 mg, q6h PRN  prochlorperazine, 10 mg, q6h PRN  sodium chloride, 500 mL, PRN        Vitals:     25 1526   BP: 129/79   Pulse: (!) 112   Resp: 16   Temp: 36.3 °C (97.3 °F)   SpO2: 94%         1900 -  0659  In: 320 [P.O.:320]  Out: 200 [Urine:200]   Weight change:      Comfortable  On RA  Alert responsive  No edema      ASSESSMENT:  Jordyn Neal is a 70 y.o. 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 Presented to OSH  with N, V, Poor Intake, Transferred to [facility] [place]  to start Chemotherapy. Consult for Hyponatremia.    #Hyponatremia  - For 2 months, more severe over the past week  - Admission Na 129 to OSH  - Transfer to [facility] Na 127  - Cr 0.9-1  - N/S 75/H  &   - N/S 1L  &   - LR 1.5L   - Paroxitine  - Contrast   - Urine Na <10, Urine Cl <15  - S Osm 275, U Osm 777  - Taking SSRI for >20 years  - Cause for Hyponatremia likely Hypovolemia.   - Following IV fluids, Urine Lytes continue to indicate hypovolemia      Recommendations:  - Would confirm Hypovolemia with IVC POCUS, and continue IV fluids based on results  - Na level at least Q4 hours    Mami Mariano MD  Nephrology Fellow  24 hour Renal Pager - [phone]    To be staffed with the attending in the AM       [1]   Past Medical History:  Diagnosis Date    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 2025    CKD (chronic kidney disease) stage 3, GFR 30-59 ml/min (Multi)     Diabetes mellitus (Multi)     Fatty liver     GERD (gastroesophageal reflux disease)     HLD (hyperlipidemia)     Hypertension     Personal history of other benign neoplasm     History of uterine leiomyoma   [2]   Past Surgical History:  Procedure Laterality Date    ESOPHAGOSCOPY / EGD  2025    ENDOSCOPIC ULTRASOUND (UPPER)      ERCP    OTHER SURGICAL HISTORY  2022     section    OTHER SURGICAL HISTORY   06/30/2020    Lumpectomy   [3]   Family History  Problem Relation Name Age of Onset    Pancreatic cancer Mother      Uterine cancer Sister     [4] No Known Allergies  [5]   Medications Prior to Admission   Medication Sig Dispense Refill Last Dose/Taking    amLODIPine (Norvasc) 10 mg tablet Take 1 tablet (10 mg) by mouth once daily. 90 tablet 1     fluticasone (Flonase) 50 mcg/actuation nasal spray Administer 1 spray into each nostril once daily as needed for allergies.       mirtazapine (Remeron) 15 mg tablet Take 1 tablet (15 mg) by mouth once daily at bedtime. Take half tablet every night for 7 days and then 1 tablet every night 30 tablet 3     OLANZapine (ZyPREXA) 5 mg tablet Take 1 tablet (5 mg) by mouth once daily at bedtime. 1 tab at bedtime for 4 days, starting the night of chemo (Patient taking differently: Take 1 tablet (5 mg) by mouth once daily at bedtime. 1 tab at bedtime for 4 days, STARTING THE NIGHT OF CHEMO) 30 tablet 1     omeprazole (PriLOSEC) 40 mg DR capsule TAKE 1 CAPSULE BY MOUTH ONCE DAILY. 90 capsule 0     oxyCODONE (Roxicodone) 5 mg immediate release tablet Take 2 tablets (10 mg) by mouth every 6 hours if needed for severe pain (7 - 10). 120 tablet 0     PARoxetine (Paxil) 20 mg tablet TAKE 1 TABLET BY MOUTH ONCE DAILY. 30 tablet 0     prochlorperazine (Compazine) 10 mg tablet Take 1 tablet (10 mg) by mouth every 6 hours if needed for nausea or vomiting. 30 tablet 2     traZODone (Desyrel) 100 mg tablet Take 1 tablet (100 mg) by mouth as needed at bedtime for sleep. 30 tablet 3

## 2025-06-27 NOTE — PROGRESS NOTES
Pharmacy Medication History Review    Jordyn Neal is a 70 y.o. female admitted for Hyponatremia. Pharmacy reviewed the patient's [id number] medications and allergies for accuracy.    The list below reflects the updated PTA list.   Prior to Admission Medications   Prescriptions Last Dose Informant   OLANZapine (ZyPREXA) 5 mg tablet  Self   Sig: Take 1 tablet (5 mg) by mouth once daily at bedtime. 1 tab at bedtime for 4 days, starting the night of chemo   Patient taking differently: Take 1 tablet (5 mg) by mouth once daily at bedtime. 1 tab at bedtime for 4 days, STARTING THE NIGHT OF CHEMO   PARoxetine (Paxil) 20 mg tablet  Self   Sig: TAKE 1 TABLET BY MOUTH ONCE DAILY.   amLODIPine (Norvasc) 10 mg tablet  Self   Sig: Take 1 tablet (10 mg) by mouth once daily.   fluticasone (Flonase) 50 mcg/actuation nasal spray  Self   Sig: Administer 1 spray into each nostril once daily as needed for allergies.   mirtazapine (Remeron) 15 mg tablet  Self   Sig: Take 1 tablet (15 mg) by mouth once daily at bedtime. Take half tablet every night for 7 days and then 1 tablet every night   omeprazole (PriLOSEC) 40 mg DR capsule  Self   Sig: TAKE 1 CAPSULE BY MOUTH ONCE DAILY.   oxyCODONE (Roxicodone) 5 mg immediate release tablet  Self   Sig: Take 2 tablets (10 mg) by mouth every 6 hours if needed for severe pain (7 - 10).   prochlorperazine (Compazine) 10 mg tablet  Self   Sig: Take 1 tablet (10 mg) by mouth every 6 hours if needed for nausea or vomiting.   traZODone (Desyrel) 100 mg tablet  Self   Sig: Take 1 tablet (100 mg) by mouth as needed at bedtime for sleep.      Facility-Administered Medications: None        The list below reflects the updated allergy list. Please review each documented allergy for additional clarification and justification.  Allergies  Reviewed by Jimmie Bhagat PharmD on 6/27/2025   No Known Allergies         Patient accepts M2B at discharge.     Sources used to complete the med history include:     [facility]  Pharmacy dispense history  Patient Interview Moderate historian  Chart Review  Care Everywhere     Below are additional concerns with the patient's PTA list.  The patient is a moderate historian and is able to recall some medications from memory. When asked about specific drug names or their indications, the patient can identify whether or not they are taking them.       Medications ADDED:  none  Medications CHANGED:  none  Medications REMOVED:   none    Jimmie Bhagat PharmD  Transitions of Care Pharmacist  [facility]  Please reach out via Secure Chat for questions, or if no response call [employer] or [facility]

## 2025-06-27 NOTE — CONSULTS
"Nutrition Consult Note    Nutrition Assessment         Patient is a 70 y.o. female recently diagnosed metastatic pancreaticobiliary carcinoma with mets to the pancreas, liver, and soft tissue (s/p ERCP with stent placement in the CBD, plans to start Durvalumab + Gemcitabine / Cisplatin), HTN, Type 2 DM, HLD, fatty liver, GERD, CKD stage III who presents to [facility] on 6/26/2025 as a transfer from OSH for initiaition of inpatient chemotherapy.   Patient initially presented to [facility] on 6/20 for generalized weakness, poor PO intake and N/V. Labs notable for hyponatremia 129 which was initially thought to be hypovolemic due to decreased PO intake, however, Na unchanged s/p multiple liters of fluid resucuitation. LFTs also uptrended during hospotal course. CT A/P (6/22) showed overall progression of metestatic disease, new small volume ascites, satisfactory placement of CBD stetnt without significant upstream bilairy dilatation, new small pleual effusion. Heme / onc was consulted for progression of metastic disease, uptrending LFTs, and evaluation for emergent chemotherpy.       Nutrition History:    Nutrition consult per Nursing Screen (MST score 3) completed by remote RD assessment.  Energy Intake: Poor < 50 %  Food and Nutrient History: RD attempted to complete remote nutrition consult however patient was not available/no answer of phone in room.  Per chart review patients intake has been minimal due to progression of metastatic disease, generalized weakness and nausea/vomiting.  Unable to obtain full nutrition history at this time and assess nausea/vomiting or pain.         Anthropometrics:  Height: 167.6 cm (5' 6\")   Weight: 92.9 kg (204 lb 14.4 oz)   BMI (Calculated): 33.09  IBW/kg (Dietitian Calculated): 59.1 kg          Weight History:   Wt Readings from Last 10 Encounters:   06/26/25 92.9 kg (204 lb 14.4 oz)   06/20/25 88 kg (194 lb)   06/17/25 87.5 kg (192 lb 14.4 oz)   06/16/25 87.5 kg (192 lb 14.4 oz) " "  05/29/25 92 kg (202 lb 13.2 oz)   05/28/25 93.4 kg (206 lb)   05/19/25 102 kg (224 lb 13.9 oz)   05/07/25 102 kg (224 lb 13.9 oz)   04/11/25 99.7 kg (219 lb 12.8 oz)   04/10/25 99.9 kg (220 lb 3.8 oz)       Weight Change %:  Weight History / % Weight Change: 7.1 percent weight loss in the last 2 months, severe  Significant Weight Loss: Yes  Interpretation of Weight Loss: >7.5% in 3 months    Nutrition Focused Physical Exam Findings:  Unable to complete due to remote RD assessment    Subcutaneous Fat Loss:      Muscle Wasting:     Edema:     Physical Findings:       Nutrition Significant Labs:  BMP Trend:   Results from last 7 days   Lab Units 06/27/25  0705 06/26/25  0559 06/25/25  0547 06/24/25  0539   GLUCOSE mg/dL 80 88 99 117*   CALCIUM mg/dL 9.3 8.9 9.1 9.0   SODIUM mmol/L 124* 127* 127* 129*   POTASSIUM mmol/L 4.7 4.4 4.3 4.1   CO2 mmol/L 15* 18* 18* 19*   CHLORIDE mmol/L 96* 98 99 101   BUN mg/dL 33* 30* 33* 26*   CREATININE mg/dL 1.08* 1.17* 1.28* 1.42*    , Vit D:   Lab Results   Component Value Date    VITD25 37 11/14/2018    , Vit B12: No results found for: \"[id number]\"     Nutrition Specific Medications:  Scheduled Medications[1]     I/O:   Last BM Date: 06/27/25; Stool Appearance: Formed (06/27/25 0519)    Dietary Orders (From admission, onward)       Start     Ordered    06/26/25 1544  Adult diet Regular  Diet effective now        Question:  Diet type  Answer:  Regular    06/26/25 1544    06/26/25 1511  May Participate in Room Service  ( ROOM SERVICE MAY PARTICIPATE)  Once        Question:  .  Answer:  Yes    06/26/25 1510                     Estimated Needs:   Total Energy Estimated Needs in 24 hours (kCal): 1672 kCal  Method for Estimating Needs: 18kcals/kg CBW  Total Protein Estimated Needs in 24 Hours (g): 88 g  Method for Estimating 24 Hour Protein Needs: 1.5grams/gk IBW  Total Fluid Estimated Needs in 24 Hours (mL): 1672 mL  Method for Estimating 24 Hour Fluid Needs: 1mL/kcal or per " physician        Nutrition Diagnosis   Malnutrition Diagnosis  Patient has Malnutrition Diagnosis: Yes  Diagnosis Status: New  Malnutrition Diagnosis: Severe malnutrition related to chronic disease or condition  Related to: poor intake in setting of oral thrush and catabolic metastatic disease process  As Evidenced by: severe weight loss greater than 7.5% in the last 3 months and energy intake less than 50% of estimated needs for greater than 5 days.            Nutrition Interventions/Recommendations   Nutrition prescription for oral nutrition    Nutrition Recommendations:  Individualized Nutrition Prescription Provided for : Recommend Regular diet as ordered with Ensure Clear TID with meals (240 calories/8grams protein each)    Nutrition Interventions/Goals:   Meals and Snacks: General healthful diet  Goal: Intake greater than 50% of meals TID  Medical Food Supplement: Commercial beverage medical food supplement therapy  Goal: Will order Ensure clear based on assessment/interventions from previous admisssion as unable to obtain nutrition history/supplement preferences at this time.  Additional Interventions: RD to gather completed nutrition history at follow up visits as able.      Education Documentation  No documentation found.            Nutrition Monitoring and Evaluation   Intake / Amount of food: Consumes at least 50% or more of meals/snacks/supplements              Digestive System Finding: Nausea, Vomiting    Goal Status: New goal(s) identified    Time Spent (min): 60 minutes         06/27/25 at 10:33 AM - TIMOTEO PRICE RDN, LD         [1] amLODIPine, 10 mg, oral, Daily  enoxaparin, 40 mg, subcutaneous, q24h  mirtazapine, 15 mg, oral, Nightly  nystatin, 5 mL, Swish & Spit, 4x daily  pantoprazole, 40 mg, oral, Daily before breakfast  PARoxetine, 20 mg, oral, Daily

## 2025-06-27 NOTE — PROGRESS NOTES
Physical Therapy    Physical Therapy Evaluation    Patient Name: Jordyn Neal  MRN: [id number]  Department: [facility]  Room: [address] [place] [address]  Today's Date: 6/27/2025   Time Calculation  Start Time: 1450  Stop Time: 1511  Time Calculation (min): 21 min    Assessment/Plan   PT Assessment  End of Session Communication: Bedside nurse  Assessment Comment: Pt is a 70 y.o female admitted for generalized weakness, poor PO intake, N/V, and initiation of inpatient chemotherapy.  Session limited by pt c/o severe dizziness sitting EOB and stating she felt like she was going to faint.  Pt returned to supine Max A and symptoms resolved before BP was able to be taken.  BP in supine WNL.  Pt too fatiuged from attempt sitting EOB to continue session so was repositioned with HOB partially elevated and no return of dizziness.  Pt will benefit from continued PT assessment to determine strength, balance, mobility, and endurance deficits prior to making D/C recommendation.  End of Session Patient Position: Bed, 3 rail up, Alarm on ( in room)  IP OR SWING BED PT PLAN  Inpatient or Swing Bed: Inpatient  PT Plan  Treatment/Interventions: Bed mobility, Transfer training, Gait training, Stair training, Balance training, Neuromuscular re-education, Strengthening, Endurance training, Therapeutic exercise, Therapeutic activity, Home exercise program  PT Plan: Ongoing PT  PT Frequency: Daily (frequency will be re-assessed next session)  PT Discharge Recommendations:  (TBD pending further assessment)  Equipment Recommended upon Discharge:  (TBD pending further assessment)  PT Recommended Transfer Status:  (TBD pending further assessment)  PT - OK to Discharge:  (No- pt requires further PT assessment to determine mobility status and D/C recommendations)    Subjective     PT Visit Info:  PT Received On: 06/27/25  General Visit Information:  General  Reason for Referral: Transfer from OSH for initiation of inpatient chemotherapy, admitted to OSH 6/20  for generalized weakness, poor PO intake, and N/V  Past Medical History Relevant to Rehab: Per chart, PMH includes 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  Pt alert and agreeable to PT. +PIV, implantable port, external catheter  Home Living:  Home Living  Type of Home: House  Lives With: Spouse  Home Adaptive Equipment:  (transport chair- uses for community amb)  Home Layout: One level  Home Access: Stairs to enter without rails  Entrance Stairs-Rails: None  Entrance Stairs-Number of Steps: 2  Prior Level of Function:  Prior Function Per Pt/Caregiver Report  Level of [place]: Independent with ADLs and functional transfers, Independent with homemaking with ambulation (prior to hospitalization)  Prior Function Comments: +driving, +fall sliding of EOB 3 weeks ago, no injuries  Precautions:  Precautions  Medical Precautions: Fall precautions      Date/Time Vitals Session Patient Position Pulse Resp SpO2 BP MAP (mmHg)    06/27/25 1450 During PT  Lying  116  --  93 %  125/76  92      Objective   Pain:  Pain Assessment  Pain Assessment: 0-10  0-10 (Numeric) Pain Score: 0 - No pain  Cognition:  Cognition  Overall Cognitive Status: Within Functional Limits  Orientation Level: Oriented X4 (however pt reporting railing to enter home and  denying railing)    General Assessments:  Activity Tolerance  Endurance: Decreased tolerance for upright activites    Sensation  Light Touch: No apparent deficits    Strength  Strength Comments: unable to assess due to feeling like she would faint  sitting EOB and then too tired to continue session after return to supine and BP taken  Coordination  Movements are Fluid and Coordinated: Yes    Postural Control  Postural Control: Impaired (Mod A progressing to CGA sitting EOB with B UE support)    Static Sitting Balance  Static Sitting-Balance Support: Bilateral upper extremity supported, Feet supported  Static Sitting-Level of Assistance: Moderate assistance, Minimum assistance, Contact guard (Mod A progressing to CGA)  Dynamic Sitting Balance  Dynamic Sitting-Balance Support: Bilateral upper extremity supported, Feet supported  Dynamic Sitting-Level of Assistance: Moderate assistance  Dynamic Sitting-Balance: Forward lean     Functional Assessments:  Bed Mobility  Bed Mobility: Yes  Bed Mobility 1  Bed Mobility 1: Rolling left, Side lying left to sit  Level of Assistance 1: Moderate assistance  Bed Mobility Comments 1: HOB flat, no use of bed rail, use of draw sheet  Bed Mobility 2  Bed Mobility  2: Sitting to supine  Level of Assistance 2: Maximum assistance (at trunk and LEs due to pt feeling like she would faint sitting EOB)  Bed Mobility Comments 2: HOB flat  Bed Mobility 3  Bed Mobility 3: Rolling right  Level of Assistance 3: Minimum assistance  Bed Mobility Comments 3: HOB flat, use of bed rail and draw sheet  Bed Mobility 4  Bed Mobility 4: Scooting (to HOB in supine)  Level of Assistance 4: Dependent, +2  Bed Mobility Comments 4: use of draw sheet    Transfers  Transfer: No (unable to assess due to feeling like she would faint sitting EOB and then too tired to continue session after return to supine and BP taken)    Ambulation/Gait Training  Ambulation/Gait Training Performed: No (unable to assess due to feeling like she would faint sitting EOB and then too tired to continue session after return to supine and BP taken)    Stairs  Stairs: No (unable to assess due to feeling like she would faint sitting EOB and then too tired to continue session after  return to supine and BP taken)  Outcome Measures:  [facility] Basic Mobility  Turning from your back to your side while in a flat bed without using bedrails: A lot  Moving from lying on your back to sitting on the side of a flat bed without using bedrails: A lot  Moving to and from bed to chair (including a wheelchair): Total  Standing up from a chair using your arms (e.g. wheelchair or bedside chair): Total  To walk in hospital room: Total  Climbing 3-5 steps with railing: Total  Basic Mobility - Total Score: 8    Encounter Problems       Encounter Problems (Active)       Balance       Pt will maintain static/dynamic sitting balance x4 minutes with B UE support and supervision to demonstrate improved balance       Start:  06/27/25    Expected End:  07/11/25            Pt will maintain static/dynamic standing balance x4 minutes with RW and supervision to demonstrate improved balance       Start:  06/27/25    Expected End:  07/11/25               Mobility       Pt will complete bed mobility independently with HOB flat and no use of bed rails       Start:  06/27/25    Expected End:  07/11/25            Pt will amb >150ft with RW and supervision in prep for safe discharge home        Start:  06/27/25    Expected End:  07/11/25            Pt will a/descend 2 steps without rail with CGA/1 HHA in prep for safe home entry        Start:  06/27/25    Expected End:  07/11/25               PT Transfers       Pt will transfer sit to stand with RW and supervision in prep for out of bed mobility        Start:  06/27/25    Expected End:  07/11/25                   Education Documentation  Precautions, taught by Alisson Sabillon, PT at 6/27/2025  3:27 PM.  Learner: Patient  Readiness: Acceptance  Method: Explanation  Response: Verbalizes Understanding  Comment: fall precautions    Body Mechanics, taught by Alisson Sabillon, PT at 6/27/2025  3:27 PM.  Learner: Patient  Readiness: Acceptance  Method: Explanation  Response: Verbalizes  Understanding  Comment: fall precautions    Mobility Training, taught by Alisson Sabillon, PT at 6/27/2025  3:27 PM.  Learner: Patient  Readiness: Acceptance  Method: Explanation  Response: Verbalizes Understanding  Comment: fall precautions    Education Comments  No comments found.

## 2025-06-28 ENCOUNTER — APPOINTMENT (OUTPATIENT)
Dept: CARDIOLOGY | Facility: HOSPITAL | Age: 70
End: 2025-06-28
Payer: MEDICARE

## 2025-06-28 ENCOUNTER — APPOINTMENT (OUTPATIENT)
Dept: HEMATOLOGY/ONCOLOGY | Facility: HOSPITAL | Age: 70
End: 2025-06-28
Payer: MEDICARE

## 2025-06-28 LAB
ALBUMIN SERPL BCP-MCNC: 2.5 G/DL (ref 3.4–5)
ALBUMIN SERPL BCP-MCNC: 2.9 G/DL (ref 3.4–5)
ALP SERPL-CCNC: 1071 U/L (ref 33–136)
ALP SERPL-CCNC: 1190 U/L (ref 33–136)
ALT SERPL W P-5'-P-CCNC: 68 U/L (ref 7–45)
ALT SERPL W P-5'-P-CCNC: 73 U/L (ref 7–45)
AMMONIA PLAS-SCNC: 73 UMOL/L (ref 16–53)
ANION GAP SERPL CALC-SCNC: 17 MMOL/L (ref 10–20)
ANION GAP SERPL CALC-SCNC: 18 MMOL/L (ref 10–20)
APTT PPP: 27 SECONDS (ref 26–36)
AST SERPL W P-5'-P-CCNC: 136 U/L (ref 9–39)
AST SERPL W P-5'-P-CCNC: 179 U/L (ref 9–39)
ATRIAL RATE: 111 BPM
BASOPHILS # BLD AUTO: 0.03 X10*3/UL (ref 0–0.1)
BASOPHILS NFR BLD AUTO: 0.2 %
BILIRUB SERPL-MCNC: 3.6 MG/DL (ref 0–1.2)
BILIRUB SERPL-MCNC: 3.9 MG/DL (ref 0–1.2)
BUN SERPL-MCNC: 32 MG/DL (ref 6–23)
BUN SERPL-MCNC: 41 MG/DL (ref 6–23)
CALCIUM SERPL-MCNC: 8.3 MG/DL (ref 8.6–10.6)
CALCIUM SERPL-MCNC: 9.3 MG/DL (ref 8.6–10.6)
CHLORIDE SERPL-SCNC: 100 MMOL/L (ref 98–107)
CHLORIDE SERPL-SCNC: 102 MMOL/L (ref 98–107)
CHOLEST SERPL-MCNC: 201 MG/DL (ref 0–199)
CHOLESTEROL/HDL RATIO: 16.5
CO2 SERPL-SCNC: 14 MMOL/L (ref 21–32)
CO2 SERPL-SCNC: 15 MMOL/L (ref 21–32)
CREAT SERPL-MCNC: 1.1 MG/DL (ref 0.5–1.05)
CREAT SERPL-MCNC: 1.39 MG/DL (ref 0.5–1.05)
EGFRCR SERPLBLD CKD-EPI 2021: 41 ML/MIN/1.73M*2
EGFRCR SERPLBLD CKD-EPI 2021: 55 ML/MIN/1.73M*2
EOSINOPHIL # BLD AUTO: 0 X10*3/UL (ref 0–0.7)
EOSINOPHIL NFR BLD AUTO: 0 %
ERYTHROCYTE [DISTWIDTH] IN BLOOD BY AUTOMATED COUNT: 17.4 % (ref 11.5–14.5)
GLUCOSE SERPL-MCNC: 131 MG/DL (ref 74–99)
GLUCOSE SERPL-MCNC: 199 MG/DL (ref 74–99)
HAV IGM SER QL: NONREACTIVE
HBV CORE IGM SER QL: NONREACTIVE
HBV SURFACE AG SERPL QL IA: NONREACTIVE
HCT VFR BLD AUTO: 34 % (ref 36–46)
HCV AB SER QL: NONREACTIVE
HDLC SERPL-MCNC: 12.2 MG/DL
HGB BLD-MCNC: 11.6 G/DL (ref 12–16)
IMM GRANULOCYTES # BLD AUTO: 0.14 X10*3/UL (ref 0–0.7)
IMM GRANULOCYTES NFR BLD AUTO: 0.8 % (ref 0–0.9)
INR PPP: 1.6 (ref 0.9–1.1)
LDH SERPL L TO P-CCNC: 200 U/L (ref 84–246)
LDLC SERPL CALC-MCNC: 151 MG/DL
LYMPHOCYTES # BLD AUTO: 0.58 X10*3/UL (ref 1.2–4.8)
LYMPHOCYTES NFR BLD AUTO: 3.3 %
MAGNESIUM SERPL-MCNC: 2.11 MG/DL (ref 1.6–2.4)
MCH RBC QN AUTO: 29.8 PG (ref 26–34)
MCHC RBC AUTO-ENTMCNC: 34.1 G/DL (ref 32–36)
MCV RBC AUTO: 87 FL (ref 80–100)
MONOCYTES # BLD AUTO: 0.2 X10*3/UL (ref 0.1–1)
MONOCYTES NFR BLD AUTO: 1.1 %
NEUTROPHILS # BLD AUTO: 16.65 X10*3/UL (ref 1.2–7.7)
NEUTROPHILS NFR BLD AUTO: 94.6 %
NON HDL CHOLESTEROL: 189 MG/DL (ref 0–149)
NRBC BLD-RTO: 0.3 /100 WBCS (ref 0–0)
P AXIS: 46 DEGREES
P OFFSET: 208 MS
P ONSET: 159 MS
PLATELET # BLD AUTO: 360 X10*3/UL (ref 150–450)
POTASSIUM SERPL-SCNC: 5.1 MMOL/L (ref 3.5–5.3)
POTASSIUM SERPL-SCNC: 5.3 MMOL/L (ref 3.5–5.3)
PR INTERVAL: 116 MS
PROT SERPL-MCNC: 5.1 G/DL (ref 6.4–8.2)
PROT SERPL-MCNC: 6.2 G/DL (ref 6.4–8.2)
PROTHROMBIN TIME: 18.1 SECONDS (ref 9.8–12.4)
Q ONSET: 217 MS
QRS COUNT: 18 BEATS
QRS DURATION: 82 MS
QT INTERVAL: 322 MS
QTC CALCULATION(BAZETT): 437 MS
QTC FREDERICIA: 395 MS
R AXIS: 4 DEGREES
RBC # BLD AUTO: 3.89 X10*6/UL (ref 4–5.2)
SODIUM SERPL-SCNC: 127 MMOL/L (ref 136–145)
SODIUM SERPL-SCNC: 127 MMOL/L (ref 136–145)
SODIUM SERPL-SCNC: 129 MMOL/L (ref 136–145)
T AXIS: 47 DEGREES
T OFFSET: 378 MS
TRIGL SERPL-MCNC: 190 MG/DL (ref 0–149)
URATE SERPL-MCNC: 10.2 MG/DL (ref 2.3–6.7)
VENTRICULAR RATE: 111 BPM
VLDL: 38 MG/DL (ref 0–40)
WBC # BLD AUTO: 17.6 X10*3/UL (ref 4.4–11.3)

## 2025-06-28 PROCEDURE — 2500000004 HC RX 250 GENERAL PHARMACY W/ HCPCS (ALT 636 FOR OP/ED): Performed by: INTERNAL MEDICINE

## 2025-06-28 PROCEDURE — 83735 ASSAY OF MAGNESIUM: CPT

## 2025-06-28 PROCEDURE — 93010 ELECTROCARDIOGRAM REPORT: CPT | Performed by: INTERNAL MEDICINE

## 2025-06-28 PROCEDURE — 1170000001 HC PRIVATE ONCOLOGY ROOM DAILY

## 2025-06-28 PROCEDURE — 87040 BLOOD CULTURE FOR BACTERIA: CPT

## 2025-06-28 PROCEDURE — 87075 CULTR BACTERIA EXCEPT BLOOD: CPT

## 2025-06-28 PROCEDURE — 80061 LIPID PANEL: CPT

## 2025-06-28 PROCEDURE — 2500000001 HC RX 250 WO HCPCS SELF ADMINISTERED DRUGS (ALT 637 FOR MEDICARE OP)

## 2025-06-28 PROCEDURE — 82607 VITAMIN B-12: CPT | Performed by: NURSE PRACTITIONER

## 2025-06-28 PROCEDURE — 85610 PROTHROMBIN TIME: CPT

## 2025-06-28 PROCEDURE — 2500000002 HC RX 250 W HCPCS SELF ADMINISTERED DRUGS (ALT 637 FOR MEDICARE OP, ALT 636 FOR OP/ED)

## 2025-06-28 PROCEDURE — 93321 DOPPLER ECHO F-UP/LMTD STD: CPT | Performed by: STUDENT IN AN ORGANIZED HEALTH CARE EDUCATION/TRAINING PROGRAM

## 2025-06-28 PROCEDURE — 84295 ASSAY OF SERUM SODIUM: CPT

## 2025-06-28 PROCEDURE — 80053 COMPREHEN METABOLIC PANEL: CPT

## 2025-06-28 PROCEDURE — 84550 ASSAY OF BLOOD/URIC ACID: CPT

## 2025-06-28 PROCEDURE — 2500000004 HC RX 250 GENERAL PHARMACY W/ HCPCS (ALT 636 FOR OP/ED)

## 2025-06-28 PROCEDURE — 36415 COLL VENOUS BLD VENIPUNCTURE: CPT

## 2025-06-28 PROCEDURE — 99233 SBSQ HOSP IP/OBS HIGH 50: CPT

## 2025-06-28 PROCEDURE — 93005 ELECTROCARDIOGRAM TRACING: CPT

## 2025-06-28 PROCEDURE — 93308 TTE F-UP OR LMTD: CPT | Performed by: STUDENT IN AN ORGANIZED HEALTH CARE EDUCATION/TRAINING PROGRAM

## 2025-06-28 PROCEDURE — 99223 1ST HOSP IP/OBS HIGH 75: CPT

## 2025-06-28 PROCEDURE — 80074 ACUTE HEPATITIS PANEL: CPT

## 2025-06-28 PROCEDURE — 85025 COMPLETE CBC W/AUTO DIFF WBC: CPT

## 2025-06-28 PROCEDURE — 82140 ASSAY OF AMMONIA: CPT

## 2025-06-28 PROCEDURE — 81450 HL NEO GSAP 5-50DNA/DNA&RNA: CPT

## 2025-06-28 PROCEDURE — 93308 TTE F-UP OR LMTD: CPT

## 2025-06-28 PROCEDURE — 83615 LACTATE (LD) (LDH) ENZYME: CPT

## 2025-06-28 PROCEDURE — 85730 THROMBOPLASTIN TIME PARTIAL: CPT

## 2025-06-28 PROCEDURE — 82390 ASSAY OF CERULOPLASMIN: CPT | Performed by: NURSE PRACTITIONER

## 2025-06-28 RX ORDER — OLANZAPINE 5 MG/1
5 TABLET, FILM COATED ORAL NIGHTLY
Status: DISPENSED | OUTPATIENT
Start: 2025-06-28

## 2025-06-28 RX ORDER — SODIUM BICARBONATE 650 MG/1
1300 TABLET ORAL 3 TIMES DAILY
Status: DISPENSED | OUTPATIENT
Start: 2025-06-28

## 2025-06-28 RX ADMIN — PAROXETINE HYDROCHLORIDE 20 MG: 20 TABLET, FILM COATED ORAL at 08:23

## 2025-06-28 RX ADMIN — HEPARIN, PORCINE (PF) 10 UNIT/ML INTRAVENOUS SYRINGE 50 UNITS: at 16:35

## 2025-06-28 RX ADMIN — NYSTATIN 500000 UNITS: 100000 SUSPENSION ORAL at 06:24

## 2025-06-28 RX ADMIN — PROCHLORPERAZINE EDISYLATE 10 MG: 5 INJECTION INTRAMUSCULAR; INTRAVENOUS at 14:53

## 2025-06-28 RX ADMIN — NYSTATIN 500000 UNITS: 100000 SUSPENSION ORAL at 16:35

## 2025-06-28 RX ADMIN — OLANZAPINE 5 MG: 5 TABLET, FILM COATED ORAL at 21:05

## 2025-06-28 RX ADMIN — CISPLATIN 52 MG: 1 INJECTION, POWDER, LYOPHILIZED, FOR SOLUTION INTRAVENOUS at 00:29

## 2025-06-28 RX ADMIN — SODIUM BICARBONATE 1300 MG: 650 TABLET ORAL at 19:11

## 2025-06-28 RX ADMIN — NYSTATIN 500000 UNITS: 100000 SUSPENSION ORAL at 15:13

## 2025-06-28 RX ADMIN — ENOXAPARIN SODIUM 40 MG: 100 INJECTION SUBCUTANEOUS at 16:35

## 2025-06-28 RX ADMIN — Medication 15 ML: at 08:25

## 2025-06-28 RX ADMIN — AMLODIPINE BESYLATE 10 MG: 10 TABLET ORAL at 08:23

## 2025-06-28 RX ADMIN — SODIUM CHLORIDE 1000 ML: 0.9 INJECTION, SOLUTION INTRAVENOUS at 00:30

## 2025-06-28 RX ADMIN — PROCHLORPERAZINE EDISYLATE 10 MG: 5 INJECTION INTRAMUSCULAR; INTRAVENOUS at 06:32

## 2025-06-28 RX ADMIN — Medication 15 ML: at 16:36

## 2025-06-28 RX ADMIN — PANTOPRAZOLE SODIUM 40 MG: 40 TABLET, DELAYED RELEASE ORAL at 06:24

## 2025-06-28 ASSESSMENT — PAIN SCALES - GENERAL
PAINLEVEL_OUTOF10: 0 - NO PAIN

## 2025-06-28 ASSESSMENT — COGNITIVE AND FUNCTIONAL STATUS - GENERAL
TOILETING: A LITTLE
TURNING FROM BACK TO SIDE WHILE IN FLAT BAD: A LOT
PERSONAL GROOMING: A LITTLE
MOVING TO AND FROM BED TO CHAIR: A LOT
DRESSING REGULAR UPPER BODY CLOTHING: A LITTLE
CLIMB 3 TO 5 STEPS WITH RAILING: TOTAL
DRESSING REGULAR LOWER BODY CLOTHING: A LITTLE
WALKING IN HOSPITAL ROOM: A LOT
DAILY ACTIVITIY SCORE: 19
MOBILITY SCORE: 12
HELP NEEDED FOR BATHING: A LITTLE
MOVING FROM LYING ON BACK TO SITTING ON SIDE OF FLAT BED WITH BEDRAILS: A LITTLE
STANDING UP FROM CHAIR USING ARMS: A LOT

## 2025-06-28 ASSESSMENT — PAIN SCALES - WONG BAKER: WONGBAKER_NUMERICALRESPONSE: NO HURT

## 2025-06-28 ASSESSMENT — PAIN - FUNCTIONAL ASSESSMENT
PAIN_FUNCTIONAL_ASSESSMENT: 0-10
PAIN_FUNCTIONAL_ASSESSMENT: 0-10

## 2025-06-28 NOTE — CARE PLAN
Nephrology Entry:      Jordyn Neal is a 70 y.o. 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 Presented to OSH 6/20 with N, V, Poor Intake, Transferred to [facility] 6/26 to start Chemotherapy. Consult for Hyponatremia.     #Hyponatremia  -chronic hyponatremia in setting of malignancy  -Etio hypovolemia vs SIADH, have some component of Trell which may cause decrease free water clearance.Cisplatin induced hyponatremia is one of DD,   -Urine osm 922,serum osm 277 (L)  -Echo is pending     Recommend:  -Repeat urin lytes with urine k, urine cl, urine cr, urine Na, urine uric acid, urine osm and serum osm, serum uric acid, lipid profile.  -May need to control pain, nausea, or any other ADH driving factor.Would avoid hypotension.  -Start sodiumbicarb 1300 TID from today.  -Need IVC assessment for fluid status  -WOuld avoid further NS.  -Keep euvolemic.    Ayesha Isabel MD  Nephrology fellow

## 2025-06-28 NOTE — CARE PLAN
Problem: Pain - Adult  Goal: Verbalizes/displays adequate comfort level or baseline comfort level  Outcome: Progressing     Problem: Safety - Adult  Goal: Free from fall injury  Outcome: Progressing     Problem: Skin  Goal: Decreased wound size/increased tissue granulation at next dressing change  Outcome: Progressing  Flowsheets (Taken 6/28/2025 1525)  Decreased wound size/increased tissue granulation at next dressing change:   Promote sleep for wound healing   Utilize specialty bed per algorithm  Goal: Prevent/minimize sheer/friction injuries  Outcome: Progressing  Flowsheets (Taken 6/28/2025 1525)  Prevent/minimize sheer/friction injuries: Turn/reposition every 2 hours/use positioning/transfer devices   The patient's goals for the shift include      The clinical goals for the shift include Pt will remain free from falls and injuries

## 2025-06-28 NOTE — NURSING NOTE
Pt received 1,000 mL sodium chloride 0.9% with potassium chloride 20 mEq, magnesium sulfate 1g over 60 minutes via R chest port.   Pt received pre-meds of palonosetron 0.25 mg IVP and dexamethasone 12 mg in dextrose 5% 50 mL IV piggyback via R chest port.     Pt then received:  -gemcitabine 2,059.6 mg in 321.2 mL of sodium chloride 0.9% over 30 via R chest port. BBR verified prior to and after administration.     -cisplatin 52 mg in 599 mL sodium chloride over 60 minutes via R chest port. BBR verified prior to and after administration.   -1,000 mL sodium chloride 0.9% bolus over 60 minutes via R chest port.     Pt tolerated regimen without nausea, vomiting, or any new symptoms. Vitals remained stable during infusion.

## 2025-06-28 NOTE — SIGNIFICANT EVENT
Rapid Response Nurse Note: RADAR alert: 7    Pager time: [phone]  Arrival time: 825  Event end time: 830  Location: [place] [address]  [] Triage by phone or secure messaging    Rapid response initiated by:  [] Rapid response RN [] Family [] Nursing Supervisor [] Physician   [x] RADAR auto page [] Sepsis auto-page [] RN [] RT   [] NP/PA [] Other:     Primary reason for call:   [] BAT [] New CPAP/BiPAP [] Bleeding [] Change in mental status   [] Chest pain [] Code blue [] FiO2 >/= 50% [] HR </= 40 bpm   [] HR >/= 130 bpm [] Hyperglycemia [] Hypoglycemia [x] RADAR    [] RR </= 8 bpm [] RR >/= 30 bpm [] SBP </= 90 mmHg [] SpO2 < 90%   [] Seizure [] Sepsis [] Shortness of breath  [] Staff concern: see comments     Initial VS and/or RADAR VS: T 36 °C; ; RR 22; /86; SPO2 93%.    Interventions:  [x] None [] ABG/VBG [] Assist w/ICU transfer [] BAT paged    [] Bag mask [] Blood [] Cardioversion [] Code Blue   [] Code blue for intubation [] Code status changed [] Chest x-ray [] EKG   [] IV fluid/bolus [] KUB x-ray [] Labs/cultures [] Medication   [] Nebulizer treatment [] NIPPV (CPAP/BiPAP) [] Oxygen [] Oral airway   [] Peripheral IV [] Palliative care consult [] CT/MRI [] Sepsis protocol    [] Suctioned [] Other:     Outcome:  [] Coded and  [] Code blue for intubation [] Coded and transferred to ICU []  on division   [x] Remained on division (no change) [] Remained on division + additional monitoring [] Remained in ED [] Transferred to ED   [] Transferred to ICU [] Transferred to inpatient status [] Transferred for interventions (procedure) [] Transferred to ICU stepdown    [] Transferred to surgery [] Transferred to telemetry [] Sepsis protocol [] STEMI protocol   [] Stroke protocol [x] Bedside nurse instructed to page rapid response for any concerns or acute change in condition/VS     Additional Comments: RADAR auto generated page received for above listed VS.  Upon arrival to division, spoke with Bedside RN  Bienvenido.  Chart and VS trend reviewed with RN.  Per RN, patient with nausea and consistent tachycardia as noted by trend review.  Sodium level consistently low and being followed by q4 hour lab draw.  Team aware.  Currently, no acute changes in patient condition.  Patient remains on division.  Bedside RN encouraged and agreeable to page Rapid Response if further concerns arise in patient status.

## 2025-06-28 NOTE — PROGRESS NOTES
"Jordyn Neal is a 70 y.o. female on day 2 of admission presenting with Hyponatremia.    Subjective   Pt seen at bedside this AM. Reports just woke up and is tired. She states tolerated chemo okay but has had nausea relieved by compazine. We discussed labs and PT recs. She is agreeable to rehab (list provided by [facility] yesterday), still needs to review with . She reports very low appetite, she is unsure why/when she was placed on remeron. We discussed supportive onc consult to help with symptom mgmt. Provider offered to update her , she declined and said can come back later.     Denies chest pain, SOB, fever, chills, constipation, diarrhea, falls, bleeding, edema, or headaches.        Objective     Physical Exam  Constitutional:       Appearance: She is ill-appearing.   HENT:      Head: Normocephalic.      Right Ear: External ear normal.      Left Ear: External ear normal.      Nose: Nose normal.      Mouth/Throat:      Mouth: Mucous membranes are dry.   Eyes:      Extraocular Movements: Extraocular movements intact.      Conjunctiva/sclera: Conjunctivae normal.   Cardiovascular:      Rate and Rhythm: Regular rhythm. Tachycardia present.   Pulmonary:      Effort: Pulmonary effort is normal.   Abdominal:      General: Bowel sounds are normal. There is distension.      Palpations: Abdomen is soft.   Musculoskeletal:      Cervical back: Normal range of motion.   Skin:     General: Skin is warm and dry.   Neurological:      Mental Status: She is oriented to person, place, and time.      Motor: Weakness present.   Psychiatric:         Mood and Affect: Mood normal.         Behavior: Behavior normal.         Thought Content: Thought content normal.         Last Recorded Vitals  Blood pressure 138/86, pulse (!) 125, temperature 36 °C (96.8 °F), temperature source Temporal, resp. rate 22, height 1.676 m (5' 6\"), weight 95.5 kg (210 lb 8.6 oz), SpO2 93%.  Intake/Output last 3 Shifts:  I/O last 3 completed " shifts:  In: 3650.2 (38.2 mL/kg) [P.O.:680; I.V.:1000 (10.5 mL/kg); IV Piggyback:1970.2]  Out: 350 (3.7 mL/kg) [Urine:350 (0.1 mL/kg/hr)]  Weight: 95.5 kg     Relevant Results     .Scheduled medications  Scheduled Medications[1]  Continuous medications  Continuous Medications[2]  PRN medications  PRN Medications[3]    .  Results for orders placed or performed during the hospital encounter of 06/26/25 (from the past 24 hours)   Urine electrolytes   Result Value Ref Range    Sodium, Urine Random 10 mmol/L    Sodium/Creatinine Ratio 5 Not established. mmol/g Creat    Potassium, Urine Random 53 mmol/L    Potassium/Creatinine Ratio 25 Not established mmol/g Creat    Chloride, Urine Random <15 mmol/L    Chloride/Creatinine Ratio      Creatinine, Urine Random 211.1 20.0 - 320.0 mg/dL   Osmolality, urine   Result Value Ref Range    Osmolality, Urine Random 922 200 - 1,200 mOsm/kg   Basic metabolic panel   Result Value Ref Range    Glucose 80 74 - 99 mg/dL    Sodium 125 (L) 136 - 145 mmol/L    Potassium 5.0 3.5 - 5.3 mmol/L    Chloride 97 (L) 98 - 107 mmol/L    Bicarbonate 15 (L) 21 - 32 mmol/L    Anion Gap 18 10 - 20 mmol/L    Urea Nitrogen 31 (H) 6 - 23 mg/dL    Creatinine 1.20 (H) 0.50 - 1.05 mg/dL    eGFR 49 (L) >60 mL/min/1.73m*2    Calcium 9.2 8.6 - 10.6 mg/dL   Sodium   Result Value Ref Range    Sodium 127 (L) 136 - 145 mmol/L   CBC and Auto Differential   Result Value Ref Range    WBC 17.6 (H) 4.4 - 11.3 x10*3/uL    nRBC 0.3 (H) 0.0 - 0.0 /100 WBCs    RBC 3.89 (L) 4.00 - 5.20 x10*6/uL    Hemoglobin 11.6 (L) 12.0 - 16.0 g/dL    Hematocrit 34.0 (L) 36.0 - 46.0 %    MCV 87 80 - 100 fL    MCH 29.8 26.0 - 34.0 pg    MCHC 34.1 32.0 - 36.0 g/dL    RDW 17.4 (H) 11.5 - 14.5 %    Platelets 360 150 - 450 x10*3/uL    Neutrophils % 94.6 40.0 - 80.0 %    Immature Granulocytes %, Automated 0.8 0.0 - 0.9 %    Lymphocytes % 3.3 13.0 - 44.0 %    Monocytes % 1.1 2.0 - 10.0 %    Eosinophils % 0.0 0.0 - 6.0 %    Basophils % 0.2 0.0 - 2.0  %    Neutrophils Absolute 16.65 (H) 1.20 - 7.70 x10*3/uL    Immature Granulocytes Absolute, Automated 0.14 0.00 - 0.70 x10*3/uL    Lymphocytes Absolute 0.58 (L) 1.20 - 4.80 x10*3/uL    Monocytes Absolute 0.20 0.10 - 1.00 x10*3/uL    Eosinophils Absolute 0.00 0.00 - 0.70 x10*3/uL    Basophils Absolute 0.03 0.00 - 0.10 x10*3/uL   Comprehensive Metabolic Panel   Result Value Ref Range    Glucose 131 (H) 74 - 99 mg/dL    Sodium 129 (L) 136 - 145 mmol/L    Potassium 5.1 3.5 - 5.3 mmol/L    Chloride 102 98 - 107 mmol/L    Bicarbonate 14 (L) 21 - 32 mmol/L    Anion Gap 18 10 - 20 mmol/L    Urea Nitrogen 32 (H) 6 - 23 mg/dL    Creatinine 1.10 (H) 0.50 - 1.05 mg/dL    eGFR 55 (L) >60 mL/min/1.73m*2    Calcium 8.3 (L) 8.6 - 10.6 mg/dL    Albumin 2.5 (L) 3.4 - 5.0 g/dL    Alkaline Phosphatase 1,190 (H) 33 - 136 U/L    Total Protein 5.1 (L) 6.4 - 8.2 g/dL     (H) 9 - 39 U/L    Bilirubin, Total 3.9 (H) 0.0 - 1.2 mg/dL    ALT 73 (H) 7 - 45 U/L   Magnesium   Result Value Ref Range    Magnesium 2.11 1.60 - 2.40 mg/dL   Sodium   Result Value Ref Range    Sodium 129 (L) 136 - 145 mmol/L           Assessment & Plan  Hyponatremia    Jordyn Manzano is a 69yo female PMHx of recently diagnosed metastatic pancreaticobiliary carcinoma with mets to liver, and soft tissue (dx 5/2024, s/p 4/14 ERCP with stent placement in the CBD), HTN, Type 2 DM, HLD, fatty liver, GERD, CKD stage III who presented to [facility] on 6/26/25  as a transfer from OSH for initiaition of inpatient chemotherapy per her oncologist request. [facility] Oncology consulted on admission, s/p C1 Gemcitabine/Cisplatin 6/27. Labs noteable for acute hyponatremia, Na+124 (6/27) previously unresponsive to IVF at OSH despite hypovolemia hyponatremia like picture, 6/27 Nephrology consulted, rec q4 sodium checks, updated recs pending. Na now improving 129 (6/28) s/p further aggressive IVF boluses (x3 6/27). 6/28 Supportive Onc consulted for symptom mgmt (nausea/poor PO), recs pending. DC  SNF pending improvement in symptoms, neph recs, and facility placement.     # s/p C1 Gemcitabine/Cisplatin   # Newly Diagnosed metastatic pancreaticobiliary carcinoma  with mets to the pancreas, liver, and soft tissue  - Primary Med Onc: Dr. Willis  - Diagnosed in 5/2025  - Discussed at tumor boar on 6/4/2025 and rec treatment with Durvalumab + Gemcitabine / Cisplatin   - Treatment was scheduled to begin on 6/22/2025, however, unable to due to admission   - CT A/P (6/22) showed overall progression of metestatic disease, new small volume ascites, satisfactory placement of CBD stent without significant upstream bilairy dilatation, new small pleual effusion  - Primary oncologist rec transfer to [facility] for intiation of emergent inpatient chemotherapy   - Oncology consulted upon arrival to [facility], 6/27 s/p Gemcitabine/Cisplatin (Dr. Recio to place orders today), durvalumab not permitted inpatient  - NGS panel results pending 6/28      # hyponatremia, improving  - Na 131 on admit to [place] (6/20) --> Na 124 (6/27) --> Na 129 (6/28)   - etiology includes hypovolemic hyponatremia in setting of poor PO intake at OSH, s/p multiple L of fluid resuscitation with downtrend of Na  - serum osm 275, urine Cr 197, urine Na <10, urine osm 777  - repeat serum osm , urine cr 211, urine Na 10, urine osm 922,  - s/p 1L fluid restriction 6/26 -- per Dr. Alvarez hold off for now   - 6/27 Nephrology consulted, rec q4 sodium chec and IVC POCUS pending   - of note, pt received x2 additional 1L NS boluses as part of chemo regimen (6/27-6/28)      # leukocytosis  - likely 2/2 steroids, low suspicion  c/f infectious process at this time  - WBC BL~10k, (6/27) 12.6 --> (6/28) 17  - 6/20 CXR w/ atelectasis, no consolidation  - 6/22 CT A/P showed PD, new small/mod ascites, satsifactory placement of CBD stent w/o dilatation  - MRSA pending   - blood cx x2 pending collection 6/28  - s/p 10mg IVP decadron premed 6/27  - CBC daily     # transamanitis    # hx biliary compression stricture s/p stent 4/2025   - likely multifactorial in setting of metastasis to liver and fatty liver  - no abd pain, afebrile, no chills at this time, endorses intermittent nausea  - Alk phos 530, ALT 53, AST 83, Tbili 2.6 upon admission (6/26) --> now uptrending (6/28) alk phos 1,190, alt 73, ast 179, tbili 3.9  - 4/15 s/p plastic biliary stent placement, plan for repeat ERCP in 2 months for stent removal, a 4/29 GI appt was cancelled unclear why, otherwise lost to GI follow up  - CT A/P (6/22) showed overall progression of metastatic disease, new small volume ascites, satisfactory placement of CBD stent without significant upstream bilairy dilatation   - ammonia, LDH, uric acid, coags, hepatitis panel pending collection 6/28  - blood cx x2 pending collection 6/28  - US liver w/ doppler pending  - Consider GI consult tomorrow (6/29) or prior to discharge if labs worsen and/or to arrange for inpt or outpatient ERCP     # tachycardia  - -120s since admission, asymptomatic, no CP or palpitations, no O2 requirement, no pain  - differential includes dehydration vs deconditioning from malignancy vs pain vs infection   - s/p x3 IVF boluses 6/27  - 6/28 EKG ordered  - low threshold for CT PE if tachycardia worsens or develops new O2 requirement       # Neoplasm related pain   # FTT  # Poor PO intake   # N/V  - Continue with home oxycodone 10mg q6 hours PRN severe pain   - Continue with home zofran 4mg q6 hours PRN first line N/V, continue with home compazine 10mg q6 hours q6 hours PRN second line N/V   - Continue with home mirtazapine 15mg nightly   - Nutrition consulted on admit, recs pending   - PT / OT consulted on admit, ampact 8, SNF list provided 6/28  - 6/28 Supportive Onc consulted, recs pending      # Oral thrush, improving  - Continue with oral nystatin 5mL QID swish and spit   - started biotene TID 6/27      # CKD III   - sCR 1.17 (6/26), recent sCr baseline ~1.2-1.3      #  HTN  - Continue with home amlodipine 10mg daily      # MDD  # LUIS FELIPE  - Continue with home paroxetine 20mg daily      # Type 2 DM  -  last Hgb A1c 5.9 (4/29/24), repeat ordered on admit and pending   - due to decreased PO intake and normal blood sugars, insulin not ordered on admit, CTM  - hypoglycemic protocol in place      # GERD  - Protonix 40mg daily      # DVT / prophy   - Lovenox subcutaneous, SCDs     # Dispo   - DNR, DNI - no ICU - confirmed on admit   - DC pending completion of chemo, hyponatremia work-up, Neph recs, and PT/OT recs   - NOK: Natalio Neal [phone]   - FUV with Dr. Willis (oncology) 7/24      I spent > 60 minutes in the professional and overall care of this patient.     Assessment and plan as above discussed with attending physician Dr. Alvarez.      Sharee Valdovinos, APRN-CNP           [1] amLODIPine, 10 mg, oral, Daily  enoxaparin, 40 mg, subcutaneous, q24h  heparin flush, 50 Units, intra-catheter, q12h  mirtazapine, 15 mg, oral, Nightly  moisturizing mouth, 15 mL, Swish & Spit, TID  nystatin, 5 mL, Swish & Spit, 4x daily  pantoprazole, 40 mg, oral, Daily before breakfast  PARoxetine, 20 mg, oral, Daily  [2]    [3] PRN medications: acetaminophen, albuterol, alteplase, alteplase, dextrose, diphenhydrAMINE, EPINEPHrine HCl, famotidine, fluticasone, heparin flush, methylPREDNISolone sodium succinate (PF), oxyCODONE, prochlorperazine, prochlorperazine, prochlorperazine, sodium chloride

## 2025-06-29 ENCOUNTER — APPOINTMENT (OUTPATIENT)
Dept: RADIOLOGY | Facility: HOSPITAL | Age: 70
End: 2025-06-29
Payer: MEDICARE

## 2025-06-29 VITALS
RESPIRATION RATE: 18 BRPM | DIASTOLIC BLOOD PRESSURE: 73 MMHG | BODY MASS INDEX: 33.84 KG/M2 | HEART RATE: 112 BPM | SYSTOLIC BLOOD PRESSURE: 115 MMHG | OXYGEN SATURATION: 93 % | HEIGHT: 66 IN | WEIGHT: 210.54 LBS | TEMPERATURE: 97.7 F

## 2025-06-29 LAB
ALBUMIN SERPL BCP-MCNC: 2.6 G/DL (ref 3.4–5)
ALBUMIN SERPL BCP-MCNC: 2.6 G/DL (ref 3.4–5)
ALP SERPL-CCNC: 841 U/L (ref 33–136)
ALP SERPL-CCNC: 889 U/L (ref 33–136)
ALT SERPL W P-5'-P-CCNC: 55 U/L (ref 7–45)
ALT SERPL W P-5'-P-CCNC: 55 U/L (ref 7–45)
AMMONIA PLAS-SCNC: 49 UMOL/L (ref 16–53)
ANION GAP BLDA CALCULATED.4IONS-SCNC: 17 MMO/L (ref 10–25)
ANION GAP SERPL CALC-SCNC: 18 MMOL/L (ref 10–20)
ANION GAP SERPL CALC-SCNC: 20 MMOL/L (ref 10–20)
AST SERPL W P-5'-P-CCNC: 101 U/L (ref 9–39)
AST SERPL W P-5'-P-CCNC: 109 U/L (ref 9–39)
BACTERIA BLD CULT: NORMAL
BACTERIA BLD CULT: NORMAL
BASE EXCESS BLDA CALC-SCNC: -10.6 MMOL/L (ref -2–3)
BASOPHILS # BLD AUTO: 0.03 X10*3/UL (ref 0–0.1)
BASOPHILS # BLD AUTO: 0.05 X10*3/UL (ref 0–0.1)
BASOPHILS NFR BLD AUTO: 0.1 %
BASOPHILS NFR BLD AUTO: 0.2 %
BILIRUB SERPL-MCNC: 4 MG/DL (ref 0–1.2)
BILIRUB SERPL-MCNC: 4.2 MG/DL (ref 0–1.2)
BODY TEMPERATURE: 37 DEGREES CELSIUS
BUN SERPL-MCNC: 50 MG/DL (ref 6–23)
BUN SERPL-MCNC: 52 MG/DL (ref 6–23)
CA-I BLDA-SCNC: 1.16 MMOL/L (ref 1.1–1.33)
CALCIUM SERPL-MCNC: 8.6 MG/DL (ref 8.6–10.6)
CALCIUM SERPL-MCNC: 8.8 MG/DL (ref 8.6–10.6)
CHLORIDE BLDA-SCNC: 101 MMOL/L (ref 98–107)
CHLORIDE SERPL-SCNC: 101 MMOL/L (ref 98–107)
CHLORIDE SERPL-SCNC: 102 MMOL/L (ref 98–107)
CO2 SERPL-SCNC: 15 MMOL/L (ref 21–32)
CO2 SERPL-SCNC: 15 MMOL/L (ref 21–32)
CREAT SERPL-MCNC: 1.81 MG/DL (ref 0.5–1.05)
CREAT SERPL-MCNC: 1.92 MG/DL (ref 0.5–1.05)
EGFRCR SERPLBLD CKD-EPI 2021: 28 ML/MIN/1.73M*2
EGFRCR SERPLBLD CKD-EPI 2021: 30 ML/MIN/1.73M*2
EJECTION FRACTION: 78 %
EOSINOPHIL # BLD AUTO: 0 X10*3/UL (ref 0–0.7)
EOSINOPHIL # BLD AUTO: 0 X10*3/UL (ref 0–0.7)
EOSINOPHIL NFR BLD AUTO: 0 %
EOSINOPHIL NFR BLD AUTO: 0 %
ERYTHROCYTE [DISTWIDTH] IN BLOOD BY AUTOMATED COUNT: 17.9 % (ref 11.5–14.5)
ERYTHROCYTE [DISTWIDTH] IN BLOOD BY AUTOMATED COUNT: 18 % (ref 11.5–14.5)
GLUCOSE BLDA-MCNC: 139 MG/DL (ref 74–99)
GLUCOSE SERPL-MCNC: 126 MG/DL (ref 74–99)
GLUCOSE SERPL-MCNC: 130 MG/DL (ref 74–99)
HCO3 BLDA-SCNC: 13.5 MMOL/L (ref 22–26)
HCT VFR BLD AUTO: 34.3 % (ref 36–46)
HCT VFR BLD AUTO: 34.5 % (ref 36–46)
HCT VFR BLD EST: 37 % (ref 36–46)
HGB BLD-MCNC: 11.3 G/DL (ref 12–16)
HGB BLD-MCNC: 11.7 G/DL (ref 12–16)
HGB BLDA-MCNC: 12.2 G/DL (ref 12–16)
HOLD SPECIMEN: NORMAL
IMM GRANULOCYTES # BLD AUTO: 0.13 X10*3/UL (ref 0–0.7)
IMM GRANULOCYTES # BLD AUTO: 0.19 X10*3/UL (ref 0–0.7)
IMM GRANULOCYTES NFR BLD AUTO: 0.6 % (ref 0–0.9)
IMM GRANULOCYTES NFR BLD AUTO: 0.9 % (ref 0–0.9)
INHALED O2 CONCENTRATION: 21 %
LACTATE BLDA-SCNC: 1.9 MMOL/L (ref 0.4–2)
LEFT VENTRICLE INTERNAL DIMENSION DIASTOLE: 3.33 CM (ref 3.5–6)
LYMPHOCYTES # BLD AUTO: 0.48 X10*3/UL (ref 1.2–4.8)
LYMPHOCYTES # BLD AUTO: 0.71 X10*3/UL (ref 1.2–4.8)
LYMPHOCYTES NFR BLD AUTO: 2.3 %
LYMPHOCYTES NFR BLD AUTO: 3.3 %
MAGNESIUM SERPL-MCNC: 2.25 MG/DL (ref 1.6–2.4)
MAGNESIUM SERPL-MCNC: 2.31 MG/DL (ref 1.6–2.4)
MCH RBC QN AUTO: 29.4 PG (ref 26–34)
MCH RBC QN AUTO: 30.6 PG (ref 26–34)
MCHC RBC AUTO-ENTMCNC: 32.9 G/DL (ref 32–36)
MCHC RBC AUTO-ENTMCNC: 33.9 G/DL (ref 32–36)
MCV RBC AUTO: 89 FL (ref 80–100)
MCV RBC AUTO: 90 FL (ref 80–100)
MONOCYTES # BLD AUTO: 0.18 X10*3/UL (ref 0.1–1)
MONOCYTES # BLD AUTO: 0.26 X10*3/UL (ref 0.1–1)
MONOCYTES NFR BLD AUTO: 0.9 %
MONOCYTES NFR BLD AUTO: 1.2 %
NEUTROPHILS # BLD AUTO: 20.12 X10*3/UL (ref 1.2–7.7)
NEUTROPHILS # BLD AUTO: 20.43 X10*3/UL (ref 1.2–7.7)
NEUTROPHILS NFR BLD AUTO: 94.7 %
NEUTROPHILS NFR BLD AUTO: 95.8 %
NRBC BLD-RTO: 0.5 /100 WBCS (ref 0–0)
NRBC BLD-RTO: 0.6 /100 WBCS (ref 0–0)
OXYHGB MFR BLDA: 95.5 % (ref 94–98)
PCO2 BLDA: 25 MM HG (ref 38–42)
PH BLDA: 7.34 PH (ref 7.38–7.42)
PHOSPHATE SERPL-MCNC: 5.8 MG/DL (ref 2.5–4.9)
PLATELET # BLD AUTO: 349 X10*3/UL (ref 150–450)
PLATELET # BLD AUTO: 351 X10*3/UL (ref 150–450)
PO2 BLDA: 82 MM HG (ref 85–95)
POTASSIUM BLDA-SCNC: 5.5 MMOL/L (ref 3.5–5.3)
POTASSIUM SERPL-SCNC: 5.3 MMOL/L (ref 3.5–5.3)
POTASSIUM SERPL-SCNC: 5.5 MMOL/L (ref 3.5–5.3)
PROCALCITONIN SERPL-MCNC: 7.78 NG/ML
PROT SERPL-MCNC: 5.3 G/DL (ref 6.4–8.2)
PROT SERPL-MCNC: 5.7 G/DL (ref 6.4–8.2)
RBC # BLD AUTO: 3.82 X10*6/UL (ref 4–5.2)
RBC # BLD AUTO: 3.85 X10*6/UL (ref 4–5.2)
SAO2 % BLDA: 98 % (ref 94–100)
SODIUM BLDA-SCNC: 126 MMOL/L (ref 136–145)
SODIUM SERPL-SCNC: 130 MMOL/L (ref 136–145)
SODIUM SERPL-SCNC: 130 MMOL/L (ref 136–145)
STAPHYLOCOCCUS SPEC CULT: ABNORMAL
TSH SERPL-ACNC: 1.62 MIU/L (ref 0.44–3.98)
URATE SERPL-MCNC: 11.2 MG/DL (ref 2.3–6.7)
VIT B12 SERPL-MCNC: >2000 PG/ML (ref 211–911)
WBC # BLD AUTO: 21 X10*3/UL (ref 4.4–11.3)
WBC # BLD AUTO: 21.6 X10*3/UL (ref 4.4–11.3)

## 2025-06-29 PROCEDURE — 84132 ASSAY OF SERUM POTASSIUM: CPT | Performed by: NURSE PRACTITIONER

## 2025-06-29 PROCEDURE — 2500000004 HC RX 250 GENERAL PHARMACY W/ HCPCS (ALT 636 FOR OP/ED)

## 2025-06-29 PROCEDURE — 99232 SBSQ HOSP IP/OBS MODERATE 35: CPT | Performed by: INTERNAL MEDICINE

## 2025-06-29 PROCEDURE — 2500000002 HC RX 250 W HCPCS SELF ADMINISTERED DRUGS (ALT 637 FOR MEDICARE OP, ALT 636 FOR OP/ED): Performed by: NURSE PRACTITIONER

## 2025-06-29 PROCEDURE — 2500000004 HC RX 250 GENERAL PHARMACY W/ HCPCS (ALT 636 FOR OP/ED): Performed by: NURSE PRACTITIONER

## 2025-06-29 PROCEDURE — 70450 CT HEAD/BRAIN W/O DYE: CPT | Performed by: RADIOLOGY

## 2025-06-29 PROCEDURE — 83735 ASSAY OF MAGNESIUM: CPT

## 2025-06-29 PROCEDURE — 1170000001 HC PRIVATE ONCOLOGY ROOM DAILY

## 2025-06-29 PROCEDURE — 80053 COMPREHEN METABOLIC PANEL: CPT

## 2025-06-29 PROCEDURE — 71045 X-RAY EXAM CHEST 1 VIEW: CPT | Performed by: RADIOLOGY

## 2025-06-29 PROCEDURE — 82435 ASSAY OF BLOOD CHLORIDE: CPT | Performed by: NURSE PRACTITIONER

## 2025-06-29 PROCEDURE — 71045 X-RAY EXAM CHEST 1 VIEW: CPT

## 2025-06-29 PROCEDURE — 82140 ASSAY OF AMMONIA: CPT | Performed by: NURSE PRACTITIONER

## 2025-06-29 PROCEDURE — 83735 ASSAY OF MAGNESIUM: CPT | Performed by: NURSE PRACTITIONER

## 2025-06-29 PROCEDURE — 84100 ASSAY OF PHOSPHORUS: CPT | Performed by: NURSE PRACTITIONER

## 2025-06-29 PROCEDURE — 84550 ASSAY OF BLOOD/URIC ACID: CPT | Performed by: NURSE PRACTITIONER

## 2025-06-29 PROCEDURE — 82960 TEST FOR G6PD ENZYME: CPT | Performed by: NURSE PRACTITIONER

## 2025-06-29 PROCEDURE — 82805 BLOOD GASES W/O2 SATURATION: CPT | Performed by: NURSE PRACTITIONER

## 2025-06-29 PROCEDURE — 2500000001 HC RX 250 WO HCPCS SELF ADMINISTERED DRUGS (ALT 637 FOR MEDICARE OP): Performed by: NURSE PRACTITIONER

## 2025-06-29 PROCEDURE — 70450 CT HEAD/BRAIN W/O DYE: CPT

## 2025-06-29 PROCEDURE — 85025 COMPLETE CBC W/AUTO DIFF WBC: CPT | Performed by: NURSE PRACTITIONER

## 2025-06-29 PROCEDURE — 2500000002 HC RX 250 W HCPCS SELF ADMINISTERED DRUGS (ALT 637 FOR MEDICARE OP, ALT 636 FOR OP/ED)

## 2025-06-29 PROCEDURE — 99233 SBSQ HOSP IP/OBS HIGH 50: CPT | Performed by: NURSE PRACTITIONER

## 2025-06-29 PROCEDURE — 85025 COMPLETE CBC W/AUTO DIFF WBC: CPT

## 2025-06-29 PROCEDURE — 84145 PROCALCITONIN (PCT): CPT | Performed by: NURSE PRACTITIONER

## 2025-06-29 PROCEDURE — 99233 SBSQ HOSP IP/OBS HIGH 50: CPT

## 2025-06-29 PROCEDURE — 2500000001 HC RX 250 WO HCPCS SELF ADMINISTERED DRUGS (ALT 637 FOR MEDICARE OP)

## 2025-06-29 PROCEDURE — 84443 ASSAY THYROID STIM HORMONE: CPT | Performed by: NURSE PRACTITIONER

## 2025-06-29 PROCEDURE — 2500000004 HC RX 250 GENERAL PHARMACY W/ HCPCS (ALT 636 FOR OP/ED): Performed by: INTERNAL MEDICINE

## 2025-06-29 PROCEDURE — 2500000005 HC RX 250 GENERAL PHARMACY W/O HCPCS: Performed by: NURSE PRACTITIONER

## 2025-06-29 RX ORDER — POLYETHYLENE GLYCOL 3350 17 G/17G
17 POWDER, FOR SOLUTION ORAL DAILY PRN
Status: ACTIVE | OUTPATIENT
Start: 2025-06-29

## 2025-06-29 RX ORDER — LACTULOSE 10 G/15ML
20 SOLUTION ORAL ONCE
Status: DISCONTINUED | OUTPATIENT
Start: 2025-06-29 | End: 2025-06-29

## 2025-06-29 RX ORDER — OXYCODONE HYDROCHLORIDE 5 MG/1
5 TABLET ORAL EVERY 6 HOURS PRN
Refills: 0 | Status: ACTIVE | OUTPATIENT
Start: 2025-06-29

## 2025-06-29 RX ORDER — ONDANSETRON 4 MG/1
4 TABLET, FILM COATED ORAL EVERY 6 HOURS PRN
Status: DISPENSED | OUTPATIENT
Start: 2025-06-29

## 2025-06-29 RX ORDER — DEXAMETHASONE 0.5 MG/5ML
1 ELIXIR ORAL EVERY 8 HOURS SCHEDULED
Status: DISPENSED | OUTPATIENT
Start: 2025-06-29

## 2025-06-29 RX ORDER — LACTULOSE 10 G/15ML
20 SOLUTION ORAL ONCE
Status: COMPLETED | OUTPATIENT
Start: 2025-06-29 | End: 2025-06-29

## 2025-06-29 RX ORDER — HEPARIN SODIUM 5000 [USP'U]/ML
5000 INJECTION, SOLUTION INTRAVENOUS; SUBCUTANEOUS EVERY 8 HOURS
Status: DISPENSED | OUTPATIENT
Start: 2025-06-29

## 2025-06-29 RX ADMIN — SODIUM BICARBONATE 1300 MG: 650 TABLET ORAL at 15:45

## 2025-06-29 RX ADMIN — AMLODIPINE BESYLATE 10 MG: 10 TABLET ORAL at 09:34

## 2025-06-29 RX ADMIN — PROCHLORPERAZINE EDISYLATE 10 MG: 5 INJECTION INTRAMUSCULAR; INTRAVENOUS at 23:53

## 2025-06-29 RX ADMIN — HEPARIN SODIUM 5000 UNITS: 5000 INJECTION, SOLUTION INTRAVENOUS; SUBCUTANEOUS at 21:04

## 2025-06-29 RX ADMIN — SODIUM ZIRCONIUM CYCLOSILICATE 15 G: 5 POWDER, FOR SUSPENSION ORAL at 13:21

## 2025-06-29 RX ADMIN — DEXAMETHASONE 1 MG: 0.5 SOLUTION ORAL at 11:10

## 2025-06-29 RX ADMIN — SODIUM CHLORIDE 500 ML: 0.9 INJECTION, SOLUTION INTRAVENOUS at 17:09

## 2025-06-29 RX ADMIN — PAROXETINE HYDROCHLORIDE 20 MG: 20 TABLET, FILM COATED ORAL at 11:06

## 2025-06-29 RX ADMIN — SODIUM BICARBONATE 1300 MG: 650 TABLET ORAL at 11:06

## 2025-06-29 RX ADMIN — LACTULOSE 20 G: 20 SOLUTION ORAL at 11:02

## 2025-06-29 RX ADMIN — ONDANSETRON HYDROCHLORIDE 4 MG: 4 TABLET, FILM COATED ORAL at 11:10

## 2025-06-29 RX ADMIN — SODIUM BICARBONATE 1300 MG: 650 TABLET ORAL at 21:04

## 2025-06-29 RX ADMIN — Medication 15 ML: at 15:45

## 2025-06-29 RX ADMIN — HEPARIN SODIUM 5000 UNITS: 5000 INJECTION, SOLUTION INTRAVENOUS; SUBCUTANEOUS at 11:02

## 2025-06-29 RX ADMIN — Medication 15 ML: at 21:04

## 2025-06-29 RX ADMIN — LIDOCAINE HYDROCHLORIDE 10 ML: 20 SOLUTION ORAL; TOPICAL at 21:04

## 2025-06-29 ASSESSMENT — COGNITIVE AND FUNCTIONAL STATUS - GENERAL
PERSONAL GROOMING: A LOT
TOILETING: TOTAL
CLIMB 3 TO 5 STEPS WITH RAILING: TOTAL
MOVING FROM LYING ON BACK TO SITTING ON SIDE OF FLAT BED WITH BEDRAILS: A LOT
MOBILITY SCORE: 8
TOILETING: TOTAL
DRESSING REGULAR UPPER BODY CLOTHING: A LOT
EATING MEALS: A LOT
MOVING TO AND FROM BED TO CHAIR: TOTAL
WALKING IN HOSPITAL ROOM: TOTAL
STANDING UP FROM CHAIR USING ARMS: TOTAL
EATING MEALS: A LOT
HELP NEEDED FOR BATHING: A LOT
DRESSING REGULAR LOWER BODY CLOTHING: A LOT
STANDING UP FROM CHAIR USING ARMS: TOTAL
PERSONAL GROOMING: A LOT
MOVING TO AND FROM BED TO CHAIR: TOTAL
TURNING FROM BACK TO SIDE WHILE IN FLAT BAD: A LOT
DAILY ACTIVITIY SCORE: 11
DAILY ACTIVITIY SCORE: 11
DRESSING REGULAR LOWER BODY CLOTHING: A LOT
TURNING FROM BACK TO SIDE WHILE IN FLAT BAD: A LOT
CLIMB 3 TO 5 STEPS WITH RAILING: TOTAL
MOBILITY SCORE: 8
WALKING IN HOSPITAL ROOM: TOTAL
MOVING FROM LYING ON BACK TO SITTING ON SIDE OF FLAT BED WITH BEDRAILS: A LOT
HELP NEEDED FOR BATHING: A LOT
DRESSING REGULAR UPPER BODY CLOTHING: A LOT

## 2025-06-29 ASSESSMENT — PAIN SCALES - GENERAL
PAINLEVEL_OUTOF10: 0 - NO PAIN
PAINLEVEL_OUTOF10: 0 - NO PAIN

## 2025-06-29 ASSESSMENT — PAIN - FUNCTIONAL ASSESSMENT: PAIN_FUNCTIONAL_ASSESSMENT: UNABLE TO SELF-REPORT

## 2025-06-29 NOTE — CARE PLAN
The patient's goals for the shift include      The clinical goals for the shift include Pt will remain HDs and VSS through end of shift 6/29/2025 1900      Problem: Pain - Adult  Goal: Verbalizes/displays adequate comfort level or baseline comfort level  Outcome: Progressing     Problem: Safety - Adult  Goal: Free from fall injury  Outcome: Progressing     Problem: Discharge Planning  Goal: Discharge to home or other facility with appropriate resources  Outcome: Progressing     Problem: Chronic Conditions and Co-morbidities  Goal: Patient's chronic conditions and co-morbidity symptoms are monitored and maintained or improved  Outcome: Progressing     Problem: Nutrition  Goal: Nutrient intake appropriate for maintaining nutritional needs  Outcome: Progressing     Problem: Skin  Goal: Decreased wound size/increased tissue granulation at next dressing change  Outcome: Progressing  Flowsheets (Taken 6/29/2025 0940)  Decreased wound size/increased tissue granulation at next dressing change: Promote sleep for wound healing  Goal: Participates in plan/prevention/treatment measures  Outcome: Progressing  Flowsheets (Taken 6/29/2025 0940)  Participates in plan/prevention/treatment measures:   Elevate heels   Discuss with provider PT/OT consult  Goal: Prevent/manage excess moisture  Outcome: Progressing  Flowsheets (Taken 6/29/2025 0940)  Prevent/manage excess moisture: Cleanse incontinence/protect with barrier cream  Goal: Prevent/minimize sheer/friction injuries  Outcome: Progressing  Flowsheets (Taken 6/29/2025 0940)  Prevent/minimize sheer/friction injuries:   Turn/reposition every 2 hours/use positioning/transfer devices   Use pull sheet  Goal: Promote/optimize nutrition  Outcome: Progressing  Flowsheets (Taken 6/29/2025 0940)  Promote/optimize nutrition:   Consume > 50% meals/supplements   Offer water/supplements/favorite foods  Goal: Promote skin healing  Outcome: Progressing  Flowsheets (Taken 6/29/2025 0940)  Promote  skin healing:   Turn/reposition every 2 hours/use positioning/transfer devices   Protective dressings over bony prominences

## 2025-06-29 NOTE — PROGRESS NOTES
Physical Therapy Attempt                 Therapy Communication Note    Patient Name: Jordyn Neal  MRN: [id number]  Department: [facility]  Room: [address] [place] [address]  Today's Date: 6/29/2025     Discipline: Physical Therapy    Missed Visit: PT Missed Visit: Yes     Missed Visit Reason: Missed Visit Reason: Patient placed on medical hold (Per notes: rapid response this am for new AMS; following commands but unable to keep eyes open, not able to speak, poor participation. Pt. went for CT, awaiting recs. Will hold until medically appropraite.)    Missed Time: Attempt

## 2025-06-29 NOTE — ASSESSMENT & PLAN NOTE
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 [facility] consulted on admission. Patient s/p C1 Gemcitabine/Cisplatin inpatient 6/27. Labs noteable for acute hyponatremia, Na+124 (6/27) previously unresponsive to IVF at OSH despite hypovolemia hyponatremia like picture. Nephrology consulted 6/27, ddx hypovolemia vs SIADH vs Cisplatin-induced.  Rec q4 sodium checks initially, further aggressive IVF boluses (x3 6/27), started sodium bicarb TID 6/28, Na showing improvement as of 6/29. Supportive Onc consulted 6/28 for symptom mgmt (nausea/poor PO intake), recs followed. 6/29 AM pt with significant AMS. CT head (6/29) pending. PT/OT rec SNF. DC to SNF pending improvement in symptoms, neph recs, and facility placement.     Updates 6/29:  - AMS; following commands but unable to keep eyes open, not able to speak  - CT head (6/29): pending  - ABG (6/29): pH 7.24, pCO2 25, pO2 82, SO2 98; RT consulted, RN to place on humidified 2L NC face mask (mouth breathing)  - TSH (6/29): pending  - 1x dose of [place] Lactulose ordered for Ammonia 73 from yesterday- repeat Ammonia pending for today 6/29  - WBC 21.6k (6/29)- CXR ordered, UA with reflex ordered, lactate on ABG 1.9, procal ordered  - Uric Acid 10.2 (6/28)- repeat pending 6/29    # s/p C1 Gemcitabine/Cisplatin Inpatient  # Newly Diagnosed 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 Willis  - Diagnosed in 5/2025  - Discussed at tumor board on 6/4/2025 and rec treatment with Durvalumab + Gemcitabine / Cisplatin   - Treatment was scheduled to begin on 6/22/2025, however,  unable to due to admission   - CT A/P (6/22) showed overall progression of metestatic disease, new small volume ascites, satisfactory placement of CBD stent without significant upstream bilairy dilatation, new small pleual effusion  - Primary oncologist rec transfer to [facility] for intiation of emergent inpatient chemotherapy   - Oncology consulted upon arrival to [facility], 6/27 s/p Gemcitabine/Cisplatin while inpatient, durvalumab not permitted inpatient  - NGS panel results pending 6/28      # AMS  - Ddx include metabolic (elevated LFTs/ ammonia/ CHIO), structural, infectious  - As of 6/29 AM- following commands but unable to keep eyes open, not able to speak- later throughout morning waxing/waning  # Metabolic  - Electrolytes stable; replete PRN. No hypo/hyperglycemia  - ABG (6/29): pH 7.24, pCO2 25, pO2 82, SO2 98; RT consulted, RN to place on humidified 2L NC face mask (mouth breathing)  - TSH (6/29): pending  - LFTs slightly improving 6/29, Tbili increased to 4 (6/29)  - sCr worsening, sCr 1.81 (6/29)  - 1x dose of [place] Lactulose ordered for Ammonia 73 from yesterday- repeat Ammonia pending for today 6/29  - Uric Acid 10.2 (6/28)- repeat pending 6/29  - Vit B12 level ordered/pending 6/29  # Infectious  - WBC 21.6k (6/29)  - Repeat CXR (6/29) prelim without c/f infection  - UA with reflex ordered/pending (6/29)  - Lactate on ABG 1.9, procal ordered/pending (6/29)  - Pt does have CBD stent that needs to be removed- pt without fevers/chills, HoTN, or abdominal pain- no obvious concern for infected stent- will consult GI 6/30 for recs regarding stent removal (inpt vs outpt); recent CT a/p (6/22) showing CBD stent intact without significant upstream biliary dilation  # Structural  - No known brain mets  - CT head (6/29): pending  - CBD stent in place via CT a/p (6/22)    # Hyponatremia-- improving  - Na 131 on admit to [place] (6/20) --> Na 124 (6/27) --> Na 129 (6/28)--> 130 (6/29)  - DDx includes hypovolemia vs SIADH vs  Cisplatin-induced (although hyponatremia started prior to Cisplatin, so if any effect not the whole etiology)  - Serum osm 275, urine Cr 197, urine Na <10, urine osm 777 (6/26)  - Repeat serum osm, urine cr 211, urine Na 10, urine osm 922 (6/27)  - s/p 1L fluid restriction 6/26 -- per Dr. Alvarez hold off for now   - Nephrology consulted 6/27, ddx hypovolemia vs SIADH vs Cisplatin-induced. Rec q4 sodium checks initially, started sodium bicarb TID 6/28, Na showing improvement as of 6/29  - Of note, pt received x2 additional 1L NS boluses as part of chemo regimen (6/27-6/28)      # Leukocytosis-- worsening  - Likely 2/2 steroids vs infectious  - WBC BL~10k, (6/27) 12.6 --> 21.6k (6/29)  - CXR (6/20) w/ atelectasis, no consolidation  - CT A/P (6/22) showed worsening disease, new small/mod ascites, satsifactory placement of CBD stent w/o dilatation  - Repeat CXR (6/29): Prelim without c/f infection  - UA with reflex (6/29): pending  - Lactate level 1.9 via ABG (6/29): Procal ordered/pending 6/29  - MSSA (6/27): +  - Blood Cx x2 (6/28): NGTD  - s/p 10mg IVP decadron premed 6/27  - CBC daily     # Transaminitis-- somewhat improving  # Hx biliary compression stricture s/p stent 4/2025   - Likely multifactorial in setting of metastasis to liver and fatty liver  - No abd pain, afebrile, no chills at this time, endorses intermittent nausea  - Alk phos 530, ALT 53, AST 83, Tbili 2.6 upon admission (6/26) --> now uptrending (6/28) alk phos 1,190, alt 73, ast 179, tbili 3.9--> showing improvement 6/29 alk phos 841, ALT 55, , Tbili 4   - 4/15 s/p plastic biliary stent placement, plan for repeat ERCP in 2 months for stent removal, a 4/29 GI appt was cancelled unclear why, otherwise lost to GI follow up  - CT A/P (6/22) showed overall progression of metastatic disease, new small volume ascites, satisfactory placement of CBD stent without significant upstream bilairy dilatation   - Ammonia 74 (6/28)--> repeat ammonia 6/29  pending, ordered 1x dose of Lactulose regardless  - Hepatitis panel (6/28): neg  - Blood Cx x2 (6/28): NGTD  - US liver w/ doppler ordered 6/28 and pending- will make NPO @ 0400 6/29 AM  - Will consult GI tomorrow 6/30 for recs regarding stent removal (inpt vs outpt)    # Tachycardia-- improving  - -120s since admission, asymptomatic, no CP or palpitations, no O2 requirement, no pain  - Ddx includes dehydration vs deconditioning from malignancy vs pain vs infection   - s/p x3 IVF boluses 6/27  - EKG (6/28): sinus tach, QTc 446  - Low threshold for CT angio chest if tachycardia worsens or develops new O2 requirement       # Neoplasm-related pain   # FTT  # Poor PO intake   # N/V  - Supportive onc consulted 6/28 for symptom/pain management, recs followed  - Continue with home Oxycodone 10mg q6hrs PRN severe pain, added Oxycodone 5mg q6hrs PRN moderate pain  - Continue with home PO Zofran 4mg q6 hours PRN N/V 2nd line, and PO/IV Compazine 10mg q6hrs PRN N/V 1st line  - STOPPED home mirtazapine 15mg nightly and started Zyprexa 5mg nightly 6/29- however then HELD Zyprexa 6/29 d/t AMS  - Nutrition consulted 6/27, rec regular diet as tolerated, Ensure Clear TID with meals  - PT / OT consulted on admit, ampact 8, SNF list provided 6/28    # Oral thrush, improving  # Mouth Pain  - Treated with oral nystatin 5mL QID swish and spit- completed course 6/29  - Started biotene TID 6/27   - Added scheduled Dex oral liquid 1mg TID swish and swallow and magic mouth wash 10mL swish and swallow q4hrs scheduled for mouth pain     # CKD III   - Recent sCr BL ~1.2-1.3 ; sCr 1.08 (6/27)--> 1.81 (6/29)  - Avoid nephrotoxic agents  - Monitor CMP daily  - Nephrology following     # HTN  - Continue with home Amlodipine 10mg daily      # MDD  # LUIS FELIPE  - Continue with home Paroxetine 20mg daily      # Type 2 DM  - Last Hgb A1c 5.9 (4/29/24); not on any diabetic medications  - Due to decreased PO intake and normal blood sugars, insulin not  ordered on admit, CTM  - Hypoglycemic protocol in place      # GERD  - Continue Protonix 40mg daily      # DVT Prophy: Stopped prophy Lovenox 6/29 and started prophy Heparin SQ 6/29. SCDs, encourage safe ambulation     DISPO:  - DNR, DNI - no ICU - confirmed on admit   - DC to SNF pending improvement in symptoms, neph recs, and facility placement  - NOK: Natalio Neal [phone]   - FUV with Dr. Willis (oncology) 7/24

## 2025-06-29 NOTE — PROGRESS NOTES
Jordyn Neal is a 70 y.o. female on day 3 of admission presenting with Hyponatremia.Jordyn Neal   70 y.o.    @WT@  N/Room: [id number]/[facility]7/4007A    Subjective: Lethargic and sleepy.  Not eating and drinking.  UOP is oliguric.  No SOB or n/v    Objective:     Meds:   amLODIPine, 10 mg, Daily  dexAMETHasone, 1 mg, q8h PEREZ  heparin, 5,000 Units, q8h  heparin flush, 50 Units, q12h  lidocaine-diphenhydraMINE-Maalox 1:1:1, 10 mL, q4h PEREZ  moisturizing mouth, 15 mL, TID  [Held by provider] OLANZapine, 5 mg, Nightly  pantoprazole, 40 mg, Daily before breakfast  PARoxetine, 20 mg, Daily  sodium bicarbonate, 1,300 mg, TID         acetaminophen, 650 mg, q6h PRN  albuterol, 3 mL, PRN  alteplase, 2 mg, PRN  alteplase, 2 mg, PRN  dextrose, 500 mL, PRN  diphenhydrAMINE, 50 mg, PRN  EPINEPHrine HCl, 0.3 mg, q5 min PRN  famotidine, 20 mg, Once PRN  fluticasone, 1 spray, Daily PRN  heparin flush, 50 Units, PRN  methylPREDNISolone sodium succinate (PF), 40 mg, PRN  ondansetron, 4 mg, q6h PRN  oxyCODONE, 10 mg, q6h PRN  oxyCODONE, 5 mg, q6h PRN  polyethylene glycol, 17 g, Daily PRN  prochlorperazine, 10 mg, q6h PRN  prochlorperazine, 10 mg, q6h PRN  sodium chloride, 500 mL, PRN        Vitals:    06/29/25 1146   BP: 121/78   Pulse: (!) 112   Resp: 17   Temp: 35.9 °C (96.6 °F)   SpO2: 93%          Intake/Output Summary (Last 24 hours) at 6/29/2025 1348  Last data filed at 6/29/2025 0343  Gross per 24 hour   Intake --   Output 175 ml   Net -175 ml       General appearance: no distress  Eyes: non-icteric  Skin: no apparent rash  Heart: [id number]  Lungs: CTA bilat no wheezing/crackles  Abdomen: soft, nt/nd  Extremities: trace edema bilat  Neuro: No FND,asterixis      Blood Labs:  Results for orders placed or performed during the hospital encounter of 06/26/25 (from the past 24 hours)   Blood Culture    Specimen: Mediport; Blood culture   Result Value Ref Range    Blood Culture Loaded on Instrument - Culture in progress    Blood  Culture    Specimen: Peripheral Venipuncture; Blood culture   Result Value Ref Range    Blood Culture Loaded on Instrument - Culture in progress    Lactate Dehydrogenase   Result Value Ref Range     84 - 246 U/L   Comprehensive Metabolic Panel   Result Value Ref Range    Glucose 199 (H) 74 - 99 mg/dL    Sodium 127 (L) 136 - 145 mmol/L    Potassium 5.3 3.5 - 5.3 mmol/L    Chloride 100 98 - 107 mmol/L    Bicarbonate 15 (L) 21 - 32 mmol/L    Anion Gap 17 10 - 20 mmol/L    Urea Nitrogen 41 (H) 6 - 23 mg/dL    Creatinine 1.39 (H) 0.50 - 1.05 mg/dL    eGFR 41 (L) >60 mL/min/1.73m*2    Calcium 9.3 8.6 - 10.6 mg/dL    Albumin 2.9 (L) 3.4 - 5.0 g/dL    Alkaline Phosphatase 1,071 (H) 33 - 136 U/L    Total Protein 6.2 (L) 6.4 - 8.2 g/dL     (H) 9 - 39 U/L    Bilirubin, Total 3.6 (H) 0.0 - 1.2 mg/dL    ALT 68 (H) 7 - 45 U/L   Uric Acid   Result Value Ref Range    Uric Acid 10.2 (H) 2.3 - 6.7 mg/dL   Coagulation Screen   Result Value Ref Range    Protime 18.1 (H) 9.8 - 12.4 seconds    INR 1.6 (H) 0.9 - 1.1    aPTT 27 26 - 36 seconds   Ammonia   Result Value Ref Range    Ammonia 73 (H) 16 - 53 umol/L   Hepatitis panel, acute   Result Value Ref Range    Hepatitis B Surface AG Nonreactive Nonreactive    Hepatitis A  AB- IgM Nonreactive Nonreactive    Hepatitis B Core AB; IgM Nonreactive Nonreactive    Hepatitis C AB Nonreactive Nonreactive   Lipid Panel   Result Value Ref Range    Cholesterol 201 (H) 0 - 199 mg/dL    HDL-Cholesterol 12.2 mg/dL    Cholesterol/HDL Ratio 16.5     LDL Calculated 151 (H) <=99 mg/dL    VLDL 38 0 - 40 mg/dL    Triglycerides 190 (H) 0 - 149 mg/dL    Non HDL Cholesterol 189 (H) 0 - 149 mg/dL   CBC and Auto Differential   Result Value Ref Range    WBC 21.6 (H) 4.4 - 11.3 x10*3/uL    nRBC 0.5 (H) 0.0 - 0.0 /100 WBCs    RBC 3.85 (L) 4.00 - 5.20 x10*6/uL    Hemoglobin 11.3 (L) 12.0 - 16.0 g/dL    Hematocrit 34.3 (L) 36.0 - 46.0 %    MCV 89 80 - 100 fL    MCH 29.4 26.0 - 34.0 pg    MCHC 32.9 32.0 -  36.0 g/dL    RDW 17.9 (H) 11.5 - 14.5 %    Platelets 351 150 - 450 x10*3/uL    Neutrophils % 94.7 40.0 - 80.0 %    Immature Granulocytes %, Automated 0.6 0.0 - 0.9 %    Lymphocytes % 3.3 13.0 - 44.0 %    Monocytes % 1.2 2.0 - 10.0 %    Eosinophils % 0.0 0.0 - 6.0 %    Basophils % 0.2 0.0 - 2.0 %    Neutrophils Absolute 20.43 (H) 1.20 - 7.70 x10*3/uL    Immature Granulocytes Absolute, Automated 0.13 0.00 - 0.70 x10*3/uL    Lymphocytes Absolute 0.71 (L) 1.20 - 4.80 x10*3/uL    Monocytes Absolute 0.26 0.10 - 1.00 x10*3/uL    Eosinophils Absolute 0.00 0.00 - 0.70 x10*3/uL    Basophils Absolute 0.05 0.00 - 0.10 x10*3/uL   Comprehensive Metabolic Panel   Result Value Ref Range    Glucose 126 (H) 74 - 99 mg/dL    Sodium 130 (L) 136 - 145 mmol/L    Potassium 5.3 3.5 - 5.3 mmol/L    Chloride 102 98 - 107 mmol/L    Bicarbonate 15 (L) 21 - 32 mmol/L    Anion Gap 18 10 - 20 mmol/L    Urea Nitrogen 50 (H) 6 - 23 mg/dL    Creatinine 1.81 (H) 0.50 - 1.05 mg/dL    eGFR 30 (L) >60 mL/min/1.73m*2    Calcium 8.8 8.6 - 10.6 mg/dL    Albumin 2.6 (L) 3.4 - 5.0 g/dL    Alkaline Phosphatase 841 (H) 33 - 136 U/L    Total Protein 5.7 (L) 6.4 - 8.2 g/dL     (H) 9 - 39 U/L    Bilirubin, Total 4.0 (H) 0.0 - 1.2 mg/dL    ALT 55 (H) 7 - 45 U/L   Magnesium   Result Value Ref Range    Magnesium 2.25 1.60 - 2.40 mg/dL   TSH   Result Value Ref Range    Thyroid Stimulating Hormone 1.62 0.44 - 3.98 mIU/L   Uric Acid   Result Value Ref Range    Uric Acid 11.2 (H) 2.3 - 6.7 mg/dL   Blood Gas Arterial Full Panel   Result Value Ref Range    POCT pH, Arterial 7.34 (L) 7.38 - 7.42 pH    POCT pCO2, Arterial 25 (L) 38 - 42 mm Hg    POCT pO2, Arterial 82 (L) 85 - 95 mm Hg    POCT SO2, Arterial 98 94 - 100 %    POCT Oxy Hemoglobin, Arterial 95.5 94.0 - 98.0 %    POCT Hematocrit Calculated, Arterial 37.0 36.0 - 46.0 %    POCT Sodium, Arterial 126 (L) 136 - 145 mmol/L    POCT Potassium, Arterial 5.5 (H) 3.5 - 5.3 mmol/L    POCT Chloride, Arterial 101 98 -  107 mmol/L    POCT Ionized Calcium, Arterial 1.16 1.10 - 1.33 mmol/L    POCT Glucose, Arterial 139 (H) 74 - 99 mg/dL    POCT Lactate, Arterial 1.9 0.4 - 2.0 mmol/L    POCT Base Excess, Arterial -10.6 (L) -2.0 - 3.0 mmol/L    POCT HCO3 Calculated, Arterial 13.5 (L) 22.0 - 26.0 mmol/L    POCT Hemoglobin, Arterial 12.2 12.0 - 16.0 g/dL    POCT Anion Gap, Arterial 17 10 - 25 mmo/L    Patient Temperature 37.0 degrees Celsius    FiO2 21 %   Ammonia   Result Value Ref Range    Ammonia 49 16 - 53 umol/L   Procalcitonin   Result Value Ref Range    Procalcitonin 7.78 (H) <=0.07 ng/mL   CBC and Auto Differential   Result Value Ref Range    WBC 21.0 (H) 4.4 - 11.3 x10*3/uL    nRBC 0.6 (H) 0.0 - 0.0 /100 WBCs    RBC 3.82 (L) 4.00 - 5.20 x10*6/uL    Hemoglobin 11.7 (L) 12.0 - 16.0 g/dL    Hematocrit 34.5 (L) 36.0 - 46.0 %    MCV 90 80 - 100 fL    MCH 30.6 26.0 - 34.0 pg    MCHC 33.9 32.0 - 36.0 g/dL    RDW 18.0 (H) 11.5 - 14.5 %    Platelets 349 150 - 450 x10*3/uL    Neutrophils % 95.8 40.0 - 80.0 %    Immature Granulocytes %, Automated 0.9 0.0 - 0.9 %    Lymphocytes % 2.3 13.0 - 44.0 %    Monocytes % 0.9 2.0 - 10.0 %    Eosinophils % 0.0 0.0 - 6.0 %    Basophils % 0.1 0.0 - 2.0 %    Neutrophils Absolute 20.12 (H) 1.20 - 7.70 x10*3/uL    Immature Granulocytes Absolute, Automated 0.19 0.00 - 0.70 x10*3/uL    Lymphocytes Absolute 0.48 (L) 1.20 - 4.80 x10*3/uL    Monocytes Absolute 0.18 0.10 - 1.00 x10*3/uL    Eosinophils Absolute 0.00 0.00 - 0.70 x10*3/uL    Basophils Absolute 0.03 0.00 - 0.10 x10*3/uL   Comprehensive Metabolic Panel   Result Value Ref Range    Glucose 130 (H) 74 - 99 mg/dL    Sodium 130 (L) 136 - 145 mmol/L    Potassium 5.5 (H) 3.5 - 5.3 mmol/L    Chloride 101 98 - 107 mmol/L    Bicarbonate 15 (L) 21 - 32 mmol/L    Anion Gap 20 10 - 20 mmol/L    Urea Nitrogen 52 (H) 6 - 23 mg/dL    Creatinine 1.92 (H) 0.50 - 1.05 mg/dL    eGFR 28 (L) >60 mL/min/1.73m*2    Calcium 8.6 8.6 - 10.6 mg/dL    Albumin 2.6 (L) 3.4 - 5.0  g/dL    Alkaline Phosphatase 889 (H) 33 - 136 U/L    Total Protein 5.3 (L) 6.4 - 8.2 g/dL     (H) 9 - 39 U/L    Bilirubin, Total 4.2 (H) 0.0 - 1.2 mg/dL    ALT 55 (H) 7 - 45 U/L   SST TOP   Result Value Ref Range    Extra Tube Hold for add-ons.               ASSESSMENT:  Jordyn Neal is a 70 y.o. 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 Presented to OSH 6/20 with N, V, Poor Intake, Transferred to [facility] 6/26 to start Chemotherapy. Consult for Hyponatremia.      #Hyponatremia  -chronic hyponatremia in setting of malignancy  -Baseline Na-133-134  -Etio: hypovolemia vs SIADH, have some component of Trell which may cause decrease free water clearance.Cisplatin induced hyponatremia is one of DD,   -Urine osm 922,serum osm 277 (L)  -Echo    1. Left ventricular ejection fraction is hyperdynamic by visual estimate at 75-80%.   2. There is left ventricular concentric remodeling.   3. Left ventricular cavity size is decreased.   4. Trivial to small pericardial effusion.   5. The inferior vena cava appears small in size, with IVC inspiratory collapse greater than 50%.   6. Elements suggestive of decreased intravascular volume.    Recommend:  -Consider starting IVF, NS a bolus of 500cc and observe response.  -Encourage oral intake.  -Agree with kelma   -Continue sodiumbicarb 1300 TID from today.  -Keep euvolemic.            Ayesha Isabel MD  Nephrology Fellow   Daytime / Weekend Renal Pager [phone]  After 7 pm Emergencies [phone] Pager [phone]

## 2025-06-29 NOTE — PROGRESS NOTES
SUPPORTIVE AND PALLIATIVE ONCOLOGY INPATIENT FOLLOW-UP    SERVICE DATE: 06/29/25     Updates 06/29/25, recommended changes are bolded below:  Mucositis sub-optimally controlled; recommend starting magic mouthwash q 4 hrs scheduled and liquid dexamethasone 1 mg swish and swallow q 8 hrs  Does not appear to be thrush; if primary team in agreement, recommend stopping nystatin swish and swallow    ASSESSMENT/PLAN:  Jordyn Neal is a 70 y.o. female recently diagnosed with metastatic pancreaticobiliary carcinoma in 5/2025 (mets to liver, pancreas, and soft tissue; s/p ERCP with stent placement in CBD). PMH significant for HTN, T2DM, HLD, fatty liver, GERD, and CKD stage III. Admitted 6/26/2025 as a transfer from OSH to urgently start inpatient chemotherapy.per pt's oncologist, Dr. Willis. Course complicated by elevated LFTs and hyponatremia. Supportive and Palliative Oncology is consulted for symptom management of N/V/poor appetite.     Symptom Management Plan:  Recommended changes are bolded     Pain:  Cancer related pain: diffuse myalgias, somatic, well-controlled  Home regimen:  Oxycodone 5 mg po q 6 hrs prn  Intolerances/previously tried: None  Risk factors:  none  Renal function impaired and Hepatic function impaired  Continue acetaminophen 650 mg po q 6 hrs prn for mild pain  Recommend oxycodone 5 mg po q 6 hrs prn for moderate pain  Continue oxycodone 10 mg po q 6 hrs prn but change indication to severe pain     Mucositis:/xerostomia:   Unclear etiology as pt is on C1D2 of chemo regimen  Sub-optimally controlled  Currently being treated for oral thrush with nystatin swish and swallow  Recommend discontinuing nystatin swish and swallow as this does not appear to be thrush  Recommend starting magic mouthwash q 4 hrs scheduled  Continue biotene mouth moisturizer  Recommend liquid dexamethasone 1 mg swish and swallow q 8 hrs scheduled to assist in decreasing inflammation of oral mucosa    Nausea:  Intermittent nausea  with vomiting related to GI malignancy, chemotherapy   Home regimen:  Ondansetron   QTc:  within normal limits  Well-controlled  Continue prochlorperazine 10 mg PO.IV q 6 hrs prn for nausea  Olanzapine 5 mg po q hs for appetite will help as well  Consider adding ondansetron 4 mg IV/PO q 6 hrs prn as a second line  If needed can consider adding lorazepam 0.25 mg po q 6 hrs prn as a 3rd line     Constipation  At risk for constipation related to medication side effects (including ondansetron), decreased oral intake, prolonged immobility in the setting of hospitalization , and elderly age, currently not constipated  Usual bowel pattern: every day  Home regimen: none  LBM 6/28/25  Miralax 17 g po daily prn  Goal to have BM without straining q48-72h, adjust regimen as needed     Altered Mood:  Chronic anxiety and depression related to health concerns   uncontrolled with home regimen   Home regimen: paroxetine 20 mg po daily  Continue paroxetine 20 mg po daily     Sleeping Difficulty:  Impaired sleep related to hospital environment  Home regimen:  trazodone (does not wish to resume inpatient)  Symptom management as listed above  Olanzapine 5 mg po q hs as listed above will help     Decreased appetite:  Appetite loss related to malignancy and disease process  Weight loss 8-10 lbs (weight in patient shows 16 lb weight gain over last week; per discussion with team, may be due to ascites)  Home regimen:  Olanzapine 5 mg po q hs (rxd 6/16/25)  and Mirtazapine 15 mg po q hs (rxd 6/19/25) both by primary oncologist  Pt reports that her decreased appetite is stemming from her oral discomfort and dryness  Nutrition consult  Recommend discontinuing mirtazapine 15 mg po q hs-literature has shown that there is no added benefit to adding mirazapine in conjunction with olanzepine, that olanzapine is superior in efficacy of cancer related anorexia, and recommended dose of mirtazapine for appetite is 7.5 mg po q hs; above this dose  indication is for depression  Recommend restarting olanzapine 5 mg po q hs  Received dex 12 mg IV x 1 as part of chemo regimen pretreatment on 6/27 pm  Received dose of palonosetron 0.25 mg IV as part of chemo regimen pretreatment on 6/27     Disposition:  Please start the process of having prior authorization with meds to beds deliver medications to patient prior to discharge via [facility] pharmacy. Prescriptions will need to be sent 48-72 hours prior to discharge so that a prior authorization can be completed.      Discharge date: unknown pending acute issues and symptom control  Will assess if patient needs an appointment with Outpatient Supportive Oncology as appropriate    Latrice Perry, MSN, APRN-CNP, ACHPN  Associate [facility]  PAGER/CONTACT:  Inpatient Supportive and Palliative Oncology  [facility] [place]  Monday-Friday 8 AM-5 PM  Epic Secure chat or pager [phone].  After hours and weekends:  pager [phone]  =================================================================  SUBJECTIVE:  Interval Events:  Remains tachycardic (109-125 overnight)  Renal function slightly worsened; serum creat=1.39 (1.1 yesterday AM)  LDH normal  Pt states her decreased appetite is secondary to her oral discomfort and dryness    Pain Assessment:  denies    Opioid Requirements  Past 24 h opioid requirements (6/28/25 at 0800 to 06/29/25 at 0800):   none  Total 24h OME use:  0    Symptom Assessment:  Nausea none  Vomiting none  Constipation none  Lack of appetite somewhat  Other very much  (mouth discomfort and dryness)    Information obtained from: chart review, interview of patient, and discussion with primary team  _________________________________________________________________   OBJECTIVE:  Lab Results   Component Value Date    WBC 21.6 (H) 06/29/2025    HGB 11.3 (L) 06/29/2025    HCT 34.3 (L) 06/29/2025    MCV 89 06/29/2025     06/29/2025     Lab Results   Component Value Date    GLUCOSE 199 (H) 06/28/2025    CALCIUM 9.3  06/28/2025     (L) 06/28/2025    K 5.3 06/28/2025    CO2 15 (L) 06/28/2025     06/28/2025    BUN 41 (H) 06/28/2025    CREATININE 1.39 (H) 06/28/2025     Lab Results   Component Value Date    ALT 68 (H) 06/28/2025     (H) 06/28/2025    ALKPHOS 1,071 (H) 06/28/2025    BILITOT 3.6 (H) 06/28/2025     Estimated Creatinine Clearance: 45.1 mL/min (A) (by C-G formula based on SCr of 1.39 mg/dL (H)).    Scheduled medications   Scheduled Medications[1]  Continuous medications  Continuous Medications[2]  PRN medications  acetaminophen, 650 mg, q6h PRN  albuterol, 3 mL, PRN  alteplase, 2 mg, PRN  alteplase, 2 mg, PRN  dextrose, 500 mL, PRN  diphenhydrAMINE, 50 mg, PRN  EPINEPHrine HCl, 0.3 mg, q5 min PRN  famotidine, 20 mg, Once PRN  fluticasone, 1 spray, Daily PRN  heparin flush, 50 Units, PRN  methylPREDNISolone sodium succinate (PF), 40 mg, PRN  ondansetron, 4 mg, q6h PRN  oxyCODONE, 10 mg, q6h PRN  oxyCODONE, 5 mg, q6h PRN  polyethylene glycol, 17 g, Daily PRN  prochlorperazine, 10 mg, q6h PRN  prochlorperazine, 10 mg, q6h PRN  sodium chloride, 500 mL, PRN    PHYSICAL EXAMINATION:  Vital Signs:   Vital signs reviewed  Visit Vitals  /72 (BP Location: Left arm, Patient Position: Lying)   Pulse 109   Temp 36.3 °C (97.3 °F) (Temporal)   Resp 19      0-10 (Numeric) Pain Score: 0 - No pain  Gandhi-Baker FACES Pain Rating: No hurt     Physical Exam  Vitals reviewed.   Constitutional:       General: She is not in acute distress.     Appearance: She is ill-appearing.      Comments: A&O x 3; pleasant and cooperative; lethargic.  Chronically ill in appearance.   HENT:      Head: Normocephalic and atraumatic.      Mouth/Throat:      Mouth: Mucous membranes are dry.      Pharynx: Posterior oropharyngeal erythema present.      Comments: No white patches or ulcers visualized (pt was unable to open mouth wide due to level of discomfort), Tongue edematous; diffuse erythema.  Superficial fissures seen on dorsal surface  of tongue  Cardiovascular:      Rate and Rhythm: Tachycardia present.   Pulmonary:      Effort: Pulmonary effort is normal. No respiratory distress.      Comments: Respirations even and unlabored; on room air  Abdominal:      General: There is distension.      Palpations: Abdomen is soft.      Tenderness: There is no abdominal tenderness.      Comments: Abdomen soft, mild distention, non-tender   Musculoskeletal:      Right lower leg: No edema.      Left lower leg: No edema.   Skin:     General: Skin is warm and dry.      Capillary Refill: Capillary refill takes less than 2 seconds.      Coloration: Skin is pale.   Neurological:      General: No focal deficit present.      Mental Status: She is alert and oriented to person, place, and time.     PALLIATIVE CARE ENCOUNTER:  Supportive and Palliative Oncology encounter:  Spoke with patient at bedside  Emotional support provided  Coordination of care:  medication changes    Medical Decision Making/Goals of Care/Advance Care Planning:  Reviewed patient's current clinical condition, including diagnosis, treatment, and management plan, and goals of care with patient  Life limiting disease: metastatic malignancy  Family: Supportive   Performance status: Moderate limitations due to fatigue and disease process  Joys/meaning/strength: Family and [place]  Information:Wants full disclosure  Goals: symptom control and cancer directed therapy  Worries and fears now and future: ongoing symptoms and inability to receive cancer treatment   Code status discussion:  DNR DNI No ICU-confirmed     Advance Directives  Existence of Advance Directives:None  Decision maker: Surrogate decision maker is  Natalio Neal [phone]   =================================================================  Signature and billing:  Medical complexity was high level due to due to complexity of problems, extensive data review, and high risk of management/treatment.    I spent 50 minutes in  the care of this patient which included chart review, interviewing patient/family, discussion with primary team, coordination of care, and documentation.    Data:   Diagnostic tests and information reviewed for today's visit:  Conversation with primary team, Most recent labs and imaging results, Most recent EKG, Medications    Some elements copied from my note on 6/28/28, the elements have been updated and all reflect current decision making from today, 06/29/25     Plan of Care discussed with: Provider, RN, Patient    Thank you for asking Supportive and Palliative Oncology to assist with care of this patient.  Recommendations will be communicated back to the consulting service by way of shared electronic medical record/secure chat/email or face-to-face.   We will continue to follow  Please contact us for additional questions or concerns.    Latrice Perry, MSN, APRN-CNP, ACHPN  Associate [facility]  PAGER/CONTACT:  Inpatient Supportive and Palliative Oncology  [facility] [place]  Monday-Friday 8 AM-5 PM  Epic Secure chat or pager [phone].  After hours and weekends:  pager [phone]          [1] amLODIPine, 10 mg, oral, Daily  enoxaparin, 40 mg, subcutaneous, q24h  heparin flush, 50 Units, intra-catheter, q12h  moisturizing mouth, 15 mL, Swish & Spit, TID  nystatin, 5 mL, Swish & Spit, 4x daily  OLANZapine, 5 mg, oral, Nightly  pantoprazole, 40 mg, oral, Daily before breakfast  PARoxetine, 20 mg, oral, Daily  sodium bicarbonate, 1,300 mg, oral, TID  [2]

## 2025-06-29 NOTE — PROGRESS NOTES
"Jordyn Neal is a 70 y.o. female on day 3 of admission presenting with Hyponatremia.    Subjective   Seen this AM at the bedside. Pt states she is very tired. Denies any mouth pain or pain elsewhere this AM. She does state that her PO intake has been poor because of her mouth pain. We discussed plan to optimize her pain today to help increase PO intake. No N/V or abdominal pain. She denies any fevers/chills, SOB, or CP.    Re presented to bedside 6/29 ~ 0930 as RN reported that pt with AMS. Pt barely opening eyes, unable to speak, however following commands and able to smile. PEERLA.     Objective     Physical Exam  Constitutional:       Appearance: She is ill-appearing.   HENT:      Head: Normocephalic.      Right Ear: External ear normal.      Left Ear: External ear normal.      Nose: Nose normal.      Mouth/Throat:      Mouth: Mucous membranes are dry.   Eyes:      Extraocular Movements: Extraocular movements intact.      Conjunctiva/sclera: Conjunctivae normal.   Cardiovascular:      Rate and Rhythm: Regular rhythm. Tachycardia present.   Pulmonary:      Effort: Pulmonary effort is normal.   Abdominal:      General: Bowel sounds are normal. There is distension.      Palpations: Abdomen is soft.   Musculoskeletal:      Cervical back: Normal range of motion.   Skin:     General: Skin is warm and dry.   Neurological:      Mental Status: She is disoriented.      Motor: Weakness present.         Last Recorded Vitals  Blood pressure 136/80, pulse 110, temperature 35.7 °C (96.3 °F), temperature source Temporal, resp. rate 18, height 1.676 m (5' 6\"), weight 95.5 kg (210 lb 8.6 oz), SpO2 96%.  Intake/Output last 3 Shifts:  I/O last 3 completed shifts:  In: 3090.2 (32.4 mL/kg) [P.O.:120; I.V.:1000 (10.5 mL/kg); IV Piggyback:1970.2]  Out: 325 (3.4 mL/kg) [Urine:325 (0.1 mL/kg/hr)]  Weight: 95.5 kg     Relevant Results     .Scheduled medications  Scheduled Medications[1]  Continuous medications  Continuous " Medications[2]  PRN medications  PRN Medications[3]    .  Results for orders placed or performed during the hospital encounter of 06/26/25 (from the past 24 hours)   Transthoracic Echo (TTE) Limited   Result Value Ref Range    LV EF 78 %    LVIDd 3.33 cm   Blood Culture    Specimen: Mediport; Blood culture   Result Value Ref Range    Blood Culture Loaded on Instrument - Culture in progress    Blood Culture    Specimen: Peripheral Venipuncture; Blood culture   Result Value Ref Range    Blood Culture Loaded on Instrument - Culture in progress    Lactate Dehydrogenase   Result Value Ref Range     84 - 246 U/L   Comprehensive Metabolic Panel   Result Value Ref Range    Glucose 199 (H) 74 - 99 mg/dL    Sodium 127 (L) 136 - 145 mmol/L    Potassium 5.3 3.5 - 5.3 mmol/L    Chloride 100 98 - 107 mmol/L    Bicarbonate 15 (L) 21 - 32 mmol/L    Anion Gap 17 10 - 20 mmol/L    Urea Nitrogen 41 (H) 6 - 23 mg/dL    Creatinine 1.39 (H) 0.50 - 1.05 mg/dL    eGFR 41 (L) >60 mL/min/1.73m*2    Calcium 9.3 8.6 - 10.6 mg/dL    Albumin 2.9 (L) 3.4 - 5.0 g/dL    Alkaline Phosphatase 1,071 (H) 33 - 136 U/L    Total Protein 6.2 (L) 6.4 - 8.2 g/dL     (H) 9 - 39 U/L    Bilirubin, Total 3.6 (H) 0.0 - 1.2 mg/dL    ALT 68 (H) 7 - 45 U/L   Uric Acid   Result Value Ref Range    Uric Acid 10.2 (H) 2.3 - 6.7 mg/dL   Coagulation Screen   Result Value Ref Range    Protime 18.1 (H) 9.8 - 12.4 seconds    INR 1.6 (H) 0.9 - 1.1    aPTT 27 26 - 36 seconds   Ammonia   Result Value Ref Range    Ammonia 73 (H) 16 - 53 umol/L   Hepatitis panel, acute   Result Value Ref Range    Hepatitis B Surface AG Nonreactive Nonreactive    Hepatitis A  AB- IgM Nonreactive Nonreactive    Hepatitis B Core AB; IgM Nonreactive Nonreactive    Hepatitis C AB Nonreactive Nonreactive   Lipid Panel   Result Value Ref Range    Cholesterol 201 (H) 0 - 199 mg/dL    HDL-Cholesterol 12.2 mg/dL    Cholesterol/HDL Ratio 16.5     LDL Calculated 151 (H) <=99 mg/dL    VLDL 38 0  - 40 mg/dL    Triglycerides 190 (H) 0 - 149 mg/dL    Non HDL Cholesterol 189 (H) 0 - 149 mg/dL   CBC and Auto Differential   Result Value Ref Range    WBC 21.6 (H) 4.4 - 11.3 x10*3/uL    nRBC 0.5 (H) 0.0 - 0.0 /100 WBCs    RBC 3.85 (L) 4.00 - 5.20 x10*6/uL    Hemoglobin 11.3 (L) 12.0 - 16.0 g/dL    Hematocrit 34.3 (L) 36.0 - 46.0 %    MCV 89 80 - 100 fL    MCH 29.4 26.0 - 34.0 pg    MCHC 32.9 32.0 - 36.0 g/dL    RDW 17.9 (H) 11.5 - 14.5 %    Platelets 351 150 - 450 x10*3/uL    Neutrophils % 94.7 40.0 - 80.0 %    Immature Granulocytes %, Automated 0.6 0.0 - 0.9 %    Lymphocytes % 3.3 13.0 - 44.0 %    Monocytes % 1.2 2.0 - 10.0 %    Eosinophils % 0.0 0.0 - 6.0 %    Basophils % 0.2 0.0 - 2.0 %    Neutrophils Absolute 20.43 (H) 1.20 - 7.70 x10*3/uL    Immature Granulocytes Absolute, Automated 0.13 0.00 - 0.70 x10*3/uL    Lymphocytes Absolute 0.71 (L) 1.20 - 4.80 x10*3/uL    Monocytes Absolute 0.26 0.10 - 1.00 x10*3/uL    Eosinophils Absolute 0.00 0.00 - 0.70 x10*3/uL    Basophils Absolute 0.05 0.00 - 0.10 x10*3/uL   Comprehensive Metabolic Panel   Result Value Ref Range    Glucose 126 (H) 74 - 99 mg/dL    Sodium 130 (L) 136 - 145 mmol/L    Potassium 5.3 3.5 - 5.3 mmol/L    Chloride 102 98 - 107 mmol/L    Bicarbonate 15 (L) 21 - 32 mmol/L    Anion Gap 18 10 - 20 mmol/L    Urea Nitrogen 50 (H) 6 - 23 mg/dL    Creatinine 1.81 (H) 0.50 - 1.05 mg/dL    eGFR 30 (L) >60 mL/min/1.73m*2    Calcium 8.8 8.6 - 10.6 mg/dL    Albumin 2.6 (L) 3.4 - 5.0 g/dL    Alkaline Phosphatase 841 (H) 33 - 136 U/L    Total Protein 5.7 (L) 6.4 - 8.2 g/dL     (H) 9 - 39 U/L    Bilirubin, Total 4.0 (H) 0.0 - 1.2 mg/dL    ALT 55 (H) 7 - 45 U/L   Magnesium   Result Value Ref Range    Magnesium 2.25 1.60 - 2.40 mg/dL     Assessment & Plan  Hyponatremia  Jordyn Manzano is a 70 year-old female with PMH of 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 [facility] consulted on admission. Patient s/p C1 Gemcitabine/Cisplatin inpatient 6/27. Labs noteable for acute hyponatremia, Na+124 (6/27) previously unresponsive to IVF at OSH despite hypovolemia hyponatremia like picture. Nephrology consulted 6/27, ddx hypovolemia vs SIADH vs Cisplatin-induced.  Rec q4 sodium checks initially, further aggressive IVF boluses (x3 6/27), started sodium bicarb TID 6/28, Na showing improvement as of 6/29. Supportive Onc consulted 6/28 for symptom mgmt (nausea/poor PO intake), recs followed. 6/29 AM pt with significant AMS. CT head (6/29) pending. PT/OT rec SNF. DC to SNF pending improvement in symptoms, neph recs, and facility placement.     Updates 6/29:  - AMS; following commands but unable to keep eyes open, not able to speak  - CT head (6/29): pending  - ABG (6/29): pH 7.24, pCO2 25, pO2 82, SO2 98; RT consulted, RN to place on humidified 2L NC face mask (mouth breathing)  - TSH (6/29): pending  - 1x dose of NC Lactulose ordered for Ammonia 73 from yesterday- repeat Ammonia pending for today 6/29  - WBC 21.6k (6/29)- CXR ordered, UA with reflex ordered, lactate on ABG 1.9, procal ordered  - Uric Acid 10.2 (6/28)- repeat pending 6/29    # s/p C1 Gemcitabine/Cisplatin Inpatient  # Newly Diagnosed 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 Willis  - Diagnosed in 5/2025  - Discussed at tumor board on 6/4/2025 and rec treatment with Durvalumab + Gemcitabine / Cisplatin   - Treatment was scheduled to begin on 6/22/2025, however, unable to due to admission   - CT A/P (6/22) showed overall progression of metestatic disease, new small volume ascites, satisfactory placement of CBD stent without significant upstream bilairy  dilatation, new small pleual effusion  - Primary oncologist rec transfer to [facility] for intiation of emergent inpatient chemotherapy   - Oncology consulted upon arrival to [facility], 6/27 s/p Gemcitabine/Cisplatin while inpatient, durvalumab not permitted inpatient  - NGS panel results pending 6/28      # AMS  - Ddx include metabolic (elevated LFTs/ ammonia/ CHIO), structural, infectious  - As of 6/29 AM- following commands but unable to keep eyes open, not able to speak- later throughout morning waxing/waning  # Metabolic  - Electrolytes stable; replete PRN. No hypo/hyperglycemia  - ABG (6/29): pH 7.24, pCO2 25, pO2 82, SO2 98; RT consulted, RN to place on humidified 2L NC face mask (mouth breathing)  - TSH (6/29): pending  - LFTs slightly improving 6/29, Tbili increased to 4 (6/29)  - sCr worsening, sCr 1.81 (6/29)  - 1x dose of LA Lactulose ordered for Ammonia 73 from yesterday- repeat Ammonia pending for today 6/29  - Uric Acid 10.2 (6/28)- repeat pending 6/29  - Vit B12 level ordered/pending 6/29  # Infectious  - WBC 21.6k (6/29)  - Repeat CXR (6/29) prelim without c/f infection  - UA with reflex ordered/pending (6/29)  - Lactate on ABG 1.9, procal ordered/pending (6/29)  - Pt does have CBD stent that needs to be removed- pt without fevers/chills, HoTN, or abdominal pain- no obvious concern for infected stent- will consult GI 6/30 for recs regarding stent removal (inpt vs outpt); recent CT a/p (6/22) showing CBD stent intact without significant upstream biliary dilation  # Structural  - No known brain mets  - CT head (6/29): pending  - CBD stent in place via CT a/p (6/22)    # Hyponatremia-- improving  - Na 131 on admit to [place] (6/20) --> Na 124 (6/27) --> Na 129 (6/28)--> 130 (6/29)  - DDx includes hypovolemia vs SIADH vs Cisplatin-induced (although hyponatremia started prior to Cisplatin, so if any effect not the whole etiology)  - Serum osm 275, urine Cr 197, urine Na <10, urine osm 777 (6/26)  - Repeat serum  osm, urine cr 211, urine Na 10, urine osm 922 (6/27)  - s/p 1L fluid restriction 6/26 -- per Dr. Alvarez hold off for now   - Nephrology consulted 6/27, ddx hypovolemia vs SIADH vs Cisplatin-induced. Rec q4 sodium checks initially, started sodium bicarb TID 6/28, Na showing improvement as of 6/29  - Of note, pt received x2 additional 1L NS boluses as part of chemo regimen (6/27-6/28)      # Leukocytosis-- worsening  - Likely 2/2 steroids vs infectious  - WBC BL~10k, (6/27) 12.6 --> 21.6k (6/29)  - CXR (6/20) w/ atelectasis, no consolidation  - CT A/P (6/22) showed worsening disease, new small/mod ascites, satsifactory placement of CBD stent w/o dilatation  - Repeat CXR (6/29): Prelim without c/f infection  - UA with reflex (6/29): pending  - Lactate level 1.9 via ABG (6/29): Procal ordered/pending 6/29  - MSSA (6/27): +  - Blood Cx x2 (6/28): NGTD  - s/p 10mg IVP decadron premed 6/27  - CBC daily     # Transaminitis-- somewhat improving  # Hx biliary compression stricture s/p stent 4/2025   - Likely multifactorial in setting of metastasis to liver and fatty liver  - No abd pain, afebrile, no chills at this time, endorses intermittent nausea  - Alk phos 530, ALT 53, AST 83, Tbili 2.6 upon admission (6/26) --> now uptrending (6/28) alk phos 1,190, alt 73, ast 179, tbili 3.9--> showing improvement 6/29 alk phos 841, ALT 55, , Tbili 4   - 4/15 s/p plastic biliary stent placement, plan for repeat ERCP in 2 months for stent removal, a 4/29 GI appt was cancelled unclear why, otherwise lost to GI follow up  - CT A/P (6/22) showed overall progression of metastatic disease, new small volume ascites, satisfactory placement of CBD stent without significant upstream bilairy dilatation   - Ammonia 74 (6/28)--> repeat ammonia 6/29 pending, ordered 1x dose of Lactulose regardless  - Hepatitis panel (6/28): neg  - Blood Cx x2 (6/28): NGTD  - US liver w/ doppler ordered 6/28 and pending- will make NPO @ 0400 6/29 AM  - Will  consult GI tomorrow 6/30 for recs regarding stent removal (inpt vs outpt)    # Tachycardia-- improving  - -120s since admission, asymptomatic, no CP or palpitations, no O2 requirement, no pain  - Ddx includes dehydration vs deconditioning from malignancy vs pain vs infection   - s/p x3 IVF boluses 6/27  - EKG (6/28): sinus tach, QTc 446  - Low threshold for CT angio chest if tachycardia worsens or develops new O2 requirement       # Neoplasm-related pain   # FTT  # Poor PO intake   # N/V  - Supportive onc consulted 6/28 for symptom/pain management, recs followed  - Continue with home Oxycodone 10mg q6hrs PRN severe pain, added Oxycodone 5mg q6hrs PRN moderate pain  - Continue with home PO Zofran 4mg q6 hours PRN N/V 2nd line, and PO/IV Compazine 10mg q6hrs PRN N/V 1st line  - STOPPED home mirtazapine 15mg nightly and started Zyprexa 5mg nightly 6/29- however then HELD Zyprexa 6/29 d/t AMS  - Nutrition consulted 6/27, rec regular diet as tolerated, Ensure Clear TID with meals  - PT / OT consulted on admit, ampact 8, SNF list provided 6/28    # Oral thrush, improving  # Mouth Pain  - Treated with oral nystatin 5mL QID swish and spit- completed course 6/29  - Started biotene TID 6/27   - Added scheduled Dex oral liquid 1mg TID swish and swallow and magic mouth wash 10mL swish and swallow q4hrs scheduled for mouth pain     # CKD III   - Recent sCr BL ~1.2-1.3 ; sCr 1.08 (6/27)--> 1.81 (6/29)  - Avoid nephrotoxic agents  - Monitor CMP daily  - Nephrology following     # HTN  - Continue with home Amlodipine 10mg daily      # MDD  # LUIS FELIPE  - Continue with home Paroxetine 20mg daily      # Type 2 DM  - Last Hgb A1c 5.9 (4/29/24); not on any diabetic medications  - Due to decreased PO intake and normal blood sugars, insulin not ordered on admit, CTM  - Hypoglycemic protocol in place      # GERD  - Continue Protonix 40mg daily      # DVT Prophy: Stopped prophy Lovenox 6/29 and started prophy Heparin SQ 6/29. SCDs,  encourage safe ambulation     DISPO:  - DNR, DNI - no ICU - confirmed on admit   - DC to SNF pending improvement in symptoms, neph recs, and facility placement  - NOK: Natalio Neal [phone]   - FUV with Dr. Willis (oncology) 7/24      I spent > 60 minutes in the professional and overall care of this patient    Assessment and plan as above discussed with attending physician Dr. Avtar Sal, APRN-CNP       [1] amLODIPine, 10 mg, oral, Daily  dexAMETHasone, 1 mg, oral, q8h PEREZ  enoxaparin, 40 mg, subcutaneous, q24h  heparin flush, 50 Units, intra-catheter, q12h  lidocaine-diphenhydraMINE-Maalox 1:1:1, 10 mL, Swish & Swallow, q4h PEREZ  moisturizing mouth, 15 mL, Swish & Spit, TID  nystatin, 5 mL, Swish & Spit, 4x daily  OLANZapine, 5 mg, oral, Nightly  pantoprazole, 40 mg, oral, Daily before breakfast  PARoxetine, 20 mg, oral, Daily  sodium bicarbonate, 1,300 mg, oral, TID     [2]    [3] PRN medications: acetaminophen, albuterol, alteplase, alteplase, dextrose, diphenhydrAMINE, EPINEPHrine HCl, famotidine, fluticasone, heparin flush, methylPREDNISolone sodium succinate (PF), ondansetron, oxyCODONE, oxyCODONE, polyethylene glycol, prochlorperazine, prochlorperazine, sodium chloride

## 2025-06-30 ENCOUNTER — APPOINTMENT (OUTPATIENT)
Dept: RADIOLOGY | Facility: HOSPITAL | Age: 70
End: 2025-06-30
Payer: MEDICARE

## 2025-06-30 ENCOUNTER — APPOINTMENT (OUTPATIENT)
Dept: HEMATOLOGY/ONCOLOGY | Facility: HOSPITAL | Age: 70
End: 2025-06-30
Payer: MEDICARE

## 2025-06-30 DIAGNOSIS — C22.1 CHOLANGIOCARCINOMA METASTATIC TO LIVER: Primary | ICD-10-CM

## 2025-06-30 DIAGNOSIS — C78.7 CHOLANGIOCARCINOMA METASTATIC TO LIVER: Primary | ICD-10-CM

## 2025-06-30 LAB
ALBUMIN SERPL BCP-MCNC: 2.6 G/DL (ref 3.4–5)
ALP SERPL-CCNC: 844 U/L (ref 33–136)
ALT SERPL W P-5'-P-CCNC: 56 U/L (ref 7–45)
ANION GAP SERPL CALC-SCNC: 21 MMOL/L (ref 10–20)
APPEARANCE UR: ABNORMAL
AST SERPL W P-5'-P-CCNC: 110 U/L (ref 9–39)
BACTERIA #/AREA URNS AUTO: ABNORMAL /HPF
BASOPHILS # BLD AUTO: 0.02 X10*3/UL (ref 0–0.1)
BASOPHILS NFR BLD AUTO: 0.1 %
BILIRUB SERPL-MCNC: 4.8 MG/DL (ref 0–1.2)
BILIRUB UR STRIP.AUTO-MCNC: ABNORMAL MG/DL
BUN SERPL-MCNC: 64 MG/DL (ref 6–23)
CALCIUM SERPL-MCNC: 8.8 MG/DL (ref 8.6–10.6)
CERULOPLASMIN SERPL-MCNC: 45 MG/DL (ref 20–60)
CHLORIDE SERPL-SCNC: 100 MMOL/L (ref 98–107)
CHLORIDE UR-SCNC: <15 MMOL/L
CHLORIDE/CREATININE (MMOL/G) IN URINE: NORMAL
CO2 SERPL-SCNC: 15 MMOL/L (ref 21–32)
COLOR UR: ABNORMAL
CREAT SERPL-MCNC: 2.33 MG/DL (ref 0.5–1.05)
CREAT UR-MCNC: 185.6 MG/DL (ref 20–320)
CREAT UR-MCNC: 185.6 MG/DL (ref 20–320)
EGFRCR SERPLBLD CKD-EPI 2021: 22 ML/MIN/1.73M*2
EOSINOPHIL # BLD AUTO: 0 X10*3/UL (ref 0–0.7)
EOSINOPHIL NFR BLD AUTO: 0 %
ERYTHROCYTE [DISTWIDTH] IN BLOOD BY AUTOMATED COUNT: 18.1 % (ref 11.5–14.5)
G6PD RBC QL: NORMAL
GLUCOSE SERPL-MCNC: 150 MG/DL (ref 74–99)
GLUCOSE UR STRIP.AUTO-MCNC: NORMAL MG/DL
HCT VFR BLD AUTO: 33.8 % (ref 36–46)
HGB BLD-MCNC: 11.2 G/DL (ref 12–16)
HOLD SPECIMEN: NORMAL
HOLD SPECIMEN: NORMAL
IMM GRANULOCYTES # BLD AUTO: 0.11 X10*3/UL (ref 0–0.7)
IMM GRANULOCYTES NFR BLD AUTO: 0.6 % (ref 0–0.9)
KETONES UR STRIP.AUTO-MCNC: NEGATIVE MG/DL
LEUKOCYTE ESTERASE UR QL STRIP.AUTO: ABNORMAL
LYMPHOCYTES # BLD AUTO: 0.41 X10*3/UL (ref 1.2–4.8)
LYMPHOCYTES NFR BLD AUTO: 2.3 %
MAGNESIUM SERPL-MCNC: 2.36 MG/DL (ref 1.6–2.4)
MCH RBC QN AUTO: 29.4 PG (ref 26–34)
MCHC RBC AUTO-ENTMCNC: 33.1 G/DL (ref 32–36)
MCV RBC AUTO: 89 FL (ref 80–100)
MONOCYTES # BLD AUTO: 0.07 X10*3/UL (ref 0.1–1)
MONOCYTES NFR BLD AUTO: 0.4 %
NEUTROPHILS # BLD AUTO: 17.53 X10*3/UL (ref 1.2–7.7)
NEUTROPHILS NFR BLD AUTO: 96.6 %
NITRITE UR QL STRIP.AUTO: NEGATIVE
NRBC BLD-RTO: 0.5 /100 WBCS (ref 0–0)
OSMOLALITY UR: 656 MOSM/KG (ref 200–1200)
PH UR STRIP.AUTO: 5.5 [PH]
PHOSPHATE SERPL-MCNC: 6.2 MG/DL (ref 2.5–4.9)
PLATELET # BLD AUTO: 348 X10*3/UL (ref 150–450)
POTASSIUM SERPL-SCNC: 5.5 MMOL/L (ref 3.5–5.3)
POTASSIUM UR-SCNC: 102 MMOL/L
POTASSIUM UR-SCNC: 102 MMOL/L
POTASSIUM/CREAT UR-RTO: 55 MMOL/G CREAT
POTASSIUM/CREAT UR-RTO: 55 MMOL/G CREAT
PROT SERPL-MCNC: 5.3 G/DL (ref 6.4–8.2)
PROT UR STRIP.AUTO-MCNC: ABNORMAL MG/DL
RBC # BLD AUTO: 3.81 X10*6/UL (ref 4–5.2)
RBC # UR STRIP.AUTO: ABNORMAL MG/DL
RBC #/AREA URNS AUTO: ABNORMAL /HPF
SODIUM SERPL-SCNC: 130 MMOL/L (ref 136–145)
SODIUM UR-SCNC: 11 MMOL/L
SODIUM/CREAT UR-RTO: 6 MMOL/G CREAT
SP GR UR STRIP.AUTO: 1.03
URATE SERPL-MCNC: 12.2 MG/DL (ref 2.3–6.7)
UROBILINOGEN UR STRIP.AUTO-MCNC: ABNORMAL MG/DL
WBC # BLD AUTO: 18.1 X10*3/UL (ref 4.4–11.3)
WBC #/AREA URNS AUTO: ABNORMAL /HPF

## 2025-06-30 PROCEDURE — 2500000004 HC RX 250 GENERAL PHARMACY W/ HCPCS (ALT 636 FOR OP/ED)

## 2025-06-30 PROCEDURE — 2500000005 HC RX 250 GENERAL PHARMACY W/O HCPCS: Performed by: NURSE PRACTITIONER

## 2025-06-30 PROCEDURE — 2500000002 HC RX 250 W HCPCS SELF ADMINISTERED DRUGS (ALT 637 FOR MEDICARE OP, ALT 636 FOR OP/ED): Performed by: NURSE PRACTITIONER

## 2025-06-30 PROCEDURE — 74160 CT ABDOMEN W/CONTRAST: CPT

## 2025-06-30 PROCEDURE — 2500000002 HC RX 250 W HCPCS SELF ADMINISTERED DRUGS (ALT 637 FOR MEDICARE OP, ALT 636 FOR OP/ED)

## 2025-06-30 PROCEDURE — 2500000001 HC RX 250 WO HCPCS SELF ADMINISTERED DRUGS (ALT 637 FOR MEDICARE OP)

## 2025-06-30 PROCEDURE — 99223 1ST HOSP IP/OBS HIGH 75: CPT | Performed by: INTERNAL MEDICINE

## 2025-06-30 PROCEDURE — 36415 COLL VENOUS BLD VENIPUNCTURE: CPT | Performed by: NURSE PRACTITIONER

## 2025-06-30 PROCEDURE — 83935 ASSAY OF URINE OSMOLALITY: CPT

## 2025-06-30 PROCEDURE — 87086 URINE CULTURE/COLONY COUNT: CPT

## 2025-06-30 PROCEDURE — 99232 SBSQ HOSP IP/OBS MODERATE 35: CPT | Performed by: INTERNAL MEDICINE

## 2025-06-30 PROCEDURE — 76700 US EXAM ABDOM COMPLETE: CPT | Mod: 59

## 2025-06-30 PROCEDURE — 93975 VASCULAR STUDY: CPT | Mod: DISTINCT PROCEDURAL SERVICE | Performed by: STUDENT IN AN ORGANIZED HEALTH CARE EDUCATION/TRAINING PROGRAM

## 2025-06-30 PROCEDURE — 99233 SBSQ HOSP IP/OBS HIGH 50: CPT | Performed by: NURSE PRACTITIONER

## 2025-06-30 PROCEDURE — 93975 VASCULAR STUDY: CPT

## 2025-06-30 PROCEDURE — 82610 CYSTATIN C: CPT | Performed by: NURSE PRACTITIONER

## 2025-06-30 PROCEDURE — 85025 COMPLETE CBC W/AUTO DIFF WBC: CPT | Performed by: NURSE PRACTITIONER

## 2025-06-30 PROCEDURE — 2500000004 HC RX 250 GENERAL PHARMACY W/ HCPCS (ALT 636 FOR OP/ED): Performed by: NURSE PRACTITIONER

## 2025-06-30 PROCEDURE — 2500000001 HC RX 250 WO HCPCS SELF ADMINISTERED DRUGS (ALT 637 FOR MEDICARE OP): Performed by: NURSE PRACTITIONER

## 2025-06-30 PROCEDURE — 84100 ASSAY OF PHOSPHORUS: CPT | Performed by: NURSE PRACTITIONER

## 2025-06-30 PROCEDURE — 87040 BLOOD CULTURE FOR BACTERIA: CPT | Performed by: NURSE PRACTITIONER

## 2025-06-30 PROCEDURE — 84550 ASSAY OF BLOOD/URIC ACID: CPT | Performed by: NURSE PRACTITIONER

## 2025-06-30 PROCEDURE — 81001 URINALYSIS AUTO W/SCOPE: CPT

## 2025-06-30 PROCEDURE — 2550000001 HC RX 255 CONTRASTS: Performed by: NURSE PRACTITIONER

## 2025-06-30 PROCEDURE — 1200000003 HC ONCOLOGY  ROOM WITH TELEMETRY DAILY

## 2025-06-30 PROCEDURE — 83735 ASSAY OF MAGNESIUM: CPT | Performed by: NURSE PRACTITIONER

## 2025-06-30 PROCEDURE — 76705 ECHO EXAM OF ABDOMEN: CPT | Mod: DISTINCT PROCEDURAL SERVICE | Performed by: STUDENT IN AN ORGANIZED HEALTH CARE EDUCATION/TRAINING PROGRAM

## 2025-06-30 PROCEDURE — 80053 COMPREHEN METABOLIC PANEL: CPT | Performed by: NURSE PRACTITIONER

## 2025-06-30 PROCEDURE — 82436 ASSAY OF URINE CHLORIDE: CPT

## 2025-06-30 PROCEDURE — 84133 ASSAY OF URINE POTASSIUM: CPT

## 2025-06-30 RX ORDER — VANCOMYCIN HYDROCHLORIDE 1 G/20ML
INJECTION, POWDER, LYOPHILIZED, FOR SOLUTION INTRAVENOUS DAILY PRN
Status: DISCONTINUED | OUTPATIENT
Start: 2025-06-30 | End: 2025-07-03

## 2025-06-30 RX ADMIN — SODIUM ZIRCONIUM CYCLOSILICATE 15 G: 10 POWDER, FOR SUSPENSION ORAL at 12:12

## 2025-06-30 RX ADMIN — HEPARIN SODIUM 5000 UNITS: 5000 INJECTION, SOLUTION INTRAVENOUS; SUBCUTANEOUS at 08:21

## 2025-06-30 RX ADMIN — SODIUM BICARBONATE 1300 MG: 650 TABLET ORAL at 22:03

## 2025-06-30 RX ADMIN — AMLODIPINE BESYLATE 10 MG: 10 TABLET ORAL at 08:20

## 2025-06-30 RX ADMIN — SODIUM ZIRCONIUM CYCLOSILICATE 15 G: 10 POWDER, FOR SUSPENSION ORAL at 22:03

## 2025-06-30 RX ADMIN — SODIUM BICARBONATE 1300 MG: 650 TABLET ORAL at 15:25

## 2025-06-30 RX ADMIN — PIPERACILLIN SODIUM AND TAZOBACTAM SODIUM 3.38 G: 3; .375 INJECTION, SOLUTION INTRAVENOUS at 16:33

## 2025-06-30 RX ADMIN — SODIUM CHLORIDE 6 MG: 9 INJECTION, SOLUTION INTRAVENOUS at 17:55

## 2025-06-30 RX ADMIN — PAROXETINE HYDROCHLORIDE 20 MG: 20 TABLET, FILM COATED ORAL at 08:20

## 2025-06-30 RX ADMIN — PANTOPRAZOLE SODIUM 40 MG: 40 TABLET, DELAYED RELEASE ORAL at 08:20

## 2025-06-30 RX ADMIN — PIPERACILLIN SODIUM AND TAZOBACTAM SODIUM 3.38 G: 3; .375 INJECTION, SOLUTION INTRAVENOUS at 11:31

## 2025-06-30 RX ADMIN — PIPERACILLIN SODIUM AND TAZOBACTAM SODIUM 3.38 G: 3; .375 INJECTION, SOLUTION INTRAVENOUS at 23:38

## 2025-06-30 RX ADMIN — LIDOCAINE HYDROCHLORIDE 10 ML: 20 SOLUTION ORAL; TOPICAL at 08:21

## 2025-06-30 RX ADMIN — VANCOMYCIN HYDROCHLORIDE 1500 MG: 1.5 INJECTION, POWDER, LYOPHILIZED, FOR SOLUTION INTRAVENOUS at 12:12

## 2025-06-30 RX ADMIN — SODIUM BICARBONATE 1300 MG: 650 TABLET ORAL at 08:20

## 2025-06-30 RX ADMIN — Medication 15 ML: at 22:13

## 2025-06-30 RX ADMIN — SODIUM CHLORIDE, SODIUM LACTATE, POTASSIUM CHLORIDE, AND CALCIUM CHLORIDE 500 ML: .6; .31; .03; .02 INJECTION, SOLUTION INTRAVENOUS at 16:44

## 2025-06-30 RX ADMIN — IOHEXOL 75 ML: 350 INJECTION, SOLUTION INTRAVENOUS at 12:58

## 2025-06-30 RX ADMIN — Medication 15 ML: at 08:20

## 2025-06-30 ASSESSMENT — COGNITIVE AND FUNCTIONAL STATUS - GENERAL
TURNING FROM BACK TO SIDE WHILE IN FLAT BAD: A LOT
MOVING FROM LYING ON BACK TO SITTING ON SIDE OF FLAT BED WITH BEDRAILS: A LOT
DRESSING REGULAR UPPER BODY CLOTHING: A LOT
MOVING TO AND FROM BED TO CHAIR: TOTAL
WALKING IN HOSPITAL ROOM: TOTAL
PERSONAL GROOMING: A LOT
HELP NEEDED FOR BATHING: A LOT
EATING MEALS: A LOT
DRESSING REGULAR LOWER BODY CLOTHING: A LOT
TOILETING: TOTAL
STANDING UP FROM CHAIR USING ARMS: TOTAL
MOBILITY SCORE: 8
DAILY ACTIVITIY SCORE: 11
CLIMB 3 TO 5 STEPS WITH RAILING: TOTAL

## 2025-06-30 ASSESSMENT — PAIN SCALES - GENERAL
PAINLEVEL_OUTOF10: 0 - NO PAIN
PAINLEVEL_OUTOF10: 0 - NO PAIN

## 2025-06-30 NOTE — CONSULTS
Vancomycin Dosing by Pharmacy- INITIAL    Jordyn Neal is a 70 y.o. year old female who Pharmacy has been consulted for vancomycin dosing for abdominal infection. Based on the patient's indication and renal status this patient will be dosed based on a goal trough/random level of 15-20.     Renal function is currently CKD, unstable renal function.    Visit Vitals  /79 (BP Location: Right arm, Patient Position: Lying)   Pulse 110   Temp 36.6 °C (97.9 °F) (Temporal)   Resp 18        Lab Results   Component Value Date    CREATININE 2.33 (H) 2025    CREATININE 1.92 (H) 2025    CREATININE 1.81 (H) 2025    CREATININE 1.39 (H) 2025    CREATININE 1.87 (H) 2025        Patient weight is as follows:   Vitals:    25 1550   Weight: 95.5 kg (210 lb 8.6 oz)       Cultures:  Susceptibility data for the encounter in last 14 days.  Collected Specimen Info Organism   25 Swab from Anterior Nares Methicillin Susceptible Staphylococcus aureus (MSSA)         I/O last 3 completed shifts:  In: 25 (0.3 mL/kg) [IV Piggyback:25]  Out: 75 (0.8 mL/kg) [Urine:75 (0 mL/kg/hr)]  Weight: 95.5 kg   I/O during current shift:  I/O this shift:  In: -   Out: 100 [Urine:100]    Temp (24hrs), Av.4 °C (97.6 °F), Min:35.9 °C (96.6 °F), Max:36.9 °C (98.4 °F)         Assessment/Plan     Patient will not be given a loading dose of 1.5 G.  Follow-up level will be ordered on 25 at 0500 unless clinically indicated sooner.  Will continue to monitor renal function daily while on vancomycin and order serum creatinine at least every 48 hours if not already ordered.  Follow for continued vancomycin needs, clinical response, and signs/symptoms of toxicity.       Marcello Parra, [facility]

## 2025-06-30 NOTE — ASSESSMENT & PLAN NOTE
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 [facility] Oncology consulted on admission. Patient s/p C1 Gemcitabine/Cisplatin inpatient 6/27. Labs noteable for acute hyponatremia, Na+124 (6/27) previously unresponsive to IVF at OSH despite hypovolemia hyponatremia like picture. Nephrology consulted 6/27, ddx hypovolemia vs SIADH vs Cisplatin-induced.  Rec q4 sodium checks initially, further aggressive IVF boluses (x3 6/27), started sodium bicarb TID 6/28, Na showing improvement as of 6/29. Supportive Onc consulted 6/28 for symptom mgmt (nausea/poor PO intake), recs followed. 6/29 AM pt with significant AMS. CT head (6/29) negative. Pt A&O x3 as of 6/30. Liver and kidney function worsening, liver and renal US pending 6/30. GI/Liver consulted 6/30 re CBD stent removal, formal recs pending but rec empiric Zosyn/Vanc (6/30-) for c/f cholangitis. PT/OT rec SNF. DC to SNF pending improvement in symptoms, CHIO, LFTs, neph recs, and facility placement.     Updates 6/30:  - A &O x3  - Tbili up to 4.8, sCr 2.33, Phos 6.2, K 5.5   - Lokelma x2 doses ordered for hyperkalemia, tele ordered  - Liver US pending  - Kidney US pending  - GI/Liver consulted re CBD stent removal, formal recs pending but rec empiric Zosyn/Vanc (6/30-) for c/f cholangitis    # s/p C1 Gemcitabine/Cisplatin Inpatient  # Newly Diagnosed 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 Willis  - Diagnosed in 5/2025  - Discussed at tumor board on 6/4/2025 and rec treatment with Durvalumab + Gemcitabine / Cisplatin   - Treatment was scheduled to begin on  6/22/2025, however, unable to due to admission   - CT A/P (6/22) showed overall progression of metestatic disease, new small volume ascites, satisfactory placement of CBD stent without significant upstream bilairy dilatation, new small pleual effusion  - Primary oncologist rec transfer to [facility] for intiation of emergent inpatient chemotherapy   - Oncology consulted upon arrival to [facility], 6/27 s/p Gemcitabine/Cisplatin while inpatient, durvalumab not permitted inpatient  - NGS panel results pending 6/28      # AMS-- improving  - Ddx include metabolic (elevated LFTs/ ammonia/ CHIO), structural, infectious  - As of 6/29 AM- following commands but unable to keep eyes open, not able to speak- later throughout morning waxing/waning  - As of 6/30 AM- pt is ordered x3  # Metabolic  - Monitor electrolytes; replete PRN. No hypo/hyperglycemia  - ABG (6/29): pH 7.24, pCO2 25, pO2 82, SO2 98; RT consulted, RN to place on humidified 2L NC face mask or tent (mouth breathing)  - TSH (6/29): 1.62  - LFTs slightly improving, Tbili increased to 4 (6/29)--> 4.8 (6/30)  - sCr worsening, sCr 1.81 (6/29)--> 2.33 (6/30)  - s/p 1x dose of Lactulose 6/29 for Ammonia 73  - Uric Acid 10.2 (6/28)--> 11.2 (6/29)--> 6/30 pending  - Vit B12 level (6/29): >2,000  - Zyprexa started 6/28 and held as of 6/29  # Infectious  - WBC 21.6k (6/29)--> 18.1k (6/30)  - Repeat CXR (6/29) without c/f infection  - UA with reflex (6/30): +protein, +blood, +urobilinogen, +leuksm +WBC, +RBC, +1 bacteria; UCX pending  - Lactate on ABG 1.9, procal (6/29): 7.78 (6/29)- repeat ordered for 7/1  - Recent CT a/p (6/22) showing CBD stent intact without significant upstream biliary dilation  - Pt does have CBD stent that needs to be removed- pt without fevers/chills, HoTN, or abdominal pain- no obvious concern for infected stent- GI/Liver consulted 6/30 for recs regarding stent removal (inpt vs outpt), formal recs pending but rec empiric Zosyn/Vanc (6/30-) for c/f  cholangitis  # Structural  - No known brain mets  - CT head (6/29): negative for acute process  - CBD stent in place via CT a/p (6/22)    # Transaminitis  # c/f Cholangitis  # Hx biliary compression stricture s/p stent 4/2025   - Likely multifactorial in setting of metastasis to liver and fatty liver  - No abd pain, afebrile, no chills at this time, endorses intermittent nausea  - Alk phos 530, ALT 53, AST 83, Tbili 2.6 upon admission (6/26) --> now uptrending (6/28) alk phos 1,190, alt 73, ast 179, tbili 3.9--> showing improvement 6/29 alk phos 841, ALT 55, , Tbili 4--> Alk Phos  - 4/15 s/p plastic biliary stent placement, plan for repeat ERCP in 2 months for stent removal, a 4/29 GI appt was cancelled unclear why, otherwise lost to GI follow up  - CT A/P (6/22) showed overall progression of metastatic disease, new small volume ascites, satisfactory placement of CBD stent without significant upstream bilairy dilatation   - Ammonia 74 (6/28)--> repeat ammonia 6/29 pending, ordered 1x dose of Lactulose regardless  - Hepatitis panel (6/28): neg  - Blood Cx x2 (6/28): NGTD  - US liver w/ doppler (6/30): pending  - GI/Liver consulted 6/30 for recs regarding stent removal (inpt vs outpt), formal recs pending but rec empiric Zosyn/Vanc (6/30-) for c/f cholangitis    # Leukocytosis-- improving  - Likely 2/2 steroids vs infectious (cholangitis?)  - See above re c/f cholangitis  - WBC BL~10k, (6/27) 12.6 --> 21.6k (6/29)--> 18.1k (6/30)  - CXR (6/20) w/ atelectasis, no consolidation  - CT A/P (6/22) showed worsening disease, new small/mod ascites, satsifactory placement of CBD stent w/o dilatation  - Repeat CXR (6/29): no c/f infection  - UA with reflex (6/30): +protein, +blood, +urobilinogen, +leuksm +WBC, +RBC, +1 bacteria; UCX pending  - Lactate level 1.9 via ABG (6/29): Procal (6/29)): 7.78 (6/29)- repeat ordered for 7/1  - MSSA (6/27): +  - Blood Cx x2 (6/28): NGTD  - s/p 10mg IVP decadron premed 6/27  - CBC  daily     # Hyponatremia-- improving  - Na 131 on admit to [place] (6/20) --> Na 124 (6/27) --> Na 129 (6/28)--> 130 (6/29 and 6/30)  - DDx includes hypovolemia vs SIADH vs Cisplatin-induced (although hyponatremia started prior to Cisplatin, so if any effect not the whole etiology)  - Serum osm 275, urine Cr 197, urine Na <10, urine osm 777 (6/26)  - Repeat serum osm, urine cr 211, urine Na 10, urine osm 922 (6/27)  - Repeat lytes ordered 6/28 and still pending- to be obtained today 6/30  - s/p 1L fluid restriction 6/26 -- per Dr. Alvarez hold off for now   - Nephrology consulted 6/27, ddx hypovolemia vs SIADH vs Cisplatin-induced. Rec q4 sodium checks initially, started sodium bicarb TID 6/28, Na showing improvement as of 6/29  - Of note, pt received x2 additional 1L NS boluses as part of chemo regimen (6/27-6/28)   - s/p 500mL NS bolus 6/29 per nephrology d/t kidney function    # CHIO on CKD III-- worsening  - Recent sCr BL ~1.2-1.3 ; sCr 1.08 (6/27)--> 1.81 (6/29)--> 2.33 (6/30)  - Avoid nephrotoxic agents  - Monitor CMP daily  - Nephrology following  - s/p 500mL NS bolus 6/29 for CHIO without improvement  - Renal US ordered/pending 6/30    # Hyperkalemia  - 2/2 CHIO  - K 5.5 (6/29) s/p 1x dose of Lokelma  - K 5.5 (6/30) Lokelma x2 doses ordered  - Tele monitoring ordered 6/30  - Nephrology following    # Tachycardia  - -120s since admission 6/26; asymptomatic, no CP or palpitations, no O2 requirement, no pain  - Ddx includes dehydration vs deconditioning from malignancy vs pain vs infection   - s/p multiple IVF boluses (x3 6/25), 500mL NS 6/29  - EKG (6/28): sinus tach, QTc 446  - Low threshold for CT angio chest if tachycardia worsens or develops new O2 requirement       # Neoplasm-related pain   # FTT  # Poor PO intake   # N/V  - Supportive onc consulted 6/28 for symptom/pain management, recs followed  - Continue with home Oxycodone 10mg q6hrs PRN severe pain, added Oxycodone 5mg q6hrs PRN moderate pain  -  Continue with home PO Zofran 4mg q6 hours PRN N/V 2nd line, and PO/IV Compazine 10mg q6hrs PRN N/V 1st line  - STOPPED home mirtazapine 15mg nightly and started Zyprexa 5mg nightly 6/29- however then HELD Zyprexa 6/29 d/t AMS  - Nutrition consulted 6/27, rec regular diet as tolerated, Ensure Clear TID with meals  - PT / OT consulted on admit, ampact 8, SNF list provided 6/28    # Oral thrush, improving  # Mouth Pain  - Treated with oral nystatin 5mL QID swish and spit- completed course 6/29  - Started biotene TID 6/27   - Added scheduled Dex oral liquid 1mg TID swish and swallow and magic mouth wash 10mL swish and swallow q4hrs scheduled for mouth pain  - Humidified face tent/face mask to be applied by nursing to aid in dry mouth     # HTN  - Continue with home Amlodipine 10mg daily      # MDD  # LUIS FELIPE  - Continue with home Paroxetine 20mg daily      # Type 2 DM  - Last Hgb A1c 5.9 (4/29/24); not on any diabetic medications  - Due to decreased PO intake and normal blood sugars, insulin not ordered on admit, CTM  - Hypoglycemic protocol in place      # GERD  - Continue Protonix 40mg daily      # DVT Prophy: Stopped prophy Lovenox 6/29 and started prophy Heparin SQ 6/29 d/t CHIO. SCDs, encourage safe ambulation     DISPO:  - DNR, DNI - no ICU - confirmed on admit   - DC to SNF pending improvement in symptoms, CHIO, LFTs, neph recs, and facility placement  - NOK: Natalio Neal [phone]   - FUV with Dr. Willis (oncology) 7/24

## 2025-06-30 NOTE — PROGRESS NOTES
06/27/25 1200   Discharge Planning   Living Arrangements Spouse/significant other   Support Systems Spouse/significant other   Assistance Needed PT/OT Pending   Type of Residence Private residence   Number of Stairs to Enter Residence 2   Number of Stairs Within Residence 0   Do you have animals or pets at home? Yes   Type of Animals or Pets 1 cat   Home or Post Acute Services Post acute facilities (Rehab/SNF/etc)   Type of Post Acute Facility Services Skilled nursing   Expected Discharge Disposition SNF   Does the patient need discharge transport arranged? Yes   RoundTrip coordination needed? Yes   Has discharge transport been arranged? No   Financial Resource Strain   How hard is it for you to pay for the very basics like food, housing, medical care, and heating? Not very   Housing Stability   In the last 12 months, was there a time when you were not able to pay the mortgage or rent on time? N   At any time in the past 12 months, were you homeless or living in a shelter (including now)? N   Transportation Needs   In the past 12 months, has lack of transportation kept you from medical appointments or from getting medications? no   In the past 12 months, has lack of transportation kept you from meetings, work, or from getting things needed for daily living? No   Patient Choice   Provider Choice list and CMS website (https://medicare.gov/care-compare#search) for post-acute Quality and Resource Measure Data were provided and reviewed with: Patient   Patient / Family choosing to utilize agency / facility established prior to hospitalization No   Intensity of Service   Intensity of Service 0-30 min     Care Transitions Note  06/27/25  Plan per Medical/Surgical Team: Recent dx pancreaticobiliary carcinoma, weakness, fall at home  Status: Inpatient  Payor Source: [employer] Medicare  Discharge disposition: PT/OT Pending, likely SNF  Expected date of discharge: TBD, next week  PCP / Primary Oncologist:  Alba    Met with patient at bedside, introduced self and role. Demographics and insurance verifed with patient. [place] She has one son Eric who assists occasionally. Pt has a cane, walker, and shower chair at home. No home O2, no home health. She states she has been to  acute rehab at [facility] in the past. Pt's  drives her to [facility]. She is retired. No SDOH concerns when prompted.     Pt was a transfer from [facility], says she has been in bed for about a week and feels deconditioned. She is agreeable to Snf if recommended. PT/OT at [facility] recommended SNF. A list was provided at bedside by this LSW. Snf process was explained as well. Will continue to follow for dispo planning.   Christiana Lyn, MSW, LSW     06/30/25  Spoke with pt spouse re: dispo. [facility] is [facility]. Spouse did not have additional choices at this time but SW continued to encourage more choices in case [facility] cannot accept. Spouse said he would talk with his son who is an EMT and his NELEIMA who worked in a few nursing homes. Per spouse, pt will have chemo every other week and will need transportation to [facility]. SW to follow.   Christiana Lyn, MSW, LSW

## 2025-06-30 NOTE — CARE PLAN
The clinical goals for the shift include pt will remain HDS and VSS throughout shift 6/30/25 by 0700.      Problem: Pain - Adult  Goal: Verbalizes/displays adequate comfort level or baseline comfort level  Outcome: Progressing     Problem: Safety - Adult  Goal: Free from fall injury  Outcome: Progressing     Problem: Discharge Planning  Goal: Discharge to home or other facility with appropriate resources  Outcome: Progressing     Problem: Chronic Conditions and Co-morbidities  Goal: Patient's chronic conditions and co-morbidity symptoms are monitored and maintained or improved  Outcome: Progressing     Problem: Nutrition  Goal: Nutrient intake appropriate for maintaining nutritional needs  Outcome: Progressing     Problem: Skin  Goal: Decreased wound size/increased tissue granulation at next dressing change  Outcome: Progressing  Goal: Participates in plan/prevention/treatment measures  Outcome: Progressing  Goal: Prevent/manage excess moisture  Outcome: Progressing  Goal: Prevent/minimize sheer/friction injuries  Outcome: Progressing  Goal: Promote/optimize nutrition  Outcome: Progressing  Goal: Promote skin healing  Outcome: Progressing

## 2025-06-30 NOTE — CONSULTS
[facility]   [facility] [place]  INITIAL CONSULT NOTE     Source of Information: The source of the history was patient    Consult requested by: Service: Oncology    Reason for Consult: elevated liver enzymes    Admission Chief Complaint: fall, generalized weakness      SUBJECTIVE     HPI: Jordyn Neal is a 70 y.o. female w/PMH of recently diagnosed, metastatic (liver, abdominal lymph nodes, soft tissue) pancreaticobiliary carcinoma, HTN, HLD, decompensated (ascites) cirrhosis, DM II, GERD, CKD III who originally presented 6/20/25 after fall from bed at home due to generalized weakness in the setting of poor oral intake and suspected dehydration.  Presentation c/b hyponatremia, oliguric CHIO. She was transferred to [facility] 6/24/25 for initiation of inpatient chemotherapy (cisplatin/gemcitibine, 6/27/25). Durvalumab planned as an outpatient but not administered. Hepatology has been consulted for elevated liver enzymes in the setting of plastic CBD stent placement in April for malignant (extrinsic) obstruction.    The patient reportedly began to display altered mentation 6/29/25 that has persisted through today. No fevers but recent chemotherapy noted. Blood cultures 6/28/25 negative. Patient does have leukocytosis. Liver enzymes elevated in cholestatic pattern with rising hyperbilirubinemia. CT A/P 6/22/25 noted CBD stent in place without biliary dilation.    The patient was admitted in April for nausea, vomiting, and RLQ pain. Patient had cholestatic liver injury and hyperbilirubinemia at this time. Imaging revealed a  6 cm segment VII mass with LAD of the tim hepatis, upper abdomen, and retroperitoneal space. Cirrhotic liver morphology also noted. Subsequent EUS with FNB of RT lobe mass and LAD demonstrated poorly differentiated adenocarcinoma of pancreaticobiliary primary. Plastic CBD stent additionally placed 4/14/25 for relief of malignant (extrinsic) obstruction due  to LAD at tim hepatis.    ROS: Complete review of systems obtained, negative unless otherwise indicated above.     Allergies[1]  Medical History[2]  Surgical History[3]  Family History[4]  Social History     Social History Narrative    Not on file     [unfilled]    Medications:  Scheduled medications  Scheduled Medications[5]  Continuous medications  Continuous Medications[6]  PRN medications  PRN Medications[7]       EXAM     Vital signs:  Visit Vitals  /79 (BP Location: Right arm, Patient Position: Lying)   Pulse 110   Temp 36.6 °C (97.9 °F) (Temporal)   Resp 18     Physical Exam  Vitals reviewed.   Constitutional:       Appearance: She is ill-appearing.   HENT:      Head: Normocephalic and atraumatic.      Nose: Nose normal.      Mouth/Throat:      Mouth: Mucous membranes are moist.      Pharynx: Oropharynx is clear.   Eyes:      General: Scleral icterus present.      Conjunctiva/sclera: Conjunctivae normal.      Pupils: Pupils are equal, round, and reactive to light.   Cardiovascular:      Rate and Rhythm: Normal rate and regular rhythm.      Pulses: Normal pulses.   Pulmonary:      Effort: Pulmonary effort is normal. No respiratory distress.   Abdominal:      General: Abdomen is flat. There is no distension.      Palpations: Abdomen is soft. There is no mass.      Tenderness: There is no abdominal tenderness. There is no guarding or rebound.   Skin:     General: Skin is warm and dry.      Capillary Refill: Capillary refill takes less than 2 seconds.      Coloration: Skin is jaundiced.   Neurological:      Mental Status: She is alert and oriented to person, place, and time.             DATA                                                                            Labs     Lab Results   Component Value Date    WBC 18.1 (H) 06/30/2025    WBC 21.0 (H) 06/29/2025    WBC 21.6 (H) 06/29/2025    HGB 11.2 (L) 06/30/2025    HGB 11.7 (L) 06/29/2025    HGB 11.3 (L) 06/29/2025    MCV 89 06/30/2025    MCV 90  06/29/2025    MCV 89 06/29/2025     06/30/2025     06/29/2025     06/29/2025       Lab Results   Component Value Date    GLUCOSE 150 (H) 06/30/2025    CALCIUM 8.8 06/30/2025     (L) 06/30/2025    K 5.5 (H) 06/30/2025    CO2 15 (L) 06/30/2025     06/30/2025    BUN 64 (H) 06/30/2025    CREATININE 2.33 (H) 06/30/2025       Lab Results   Component Value Date    ALT 56 (H) 06/30/2025    ALT 55 (H) 06/29/2025    ALT 55 (H) 06/29/2025     (H) 06/30/2025     (H) 06/29/2025     (H) 06/29/2025    ALKPHOS 844 (H) 06/30/2025    ALKPHOS 889 (H) 06/29/2025    ALKPHOS 841 (H) 06/29/2025    BILITOT 4.8 (H) 06/30/2025    BILITOT 4.2 (H) 06/29/2025    BILITOT 4.0 (H) 06/29/2025                                                                                  Imaging           === 04/09/25 ===    US PELVIS TRANSABDOMINAL WITH TRANSVAGINAL    - Impression -  1. Abnormally thickened and heterogenous endometrial stripe with  cystic changes measuring up to 1.6 cm. Advise screening for  underlying postmenopausal bleeding and accordingly further assessment  including tissue sampling could be considered.  2. Right ovary could not be visualized.    === 06/26/25 ===    CT HEAD WO IV CONTRAST    - Impression -  1. No evidence of acute intracranial process.  2. Chronic findings as above including sequela of small-vessel  ischemic change and mild ventriculomegaly likely due to  central/cortical volume loss.                                                                           GI Procedures   EUS 4/14/25  Impression  Normal pancreas  Heterogenous appearing hepatic parenchyma suggestive of diffuse malignancy in the right lobe  Innumerable enlarged lymph nodes throughout the upper abdomen with a particularly enlarged lymph node in the portal region. The lymph node was causing biliary stricturing with evidence of a plastic biliary stent traversing the region of obstruction  Multiple passes were  obtained of the malignant appearing portal lymph node for diagnosis.   The gallbladder was visualized and appeared distended. The gallbladder contained stones and biliary sludge.  FINAL DIAGNOSIS   A.  Hilar lymph node, biopsy:  - Poorly differentiated carcinoma.  - No lymphoid tissue identified.     Final Cytological Interpretation      A. LYMPH NODE (SPECIFY SITE) FINE NEEDLE ASPIRATION- FNA HILAR LYMPH NODE, CYTOLOGY AND CELL BLOCK:                                          Malignant cells present derived from a poorly differentiated carcinoma.     ERCP 4/14/25  Impression  Normal EGD  Extrinsic compression of the proximal common bile duct with normal appearing biliary tissue on cholangioscopy  Following sphincterotomy, a 10 Fr x 9 cm plastic biliary stent was placed.     ASSESSMENT / PLAN                  Assessment and Recommendations:   Jordyn Neal is a 70 y.o. female w/PMH of recently diagnosed, metastatic (liver, abdominal lymph nodes, soft tissue) pancreaticobiliary carcinoma, HTN, HLD, decompensated (ascites) cirrhosis, DM II, GERD, CKD III who originally presented 6/20/25 after fall from bed at home due to generalized weakness in the setting of poor oral intake and suspected dehydration.  Presentation c/b hyponatremia, oliguric CHIO. She was transferred to [facility] [place] 6/24/25 for initiation of inpatient chemotherapy (cisplatin/gemcitibine, 6/27/25). Durvalumab planned as an outpatient but not administered. Hepatology has been consulted for elevated liver enzymes in the setting of plastic CBD stent placement in April for malignant (extrinsic) obstruction.    The patient demonstrates evidence of altered mentation, leukocytosis, and cholestatic liver injury with hyperbilirubinemia concerning for cholangitis. No fevers observed but the patient did recently start chemotherapy and is immunosuppressed. Cholangitis may be caused by occlusion from progressive tumor burden proximal to biliary stent rather than biliary  stent occlusion as ductal dilation on multiple imaging modalities do not apparent.     Alternatively, the patient has cholestatic liver injury and hyperbilirubinemia due to tumor progression after only recently starting chemotherapy.    MELD 3.0: 32 at 6/30/2025  4:47 AM  MELD-Na: 29 at 6/30/2025  4:47 AM  Calculated from:  Serum Creatinine: 2.33 mg/dL at 6/30/2025  4:47 AM  Serum Sodium: 130 mmol/L at 6/30/2025  4:47 AM  Total Bilirubin: 4.8 mg/dL at 6/30/2025  4:47 AM  Serum Albumin: 2.6 g/dL at 6/30/2025  4:47 AM  INR(ratio): 1.6 at 6/28/2025  4:04 PM  Age at listing (hypothetical): 70 years  Sex: Female at 6/30/2025  4:47 AM      #Elevated Liver Enzymes, Cholestatic  #Hyperbilirubinemia  #Leukocytosis  #Cholangiocarcinoma, Poorly Differentiated, Stage III    Recommendations:  -Please obtain daily CBC, INR, CMP.  -Please obtain repeat blood cultures.  -Please start empiric antibiotic therapy for cholangitis, consider vancomycin and Zosyn.  -Patient reviewed today at tumor board. The patient's liver injury is likely related to tumor progression, however, will discuss with [facility] tomorrow regarding the role of stent exchange for possible alleviation.     HUGO per primary team.   ------------------------------------------------------------------------  Champ Covarrubias MD   Gastroenterology Fellow    After 5PM and on Weekends, please page on-call fellow.    To be discussed with service attending Dr. Lemos  Final recommendations pending attending attestation.            [1] No Known Allergies  [2]   Past Medical History:  Diagnosis Date    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 06/16/2025    CKD (chronic kidney disease) stage 3, GFR 30-59 ml/min (Multi)     Diabetes mellitus (Multi)     Fatty liver     GERD (gastroesophageal reflux disease)     HLD (hyperlipidemia)     Hypertension     Personal history of other benign neoplasm     History of uterine leiomyoma   [3]   Past Surgical History:  Procedure  Laterality Date    ESOPHAGOSCOPY / EGD  2025    ENDOSCOPIC ULTRASOUND (UPPER)      ERCP    OTHER SURGICAL HISTORY  2022     section    OTHER SURGICAL HISTORY  2020    Lumpectomy   [4]   Family History  Problem Relation Name Age of Onset    Pancreatic cancer Mother      Uterine cancer Sister     [5] amLODIPine, 10 mg, oral, Daily  dexAMETHasone, 1 mg, oral, q8h PEREZ  heparin, 5,000 Units, subcutaneous, q8h  heparin flush, 50 Units, intra-catheter, q12h  lidocaine-diphenhydraMINE-Maalox 1:1:1, 10 mL, Swish & Swallow, q4h PEREZ  moisturizing mouth, 15 mL, Swish & Spit, TID  [Held by provider] OLANZapine, 5 mg, oral, Nightly  pantoprazole, 40 mg, oral, Daily before breakfast  PARoxetine, 20 mg, oral, Daily  piperacillin-tazobactam, 3.375 g, intravenous, q6h  sodium bicarbonate, 1,300 mg, oral, TID  sodium zirconium cyclosilicate, 15 g, oral, q8h  [6]    [7] PRN medications: acetaminophen, albuterol, alteplase, alteplase, dextrose, diphenhydrAMINE, EPINEPHrine HCl, famotidine, fluticasone, heparin flush, methylPREDNISolone sodium succinate (PF), ondansetron, oxyCODONE, oxyCODONE, polyethylene glycol, prochlorperazine, prochlorperazine, sodium chloride, vancomycin

## 2025-06-30 NOTE — PROGRESS NOTES
"Jordyn Neal is a 70 y.o. female on day 4 of admission presenting with Hyponatremia.    Subjective   Seen this AM at the bedside. Pt alert and oriented x3 today. She denies any pain. Notes that her mouth is still very dry. Discussed with nursing to apply humidified face tent or mask to help with dryness. We also discussed worsening kidney and liver function with plans for Liver US and kidney US today. She denies any fevers/chills, SOB, or CP.    Objective     Physical Exam  Constitutional:       Appearance: She is ill-appearing.   HENT:      Head: Normocephalic.      Right Ear: External ear normal.      Left Ear: External ear normal.      Nose: Nose normal.      Mouth/Throat:      Mouth: Mucous membranes are dry.   Eyes:      Extraocular Movements: Extraocular movements intact.      Conjunctiva/sclera: Conjunctivae normal.   Cardiovascular:      Rate and Rhythm: Normal rate and regular rhythm.   Pulmonary:      Effort: Pulmonary effort is normal.   Abdominal:      General: Bowel sounds are normal. There is distension.      Palpations: Abdomen is soft.   Musculoskeletal:      Cervical back: Normal range of motion.   Skin:     General: Skin is warm and dry.   Neurological:      Mental Status: She is oriented to person, place, and time.      Motor: Weakness present.       Last Recorded Vitals  Blood pressure 123/79, pulse 110, temperature 36.6 °C (97.9 °F), temperature source Temporal, resp. rate 18, height 1.676 m (5' 6\"), weight 95.5 kg (210 lb 8.6 oz), SpO2 95%.  Intake/Output last 3 Shifts:  I/O last 3 completed shifts:  In: 25 (0.3 mL/kg) [IV Piggyback:25]  Out: 75 (0.8 mL/kg) [Urine:75 (0 mL/kg/hr)]  Weight: 95.5 kg     Relevant Results     .Scheduled medications  Scheduled Medications[1]  Continuous medications  Continuous Medications[2]  PRN medications  PRN Medications[3]    .  Results for orders placed or performed during the hospital encounter of 06/26/25 (from the past 24 hours)   Blood Gas Arterial Full " Panel   Result Value Ref Range    POCT pH, Arterial 7.34 (L) 7.38 - 7.42 pH    POCT pCO2, Arterial 25 (L) 38 - 42 mm Hg    POCT pO2, Arterial 82 (L) 85 - 95 mm Hg    POCT SO2, Arterial 98 94 - 100 %    POCT Oxy Hemoglobin, Arterial 95.5 94.0 - 98.0 %    POCT Hematocrit Calculated, Arterial 37.0 36.0 - 46.0 %    POCT Sodium, Arterial 126 (L) 136 - 145 mmol/L    POCT Potassium, Arterial 5.5 (H) 3.5 - 5.3 mmol/L    POCT Chloride, Arterial 101 98 - 107 mmol/L    POCT Ionized Calcium, Arterial 1.16 1.10 - 1.33 mmol/L    POCT Glucose, Arterial 139 (H) 74 - 99 mg/dL    POCT Lactate, Arterial 1.9 0.4 - 2.0 mmol/L    POCT Base Excess, Arterial -10.6 (L) -2.0 - 3.0 mmol/L    POCT HCO3 Calculated, Arterial 13.5 (L) 22.0 - 26.0 mmol/L    POCT Hemoglobin, Arterial 12.2 12.0 - 16.0 g/dL    POCT Anion Gap, Arterial 17 10 - 25 mmo/L    Patient Temperature 37.0 degrees Celsius    FiO2 21 %   Ammonia   Result Value Ref Range    Ammonia 49 16 - 53 umol/L   Procalcitonin   Result Value Ref Range    Procalcitonin 7.78 (H) <=0.07 ng/mL   CBC and Auto Differential   Result Value Ref Range    WBC 21.0 (H) 4.4 - 11.3 x10*3/uL    nRBC 0.6 (H) 0.0 - 0.0 /100 WBCs    RBC 3.82 (L) 4.00 - 5.20 x10*6/uL    Hemoglobin 11.7 (L) 12.0 - 16.0 g/dL    Hematocrit 34.5 (L) 36.0 - 46.0 %    MCV 90 80 - 100 fL    MCH 30.6 26.0 - 34.0 pg    MCHC 33.9 32.0 - 36.0 g/dL    RDW 18.0 (H) 11.5 - 14.5 %    Platelets 349 150 - 450 x10*3/uL    Neutrophils % 95.8 40.0 - 80.0 %    Immature Granulocytes %, Automated 0.9 0.0 - 0.9 %    Lymphocytes % 2.3 13.0 - 44.0 %    Monocytes % 0.9 2.0 - 10.0 %    Eosinophils % 0.0 0.0 - 6.0 %    Basophils % 0.1 0.0 - 2.0 %    Neutrophils Absolute 20.12 (H) 1.20 - 7.70 x10*3/uL    Immature Granulocytes Absolute, Automated 0.19 0.00 - 0.70 x10*3/uL    Lymphocytes Absolute 0.48 (L) 1.20 - 4.80 x10*3/uL    Monocytes Absolute 0.18 0.10 - 1.00 x10*3/uL    Eosinophils Absolute 0.00 0.00 - 0.70 x10*3/uL    Basophils Absolute 0.03 0.00 -  0.10 x10*3/uL   Comprehensive Metabolic Panel   Result Value Ref Range    Glucose 130 (H) 74 - 99 mg/dL    Sodium 130 (L) 136 - 145 mmol/L    Potassium 5.5 (H) 3.5 - 5.3 mmol/L    Chloride 101 98 - 107 mmol/L    Bicarbonate 15 (L) 21 - 32 mmol/L    Anion Gap 20 10 - 20 mmol/L    Urea Nitrogen 52 (H) 6 - 23 mg/dL    Creatinine 1.92 (H) 0.50 - 1.05 mg/dL    eGFR 28 (L) >60 mL/min/1.73m*2    Calcium 8.6 8.6 - 10.6 mg/dL    Albumin 2.6 (L) 3.4 - 5.0 g/dL    Alkaline Phosphatase 889 (H) 33 - 136 U/L    Total Protein 5.3 (L) 6.4 - 8.2 g/dL     (H) 9 - 39 U/L    Bilirubin, Total 4.2 (H) 0.0 - 1.2 mg/dL    ALT 55 (H) 7 - 45 U/L   Phosphorus   Result Value Ref Range    Phosphorus 5.8 (H) 2.5 - 4.9 mg/dL   Magnesium   Result Value Ref Range    Magnesium 2.31 1.60 - 2.40 mg/dL   Phosphorus   Result Value Ref Range    Phosphorus 6.2 (H) 2.5 - 4.9 mg/dL   Magnesium   Result Value Ref Range    Magnesium 2.36 1.60 - 2.40 mg/dL   Comprehensive Metabolic Panel   Result Value Ref Range    Glucose 150 (H) 74 - 99 mg/dL    Sodium 130 (L) 136 - 145 mmol/L    Potassium 5.5 (H) 3.5 - 5.3 mmol/L    Chloride 100 98 - 107 mmol/L    Bicarbonate 15 (L) 21 - 32 mmol/L    Anion Gap 21 (H) 10 - 20 mmol/L    Urea Nitrogen 64 (H) 6 - 23 mg/dL    Creatinine 2.33 (H) 0.50 - 1.05 mg/dL    eGFR 22 (L) >60 mL/min/1.73m*2    Calcium 8.8 8.6 - 10.6 mg/dL    Albumin 2.6 (L) 3.4 - 5.0 g/dL    Alkaline Phosphatase 844 (H) 33 - 136 U/L    Total Protein 5.3 (L) 6.4 - 8.2 g/dL     (H) 9 - 39 U/L    Bilirubin, Total 4.8 (H) 0.0 - 1.2 mg/dL    ALT 56 (H) 7 - 45 U/L   CBC and Auto Differential   Result Value Ref Range    WBC 18.1 (H) 4.4 - 11.3 x10*3/uL    nRBC 0.5 (H) 0.0 - 0.0 /100 WBCs    RBC 3.81 (L) 4.00 - 5.20 x10*6/uL    Hemoglobin 11.2 (L) 12.0 - 16.0 g/dL    Hematocrit 33.8 (L) 36.0 - 46.0 %    MCV 89 80 - 100 fL    MCH 29.4 26.0 - 34.0 pg    MCHC 33.1 32.0 - 36.0 g/dL    RDW 18.1 (H) 11.5 - 14.5 %    Platelets 348 150 - 450 x10*3/uL     Neutrophils % 96.6 40.0 - 80.0 %    Immature Granulocytes %, Automated 0.6 0.0 - 0.9 %    Lymphocytes % 2.3 13.0 - 44.0 %    Monocytes % 0.4 2.0 - 10.0 %    Eosinophils % 0.0 0.0 - 6.0 %    Basophils % 0.1 0.0 - 2.0 %    Neutrophils Absolute 17.53 (H) 1.20 - 7.70 x10*3/uL    Immature Granulocytes Absolute, Automated 0.11 0.00 - 0.70 x10*3/uL    Lymphocytes Absolute 0.41 (L) 1.20 - 4.80 x10*3/uL    Monocytes Absolute 0.07 (L) 0.10 - 1.00 x10*3/uL    Eosinophils Absolute 0.00 0.00 - 0.70 x10*3/uL    Basophils Absolute 0.02 0.00 - 0.10 x10*3/uL   Urinalysis with Reflex Culture and Microscopic   Result Value Ref Range    Color, Urine Dark-Yellow Light-Yellow, Yellow, Dark-Yellow    Appearance, Urine Turbid (N) Clear    Specific Gravity, Urine 1.029 1.005 - 1.035    pH, Urine 5.5 5.0, 5.5, 6.0, 6.5, 7.0, 7.5, 8.0    Protein, Urine 30 (1+) (A) NEGATIVE, 10 (TRACE), 20 (TRACE) mg/dL    Glucose, Urine Normal Normal mg/dL    Blood, Urine 0.2 (2+) (A) NEGATIVE mg/dL    Ketones, Urine NEGATIVE NEGATIVE mg/dL    Bilirubin, Urine 1 (1+) (A) NEGATIVE mg/dL    Urobilinogen, Urine 4 (2+) (A) Normal mg/dL    Nitrite, Urine NEGATIVE NEGATIVE    Leukocyte Esterase, Urine 500 Keena/uL (A) NEGATIVE   Extra Urine Gray Tube   Result Value Ref Range    Extra Tube     Microscopic Only, Urine   Result Value Ref Range    WBC, Urine 21-50 (A) 1-5, NONE /HPF    RBC, Urine 6-10 (A) NONE, 1-2, 3-5 /HPF    Bacteria, Urine 1+ (A) NONE SEEN /HPF   SST TOP   Result Value Ref Range    Extra Tube Hold for add-ons.      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 on 6/26/25  as a transfer from OSH for initiaition of inpatient chemotherapy per her oncologist request. [facility] [place], [place] Oncology  consulted on admission. Patient s/p C1 Gemcitabine/Cisplatin inpatient 6/27. Labs noteable for acute hyponatremia, Na+124 (6/27) previously unresponsive to IVF at OSH despite hypovolemia hyponatremia like picture. Nephrology consulted 6/27, ddx hypovolemia vs SIADH vs Cisplatin-induced.  Rec q4 sodium checks initially, further aggressive IVF boluses (x3 6/27), started sodium bicarb TID 6/28, Na showing improvement as of 6/29. Supportive Onc consulted 6/28 for symptom mgmt (nausea/poor PO intake), recs followed. 6/29 AM pt with significant AMS. CT head (6/29) negative. Pt A&O x3 as of 6/30. Liver and kidney function worsening, liver and renal US pending 6/30. GI/Liver consulted 6/30 re CBD stent removal, formal recs pending but rec empiric Zosyn/Vanc (6/30-) for c/f cholangitis. PT/OT rec SNF. DC to SNF pending improvement in symptoms, CHIO, LFTs, neph recs, and facility placement.     Updates 6/30:  - A &O x3  - Tbili up to 4.8, sCr 2.33, Phos 6.2, K 5.5   - Lokelma x2 doses ordered for hyperkalemia, tele ordered  - Liver US pending  - Kidney US pending  - GI/Liver consulted re CBD stent removal, formal recs pending but rec empiric Zosyn/Vanc (6/30-) for c/f cholangitis    # s/p C1 Gemcitabine/Cisplatin Inpatient  # Newly Diagnosed 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 Willis  - Diagnosed in 5/2025  - Discussed at tumor board on 6/4/2025 and rec treatment with Durvalumab + Gemcitabine / Cisplatin   - Treatment was scheduled to begin on 6/22/2025, however, unable to due to admission   - CT A/P (6/22) showed overall progression of metestatic disease, new small volume ascites, satisfactory placement of CBD stent without significant upstream bilairy dilatation, new small pleual effusion  - Primary oncologist rec transfer to [facility] for intiation of emergent inpatient chemotherapy   - Oncology consulted upon arrival to [facility], 6/27 s/p  Gemcitabine/Cisplatin while inpatient, durvalumab not permitted inpatient  - NGS panel results pending 6/28      # AMS-- improving  - Ddx include metabolic (elevated LFTs/ ammonia/ CHIO), structural, infectious  - As of 6/29 AM- following commands but unable to keep eyes open, not able to speak- later throughout morning waxing/waning  - As of 6/30 AM- pt is ordered x3  # Metabolic  - Monitor electrolytes; replete PRN. No hypo/hyperglycemia  - ABG (6/29): pH 7.24, pCO2 25, pO2 82, SO2 98; RT consulted, RN to place on humidified 2L NC face mask or tent (mouth breathing)  - TSH (6/29): 1.62  - LFTs slightly improving, Tbili increased to 4 (6/29)--> 4.8 (6/30)  - sCr worsening, sCr 1.81 (6/29)--> 2.33 (6/30)  - s/p 1x dose of Lactulose 6/29 for Ammonia 73  - Uric Acid 10.2 (6/28)--> 11.2 (6/29)--> 6/30 pending  - Vit B12 level (6/29): >2,000  - Zyprexa started 6/28 and held as of 6/29  # Infectious  - WBC 21.6k (6/29)--> 18.1k (6/30)  - Repeat CXR (6/29) without c/f infection  - UA with reflex (6/30): +protein, +blood, +urobilinogen, +leuksm +WBC, +RBC, +1 bacteria; UCX pending  - Lactate on ABG 1.9, procal (6/29): 7.78 (6/29)- repeat ordered for 7/1  - Recent CT a/p (6/22) showing CBD stent intact without significant upstream biliary dilation  - Pt does have CBD stent that needs to be removed- pt without fevers/chills, HoTN, or abdominal pain- no obvious concern for infected stent- GI/Liver consulted 6/30 for recs regarding stent removal (inpt vs outpt), formal recs pending but rec empiric Zosyn/Vanc (6/30-) for c/f cholangitis  # Structural  - No known brain mets  - CT head (6/29): negative for acute process  - CBD stent in place via CT a/p (6/22)    # Transaminitis  # c/f Cholangitis  # Hx biliary compression stricture s/p stent 4/2025   - Likely multifactorial in setting of metastasis to liver and fatty liver  - No abd pain, afebrile, no chills at this time, endorses intermittent nausea  - Alk phos 530, ALT 53, AST  83, Tbili 2.6 upon admission (6/26) --> now uptrending (6/28) alk phos 1,190, alt 73, ast 179, tbili 3.9--> showing improvement 6/29 alk phos 841, ALT 55, , Tbili 4--> Alk Phos  - 4/15 s/p plastic biliary stent placement, plan for repeat ERCP in 2 months for stent removal, a 4/29 GI appt was cancelled unclear why, otherwise lost to GI follow up  - CT A/P (6/22) showed overall progression of metastatic disease, new small volume ascites, satisfactory placement of CBD stent without significant upstream bilairy dilatation   - Ammonia 74 (6/28)--> repeat ammonia 6/29 pending, ordered 1x dose of Lactulose regardless  - Hepatitis panel (6/28): neg  - Blood Cx x2 (6/28): NGTD  - US liver w/ doppler (6/30): pending  - GI/Liver consulted 6/30 for recs regarding stent removal (inpt vs outpt), formal recs pending but rec empiric Zosyn/Vanc (6/30-) for c/f cholangitis    # Leukocytosis-- improving  - Likely 2/2 steroids vs infectious (cholangitis?)  - See above re c/f cholangitis  - WBC BL~10k, (6/27) 12.6 --> 21.6k (6/29)--> 18.1k (6/30)  - CXR (6/20) w/ atelectasis, no consolidation  - CT A/P (6/22) showed worsening disease, new small/mod ascites, satsifactory placement of CBD stent w/o dilatation  - Repeat CXR (6/29): no c/f infection  - UA with reflex (6/30): +protein, +blood, +urobilinogen, +leuksm +WBC, +RBC, +1 bacteria; UCX pending  - Lactate level 1.9 via ABG (6/29): Procal (6/29)): 7.78 (6/29)- repeat ordered for 7/1  - MSSA (6/27): +  - Blood Cx x2 (6/28): NGTD  - s/p 10mg IVP decadron premed 6/27  - CBC daily     # Hyponatremia-- improving  - Na 131 on admit to [place] (6/20) --> Na 124 (6/27) --> Na 129 (6/28)--> 130 (6/29 and 6/30)  - DDx includes hypovolemia vs SIADH vs Cisplatin-induced (although hyponatremia started prior to Cisplatin, so if any effect not the whole etiology)  - Serum osm 275, urine Cr 197, urine Na <10, urine osm 777 (6/26)  - Repeat serum osm, urine cr 211, urine Na 10, urine osm 922  (6/27)  - Repeat lytes ordered 6/28 and still pending- to be obtained today 6/30  - s/p 1L fluid restriction 6/26 -- per Dr. Alvarez hold off for now   - Nephrology consulted 6/27, ddx hypovolemia vs SIADH vs Cisplatin-induced. Rec q4 sodium checks initially, started sodium bicarb TID 6/28, Na showing improvement as of 6/29  - Of note, pt received x2 additional 1L NS boluses as part of chemo regimen (6/27-6/28)   - s/p 500mL NS bolus 6/29 per nephrology d/t kidney function    # CHIO on CKD III-- worsening  - Recent sCr BL ~1.2-1.3 ; sCr 1.08 (6/27)--> 1.81 (6/29)--> 2.33 (6/30)  - Avoid nephrotoxic agents  - Monitor CMP daily  - Nephrology following  - s/p 500mL NS bolus 6/29 for CHIO without improvement  - Renal US ordered/pending 6/30    # Hyperkalemia  - 2/2 CHIO  - K 5.5 (6/29) s/p 1x dose of Lokelma  - K 5.5 (6/30) Lokelma x2 doses ordered  - Tele monitoring ordered 6/30  - Nephrology following    # Tachycardia  - -120s since admission 6/26; asymptomatic, no CP or palpitations, no O2 requirement, no pain  - Ddx includes dehydration vs deconditioning from malignancy vs pain vs infection   - s/p multiple IVF boluses (x3 6/25), 500mL NS 6/29  - EKG (6/28): sinus tach, QTc 446  - Low threshold for CT angio chest if tachycardia worsens or develops new O2 requirement       # Neoplasm-related pain   # FTT  # Poor PO intake   # N/V  - Supportive onc consulted 6/28 for symptom/pain management, recs followed  - Continue with home Oxycodone 10mg q6hrs PRN severe pain, added Oxycodone 5mg q6hrs PRN moderate pain  - Continue with home PO Zofran 4mg q6 hours PRN N/V 2nd line, and PO/IV Compazine 10mg q6hrs PRN N/V 1st line  - STOPPED home mirtazapine 15mg nightly and started Zyprexa 5mg nightly 6/29- however then HELD Zyprexa 6/29 d/t AMS  - Nutrition consulted 6/27, rec regular diet as tolerated, Ensure Clear TID with meals  - PT / OT consulted on admit, ampact 8, SNF list provided 6/28    # Oral thrush, improving  #  Mouth Pain  - Treated with oral nystatin 5mL QID swish and spit- completed course 6/29  - Started biotene TID 6/27   - Added scheduled Dex oral liquid 1mg TID swish and swallow and magic mouth wash 10mL swish and swallow q4hrs scheduled for mouth pain  - Humidified face tent/face mask to be applied by nursing to aid in dry mouth     # HTN  - Continue with home Amlodipine 10mg daily      # MDD  # LUIS FELIPE  - Continue with home Paroxetine 20mg daily      # Type 2 DM  - Last Hgb A1c 5.9 (4/29/24); not on any diabetic medications  - Due to decreased PO intake and normal blood sugars, insulin not ordered on admit, CTM  - Hypoglycemic protocol in place      # GERD  - Continue Protonix 40mg daily      # DVT Prophy: Stopped prophy Lovenox 6/29 and started prophy Heparin SQ 6/29 d/t CHIO. SCDs, encourage safe ambulation     DISPO:  - DNR, DNI - no ICU - confirmed on admit   - DC to SNF pending improvement in symptoms, CHIO, LFTs, neph recs, and facility placement  - NOK: Natalio Neal [phone]   - FUV with Dr. Willis (oncology) 7/24      I spent > 60 minutes in the professional and overall care of this patient    Assessment and plan as above discussed with attending physician Dr. Avtar Sal, APRN-CNP         [1] amLODIPine, 10 mg, oral, Daily  dexAMETHasone, 1 mg, oral, q8h PEREZ  heparin, 5,000 Units, subcutaneous, q8h  heparin flush, 50 Units, intra-catheter, q12h  lidocaine-diphenhydraMINE-Maalox 1:1:1, 10 mL, Swish & Swallow, q4h PEREZ  moisturizing mouth, 15 mL, Swish & Spit, TID  [Held by provider] OLANZapine, 5 mg, oral, Nightly  pantoprazole, 40 mg, oral, Daily before breakfast  PARoxetine, 20 mg, oral, Daily  sodium bicarbonate, 1,300 mg, oral, TID  sodium zirconium cyclosilicate, 15 g, oral, Once     [2]    [3] PRN medications: acetaminophen, albuterol, alteplase, alteplase, dextrose, diphenhydrAMINE, EPINEPHrine HCl, famotidine, fluticasone, heparin flush, methylPREDNISolone sodium succinate  (PF), ondansetron, oxyCODONE, oxyCODONE, polyethylene glycol, prochlorperazine, prochlorperazine, sodium chloride

## 2025-06-30 NOTE — PROGRESS NOTES
Jordyn Neal is a 70 y.o. female on day 4 of admission presenting with Hyponatremia.Jordyn Neal   70 y.o.    @WT@  [facility]/Room: 37690361/4007/4007-A    Subjective:     Sleepy  New CHIO  Lethargic    Objective:     Meds:   amLODIPine, 10 mg, Daily  dexAMETHasone, 1 mg, q8h PEREZ  heparin, 5,000 Units, q8h  heparin flush, 50 Units, q12h  lidocaine-diphenhydraMINE-Maalox 1:1:1, 10 mL, q4h PEREZ  moisturizing mouth, 15 mL, TID  [Held by provider] OLANZapine, 5 mg, Nightly  pantoprazole, 40 mg, Daily before breakfast  PARoxetine, 20 mg, Daily  piperacillin-tazobactam, 3.375 g, q6h  sodium bicarbonate, 1,300 mg, TID  sodium zirconium cyclosilicate, 15 g, q8h         acetaminophen, 650 mg, q6h PRN  albuterol, 3 mL, PRN  alteplase, 2 mg, PRN  alteplase, 2 mg, PRN  dextrose, 500 mL, PRN  diphenhydrAMINE, 50 mg, PRN  EPINEPHrine HCl, 0.3 mg, q5 min PRN  famotidine, 20 mg, Once PRN  fluticasone, 1 spray, Daily PRN  heparin flush, 50 Units, PRN  methylPREDNISolone sodium succinate (PF), 40 mg, PRN  ondansetron, 4 mg, q6h PRN  oxyCODONE, 10 mg, q6h PRN  oxyCODONE, 5 mg, q6h PRN  polyethylene glycol, 17 g, Daily PRN  prochlorperazine, 10 mg, q6h PRN  prochlorperazine, 10 mg, q6h PRN  sodium chloride, 500 mL, PRN  vancomycin, , Daily PRN        Vitals:    06/30/25 1224   BP: 122/70   Pulse: 105   Resp: 18   Temp: 37 °C (98.6 °F)   SpO2: 94%          Intake/Output Summary (Last 24 hours) at 6/30/2025 1455  Last data filed at 6/30/2025 1342  Gross per 24 hour   Intake 575 ml   Output 200 ml   Net 375 ml       Lethargic, Sleepy  On RA  Comfortable  +1      Blood Labs:  Results for orders placed or performed during the hospital encounter of 06/26/25 (from the past 24 hours)   Phosphorus   Result Value Ref Range    Phosphorus 6.2 (H) 2.5 - 4.9 mg/dL   Magnesium   Result Value Ref Range    Magnesium 2.36 1.60 - 2.40 mg/dL   Comprehensive Metabolic Panel   Result Value Ref Range    Glucose 150 (H) 74 - 99 mg/dL    Sodium 130 (L) 136 - 145  mmol/L    Potassium 5.5 (H) 3.5 - 5.3 mmol/L    Chloride 100 98 - 107 mmol/L    Bicarbonate 15 (L) 21 - 32 mmol/L    Anion Gap 21 (H) 10 - 20 mmol/L    Urea Nitrogen 64 (H) 6 - 23 mg/dL    Creatinine 2.33 (H) 0.50 - 1.05 mg/dL    eGFR 22 (L) >60 mL/min/1.73m*2    Calcium 8.8 8.6 - 10.6 mg/dL    Albumin 2.6 (L) 3.4 - 5.0 g/dL    Alkaline Phosphatase 844 (H) 33 - 136 U/L    Total Protein 5.3 (L) 6.4 - 8.2 g/dL     (H) 9 - 39 U/L    Bilirubin, Total 4.8 (H) 0.0 - 1.2 mg/dL    ALT 56 (H) 7 - 45 U/L   CBC and Auto Differential   Result Value Ref Range    WBC 18.1 (H) 4.4 - 11.3 x10*3/uL    nRBC 0.5 (H) 0.0 - 0.0 /100 WBCs    RBC 3.81 (L) 4.00 - 5.20 x10*6/uL    Hemoglobin 11.2 (L) 12.0 - 16.0 g/dL    Hematocrit 33.8 (L) 36.0 - 46.0 %    MCV 89 80 - 100 fL    MCH 29.4 26.0 - 34.0 pg    MCHC 33.1 32.0 - 36.0 g/dL    RDW 18.1 (H) 11.5 - 14.5 %    Platelets 348 150 - 450 x10*3/uL    Neutrophils % 96.6 40.0 - 80.0 %    Immature Granulocytes %, Automated 0.6 0.0 - 0.9 %    Lymphocytes % 2.3 13.0 - 44.0 %    Monocytes % 0.4 2.0 - 10.0 %    Eosinophils % 0.0 0.0 - 6.0 %    Basophils % 0.1 0.0 - 2.0 %    Neutrophils Absolute 17.53 (H) 1.20 - 7.70 x10*3/uL    Immature Granulocytes Absolute, Automated 0.11 0.00 - 0.70 x10*3/uL    Lymphocytes Absolute 0.41 (L) 1.20 - 4.80 x10*3/uL    Monocytes Absolute 0.07 (L) 0.10 - 1.00 x10*3/uL    Eosinophils Absolute 0.00 0.00 - 0.70 x10*3/uL    Basophils Absolute 0.02 0.00 - 0.10 x10*3/uL   Uric Acid   Result Value Ref Range    Uric Acid 12.2 (H) 2.3 - 6.7 mg/dL   Urine electrolytes   Result Value Ref Range    Sodium, Urine Random 11 mmol/L    Sodium/Creatinine Ratio 6 Not established. mmol/g Creat    Potassium, Urine Random 102 mmol/L    Potassium/Creatinine Ratio 55 Not established mmol/g Creat    Chloride, Urine Random <15 mmol/L    Chloride/Creatinine Ratio      Creatinine, Urine Random 185.6 20.0 - 320.0 mg/dL   Potassium, Urine Random   Result Value Ref Range    Potassium, Urine  Random 102 mmol/L    Creatinine, Urine Random 185.6 20.0 - 320.0 mg/dL    Potassium/Creatinine Ratio 55 Not established mmol/g Creat   Osmolality, urine   Result Value Ref Range    Osmolality, Urine Random 656 200 - 1,200 mOsm/kg   Urinalysis with Reflex Culture and Microscopic   Result Value Ref Range    Color, Urine Dark-Yellow Light-Yellow, Yellow, Dark-Yellow    Appearance, Urine Turbid (N) Clear    Specific Gravity, Urine 1.029 1.005 - 1.035    pH, Urine 5.5 5.0, 5.5, 6.0, 6.5, 7.0, 7.5, 8.0    Protein, Urine 30 (1+) (A) NEGATIVE, 10 (TRACE), 20 (TRACE) mg/dL    Glucose, Urine Normal Normal mg/dL    Blood, Urine 0.2 (2+) (A) NEGATIVE mg/dL    Ketones, Urine NEGATIVE NEGATIVE mg/dL    Bilirubin, Urine 1 (1+) (A) NEGATIVE mg/dL    Urobilinogen, Urine 4 (2+) (A) Normal mg/dL    Nitrite, Urine NEGATIVE NEGATIVE    Leukocyte Esterase, Urine 500 Keena/uL (A) NEGATIVE   Extra Urine Gray Tube   Result Value Ref Range    Extra Tube     Microscopic Only, Urine   Result Value Ref Range    WBC, Urine 21-50 (A) 1-5, NONE /HPF    RBC, Urine 6-10 (A) NONE, 1-2, 3-5 /HPF    Bacteria, Urine 1+ (A) NONE SEEN /HPF   SST TOP   Result Value Ref Range    Extra Tube Hold for add-ons.         ASSESSMENT:  Jordyn Neal is a 70 y.o. 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 Presented to OSH 6/20 with N, V, Poor Intake, Transferred to [facility] 6/26 to start Chemotherapy. Consult for Hyponatremia.      #Hyponatremia  - chronic hyponatremia in setting of malignancy  - Baseline Na-133-134  - Etio: hypovolemia vs SIADH, have some component of Trell which may cause decrease free water clearance.Cisplatin induced hyponatremia is one of DD,   - Urine osm 922,serum osm 277 (L)  - Echo    LVEF 75-80%.  Echo suggestive of  Possible Hypovolemia    #CHIO  - Baseline Cr 0.9  - Onset 6/22  - Dips of BP, Vanc/Zosyn, can affect this    Recommend:  - Clean Catch U/A & Urine Culture  - Hold BP Medications  - Do Cystatin C  - LR Preferred over N/S (Considering Acidemia)  - Boluses of IVF Preferred over Maintenance  -Encourage oral intake.  -Agree with lokelma   -Continue sodiumbicarb 1300 TID from today.  -Keep euvolemic.            Mami Mariano MD  Nephrology Fellow   Daytime / Weekend Renal Pager [phone]  After 7 pm Emergencies [phone] Pager [phone]

## 2025-06-30 NOTE — CARE PLAN
The patient's goals for the shift include      The clinical goals for the shift include Pt will remain safe and free of injury through end of shift 6/30/2025 1900      Problem: Pain - Adult  Goal: Verbalizes/displays adequate comfort level or baseline comfort level  Outcome: Progressing     Problem: Safety - Adult  Goal: Free from fall injury  Outcome: Progressing     Problem: Discharge Planning  Goal: Discharge to home or other facility with appropriate resources  Outcome: Progressing     Problem: Chronic Conditions and Co-morbidities  Goal: Patient's chronic conditions and co-morbidity symptoms are monitored and maintained or improved  Outcome: Progressing     Problem: Nutrition  Goal: Nutrient intake appropriate for maintaining nutritional needs  Outcome: Progressing     Problem: Skin  Goal: Decreased wound size/increased tissue granulation at next dressing change  Outcome: Progressing  Flowsheets (Taken 6/30/2025 0925)  Decreased wound size/increased tissue granulation at next dressing change: Protective dressings over bony prominences  Goal: Participates in plan/prevention/treatment measures  Outcome: Progressing  Flowsheets (Taken 6/30/2025 0925)  Participates in plan/prevention/treatment measures: Discuss with provider PT/OT consult  Goal: Prevent/manage excess moisture  Outcome: Progressing  Flowsheets (Taken 6/30/2025 0925)  Prevent/manage excess moisture: Cleanse incontinence/protect with barrier cream  Goal: Prevent/minimize sheer/friction injuries  Outcome: Progressing  Flowsheets (Taken 6/30/2025 0925)  Prevent/minimize sheer/friction injuries:   Turn/reposition every 2 hours/use positioning/transfer devices   Use pull sheet  Goal: Promote/optimize nutrition  Outcome: Progressing  Flowsheets (Taken 6/30/2025 0925)  Promote/optimize nutrition:   Consume > 50% meals/supplements   Offer water/supplements/favorite foods  Goal: Promote skin healing  Outcome: Progressing  Flowsheets (Taken 6/30/2025  0199)  Promote skin healing:   Turn/reposition every 2 hours/use positioning/transfer devices   Protective dressings over bony prominences

## 2025-06-30 NOTE — PROGRESS NOTES
Physical Therapy                 Therapy Communication Note    Patient Name: Jordyn Neal  MRN: [id number]  Department: [facility]  Room: [address] [place] [address]  Today's Date: 6/30/2025     Discipline: Physical Therapy    Missed Visit: PT Missed Visit: Yes     Missed Visit Reason: Missed Visit Reason:  (Pt about to go down for liver ultrasound.  PT will re-attempt as schedule allows)    Missed Time: Attempt 1039

## 2025-07-01 LAB
ALBUMIN SERPL BCP-MCNC: 2.6 G/DL (ref 3.4–5)
ALP SERPL-CCNC: 701 U/L (ref 33–136)
ALT SERPL W P-5'-P-CCNC: 51 U/L (ref 7–45)
ANION GAP SERPL CALC-SCNC: 17 MMOL/L (ref 10–20)
AST SERPL W P-5'-P-CCNC: 90 U/L (ref 9–39)
BACTERIA UR CULT: NORMAL
BASOPHILS # BLD AUTO: 0.01 X10*3/UL (ref 0–0.1)
BASOPHILS NFR BLD AUTO: 0.1 %
BILIRUB SERPL-MCNC: 4.5 MG/DL (ref 0–1.2)
BUN SERPL-MCNC: 66 MG/DL (ref 6–23)
CALCIUM SERPL-MCNC: 8.7 MG/DL (ref 8.6–10.6)
CHLORIDE SERPL-SCNC: 102 MMOL/L (ref 98–107)
CO2 SERPL-SCNC: 18 MMOL/L (ref 21–32)
CREAT SERPL-MCNC: 2.19 MG/DL (ref 0.5–1.05)
EGFRCR SERPLBLD CKD-EPI 2021: 24 ML/MIN/1.73M*2
EOSINOPHIL # BLD AUTO: 0.02 X10*3/UL (ref 0–0.7)
EOSINOPHIL NFR BLD AUTO: 0.1 %
ERYTHROCYTE [DISTWIDTH] IN BLOOD BY AUTOMATED COUNT: 18.6 % (ref 11.5–14.5)
GLUCOSE SERPL-MCNC: 119 MG/DL (ref 74–99)
HCT VFR BLD AUTO: 31.1 % (ref 36–46)
HGB BLD-MCNC: 10.3 G/DL (ref 12–16)
HYPOCHROMIA BLD QL SMEAR: NORMAL
IMM GRANULOCYTES # BLD AUTO: 0.09 X10*3/UL (ref 0–0.7)
IMM GRANULOCYTES NFR BLD AUTO: 0.6 % (ref 0–0.9)
LYMPHOCYTES # BLD AUTO: 0.4 X10*3/UL (ref 1.2–4.8)
LYMPHOCYTES NFR BLD AUTO: 2.9 %
MAGNESIUM SERPL-MCNC: 2.26 MG/DL (ref 1.6–2.4)
MCH RBC QN AUTO: 29.3 PG (ref 26–34)
MCHC RBC AUTO-ENTMCNC: 33.1 G/DL (ref 32–36)
MCV RBC AUTO: 88 FL (ref 80–100)
MONOCYTES # BLD AUTO: 0.01 X10*3/UL (ref 0.1–1)
MONOCYTES NFR BLD AUTO: 0.1 %
NEUTROPHILS # BLD AUTO: 13.37 X10*3/UL (ref 1.2–7.7)
NEUTROPHILS NFR BLD AUTO: 96.2 %
NRBC BLD-RTO: 0 /100 WBCS (ref 0–0)
PHOSPHATE SERPL-MCNC: 5 MG/DL (ref 2.5–4.9)
PLATELET # BLD AUTO: 265 X10*3/UL (ref 150–450)
POTASSIUM SERPL-SCNC: 4.6 MMOL/L (ref 3.5–5.3)
PROCALCITONIN SERPL-MCNC: 3.89 NG/ML
PROT SERPL-MCNC: 5.3 G/DL (ref 6.4–8.2)
RBC # BLD AUTO: 3.52 X10*6/UL (ref 4–5.2)
RBC MORPH BLD: NORMAL
SODIUM SERPL-SCNC: 132 MMOL/L (ref 136–145)
URATE SERPL-MCNC: 2.6 MG/DL (ref 2.3–6.7)
VANCOMYCIN SERPL-MCNC: 13 UG/ML (ref 5–20)
WBC # BLD AUTO: 13.9 X10*3/UL (ref 4.4–11.3)

## 2025-07-01 PROCEDURE — 80202 ASSAY OF VANCOMYCIN: CPT

## 2025-07-01 PROCEDURE — 1200000003 HC ONCOLOGY  ROOM WITH TELEMETRY DAILY

## 2025-07-01 PROCEDURE — 85025 COMPLETE CBC W/AUTO DIFF WBC: CPT | Performed by: NURSE PRACTITIONER

## 2025-07-01 PROCEDURE — 84550 ASSAY OF BLOOD/URIC ACID: CPT | Performed by: NURSE PRACTITIONER

## 2025-07-01 PROCEDURE — 84075 ASSAY ALKALINE PHOSPHATASE: CPT | Performed by: NURSE PRACTITIONER

## 2025-07-01 PROCEDURE — 2500000004 HC RX 250 GENERAL PHARMACY W/ HCPCS (ALT 636 FOR OP/ED): Performed by: NURSE PRACTITIONER

## 2025-07-01 PROCEDURE — 84100 ASSAY OF PHOSPHORUS: CPT | Performed by: NURSE PRACTITIONER

## 2025-07-01 PROCEDURE — 83735 ASSAY OF MAGNESIUM: CPT | Performed by: NURSE PRACTITIONER

## 2025-07-01 PROCEDURE — 2500000001 HC RX 250 WO HCPCS SELF ADMINISTERED DRUGS (ALT 637 FOR MEDICARE OP)

## 2025-07-01 PROCEDURE — 99233 SBSQ HOSP IP/OBS HIGH 50: CPT

## 2025-07-01 PROCEDURE — 2500000002 HC RX 250 W HCPCS SELF ADMINISTERED DRUGS (ALT 637 FOR MEDICARE OP, ALT 636 FOR OP/ED)

## 2025-07-01 PROCEDURE — 2500000004 HC RX 250 GENERAL PHARMACY W/ HCPCS (ALT 636 FOR OP/ED)

## 2025-07-01 PROCEDURE — 84145 PROCALCITONIN (PCT): CPT | Performed by: NURSE PRACTITIONER

## 2025-07-01 PROCEDURE — 97165 OT EVAL LOW COMPLEX 30 MIN: CPT | Mod: GO

## 2025-07-01 PROCEDURE — 97530 THERAPEUTIC ACTIVITIES: CPT | Mod: GP

## 2025-07-01 RX ORDER — VANCOMYCIN HYDROCHLORIDE 1 G/200ML
1000 INJECTION, SOLUTION INTRAVENOUS ONCE
Status: COMPLETED | OUTPATIENT
Start: 2025-07-01 | End: 2025-07-01

## 2025-07-01 RX ADMIN — PIPERACILLIN SODIUM AND TAZOBACTAM SODIUM 3.38 G: 3; .375 INJECTION, SOLUTION INTRAVENOUS at 23:00

## 2025-07-01 RX ADMIN — Medication 15 ML: at 09:25

## 2025-07-01 RX ADMIN — PIPERACILLIN SODIUM AND TAZOBACTAM SODIUM 3.38 G: 3; .375 INJECTION, SOLUTION INTRAVENOUS at 11:13

## 2025-07-01 RX ADMIN — PIPERACILLIN SODIUM AND TAZOBACTAM SODIUM 3.38 G: 3; .375 INJECTION, SOLUTION INTRAVENOUS at 05:45

## 2025-07-01 RX ADMIN — PIPERACILLIN SODIUM AND TAZOBACTAM SODIUM 3.38 G: 3; .375 INJECTION, SOLUTION INTRAVENOUS at 17:19

## 2025-07-01 RX ADMIN — VANCOMYCIN HYDROCHLORIDE 1000 MG: 1 INJECTION, SOLUTION INTRAVENOUS at 09:27

## 2025-07-01 ASSESSMENT — COGNITIVE AND FUNCTIONAL STATUS - GENERAL
MOVING FROM LYING ON BACK TO SITTING ON SIDE OF FLAT BED WITH BEDRAILS: TOTAL
DRESSING REGULAR UPPER BODY CLOTHING: A LOT
DAILY ACTIVITIY SCORE: 11
PERSONAL GROOMING: A LOT
MOVING TO AND FROM BED TO CHAIR: TOTAL
TURNING FROM BACK TO SIDE WHILE IN FLAT BAD: A LOT
EATING MEALS: A LOT
CLIMB 3 TO 5 STEPS WITH RAILING: TOTAL
TURNING FROM BACK TO SIDE WHILE IN FLAT BAD: TOTAL
MOVING FROM LYING ON BACK TO SITTING ON SIDE OF FLAT BED WITH BEDRAILS: A LOT
PERSONAL GROOMING: A LOT
HELP NEEDED FOR BATHING: A LOT
MOBILITY SCORE: 8
DAILY ACTIVITIY SCORE: 12
HELP NEEDED FOR BATHING: A LOT
DRESSING REGULAR UPPER BODY CLOTHING: A LOT
STANDING UP FROM CHAIR USING ARMS: TOTAL
DRESSING REGULAR LOWER BODY CLOTHING: A LOT
TOILETING: A LOT
MOBILITY SCORE: 6
DRESSING REGULAR LOWER BODY CLOTHING: A LOT
MOVING TO AND FROM BED TO CHAIR: TOTAL
WALKING IN HOSPITAL ROOM: TOTAL
CLIMB 3 TO 5 STEPS WITH RAILING: TOTAL
EATING MEALS: A LOT
WALKING IN HOSPITAL ROOM: TOTAL
STANDING UP FROM CHAIR USING ARMS: TOTAL
TOILETING: TOTAL

## 2025-07-01 ASSESSMENT — PAIN SCALES - GENERAL
PAINLEVEL_OUTOF10: 0 - NO PAIN

## 2025-07-01 ASSESSMENT — PAIN - FUNCTIONAL ASSESSMENT
PAIN_FUNCTIONAL_ASSESSMENT: 0-10
PAIN_FUNCTIONAL_ASSESSMENT: 0-10

## 2025-07-01 NOTE — PROGRESS NOTES
"Jordyn Neal is a 70 y.o. female on day 5 of admission presenting with Hyponatremia.    Subjective   Seen this AM at the bedside. PT had uneventful night. She is alert and oriented x3 this morning. She has no complaints of pain, only complaints of mouth dryness. Discussed that labs were not worsening, and plans to hear from from GI regarding stent exchange.     Objective     Physical Exam  Constitutional:       Appearance: She is ill-appearing.   HENT:      Head: Normocephalic.      Right Ear: External ear normal.      Left Ear: External ear normal.      Nose: Nose normal.      Mouth/Throat:      Mouth: Mucous membranes are dry.   Eyes:      Extraocular Movements: Extraocular movements intact.      Conjunctiva/sclera: Conjunctivae normal.   Cardiovascular:      Rate and Rhythm: Normal rate and regular rhythm.   Pulmonary:      Effort: Pulmonary effort is normal.   Abdominal:      General: Bowel sounds are normal. There is distension.      Palpations: Abdomen is soft.   Musculoskeletal:      Cervical back: Normal range of motion.   Skin:     General: Skin is warm and dry.   Neurological:      Mental Status: She is oriented to person, place, and time.      Motor: Weakness present.       Last Recorded Vitals  Blood pressure 109/68, pulse 97, temperature 36.5 °C (97.7 °F), temperature source Temporal, resp. rate 18, height 1.676 m (5' 6\"), weight 95.5 kg (210 lb 8.6 oz), SpO2 93%.  Intake/Output last 3 Shifts:  I/O last 3 completed shifts:  In: 1250 (13.1 mL/kg) [IV Piggyback:1250]  Out: 700 (7.3 mL/kg) [Urine:700 (0.2 mL/kg/hr)]  Weight: 95.5 kg     Relevant Results     .Scheduled medications  Scheduled Medications[1]  Continuous medications  Continuous Medications[2]  PRN medications  PRN Medications[3]    .  Results for orders placed or performed during the hospital encounter of 06/26/25 (from the past 24 hours)   Blood Culture    Specimen: Peripheral Venipuncture; Blood culture   Result Value Ref Range    Blood " Culture Loaded on Instrument - Culture in progress    Blood Culture    Specimen: Peripheral Venipuncture; Blood culture   Result Value Ref Range    Blood Culture Loaded on Instrument - Culture in progress    Phosphorus   Result Value Ref Range    Phosphorus 5.0 (H) 2.5 - 4.9 mg/dL   Magnesium   Result Value Ref Range    Magnesium 2.26 1.60 - 2.40 mg/dL   Comprehensive Metabolic Panel   Result Value Ref Range    Glucose 119 (H) 74 - 99 mg/dL    Sodium 132 (L) 136 - 145 mmol/L    Potassium 4.6 3.5 - 5.3 mmol/L    Chloride 102 98 - 107 mmol/L    Bicarbonate 18 (L) 21 - 32 mmol/L    Anion Gap 17 10 - 20 mmol/L    Urea Nitrogen 66 (H) 6 - 23 mg/dL    Creatinine 2.19 (H) 0.50 - 1.05 mg/dL    eGFR 24 (L) >60 mL/min/1.73m*2    Calcium 8.7 8.6 - 10.6 mg/dL    Albumin 2.6 (L) 3.4 - 5.0 g/dL    Alkaline Phosphatase 701 (H) 33 - 136 U/L    Total Protein 5.3 (L) 6.4 - 8.2 g/dL    AST 90 (H) 9 - 39 U/L    Bilirubin, Total 4.5 (H) 0.0 - 1.2 mg/dL    ALT 51 (H) 7 - 45 U/L   CBC and Auto Differential   Result Value Ref Range    WBC 13.9 (H) 4.4 - 11.3 x10*3/uL    nRBC 0.0 0.0 - 0.0 /100 WBCs    RBC 3.52 (L) 4.00 - 5.20 x10*6/uL    Hemoglobin 10.3 (L) 12.0 - 16.0 g/dL    Hematocrit 31.1 (L) 36.0 - 46.0 %    MCV 88 80 - 100 fL    MCH 29.3 26.0 - 34.0 pg    MCHC 33.1 32.0 - 36.0 g/dL    RDW 18.6 (H) 11.5 - 14.5 %    Platelets 265 150 - 450 x10*3/uL    Neutrophils % 96.2 40.0 - 80.0 %    Immature Granulocytes %, Automated 0.6 0.0 - 0.9 %    Lymphocytes % 2.9 13.0 - 44.0 %    Monocytes % 0.1 2.0 - 10.0 %    Eosinophils % 0.1 0.0 - 6.0 %    Basophils % 0.1 0.0 - 2.0 %    Neutrophils Absolute 13.37 (H) 1.20 - 7.70 x10*3/uL    Immature Granulocytes Absolute, Automated 0.09 0.00 - 0.70 x10*3/uL    Lymphocytes Absolute 0.40 (L) 1.20 - 4.80 x10*3/uL    Monocytes Absolute 0.01 (L) 0.10 - 1.00 x10*3/uL    Eosinophils Absolute 0.02 0.00 - 0.70 x10*3/uL    Basophils Absolute 0.01 0.00 - 0.10 x10*3/uL   Procalcitonin   Result Value Ref Range     Procalcitonin 3.89 (H) <=0.07 ng/mL   Vancomycin   Result Value Ref Range    Vancomycin 13.0 5.0 - 20.0 ug/mL   Uric Acid   Result Value Ref Range    Uric Acid 2.6 2.3 - 6.7 mg/dL   Morphology   Result Value Ref Range    RBC Morphology See Below     Hypochromia Mild      Assessment & Plan  Hyponatremia  Jordyn Manzano is a 70 year-old female with PMH of 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 [facility] consulted on admission. Patient s/p C1 Gemcitabine/Cisplatin inpatient 6/27. Labs noteable for acute hyponatremia, Na+124 (6/27) previously unresponsive to IVF at OSH despite hypovolemia hyponatremia like picture. Nephrology consulted 6/27, ddx hypovolemia vs SIADH vs Cisplatin-induced; rec q4 sodium checks initially, further aggressive IVF boluses (x3 6/27), started sodium bicarb TID 6/28, Na showing improvement as of 6/29. Supportive Onc consulted 6/28 for symptom mgmt (nausea/poor PO intake), recs followed. 6/29 AM pt with significant AMS. CT head (6/29) negative. Pt A&O x3 as of 6/30. Liver and kidney function worsening; Liver and Renal US ordered (6/30) which showed known metastatic disease of liver, mass in tim hepatis compatible w/ known necrotic lymphadenopathy, gallbladder sludge w/o evidence of acute cholecystitis, diffuse mod-to-large volume ascites, and c/f PV thrombosis- further eval w/ CT recommended. CT liver (6/30) showed known metastatic disease in liver, no evidence of portal vein thrombus, moderate volume ascites and interval development of R anterior abdominal wall soft tissue nodule. GI/Liver consulted re CBD stent removal (6/30) rec daily labs w/ INR, repeat blood cultures and starting empiric  Zosyn/Vanc (6/30- current) for c/f cholangitis; team to further discuss possible stent exchange inpatient. PT/OT rec SNF. DC to SNF pending improvement in symptoms, CHIO, LFTs, and facility placement.     Updates 7/1:  - A&Ox3 this morning  - Labs slightly improving; tbili 4.5, sCr. 2.19, phos 5.0, K+ 4.6 (s/p Lokelma x2)  - renal/liver US w/ doppler (6/30) showed known metastatic disease of liver, mass in tim hepatis compatible w/ known necrotic lymphadenopathy, gallbladder sludge w/o evidence of acute cholecystitis, diffuse mod-to-large volume ascites, and c/f PV thrombosis- further eval w/ CT recommended  - CT liver (6/30) showed known metastatic disease in liver, no evidence of portal vein thrombus, moderate volume ascites, interval development of R anterior abdominal wall soft tissue nodule  - GI/Liver consulted re CBD stent removal, rec daily labs w/ INR, repeat blood cultures empiric Zosyn/Vanc (6/30- current) for c/f cholangitis; team to further discuss regarding possible stent exchange inpatient    # Transaminitis - improving  # c/f Cholangitis  # Hx biliary compression stricture s/p stent 4/2025   - Likely multifactorial in setting of metastasis to liver and fatty liver  - No abd pain, afebrile, no chills at this time, endorses intermittent nausea  - Alk phos 530, ALT 53, AST 83, Tbili 2.6 upon admission (6/26) --> now uptrending (6/28) alk phos 1,190, alt 73, ast 179, tbili 3.9--> showing improvement (6/29) alk phos 841, ALT 55, , Tbili 4--> (7/1) alk phos 701, ALT 51, AST 90, tbili 4.5  - 4/15 s/p plastic biliary stent placement, plan for repeat ERCP in 2 months for stent removal, a 4/29 GI appt was cancelled unclear why, otherwise lost to GI follow up  - CT A/P (6/22) showed overall progression of metastatic disease, new small volume ascites, satisfactory placement of CBD stent without significant upstream bilairy dilatation   - Ammonia 74 (6/28)--> repeat ammonia 49 (6/29) s/p 1x dose of  Lactulose (ordered prior to repeat ammonia regardless)  - Hepatitis panel (6/28): neg  - Blood Cx x2 (6/28): NGTD x2days  - renal/liver US w/ doppler (6/30) showed known metastatic disease of liver, mass in tim hepatis compatible w/ known necrotic lymphadenopathy, gallbladder sludge w/o evidence of acute cholecystitis, diffuse mod-to-large volume ascites, and c/f PV thrombosis- further eval w/ CT recommended  - CT liver (6/30) showed known metastatic disease in liver, no evidence of portal vein thrombus, moderate volume ascites, interval development of R anterior abdominal wall soft tissue nodule  - GI/Liver consulted re CBD stent removal, rec daily labs w/ INR, repeat blood cultures empiric Zosyn/Vanc (6/30- current) for c/f cholangitis; team to further discuss regarding possible stent exchange inpatient  - repeat blood cx x2 (6/30) pending    # s/p C1 Gemcitabine/Cisplatin Inpatient  # Newly Diagnosed 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 Willis  - Diagnosed in 5/2025  - Discussed at tumor board on 6/4/2025 and rec treatment with Durvalumab + Gemcitabine / Cisplatin   - Treatment was scheduled to begin on 6/22/2025, however, unable to due to admission   - CT A/P (6/22) showed overall progression of metestatic disease, new small volume ascites, satisfactory placement of CBD stent without significant upstream bilairy dilatation, new small pleual effusion  - Primary oncologist rec transfer to [facility] for intiation of emergent inpatient chemotherapy   - Oncology consulted upon arrival to [facility], 6/27 s/p Gemcitabine/Cisplatin while inpatient, durvalumab not permitted inpatient  - NGS panel results pending 6/28     # Leukocytosis-- improving  - Likely 2/2 steroids vs infectious (cholangitis?)  - See above re c/f cholangitis  - WBC BL~10k, (6/27) 12.6 --> 21.6k (6/29)--> 18.1k (6/30)--> 13.9k (7/1)  - CXR (6/20) w/ atelectasis, no  consolidation  - CT A/P (6/22) showed worsening disease, new small/mod ascites, satsifactory placement of CBD stent w/o dilatation  - Repeat CXR (6/29): no c/f infection  - UA with reflex (6/30): +protein, +blood, +urobilinogen, +leuksm +WBC, +RBC, +1 bacteria; UCX pending  - Lactate level 1.9 via ABG (6/29): Procal (6/29)): 7.78 (6/29)- repeat ordered for 7/1  - MSSA (6/27): +  - Blood Cx x2 (6/28): NGTD x2d  - Blood cx x2 (6/30): pending  - s/p 10mg IVP decadron premed 6/27  - CBC daily     # Hyponatremia-- improving  - Na 131 on admit to [place] (6/20) --> Na 124 (6/27) --> Na 129 (6/28)--> 130 (6/29 and 6/30)--> 132 (7/1)  - DDx includes hypovolemia vs SIADH vs Cisplatin-induced (although hyponatremia started prior to Cisplatin, so if any effect not the whole etiology)  - Serum osm 275, urine Cr 197, urine Na <10, urine osm 777 (6/26)  - Repeat serum osm, urine cr 211, urine Na 10, urine osm 922 (6/27)  - Repeat lytes (6/30) urine cr 185.6, urine Na 11, urine osm 656 (6/30)  - s/p 1L fluid restriction 6/26 -- per Dr. Alvarez hold off for now   - Nephrology consulted 6/27, ddx hypovolemia vs SIADH vs Cisplatin-induced. Rec q4 sodium checks initially, started sodium bicarb TID 6/28, Na showing improvement as of 6/29  - Of note, pt received x2 additional 1L NS boluses as part of chemo regimen (6/27-6/28)   - s/p 500mL NS bolus 6/29 per nephrology d/t kidney function  - updated Nephrology recs (6/30) rec hold amlodipine, obtain Cystatin C, continue sodium bicarb 1300 TID, s/p 500mL LR bolus    # AMS-- improving  - Ddx include metabolic (elevated LFTs/ ammonia/ CHIO), structural, infectious  - As of 6/29 AM- following commands but unable to keep eyes open, not able to speak- later throughout morning waxing/waning  - As of 6/30 AM- pt is ordered x3  # Metabolic  - Monitor electrolytes; replete PRN. No hypo/hyperglycemia  - ABG (6/29): pH 7.24, pCO2 25, pO2 82, SO2 98; RT consulted, RN to place on humidified 2L NC face  mask or tent (mouth breathing)  - TSH (6/29): 1.62  - LFTs slightly improving, Tbili increased to 4 (6/29)--> 4.8 (6/30)  - sCr worsening, sCr 1.81 (6/29)--> 2.33 (6/30)  - s/p 1x dose of Lactulose 6/29 for Ammonia 73  - repeat ammonia 49 (6/30)  - Uric Acid 10.2 (6/28)--> 11.2 (6/29)-->12.2 (6/30) s/p 1x dose of rasburicase--> 2.6 (6/30)  - Vit B12 level (6/29): >2,000  - Zyprexa started 6/28 and held as of 6/29  # Infectious  - WBC 21.6k (6/29)--> 18.1k (6/30)--> 13.9k (7/1)  - Repeat CXR (6/29) without c/f infection  - UA with reflex (6/30): +protein, +blood, +urobilinogen, +leuksm +WBC, +RBC, +1 bacteria; UCX pending  - Lactate on ABG 1.9, procal 7.78 (6/29), repeat procal 3.89 (7/1)  - Recent CT a/p (6/22) showing CBD stent intact without significant upstream biliary dilation  - Pt does have CBD stent that needs to be removed- pt without fevers/chills, HoTN, or abdominal pain- no obvious concern for infected stent; GI/Liver consulted re CBD stent removal (6/30) rec daily labs w/ INR, repeat blood cultures, starting empiric Zosyn/Vanc (6/30- current) for c/f cholangitis; team to further discuss regarding possible stent exchange inpatient  # Structural  - No known brain mets  - CT head (6/29): negative for acute process  - CBD stent in place via CT a/p (6/22)    # CHIO on CKD III - improving  - Recent sCr BL ~1.2-1.3 ; sCr 1.08 (6/27)--> 1.81 (6/29)--> 2.33 (6/30)--> 2.19 (7/1)  - Avoid nephrotoxic agents  - Monitor CMP daily  - Nephrology following  - s/p 500mL NS bolus 6/29 for CHIO without improvement  - s/p 500ml LR bolus 6/30    # Hyperkalemia  - 2/2 CHIO  - K 5.5 (6/29) s/p 1x dose of Lokelma  - K 5.5 (6/30) s/p Lokelma x2 doses--> K 4.6 (7/1)  - Tele monitoring ordered 6/30  - Nephrology following    # Tachycardia  - -120s since admission 6/26; asymptomatic, no CP or palpitations, no O2 requirement, no pain  - Ddx includes dehydration vs deconditioning from malignancy vs pain vs infection   - s/p  multiple IVF boluses (x3 6/25), 500mL NS 6/29  - EKG (6/28): sinus tach, QTc 446  - Low threshold for CT angio chest if tachycardia worsens or develops new O2 requirement       # Neoplasm-related pain   # FTT  # Poor PO intake   # N/V  - Supportive onc consulted 6/28 for symptom/pain management, recs followed  - Continue with home Oxycodone 10mg q6hrs PRN severe pain, added Oxycodone 5mg q6hrs PRN moderate pain  - Continue with home PO Zofran 4mg q6 hours PRN N/V 2nd line, and PO/IV Compazine 10mg q6hrs PRN N/V 1st line  - STOPPED home mirtazapine 15mg nightly and started Zyprexa 5mg nightly 6/29- however then HELD Zyprexa 6/29 d/t AMS  - Nutrition consulted 6/27, rec regular diet as tolerated, Ensure Clear TID with meals  - PT / OT consulted on admit, ampact 8, SNF list provided 6/28    # Oral thrush, improving  # Mouth Pain  - Treated with oral nystatin 5mL QID swish and spit- completed course 6/29  - Started biotene TID 6/27   - Added scheduled Dex oral liquid 1mg TID swish and swallow and magic mouth wash 10mL swish and swallow q4hrs scheduled for mouth pain  - Humidified face tent/face mask to be applied by nursing to aid in dry mouth     # HTN  - HOLD Amlodipine 10mg daily per updated Neph recs     # MDD  # LUIS FELIPE  - Continue with home Paroxetine 20mg daily      # Type 2 DM  - Last Hgb A1c 5.9 (4/29/24); not on any diabetic medications  - Due to decreased PO intake and normal blood sugars, insulin not ordered on admit, CTM  - Hypoglycemic protocol in place      # GERD  - Continue Protonix 40mg daily      # DVT Prophy: Stopped prophy Lovenox 6/29 and started prophy Heparin SQ 6/29 d/t CHIO. SCDs, encourage safe ambulation     DISPO:  - DNR, DNI - no ICU - confirmed on admit   - DC to SNF pending improvement in symptoms, CHIO, LFTs, neph recs, and facility placement  - NOK: Natalio Neal [phone]   - FUV with Dr. Willis (oncology) 7/24      I spent 75 minutes in the professional and overall care of this  patient.    Assessment and plan as above discussed with attending physician Dr. Avtar Alvarez.    Randi Chatterjee, APRN-CNP       [1] [Held by provider] amLODIPine, 10 mg, oral, Daily  dexAMETHasone, 1 mg, oral, q8h PEREZ  [Held by provider] heparin, 5,000 Units, subcutaneous, q8h  heparin flush, 50 Units, intra-catheter, q12h  lidocaine-diphenhydraMINE-Maalox 1:1:1, 10 mL, Swish & Swallow, q4h PEREZ  moisturizing mouth, 15 mL, Swish & Spit, TID  [Held by provider] OLANZapine, 5 mg, oral, Nightly  pantoprazole, 40 mg, oral, Daily before breakfast  PARoxetine, 20 mg, oral, Daily  piperacillin-tazobactam, 3.375 g, intravenous, q6h  sodium bicarbonate, 1,300 mg, oral, TID     [2]    [3] PRN medications: acetaminophen, albuterol, alteplase, alteplase, dextrose, diphenhydrAMINE, EPINEPHrine HCl, famotidine, fluticasone, heparin flush, methylPREDNISolone sodium succinate (PF), ondansetron, oxyCODONE, oxyCODONE, polyethylene glycol, prochlorperazine, prochlorperazine, sodium chloride, vancomycin

## 2025-07-01 NOTE — PROGRESS NOTES
Physical Therapy    Physical Therapy Treatment    Patient Name: Jordyn Neal  MRN: [id number]  Department: [facility]  Room: [address] [place] [address]  Today's Date: 7/1/2025  Time Calculation  Start Time: 1005  Stop Time: 1028  Time Calculation (min): 23 min         Assessment/Plan   PT Assessment  PT Assessment Results: Decreased strength, Decreased endurance, Impaired balance, Decreased mobility, Decreased cognition  Rehab Prognosis: Good  Barriers to Discharge Home: Caregiver assistance  Caregiver Assistance: Caregiver assistance needed per identified barriers - however, level of patient's required assistance exceeds assistance available at home  Cognition Needs: 24hr supervision for safety awareness needed, Medication and/or medical management daily assist needed, Recollection or understanding of home exercise program limited, Insight of patient limited regarding functional ability/needs, Cognition-related high falls risk  Physical Needs: Stair navigation into home limited by function/safety, Ambulating household distances limited by function/safety, 24hr mobility assistance needed, 24hr ADL assistance needed, High falls risk due to function or environment  End of Session Communication: Bedside nurse  Assessment Comment: Pt with improved tolerance to upright positioning this session however significant regression in independence compared to last session.  Pt is currently dependent x2 for bed mobility and Mod Ax1 for sitting balance.  Attempted sit to stand transfer however pt was dependent and unable to clear hips from bed.  Pt continues to benefit from skilled PT to address strength, balance, mobility, and endurance deficits and would benefit from moderate intensity PT upon discharge.  End of Session Patient Position: Bed, 3 rail up, Alarm on  PT Plan  Inpatient/Swing Bed or Outpatient: Inpatient  PT Plan  Treatment/Interventions: Bed mobility, Transfer training, Gait training, Stair training, Balance training, Neuromuscular  re-education, Strengthening, Endurance training, Therapeutic exercise, Therapeutic activity, Home exercise program, Wheelchair management  PT Plan: Ongoing PT  PT Frequency: 3 times per week (during acute inpatient hospitalization)  PT Discharge Recommendations: Moderate intensity level of continued care (Based on current functional status and rehab potential, patient is anticipated to benefit from 5 or more days per week of skilled rehabilitative therapy after discharge from this acute inpatient hospitalization.)  Equipment Recommended upon Discharge:  (TBD at next level of care)  PT Recommended Transfer Status: Total assist  PT - OK to Discharge: Yes (eval complete and discharge recommendations made)    PT Visit Info:  PT Received On: 07/01/25     General Visit Information:   General  Reason for Referral: Transfer from OSH for initiation of inpatient chemotherapy, admitted to OSH 6/20 for generalized weakness, poor PO intake, and N/V  Past Medical History Relevant to Rehab: Per chart, PMH includes 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  Pt sleeping upon entry but awakens to gentle verbal stimuli and agreeable to PT. +PIV, implantable port, external catheter, tele    Subjective   Precautions:  Precautions  Medical Precautions: Fall precautions      Objective   Pain:  Pain Assessment  Pain Assessment: 0-10  0-10 (Numeric) Pain Score: 0 - No pain  Cognition:  Cognition  Overall Cognitive Status: Impaired  Orientation Level: Disoriented to  situation, Disoriented to time  Following Commands: Follows one step commands with increased time (and repetition)  Processing Speed: Delayed  Coordination:  Movements are Fluid and Coordinated: No  Postural Control:  Postural Control  Postural Control: Impaired (Mod A)  Static Sitting Balance  Static Sitting-Balance Support: Bilateral upper extremity supported, Feet supported  Static Sitting-Level of Assistance: Moderate assistance  Static Sitting-Comment/Number of Minutes: VCs for B UE support  Dynamic Sitting Balance  Dynamic Sitting-Balance Support: Bilateral upper extremity supported, Feet supported  Dynamic Sitting-Level of Assistance: Moderate assistance  Dynamic Sitting-Balance: Forward lean  Dynamic Sitting-Comments: VCs for B UE support  Extremity/Trunk Assessments:  RLE   RLE :  (R LE strength grossly 3+/5 except hip flexion 2/5)  LLE   LLE :  (L LE strength grossly 3+/5 except hip flexion 2/5)  Activity Tolerance:  Activity Tolerance  Endurance: Tolerates 10 - 20 min exercise with multiple rests  Treatments:  Therapeutic Activity  Therapeutic Activity Performed: Yes  Therapeutic Activity 1: bed mobility, sitting balance. transfer attempt, increased time and repetition for command following and to keep pt awake due to falling back to sleep after ecvery question in supine with HOB elevated.    Bed Mobility  Bed Mobility: Yes  Bed Mobility 1  Bed Mobility 1: Supine to sitting, Sitting to supine  Level of Assistance 1: Dependent, +2  Bed Mobility Comments 1: HOB maximally elevated supine to sit, HOB flat sit to supine, use of draw sheet  Bed Mobility 2  Bed Mobility  2: Scooting (to HOB in supine)  Level of Assistance 2: Dependent, +2  Bed Mobility Comments 2: use of draw sheet    Ambulation/Gait Training  Ambulation/Gait Training Performed: No  Transfers  Transfer: Yes  Transfer 1  Transfer From 1: Bed to  Transfer to 1: Stand  Technique 1: Sit to stand, Stand to sit  Transfer Device 1:  (no AD)  Transfer  Level of Assistance 1: Dependent, +2  Trials/Comments 1: us of draw sheet, unable to clear hips from bed    Stairs  Stairs: No    Outcome Measures:  [facility] Basic Mobility  Turning from your back to your side while in a flat bed without using bedrails: Total  Moving from lying on your back to sitting on the side of a flat bed without using bedrails: Total  Moving to and from bed to chair (including a wheelchair): Total  Standing up from a chair using your arms (e.g. wheelchair or bedside chair): Total  To walk in hospital room: Total  Climbing 3-5 steps with railing: Total  Basic Mobility - Total Score: 6    Education Documentation  Precautions, taught by Alisson Sabillon PT at 7/1/2025  1:28 PM.  Learner: Patient  Readiness: Acceptance  Method: Explanation  Response: No Evidence of Learning  Comment: fall precautions    Body Mechanics, taught by Alisson Sabillon PT at 7/1/2025  1:28 PM.  Learner: Patient  Readiness: Acceptance  Method: Explanation  Response: No Evidence of Learning  Comment: fall precautions    Mobility Training, taught by Alisson Sabillon PT at 7/1/2025  1:28 PM.  Learner: Patient  Readiness: Acceptance  Method: Explanation  Response: No Evidence of Learning  Comment: fall precautions    Education Comments  No comments found.        Encounter Problems       Encounter Problems (Active)       Balance       Pt will maintain static/dynamic sitting balance x4 minutes with B UE support and supervision to demonstrate improved balance (Progressing)       Start:  06/27/25    Expected End:  07/11/25            Pt will maintain static/dynamic standing balance x4 minutes with RW and supervision to demonstrate improved balance (Not Progressing)       Start:  06/27/25    Expected End:  07/11/25               Mobility       Pt will complete bed mobility independently with HOB flat and no use of bed rails (Progressing)       Start:  06/27/25    Expected End:  07/11/25            Pt will amb >150ft with RW and supervision  in prep for safe discharge home  (Not Progressing)       Start:  06/27/25    Expected End:  07/11/25            Pt will a/descend 2 steps without rail with CGA/1 HHA in prep for safe home entry  (Not Progressing)       Start:  06/27/25    Expected End:  07/11/25               PT Transfers       Pt will transfer sit to stand with RW and supervision in prep for out of bed mobility  (Progressing)       Start:  06/27/25    Expected End:  07/11/25

## 2025-07-01 NOTE — PROGRESS NOTES
Vancomycin Dosing by Pharmacy- FOLLOW UP    Jordyn Neal is a 70 y.o. year old female who Pharmacy has been consulted for vancomycin dosing for abdominal infection. Based on the patient's indication and renal status this patient is being dosed based on a goal trough/random level of 15-20.     Renal function is currently declining. Continue to dose by levels.     Visit Vitals  /68 (BP Location: Left arm, Patient Position: Lying)   Pulse 97   Temp 36.5 °C (97.7 °F) (Temporal)   Resp 18        Lab Results   Component Value Date    CREATININE 2.19 (H) 2025    CREATININE 2.33 (H) 2025    CREATININE 1.92 (H) 2025    CREATININE 1.81 (H) 2025    CREATININE 1.87 (H) 2025        Patient weight is as follows:   Vitals:    25 1550   Weight: 95.5 kg (210 lb 8.6 oz)       Cultures:  Susceptibility data for the encounter in last 14 days.  Collected Specimen Info Organism   25 Swab from Anterior Nares Methicillin Susceptible Staphylococcus aureus (MSSA)        I/O last 3 completed shifts:  In: 1250 (13.1 mL/kg) [IV Piggyback:1250]  Out: 700 (7.3 mL/kg) [Urine:700 (0.2 mL/kg/hr)]  Weight: 95.5 kg   I/O during current shift:  No intake/output data recorded.    Temp (24hrs), Av.5 °C (97.7 °F), Min:36.2 °C (97.2 °F), Max:37 °C (98.6 °F)      Assessment/Plan    AM level was 13, drawn 18hr after the initial 1500mg dose. Will give a 1g x1 dose today.   The next level will be obtained on  with AM labs. May be obtained sooner if clinically indicated.   Will continue to monitor renal function daily while on vancomycin and order serum creatinine at least every 48 hours if not already ordered.  Follow for continued vancomycin needs, clinical response, and signs/symptoms of toxicity.       Erasmo Torres, PharmD, BCPS

## 2025-07-01 NOTE — CARE PLAN
The clinical goals for the shift include Pt will remain HDS and VSS throughout shift 7/1/25 by 0700.      Problem: Pain - Adult  Goal: Verbalizes/displays adequate comfort level or baseline comfort level  Outcome: Progressing     Problem: Safety - Adult  Goal: Free from fall injury  Outcome: Progressing     Problem: Discharge Planning  Goal: Discharge to home or other facility with appropriate resources  Outcome: Progressing     Problem: Chronic Conditions and Co-morbidities  Goal: Patient's chronic conditions and co-morbidity symptoms are monitored and maintained or improved  Outcome: Progressing     Problem: Nutrition  Goal: Nutrient intake appropriate for maintaining nutritional needs  Outcome: Progressing     Problem: Skin  Goal: Decreased wound size/increased tissue granulation at next dressing change  Outcome: Progressing  Goal: Participates in plan/prevention/treatment measures  Outcome: Progressing  Goal: Prevent/manage excess moisture  Outcome: Progressing  Goal: Prevent/minimize sheer/friction injuries  Outcome: Progressing  Goal: Promote/optimize nutrition  Outcome: Progressing  Goal: Promote skin healing  Outcome: Progressing

## 2025-07-01 NOTE — PROGRESS NOTES
Occupational Therapy    Evaluation    Patient Name: Jordyn Neal  MRN: [id number]  Today's Date: 7/1/2025  Room: [address] [place] [address]A  Time Calculation  Start Time: 1006  Stop Time: 1027  Time Calculation (min): 21 min    Assessment  IP OT Assessment  OT Assessment: Pt presents with decreased I/ADL and fx mob indep. Pt would benefit from MOD int OT.  Prognosis: Good  Barriers to Discharge Home: Caregiver assistance, Cognition needs, Physical needs  Caregiver Assistance: Caregiver assistance needed per identified barriers - however, level of patient's required assistance exceeds assistance available at home  Cognition Needs: Cognition-related high falls risk  Physical Needs: 24hr mobility assistance needed, 24hr ADL assistance needed, High falls risk due to function or environment  Evaluation/Treatment Tolerance: Patient tolerated treatment well  End of Session Communication: Bedside nurse  End of Session Patient Position: Bed, 3 rail up, Alarm on    Plan:  Inpatient Plan  Treatment Interventions: ADL retraining, Functional transfer training, UE strengthening/ROM, Endurance training, Cognitive reorientation, Patient/family training, Equipment evaluation/education, Fine motor coordination activities, Compensatory technique education  OT Frequency: 3 times per week  OT Discharge Recommendations: Moderate intensity level of continued care  Equipment Recommended upon Discharge:  (tbd)  OT Recommended Transfer Status: Assist of 2  OT - OK to Discharge: Yes (OT eval complete and d/c recs made)  OT Assessment  OT Assessment Results: Decreased ADL status, Decreased upper extremity range of motion, Decreased upper extremity strength, Decreased cognition, Decreased endurance, Decreased fine motor control, Decreased functional mobility, Decreased IADLs, Decreased trunk control for functional activities  Prognosis: Good  Evaluation/Treatment Tolerance: Patient tolerated treatment well  Strengths: Attitude of self    Subjective   Current  Problem:  1. Hyponatremia  Transthoracic Echo (TTE) Limited    Transthoracic Echo (TTE) Limited    CANCELED: Transthoracic Echo (TTE) Limited    CANCELED: Transthoracic Echo (TTE) Limited      2. 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  OLANZapine (ZyPREXA) 5 mg tablet    dexAMETHasone (Decadron) 4 mg tablet    ondansetron (Zofran) 8 mg tablet    prochlorperazine (Compazine) 10 mg tablet    Treatment Conditions    Nursing Communication - Gemcitabine    Nursing Communication - Extravasation Management, Irritant    Nursing Communication - CISplatin    Nursing Communication - Extravasation Management, CISplatin    sodium chloride 0.9% 1,000 mL with potassium chloride 20 mEq, magnesium sulfate 1 g infusion    dexAMETHasone (Decadron) 12 mg in dextrose 5% 50 mL IV (Ordered as: dexAMETHasone)    palonosetron (Aloxi) injection 0.25 mg    prochlorperazine (Compazine) tablet 10 mg    prochlorperazine (Compazine) injection 10 mg    gemcitabine (Gemzar) 2,059.6 mg in sodium chloride 0.9% 321.2 mL IV    CISplatin (Platinol) 52 mg in sodium chloride 0.9% 599 mL IV    sodium chloride 0.9 % bolus 1,000 mL    Nursing Communication - Hypersensitivity Management, Moderate    Nursing Communication - Hypersensitivity Management, Severe    Nursing Communication - Respiratory Management    sodium chloride 0.9 % bolus 500 mL    dextrose 5 % in water (D5W) bolus 500 mL    diphenhydrAMINE (BENADryl) injection 50 mg    methylPREDNISolone sod succinate (SOLU-Medrol) 40 mg/mL injection 40 mg    famotidine PF (Pepcid) injection 20 mg    EPINEPHrine HCl (PF) (Adrenalin) injection 0.3 mg    Pulse oximetry, continuous    albuterol 2.5 mg /3 mL (0.083 %) nebulizer solution 3 mL    Treatment Conditions    Nursing Communication - Gemcitabine    Nursing Communication - Extravasation Management, Irritant    Nursing Communication - CISplatin    Nursing Communication - Extravasation Management, CISplatin    Nursing Communication - Hypersensitivity  Management, Moderate    Nursing Communication - Hypersensitivity Management, Severe    Nursing Communication - Respiratory Management    Pulse oximetry, continuous    Transthoracic Echo (TTE) Limited    Transthoracic Echo (TTE) Limited    CANCELED: Transthoracic Echo (TTE) Limited    CANCELED: Transthoracic Echo (TTE) Limited      3. Cholangiocarcinoma (Multi)        4. Other general symptoms and signs  Transthoracic Echo (TTE) Limited    Transthoracic Echo (TTE) Limited    CANCELED: Transthoracic Echo (TTE) Limited    CANCELED: Transthoracic Echo (TTE) Limited      5. 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 Completed bed mob this date and attempted sit<>stand transfer. Pt would benefit from MOD int OT.     Precautions:  Medical Precautions: Fall precautions    Pain:  Pain Assessment  Pain Assessment: 0-10  0-10 (Numeric) Pain Score: 0 - No pain    Objective   Cognition:  Overall Cognitive Status: Impaired  Arousal/Alertness: Delayed responses to stimuli  Orientation Level: Disoriented to situation, Disoriented to time  Following Commands: Follows one step commands with increased time (+repetition, +  intermittent tactile cues)  Insight: Within function limits  Impulsive: Within functional limits  Processing Speed: Delayed    Home Living:  Type of Home: House  Lives With: Spouse  Home Adaptive Equipment: None  Home Layout: One level  Home Access: Stairs to enter without rails  Entrance Stairs-Number of Steps: 2  Bathroom Shower/Tub: Walk-in shower  Bathroom Equipment: Grab bars in shower     Prior Function:  Level of [place]: Independent with ADLs and functional transfers, Independent with homemaking with ambulation  Receives Help From: Family  ADL Assistance:  (initially endorsed independence, then stated husbnd helps)  Homemaking Assistance:  (initially endorsed independence, then stated  helps)  Ambulatory Assistance: Independent  Prior Function Comments: difficulty to assess 2/2 cognition    IADL History:  IADL Comments: +cooking and cleaning    ADL:  Eating Assistance:  (anticipate modA overall; while sitting up in bed, pt required modA for bringing cup to mouth)  Grooming Assistance:  (anticipate maxA overall)  Bathing Assistance:  (anticipate maxA overall)  UE Dressing Assistance:  (anticipate maxA overall)  LE Dressing Assistance:  (anticipate maxA overall)  Toileting Assistance with Device:  (anticipate maxA overall)    Activity Tolerance:  Endurance: Tolerates 10 - 20 min exercise with multiple rests    Balance:  Static Sitting Balance  Static Sitting-Balance Support: Feet supported  Static Sitting-Level of Assistance: Moderate assistance  Static Sitting-Comment/Number of Minutes: sat EOB ~6 min    Bed Mobility/Transfers: Bed Mobility  Bed Mobility: Yes  Bed Mobility 1  Bed Mobility 1: Supine to sitting, Sitting to supine  Level of Assistance 1: Dependent, +2, Maximum verbal cues, Maximum tactile cues  Bed Mobility Comments 1: HOB elevated  Bed Mobility 2  Bed Mobility  2: Scooting  Level of Assistance 2: Dependent, +2  Bed Mobility Comments 2: use of drawsheet to boost toward  HOB  Functional Mobility  Functional Mobility Performed: No   and Transfers  Transfer: Yes  Transfer 1  Transfer From 1: Bed to, Stand to  Transfer to 1: Stand, Bed  Technique 1: Sit to stand, Stand to sit  Transfer Device 1:  (arm in arm)  Transfer Level of Assistance 1: Dependent, +2  Trials/Comments 1: use of draw sheet, unable to clear bed    IADL's:   IADL Comments: +cooking and cleaning    Coordination:  Movements are Fluid and Coordinated: No     Hand Function:  Hand Function  Gross Grasp: Impaired (very minimal strength)  Coordination: Impaired    Extremities:   RUE   RUE :  (shoulder flexion AROM ~65deg (anticipate decreased shoulder ext rot), otherwise at least 3+/5), LUE   LUE:  (shoulder flexion AROM ~65deg (anticipate decreased shoulder ext rot), otherwise at least 3+/5),  , and      Outcome Measures: [facility] Daily Activity  Putting on and taking off regular lower body clothing: A lot  Bathing (including washing, rinsing, drying): A lot  Putting on and taking off regular upper body clothing: A lot  Toileting, which includes using toilet, bedpan or urinal: A lot  Taking care of personal grooming such as brushing teeth: A lot  Eating Meals: A lot  Daily Activity - Total Score: 12  Brief Confusion Assessment Method (bCAM)  CAM Result:  (disoriented to situation and time)    Education Documentation  Body Mechanics, taught by Lona Hodges OT at 7/1/2025  3:21 PM.  Learner: Patient  Readiness: Acceptance  Method: Explanation  Response: Needs Reinforcement    Precautions, taught by Lona Hodges OT at 7/1/2025  3:21 PM.  Learner: Patient  Readiness: Acceptance  Method: Explanation  Response: Needs Reinforcement    ADL Training, taught by Lona Hodges OT at 7/1/2025  3:21 PM.  Learner: Patient  Readiness: Acceptance  Method: Explanation  Response: Needs Reinforcement    Education Comments  No comments found.      Goals:     Encounter Problems       Encounter Problems (Active)       ADLs       Pt will  complete UB dressing with minimal level of assistance while seated and/or standing.   (Progressing)       Start:  07/01/25    Expected End:  07/15/25            Pt will complete LB dressing with moderate level of assistance while seated and/or standing.   (Progressing)       Start:  07/01/25    Expected End:  07/15/25            Pt will complete grooming tasks while seated or standing with stand by level of assistance.   (Progressing)       Start:  07/01/25    Expected End:  07/15/25               COGNITION/SAFETY       Patient will demonstrated orientation x 4 with visual cues. (Progressing)       Start:  07/01/25    Expected End:  07/15/25       ORIENTATION            TRANSFERS       Patient will perform bed mobility minimal assist  level of assistance and bed rails in order to improve safety and independence with mobility (Progressing)       Start:  07/01/25    Expected End:  07/15/25            Patient will complete sit to stand transfer with moderate assist level of assistance and least restrictive device in order to improve safety and prepare for out of bed mobility. (Progressing)       Start:  07/01/25    Expected End:  07/15/25                   07/01/25 at 3:22 PM   MARCEL Luevano/ALLISON  Rehab Office: [phone]

## 2025-07-01 NOTE — CARE PLAN
The patient's goals for the shift include      The clinical goals for the shift include Pt will remain HDs and VSS through end of shift 7/1/2025 1900      Problem: Pain - Adult  Goal: Verbalizes/displays adequate comfort level or baseline comfort level  Outcome: Progressing     Problem: Safety - Adult  Goal: Free from fall injury  Outcome: Progressing     Problem: Discharge Planning  Goal: Discharge to home or other facility with appropriate resources  Outcome: Progressing     Problem: Chronic Conditions and Co-morbidities  Goal: Patient's chronic conditions and co-morbidity symptoms are monitored and maintained or improved  Outcome: Progressing     Problem: Nutrition  Goal: Nutrient intake appropriate for maintaining nutritional needs  Outcome: Progressing     Problem: Skin  Goal: Decreased wound size/increased tissue granulation at next dressing change  Outcome: Progressing  Flowsheets (Taken 7/1/2025 0940)  Decreased wound size/increased tissue granulation at next dressing change: Protective dressings over bony prominences  Goal: Participates in plan/prevention/treatment measures  Outcome: Progressing  Flowsheets (Taken 7/1/2025 0940)  Participates in plan/prevention/treatment measures: Discuss with provider PT/OT consult  Goal: Prevent/manage excess moisture  Outcome: Progressing  Flowsheets (Taken 7/1/2025 0940)  Prevent/manage excess moisture: Cleanse incontinence/protect with barrier cream  Goal: Prevent/minimize sheer/friction injuries  Outcome: Progressing  Flowsheets (Taken 7/1/2025 0940)  Prevent/minimize sheer/friction injuries:   Turn/reposition every 2 hours/use positioning/transfer devices   Use pull sheet  Goal: Promote/optimize nutrition  Outcome: Progressing  Flowsheets (Taken 7/1/2025 0940)  Promote/optimize nutrition:   Consume > 50% meals/supplements   Offer water/supplements/favorite foods  Goal: Promote skin healing  Outcome: Progressing  Flowsheets (Taken 7/1/2025 0940)  Promote skin  healing:   Turn/reposition every 2 hours/use positioning/transfer devices   Protective dressings over bony prominences

## 2025-07-01 NOTE — ASSESSMENT & PLAN NOTE
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 [facility] consulted on admission. Patient s/p C1 Gemcitabine/Cisplatin inpatient 6/27. Labs noteable for acute hyponatremia, Na+124 (6/27) previously unresponsive to IVF at OSH despite hypovolemia hyponatremia like picture. Nephrology consulted 6/27, ddx hypovolemia vs SIADH vs Cisplatin-induced; rec q4 sodium checks initially, further aggressive IVF boluses (x3 6/27), started sodium bicarb TID 6/28, Na showing improvement as of 6/29. Supportive Onc consulted 6/28 for symptom mgmt (nausea/poor PO intake), recs followed. 6/29 AM pt with significant AMS. CT head (6/29) negative. Pt A&O x3 as of 6/30. Liver and kidney function worsening; Liver and Renal US ordered (6/30) which showed known metastatic disease of liver, mass in tim hepatis compatible w/ known necrotic lymphadenopathy, gallbladder sludge w/o evidence of acute cholecystitis, diffuse mod-to-large volume ascites, and c/f PV thrombosis- further eval w/ CT recommended. CT liver (6/30) showed known metastatic disease in liver, no evidence of portal vein thrombus, moderate volume ascites and interval development of R anterior abdominal wall soft tissue nodule. GI/Liver consulted re CBD stent removal (6/30) rec daily labs w/ INR, repeat blood cultures and starting empiric Zosyn/Vanc (6/30- current) for c/f cholangitis; team to further discuss possible stent exchange inpatient. PT/OT rec SNF. DC to SNF pending improvement in symptoms, CHIO, LFTs, and facility placement.     Updates 7/1:  - A&Ox3 this morning  - Labs slightly improving; tbili 4.5, sCr. 2.19, phos 5.0, K+ 4.6  (s/p Lokelma x2)  - renal/liver US w/ doppler (6/30) showed known metastatic disease of liver, mass in tim hepatis compatible w/ known necrotic lymphadenopathy, gallbladder sludge w/o evidence of acute cholecystitis, diffuse mod-to-large volume ascites, and c/f PV thrombosis- further eval w/ CT recommended  - CT liver (6/30) showed known metastatic disease in liver, no evidence of portal vein thrombus, moderate volume ascites, interval development of R anterior abdominal wall soft tissue nodule  - GI/Liver consulted re CBD stent removal, rec daily labs w/ INR, repeat blood cultures empiric Zosyn/Vanc (6/30- current) for c/f cholangitis; team to further discuss regarding possible stent exchange inpatient    # Transaminitis - improving  # c/f Cholangitis  # Hx biliary compression stricture s/p stent 4/2025   - Likely multifactorial in setting of metastasis to liver and fatty liver  - No abd pain, afebrile, no chills at this time, endorses intermittent nausea  - Alk phos 530, ALT 53, AST 83, Tbili 2.6 upon admission (6/26) --> now uptrending (6/28) alk phos 1,190, alt 73, ast 179, tbili 3.9--> showing improvement (6/29) alk phos 841, ALT 55, , Tbili 4--> (7/1) alk phos 701, ALT 51, AST 90, tbili 4.5  - 4/15 s/p plastic biliary stent placement, plan for repeat ERCP in 2 months for stent removal, a 4/29 GI appt was cancelled unclear why, otherwise lost to GI follow up  - CT A/P (6/22) showed overall progression of metastatic disease, new small volume ascites, satisfactory placement of CBD stent without significant upstream bilairy dilatation   - Ammonia 74 (6/28)--> repeat ammonia 49 (6/29) s/p 1x dose of Lactulose (ordered prior to repeat ammonia regardless)  - Hepatitis panel (6/28): neg  - Blood Cx x2 (6/28): NGTD x2days  - renal/liver US w/ doppler (6/30) showed known metastatic disease of liver, mass in tim hepatis compatible w/ known necrotic lymphadenopathy, gallbladder sludge w/o evidence of acute  cholecystitis, diffuse mod-to-large volume ascites, and c/f PV thrombosis- further eval w/ CT recommended  - CT liver (6/30) showed known metastatic disease in liver, no evidence of portal vein thrombus, moderate volume ascites, interval development of R anterior abdominal wall soft tissue nodule  - GI/Liver consulted re CBD stent removal, rec daily labs w/ INR, repeat blood cultures empiric Zosyn/Vanc (6/30- current) for c/f cholangitis; team to further discuss regarding possible stent exchange inpatient  - repeat blood cx x2 (6/30) pending    # s/p C1 Gemcitabine/Cisplatin Inpatient  # Newly Diagnosed 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 Willis  - Diagnosed in 5/2025  - Discussed at tumor board on 6/4/2025 and rec treatment with Durvalumab + Gemcitabine / Cisplatin   - Treatment was scheduled to begin on 6/22/2025, however, unable to due to admission   - CT A/P (6/22) showed overall progression of metestatic disease, new small volume ascites, satisfactory placement of CBD stent without significant upstream bilairy dilatation, new small pleual effusion  - Primary oncologist rec transfer to [facility] for intiation of emergent inpatient chemotherapy   - Oncology consulted upon arrival to [facility], 6/27 s/p Gemcitabine/Cisplatin while inpatient, durvalumab not permitted inpatient  - NGS panel results pending 6/28     # Leukocytosis-- improving  - Likely 2/2 steroids vs infectious (cholangitis?)  - See above re c/f cholangitis  - WBC BL~10k, (6/27) 12.6 --> 21.6k (6/29)--> 18.1k (6/30)--> 13.9k (7/1)  - CXR (6/20) w/ atelectasis, no consolidation  - CT A/P (6/22) showed worsening disease, new small/mod ascites, satsifactory placement of CBD stent w/o dilatation  - Repeat CXR (6/29): no c/f infection  - UA with reflex (6/30): +protein, +blood, +urobilinogen, +leuksm +WBC, +RBC, +1 bacteria; UCX pending  - Lactate level 1.9 via ABG (6/29): Procal  (6/29)): 7.78 (6/29)- repeat ordered for 7/1  - MSSA (6/27): +  - Blood Cx x2 (6/28): NGTD x2d  - Blood cx x2 (6/30): pending  - s/p 10mg IVP decadron premed 6/27  - CBC daily     # Hyponatremia-- improving  - Na 131 on admit to [facility] (6/20) --> Na 124 (6/27) --> Na 129 (6/28)--> 130 (6/29 and 6/30)--> 132 (7/1)  - DDx includes hypovolemia vs SIADH vs Cisplatin-induced (although hyponatremia started prior to Cisplatin, so if any effect not the whole etiology)  - Serum osm 275, urine Cr 197, urine Na <10, urine osm 777 (6/26)  - Repeat serum osm, urine cr 211, urine Na 10, urine osm 922 (6/27)  - Repeat lytes (6/30) urine cr 185.6, urine Na 11, urine osm 656 (6/30)  - s/p 1L fluid restriction 6/26 -- per Dr. Alvarez hold off for now   - Nephrology consulted 6/27, ddx hypovolemia vs SIADH vs Cisplatin-induced. Rec q4 sodium checks initially, started sodium bicarb TID 6/28, Na showing improvement as of 6/29  - Of note, pt received x2 additional 1L NS boluses as part of chemo regimen (6/27-6/28)   - s/p 500mL NS bolus 6/29 per nephrology d/t kidney function  - updated Nephrology recs (6/30) rec hold amlodipine, obtain Cystatin C, continue sodium bicarb 1300 TID, s/p 500mL LR bolus    # AMS-- improving  - Ddx include metabolic (elevated LFTs/ ammonia/ CHIO), structural, infectious  - As of 6/29 AM- following commands but unable to keep eyes open, not able to speak- later throughout morning waxing/waning  - As of 6/30 AM- pt is ordered x3  # Metabolic  - Monitor electrolytes; replete PRN. No hypo/hyperglycemia  - ABG (6/29): pH 7.24, pCO2 25, pO2 82, SO2 98; RT consulted, RN to place on humidified 2L NC face mask or tent (mouth breathing)  - TSH (6/29): 1.62  - LFTs slightly improving, Tbili increased to 4 (6/29)--> 4.8 (6/30)  - sCr worsening, sCr 1.81 (6/29)--> 2.33 (6/30)  - s/p 1x dose of Lactulose 6/29 for Ammonia 73  - repeat ammonia 49 (6/30)  - Uric Acid 10.2 (6/28)--> 11.2 (6/29)-->12.2 (6/30) s/p 1x dose of  rasburicase--> 2.6 (6/30)  - Vit B12 level (6/29): >2,000  - Zyprexa started 6/28 and held as of 6/29  # Infectious  - WBC 21.6k (6/29)--> 18.1k (6/30)--> 13.9k (7/1)  - Repeat CXR (6/29) without c/f infection  - UA with reflex (6/30): +protein, +blood, +urobilinogen, +leuksm +WBC, +RBC, +1 bacteria; UCX pending  - Lactate on ABG 1.9, procal 7.78 (6/29), repeat procal 3.89 (7/1)  - Recent CT a/p (6/22) showing CBD stent intact without significant upstream biliary dilation  - Pt does have CBD stent that needs to be removed- pt without fevers/chills, HoTN, or abdominal pain- no obvious concern for infected stent; GI/Liver consulted re CBD stent removal (6/30) rec daily labs w/ INR, repeat blood cultures, starting empiric Zosyn/Vanc (6/30- current) for c/f cholangitis; team to further discuss regarding possible stent exchange inpatient  # Structural  - No known brain mets  - CT head (6/29): negative for acute process  - CBD stent in place via CT a/p (6/22)    # CHIO on CKD III - improving  - Recent sCr BL ~1.2-1.3 ; sCr 1.08 (6/27)--> 1.81 (6/29)--> 2.33 (6/30)--> 2.19 (7/1)  - Avoid nephrotoxic agents  - Monitor CMP daily  - Nephrology following  - s/p 500mL NS bolus 6/29 for CHIO without improvement  - s/p 500ml LR bolus 6/30    # Hyperkalemia  - 2/2 CHIO  - K 5.5 (6/29) s/p 1x dose of Lokelma  - K 5.5 (6/30) s/p Lokelma x2 doses--> K 4.6 (7/1)  - Tele monitoring ordered 6/30  - Nephrology following    # Tachycardia  - -120s since admission 6/26; asymptomatic, no CP or palpitations, no O2 requirement, no pain  - Ddx includes dehydration vs deconditioning from malignancy vs pain vs infection   - s/p multiple IVF boluses (x3 6/25), 500mL NS 6/29  - EKG (6/28): sinus tach, QTc 446  - Low threshold for CT angio chest if tachycardia worsens or develops new O2 requirement       # Neoplasm-related pain   # FTT  # Poor PO intake   # N/V  - Supportive onc consulted 6/28 for symptom/pain management, recs followed  -  Continue with home Oxycodone 10mg q6hrs PRN severe pain, added Oxycodone 5mg q6hrs PRN moderate pain  - Continue with home PO Zofran 4mg q6 hours PRN N/V 2nd line, and PO/IV Compazine 10mg q6hrs PRN N/V 1st line  - STOPPED home mirtazapine 15mg nightly and started Zyprexa 5mg nightly 6/29- however then HELD Zyprexa 6/29 d/t AMS  - Nutrition consulted 6/27, rec regular diet as tolerated, Ensure Clear TID with meals  - PT / OT consulted on admit, ampact 8, SNF list provided 6/28    # Oral thrush, improving  # Mouth Pain  - Treated with oral nystatin 5mL QID swish and spit- completed course 6/29  - Started biotene TID 6/27   - Added scheduled Dex oral liquid 1mg TID swish and swallow and magic mouth wash 10mL swish and swallow q4hrs scheduled for mouth pain  - Humidified face tent/face mask to be applied by nursing to aid in dry mouth     # HTN  - HOLD Amlodipine 10mg daily per updated Neph recs     # MDD  # LUIS FELIPE  - Continue with home Paroxetine 20mg daily      # Type 2 DM  - Last Hgb A1c 5.9 (4/29/24); not on any diabetic medications  - Due to decreased PO intake and normal blood sugars, insulin not ordered on admit, CTM  - Hypoglycemic protocol in place      # GERD  - Continue Protonix 40mg daily      # DVT Prophy: Stopped prophy Lovenox 6/29 and started prophy Heparin SQ 6/29 d/t CHIO. SCDs, encourage safe ambulation     DISPO:  - DNR, DNI - no ICU - confirmed on admit   - DC to SNF pending improvement in symptoms, CHIO, LFTs, neph recs, and facility placement  - NOK: Natalio Neal [phone]   - FUV with Dr. Willis (oncology) 7/24

## 2025-07-02 ENCOUNTER — APPOINTMENT (OUTPATIENT)
Dept: HEMATOLOGY/ONCOLOGY | Facility: CLINIC | Age: 70
End: 2025-07-02
Payer: MEDICARE

## 2025-07-02 ENCOUNTER — TELEPHONE (OUTPATIENT)
Dept: GASTROENTEROLOGY | Facility: CLINIC | Age: 70
End: 2025-07-02
Payer: MEDICARE

## 2025-07-02 LAB
ALBUMIN SERPL BCP-MCNC: 2.6 G/DL (ref 3.4–5)
ALP SERPL-CCNC: 640 U/L (ref 33–136)
ALT SERPL W P-5'-P-CCNC: 45 U/L (ref 7–45)
ANION GAP SERPL CALC-SCNC: 17 MMOL/L (ref 10–20)
APTT PPP: 24 SECONDS (ref 26–36)
AST SERPL W P-5'-P-CCNC: 72 U/L (ref 9–39)
ATRIAL RATE: 117 BPM
BACTERIA BLD CULT: NORMAL
BACTERIA BLD CULT: NORMAL
BASOPHILS # BLD MANUAL: 0 X10*3/UL (ref 0–0.1)
BASOPHILS NFR BLD MANUAL: 0 %
BILIRUB SERPL-MCNC: 4.6 MG/DL (ref 0–1.2)
BLASTS # BLD MANUAL: 0 X10*3/UL
BLASTS NFR BLD MANUAL: 0 %
BUN SERPL-MCNC: 66 MG/DL (ref 6–23)
CALCIUM SERPL-MCNC: 8.8 MG/DL (ref 8.6–10.6)
CHLORIDE SERPL-SCNC: 105 MMOL/L (ref 98–107)
CO2 SERPL-SCNC: 18 MMOL/L (ref 21–32)
CREAT SERPL-MCNC: 2.08 MG/DL (ref 0.5–1.05)
CREAT UR-MCNC: 60.8 MG/DL (ref 20–320)
EGFRCR SERPLBLD CKD-EPI 2021: 26 ML/MIN/1.73M*2
EOSINOPHIL # BLD MANUAL: 0 X10*3/UL (ref 0–0.7)
EOSINOPHIL NFR BLD MANUAL: 0 %
ERYTHROCYTE [DISTWIDTH] IN BLOOD BY AUTOMATED COUNT: 18.6 % (ref 11.5–14.5)
GLUCOSE SERPL-MCNC: 121 MG/DL (ref 74–99)
HCT VFR BLD AUTO: 30.4 % (ref 36–46)
HGB BLD-MCNC: 10.1 G/DL (ref 12–16)
IMM GRANULOCYTES # BLD AUTO: 0.06 X10*3/UL (ref 0–0.7)
IMM GRANULOCYTES NFR BLD AUTO: 0.5 % (ref 0–0.9)
INR PPP: 1.3 (ref 0.9–1.1)
LYMPHOCYTES # BLD MANUAL: 0.37 X10*3/UL (ref 1.2–4.8)
LYMPHOCYTES NFR BLD MANUAL: 3.4 %
MAGNESIUM SERPL-MCNC: 2.36 MG/DL (ref 1.6–2.4)
MCH RBC QN AUTO: 29.5 PG (ref 26–34)
MCHC RBC AUTO-ENTMCNC: 33.2 G/DL (ref 32–36)
MCV RBC AUTO: 89 FL (ref 80–100)
METAMYELOCYTES # BLD MANUAL: 0 X10*3/UL
METAMYELOCYTES NFR BLD MANUAL: 0 %
MICROALBUMIN UR-MCNC: 10.3 MG/L
MICROALBUMIN/CREAT UR: 16.9 UG/MG CREAT
MONOCYTES # BLD MANUAL: 0 X10*3/UL (ref 0.1–1)
MONOCYTES NFR BLD MANUAL: 0 %
MYELOCYTES # BLD MANUAL: 0 X10*3/UL
MYELOCYTES NFR BLD MANUAL: 0 %
NEUTROPHILS # BLD MANUAL: 10.63 X10*3/UL (ref 1.2–7.7)
NEUTS BAND # BLD MANUAL: 0 X10*3/UL (ref 0–0.7)
NEUTS BAND NFR BLD MANUAL: 0 %
NEUTS SEG # BLD MANUAL: 10.63 X10*3/UL (ref 1.2–7)
NEUTS SEG NFR BLD MANUAL: 96.6 %
NRBC BLD MANUAL-RTO: 0 % (ref 0–0)
NRBC BLD-RTO: 0 /100 WBCS (ref 0–0)
P AXIS: 27 DEGREES
P OFFSET: 202 MS
P ONSET: 152 MS
PHOSPHATE SERPL-MCNC: 4.5 MG/DL (ref 2.5–4.9)
PLASMA CELLS # BLD MANUAL: 0 X10*3/UL
PLASMA CELLS NFR BLD MANUAL: 0 %
PLATELET # BLD AUTO: 229 X10*3/UL (ref 150–450)
POTASSIUM SERPL-SCNC: 4.2 MMOL/L (ref 3.5–5.3)
PR INTERVAL: 126 MS
PROMYELOCYTES # BLD MANUAL: 0 X10*3/UL
PROMYELOCYTES NFR BLD MANUAL: 0 %
PROT SERPL-MCNC: 4.9 G/DL (ref 6.4–8.2)
PROTHROMBIN TIME: 13.9 SECONDS (ref 9.8–12.4)
Q ONSET: 215 MS
QRS COUNT: 19 BEATS
QRS DURATION: 84 MS
QT INTERVAL: 320 MS
QTC CALCULATION(BAZETT): 446 MS
QTC FREDERICIA: 400 MS
R AXIS: -8 DEGREES
RBC # BLD AUTO: 3.42 X10*6/UL (ref 4–5.2)
RBC MORPH BLD: ABNORMAL
SODIUM SERPL-SCNC: 136 MMOL/L (ref 136–145)
T AXIS: 23 DEGREES
T OFFSET: 375 MS
TOTAL CELLS COUNTED BLD: 116
VANCOMYCIN SERPL-MCNC: 14.3 UG/ML (ref 5–20)
VARIANT LYMPHS # BLD MANUAL: 0 X10*3/UL (ref 0–0.5)
VARIANT LYMPHS NFR BLD: 0 %
VENTRICULAR RATE: 117 BPM
WBC # BLD AUTO: 11 X10*3/UL (ref 4.4–11.3)

## 2025-07-02 PROCEDURE — 85007 BL SMEAR W/DIFF WBC COUNT: CPT | Performed by: NURSE PRACTITIONER

## 2025-07-02 PROCEDURE — 85610 PROTHROMBIN TIME: CPT

## 2025-07-02 PROCEDURE — 83735 ASSAY OF MAGNESIUM: CPT | Performed by: NURSE PRACTITIONER

## 2025-07-02 PROCEDURE — 2500000004 HC RX 250 GENERAL PHARMACY W/ HCPCS (ALT 636 FOR OP/ED): Performed by: INTERNAL MEDICINE

## 2025-07-02 PROCEDURE — 80053 COMPREHEN METABOLIC PANEL: CPT | Performed by: NURSE PRACTITIONER

## 2025-07-02 PROCEDURE — 84100 ASSAY OF PHOSPHORUS: CPT | Performed by: NURSE PRACTITIONER

## 2025-07-02 PROCEDURE — 85027 COMPLETE CBC AUTOMATED: CPT | Performed by: NURSE PRACTITIONER

## 2025-07-02 PROCEDURE — 82043 UR ALBUMIN QUANTITATIVE: CPT

## 2025-07-02 PROCEDURE — 2500000004 HC RX 250 GENERAL PHARMACY W/ HCPCS (ALT 636 FOR OP/ED)

## 2025-07-02 PROCEDURE — 99233 SBSQ HOSP IP/OBS HIGH 50: CPT | Performed by: INTERNAL MEDICINE

## 2025-07-02 PROCEDURE — 99233 SBSQ HOSP IP/OBS HIGH 50: CPT

## 2025-07-02 PROCEDURE — 92610 EVALUATE SWALLOWING FUNCTION: CPT | Mod: GN | Performed by: SPEECH-LANGUAGE PATHOLOGIST

## 2025-07-02 PROCEDURE — 1200000003 HC ONCOLOGY  ROOM WITH TELEMETRY DAILY

## 2025-07-02 PROCEDURE — 99232 SBSQ HOSP IP/OBS MODERATE 35: CPT | Performed by: INTERNAL MEDICINE

## 2025-07-02 PROCEDURE — 2500000004 HC RX 250 GENERAL PHARMACY W/ HCPCS (ALT 636 FOR OP/ED): Performed by: NURSE PRACTITIONER

## 2025-07-02 PROCEDURE — 80202 ASSAY OF VANCOMYCIN: CPT

## 2025-07-02 RX ORDER — SODIUM CHLORIDE, SODIUM LACTATE, POTASSIUM CHLORIDE, CALCIUM CHLORIDE 600; 310; 30; 20 MG/100ML; MG/100ML; MG/100ML; MG/100ML
80 INJECTION, SOLUTION INTRAVENOUS CONTINUOUS
Status: ACTIVE | OUTPATIENT
Start: 2025-07-02 | End: 2025-07-03

## 2025-07-02 RX ORDER — SODIUM CHLORIDE, SODIUM LACTATE, POTASSIUM CHLORIDE, CALCIUM CHLORIDE 600; 310; 30; 20 MG/100ML; MG/100ML; MG/100ML; MG/100ML
80 INJECTION, SOLUTION INTRAVENOUS CONTINUOUS
Status: DISCONTINUED | OUTPATIENT
Start: 2025-07-02 | End: 2025-07-02

## 2025-07-02 RX ADMIN — VANCOMYCIN HYDROCHLORIDE 1500 MG: 1.5 INJECTION, POWDER, LYOPHILIZED, FOR SOLUTION INTRAVENOUS at 09:41

## 2025-07-02 RX ADMIN — Medication 15 ML: at 21:14

## 2025-07-02 RX ADMIN — PROCHLORPERAZINE EDISYLATE 10 MG: 5 INJECTION INTRAMUSCULAR; INTRAVENOUS at 09:28

## 2025-07-02 RX ADMIN — PIPERACILLIN SODIUM AND TAZOBACTAM SODIUM 3.38 G: 3; .375 INJECTION, SOLUTION INTRAVENOUS at 11:24

## 2025-07-02 RX ADMIN — Medication 15 ML: at 09:28

## 2025-07-02 RX ADMIN — SODIUM CHLORIDE, POTASSIUM CHLORIDE, SODIUM LACTATE AND CALCIUM CHLORIDE 80 ML/HR: 600; 310; 30; 20 INJECTION, SOLUTION INTRAVENOUS at 17:09

## 2025-07-02 RX ADMIN — Medication 15 ML: at 15:17

## 2025-07-02 RX ADMIN — PIPERACILLIN SODIUM AND TAZOBACTAM SODIUM 3.38 G: 3; .375 INJECTION, SOLUTION INTRAVENOUS at 05:38

## 2025-07-02 RX ADMIN — SODIUM CHLORIDE, POTASSIUM CHLORIDE, SODIUM LACTATE AND CALCIUM CHLORIDE 80 ML/HR: 600; 310; 30; 20 INJECTION, SOLUTION INTRAVENOUS at 18:18

## 2025-07-02 RX ADMIN — PIPERACILLIN SODIUM AND TAZOBACTAM SODIUM 3.38 G: 3; .375 INJECTION, SOLUTION INTRAVENOUS at 17:09

## 2025-07-02 RX ADMIN — PIPERACILLIN SODIUM AND TAZOBACTAM SODIUM 3.38 G: 3; .375 INJECTION, SOLUTION INTRAVENOUS at 23:28

## 2025-07-02 ASSESSMENT — COGNITIVE AND FUNCTIONAL STATUS - GENERAL
MOVING FROM LYING ON BACK TO SITTING ON SIDE OF FLAT BED WITH BEDRAILS: A LOT
MOVING TO AND FROM BED TO CHAIR: TOTAL
DAILY ACTIVITIY SCORE: 12
CLIMB 3 TO 5 STEPS WITH RAILING: TOTAL
TURNING FROM BACK TO SIDE WHILE IN FLAT BAD: TOTAL
STANDING UP FROM CHAIR USING ARMS: TOTAL
TURNING FROM BACK TO SIDE WHILE IN FLAT BAD: A LOT
STANDING UP FROM CHAIR USING ARMS: TOTAL
MOBILITY SCORE: 7
CLIMB 3 TO 5 STEPS WITH RAILING: TOTAL
TOILETING: A LOT
WALKING IN HOSPITAL ROOM: TOTAL
DRESSING REGULAR UPPER BODY CLOTHING: A LOT
MOVING FROM LYING ON BACK TO SITTING ON SIDE OF FLAT BED WITH BEDRAILS: A LOT
WALKING IN HOSPITAL ROOM: TOTAL
DRESSING REGULAR LOWER BODY CLOTHING: A LOT
HELP NEEDED FOR BATHING: A LOT
MOVING TO AND FROM BED TO CHAIR: A LOT
PERSONAL GROOMING: A LOT
EATING MEALS: A LOT
MOBILITY SCORE: 9

## 2025-07-02 ASSESSMENT — PAIN SCALES - GENERAL
PAINLEVEL_OUTOF10: 0 - NO PAIN
PAINLEVEL_OUTOF10: 0 - NO PAIN

## 2025-07-02 ASSESSMENT — PAIN SCALES - WONG BAKER: WONGBAKER_NUMERICALRESPONSE: NO HURT

## 2025-07-02 ASSESSMENT — PAIN - FUNCTIONAL ASSESSMENT
PAIN_FUNCTIONAL_ASSESSMENT: UNABLE TO SELF-REPORT
PAIN_FUNCTIONAL_ASSESSMENT: 0-10

## 2025-07-02 NOTE — CONSULTS
"Nutrition Follow Up Assessment:   Nutrition Assessment    --->Reason for Re-Consult: Provider consult order--per conversation with team, considering TF v TPN v another goals of care discussion with pt and family    Since admit, pt s/p C1 Gemcitabine/Cisplatin. AMS improving. Now with c/f cholangitis--plans for possible stent exchange. Nephrology following d/t CHIO on CKD and hyponatremia. Also noted with abdominal ascites.    SLP evaluated pt today with recs for NPO status.    Nutrition History:  Pt soundly sleeping upon RDN's entrance in to room.  present at bedside. Visit kept brief to allow pt to sleep, per 's request.    Per , pt's PO has been poor for many weeks now, \"she would take a few bites of something and then say she was full.\" Was also having issues with oral thrush and intermittent nausea.    Since admit, PO has worsened.  doesn't think pt has really eaten or drank anything in the last ~2 days. Did mention oral thrush and nausea seems to be improving since admit.    --Vitamin/Herbal Supplement Use: none noted in review of home meds  --Food Allergy: none       Anthropometrics:  Height: 167.6 cm (5' 6\")   Weight: 95.5 kg (210 lb 8.6 oz)   BMI (Calculated): 34; BMI using 88 kg=31.3  IBW/kg (Dietitian Calculated): 59.1 kg  Percent of IBW: 162 %; %IBW using 88 lu=908%    Weight History:     Wt Readings per review of EMR:   06/27/25 95.5 kg (210 lb 8.6 oz) (current wt)--possibly elevated d/t fluids   06/20/25 88 kg (194 lb)--RDN suspects this wt closer to dry wt   06/17/25 87.5 kg (192 lb 14.4 oz)   06/16/25 87.5 kg (192 lb 14.4 oz)   05/29/25 92 kg (202 lb 13.2 oz)   05/28/25 93.4 kg (206 lb)--->6% wt loss from this date to 06/20/25 (significant)   05/19/25 102 kg (224 lb 13.9 oz)   05/07/25 102 kg (224 lb 13.9 oz)   04/11/25 99.7 kg (219 lb 12.8 oz)--->12% wt loss from this date to 06/20/25 (significant)   04/10/25 99.9 kg (220 lb 3.8 oz)   11/05/24 105 kg (232 lb)--->16% wt loss " from this date to 06/20/25 (significant)   04/29/24 106 kg (233 lb)   04/25/24 106 kg (233 lb)   04/22/24 107 kg (236 lb 3.2 oz)   12/20/23 107 kg (235 lb)     Nutrition Focused Physical Exam Findings:  Subcutaneous Fat Loss:   Defer Subcutaneous Fat Loss Assessment: Defer all  Defer All Reason: pt soundly sleeping; appears to have some wasting to face  Muscle Wasting:  Defer Muscle Wasting Assessment: Defer all  Defer All Reason: pt soundly sleeping; appears to have some wasting to face  Edema:  Edema: +1 trace  Edema Location: BL LE  Physical Findings:  Skin: Negative    Nutrition Significant Labs:  CBC Trend:   Results from last 7 days   Lab Units 07/02/25  0538 07/01/25  0550 06/30/25  0447 06/29/25  1035   WBC AUTO x10*3/uL 11.0 13.9* 18.1* 21.0*   RBC AUTO x10*6/uL 3.42* 3.52* 3.81* 3.82*   HEMOGLOBIN g/dL 10.1* 10.3* 11.2* 11.7*   HEMATOCRIT % 30.4* 31.1* 33.8* 34.5*   MCV fL 89 88 89 90   PLATELETS AUTO x10*3/uL 229 265 348 349   BMP Trend:   Results from last 7 days   Lab Units 07/02/25  0538 07/01/25  0550 06/30/25  0447 06/29/25  1035   GLUCOSE mg/dL 121* 119* 150* 130*   CALCIUM mg/dL 8.8 8.7 8.8 8.6   SODIUM mmol/L 136 132* 130* 130*   POTASSIUM mmol/L 4.2 4.6 5.5* 5.5*   CO2 mmol/L 18* 18* 15* 15*   CHLORIDE mmol/L 105 102 100 101   BUN mg/dL 66* 66* 64* 52*   CREATININE mg/dL 2.08* 2.19* 2.33* 1.92*   A1C:  Lab Results   Component Value Date    HGBA1C 5.9 (H) 04/29/2024   BG POCT trend:   Results from last 7 days   Lab Units 06/26/25  1111 06/26/25  0639 06/25/25  1936 06/25/25  1701   POCT GLUCOSE mg/dL 116* 100* 107* 129*   Liver Function Trend:   Results from last 7 days   Lab Units 07/02/25  0538 07/01/25  0550 06/30/25  0447 06/29/25  1035   ALK PHOS U/L 640* 701* 844* 889*   AST U/L 72* 90* 110* 101*   ALT U/L 45 51* 56* 55*   BILIRUBIN TOTAL mg/dL 4.6* 4.5* 4.8* 4.2*   Renal Lab Trend:   Results from last 7 days   Lab Units 07/02/25  0538 07/01/25  0550 06/30/25  0447 06/29/25  1035   POTASSIUM  mmol/L 4.2 4.6 5.5* 5.5*   PHOSPHORUS mg/dL 4.5 5.0* 6.2* 5.8*   SODIUM mmol/L 136 132* 130* 130*   MAGNESIUM mg/dL 2.36 2.26 2.36 2.31   EGFR mL/min/1.73m*2 26* 24* 22* 28*   BUN mg/dL 66* 66* 64* 52*   CREATININE mg/dL 2.08* 2.19* 2.33* 1.92*   Vit D:   Lab Results   Component Value Date    VITD25 37 11/14/2018   Vit B12:   Lab Results   Component Value Date    [id number] >2,000 (H) 06/28/2025     Medications:  Scheduled medications  Scheduled Medications[1]  Continuous medications  Continuous Medications[2]  PRN medications  PRN Medications[3]    I/O:   Last BM Date: 07/01/25; Stool Appearance: Loose (07/01/25 0852)    Dietary Orders (From admission, onward)       Start     Ordered    07/01/25 1637  Adult diet Regular  Diet effective now        Question:  Diet type  Answer:  Regular    07/01/25 1636    06/27/25 1035  Oral nutritional supplements  Until discontinued        Question Answer Comment   Deliver with All meals    Select supplement: Ensure Clear        06/27/25 1034    06/26/25 1511  May Participate in Room Service  ( ROOM SERVICE MAY PARTICIPATE)  Once        Question:  .  Answer:  Yes    06/26/25 1510                Estimated Needs:   Total Energy Estimated Needs in 24 hours (kCal): ~4206-7794  Method for Estimating Needs: 59 (IBW) x ~30  Total Protein Estimated Needs in 24 Hours (g): ~70  Method for Estimating 24 Hour Protein Needs: 59 (IBW) x ~1.2g/kg--follow kidney function  Total Fluid Estimated Needs in 24 Hours (mL): per MD/team        Nutrition Diagnosis   Malnutrition Diagnosis  Patient has Malnutrition Diagnosis: Yes  Diagnosis Status: New  Malnutrition Diagnosis: Severe malnutrition related to acute disease or injury (with chronic component)  Related to: metastatic CA, recent AMS, nausea  As Evidenced by: pt with 6% wt loss in <1 month/12% wt loss in ~2 mos/16% wt loss in ~7 mos, estimated to be consuming <50% estimated energy needs x >5 days    Additional Assessment Information: Pt at risk  for refeeding syndrome.       Nutrition Interventions/Recommendations   Nutrition prescription for enteral nutrition, Nutrition prescription for parenteral nutrition    Nutrition Recommendations:  1. PO diet, only as tolerated/as medically appropriate, per SLP recs.  --RDN to discontinue order for Ensure Clear, considering recs for NPO status.    2. If aggressive care to continue, feel pt needs nutrition support, if in line with goals of care, considering severe malnutrition.     3. Please start 100 mg thiamin daily.    4. RDN would prefer use of enteral nutrition support over parenteral if pt with functional GI tract.    5. If TF to begin, please order TF of TwoCal HN and start @ 15ml/hr. Increase 10 ml Q12H to goal rate of 35ml/hr, only as tolerated.  --FWF, per MD/team (TF @ goal rate would provide a total of 588 mls free water/day)  --Above TF regimen would provide a total of 1680 kcals, 70 g pro--meets 99% estimated kcal needs, 100% estimated pro needs    TF Monitoring:  --RFP + mag daily; please follow lytes closely and replace PRN  --Daily weights    6. If pt not appropriate to feed enterally, could start parenteral nutrition support. Pt is noted with Mediport. May need PICC, if team does not want to use Mediport to run TPN.    7. If TPN to begin, please check baseline triglyceride level beforehand.    8. If/When TPN to begin, please order the standard 5% amino acids, 20% dextrose and start @ 20ml/hr on day 1.  --After 24 hours, if no major shifts in lytes and glucose levels <180, advance to 40ml/hr.  --On third day of TPN, again if no major shifts in lytes and glucose levels <180, advance to goal rate of 60ml/hr.    9. Please also order 250 mls SMOFlipids to run @ goal rate of ~21ml/hr x 12 hours/night    ---->Above TPN regimen provides a total of 288 g dextrose, 72 g pro, 1767 kcals, 28% fat--meets 100% pt's estimated kcal and pro needs    TPN Monitoring:  --RFP + mag daily; please follow lytes closely and  replace PRN  --Accuchecks Q6H or per team  --LFTs and TG level at least once/week; will continue to follow LFTs and monitor need to hold trace elements in TPN  --Daily weights    Nutrition Interventions/Goals:   Enteral Intake: Management of composition of enteral nutrition, Management of delivery rate of enteral nutrition  Goal: TF of TwoCal HN @ goal rate of 35ml/mn=8551 kcals, 70 g pro  Parenteral Nutrition: Management of composition of parenteral nutrition, Management of delivery rate of parenteral nutrition  Goal: TPN (5/20) @ goal rate of 60ml/hr + 250mls OOPA=3952 kcals, 72 g pro  Coordination of Care with Providers: Provider  Goal: oncology NP    Education:  Education Documentation  No documentation found.    Pt's spouse denied any questions for RDN at this time.       Nutrition Monitoring and Evaluation   Enteral and Parenteral Nutrition Intake Determination: Parenteral nutrition intake - Tolerate TPN at goal rate, Enteral nutrition intake - Tolerate TF at goal rate    Body Weight: Body weight - Weight reduction from fluids, as needed    Electrolyte and Renal Panel: Electrolytes within normal limits  Glucose/Endocrine Profile: Glucose within normal limits ( mg/dL)      Goal Status: New goal(s) identified          Time Spent (min): 60 minutes              [1] [Held by provider] amLODIPine, 10 mg, oral, Daily  dexAMETHasone, 1 mg, oral, q8h PEREZ  [Held by provider] heparin, 5,000 Units, subcutaneous, q8h  heparin flush, 50 Units, intra-catheter, q12h  lidocaine-diphenhydraMINE-Maalox 1:1:1, 10 mL, Swish & Swallow, q4h PEREZ  moisturizing mouth, 15 mL, Swish & Spit, TID  [Held by provider] OLANZapine, 5 mg, oral, Nightly  pantoprazole, 40 mg, oral, Daily before breakfast  PARoxetine, 20 mg, oral, Daily  piperacillin-tazobactam, 3.375 g, intravenous, q6h  sodium bicarbonate, 1,300 mg, oral, TID     [2] lactated Ringer's, 80 mL/hr     [3] PRN medications: acetaminophen, albuterol, alteplase, alteplase,  dextrose, diphenhydrAMINE, EPINEPHrine HCl, famotidine, fluticasone, heparin flush, methylPREDNISolone sodium succinate (PF), ondansetron, oxyCODONE, oxyCODONE, polyethylene glycol, prochlorperazine, prochlorperazine, sodium chloride, vancomycin

## 2025-07-02 NOTE — CONSULTS
Gastroenterology Consult Service Progress Note  Department of Gastroenterology & Hepatology  [facility]    [facility]  July 2, 2025   Patient: Jordyn Neal    Medical Record: [id number]  Reason for Initial Consult: elevated liver enzymes       Interval History:   - NAEO   - patient continues to have AMS, but reportedly improved   - Improvement in LFTs    Physical Exam:    Vitals:    07/02/25 0545 07/02/25 0820 07/02/25 1132 07/02/25 1522   BP: 113/75 108/66 115/72 111/72   BP Location: Left arm Left arm Left arm Left arm   Patient Position: Lying Lying Lying Lying   Pulse: 104 101 106 108   Resp: 20 18 18 18   Temp: 36.2 °C (97.2 °F) 36.6 °C (97.9 °F) 36.8 °C (98.3 °F) 36 °C (96.8 °F)   TempSrc: Temporal Temporal Temporal Temporal   SpO2: 95% 95% 95% 94%   Weight:       Height:             Intake/Output Summary (Last 24 hours) at 7/2/2025 1716  Last data filed at 7/2/2025 1202  Gross per 24 hour   Intake 700 ml   Output 1200 ml   Net -500 ml       Constitutional:       Appearance: She is ill-appearing.   HENT:      Head: Normocephalic and atraumatic.      Nose: Nose normal.      Mouth/Throat:      Mouth: Mucous membranes are moist.      Pharynx: Oropharynx is clear.   Eyes:      General: Scleral icterus present.      Conjunctiva/sclera: Conjunctivae normal.      Pupils: Pupils are equal, round, and reactive to light.   Cardiovascular:      Rate and Rhythm: Normal rate and regular rhythm.      Pulses: Normal pulses.   Pulmonary:      Effort: Pulmonary effort is normal. No respiratory distress.   Abdominal:      General: Abdomen is flat. There is no distension.      Palpations: Abdomen is soft. There is no mass.      Tenderness: There is no abdominal tenderness. There is no guarding or rebound.   Skin:     General: Skin is warm and dry.      Capillary Refill: Capillary refill takes less than 2 seconds.      Coloration: Skin is jaundiced.   Neurological:      Mental  Status: altered    Labs:  CBC  Results from last 7 days   Lab Units 07/02/25  0538 07/01/25  0550 06/30/25  0447 06/29/25  1035   HEMOGLOBIN g/dL 10.1* 10.3* 11.2* 11.7*   HEMATOCRIT % 30.4* 31.1* 33.8* 34.5*   WBC AUTO x10*3/uL 11.0 13.9* 18.1* 21.0*   PLATELETS AUTO x10*3/uL 229 265 348 349      BMP  Results from last 7 days   Lab Units 07/02/25  0538 07/01/25  0550 06/30/25  0447 06/29/25  1035   SODIUM mmol/L 136 132* 130* 130*   POTASSIUM mmol/L 4.2 4.6 5.5* 5.5*   CHLORIDE mmol/L 105 102 100 101   CO2 mmol/L 18* 18* 15* 15*   BUN mg/dL 66* 66* 64* 52*   CREATININE mg/dL 2.08* 2.19* 2.33* 1.92*   MAGNESIUM mg/dL 2.36 2.26 2.36 2.31   PHOSPHORUS mg/dL 4.5 5.0* 6.2* 5.8*   ANION GAP mmol/L 17 17 21* 20   GLUCOSE mg/dL 121* 119* 150* 130*   CALCIUM mg/dL 8.8 8.7 8.8 8.6     LFTS  Results from last 7 days   Lab Units 07/02/25  0538 07/01/25  0550 06/30/25  0447   ALT U/L 45 51* 56*   AST U/L 72* 90* 110*   ALK PHOS U/L 640* 701* 844*   BILIRUBIN TOTAL mg/dL 4.6* 4.5* 4.8*     COAGS  Results from last 7 days   Lab Units 07/02/25  0538 06/28/25  1604   INR  1.3* 1.6*        Imaging:  === 06/26/25 ===    US ABDOMEN COMPLETE    - Impression -  1. Inability to obtain adequate color flow within the portal venous  system with intraluminal echogenicity. Findings are concerning for  portal venous thrombosis, however markedly slow flow is a  possibility. Recommend further evaluation with dedicated CT of the  liver.  2. Cirrhotic morphology of the liver, with markedly echogenic hepatic  parenchyma, compatible with patient's known metastatic disease.  3. An irregular hypoechoic 8.5 x 7.9 x 7.2 cm mass at the tim  hepatis, compatible with known necrotic lymphadenopathy.  4. Diffuse moderate-to-large volume ascites.  5. Gallbladder sludge without evidence of acute cholecystitis.      I personally reviewed the images/study and I agree with the findings  as stated by Dagoberto Leo MD. This study was interpreted at  [place], [place].    MACRO:  Critical Finding:  See findings. Notification was initiated on  6/30/2025 at 1:24 pm by  Dagoberto Leo.  (**-OCF-**) Instructions:    Signed by: Leonard Nova 6/30/2025 8:37 PM  Dictation workstation:   [id number]  === 06/26/25 ===    CT LIVER W CONTRAST    - Impression -  1. Compared to most recent CT from 06/22/2025, there is overall  similar degree of metastatic disease in the visualized abdomen.  Similar metastatic disease in the liver with a dominant lesion in the  right hepatic lobe. Extensive melony metastatic disease throughout the  visualized abdomen.  2. No evidence of portal vein thrombus.  3. Similar moderate volume ascites.  4. Interval development of a right anterior abdominal wall soft  tissue nodule likely reflecting a site of injection given its  location/rapid development.  5. Additional chronic or incidental findings as described above.    I personally reviewed the images/study and I agree with the findings  as stated by Juan Noguera MD. This study was interpreted at  [place], [place].    MACRO:  None    Signed by: Chu Osman 6/30/2025 7:32 PM  Dictation workstation:   [id number]  === 04/09/25 ===    MR LIVER WO AND W CONTRAST    - Impression -  1.  8 cm malignant-appearing, centrally necrotic indeterminate right  hepatic lobe mass. Although there are morphologic changes of  cirrhosis, the enhancement pattern is not suggestive of  hepatocellular carcinoma. There is some delayed progressive central  enhancement and cholangiocarcinoma may be a diagnostic consideration.  2. Scattered punctate foci restricted diffusion involving both lobes  of the liver which could be additional metastatic lesions.  3. Extensive tim hepatis and retroperitoneal lymphadenopathy  demonstrating varying degrees of necrosis. Enlarged tim hepatis  lymph nodes appear to compress the mid common bile duct  causing mild  upstream intrahepatic dilatation.      MACRO:  None    Signed by: Xavier Rocha 4/10/2025 3:09 PM  Dictation workstation:   [id number]    GI procedures  EUS 4/14/25  Impression  Normal pancreas  Heterogenous appearing hepatic parenchyma suggestive of diffuse malignancy in the right lobe  Innumerable enlarged lymph nodes throughout the upper abdomen with a particularly enlarged lymph node in the portal region. The lymph node was causing biliary stricturing with evidence of a plastic biliary stent traversing the region of obstruction  Multiple passes were obtained of the malignant appearing portal lymph node for diagnosis.   The gallbladder was visualized and appeared distended. The gallbladder contained stones and biliary sludge.  FINAL DIAGNOSIS   A.  Hilar lymph node, biopsy:  - Poorly differentiated carcinoma.  - No lymphoid tissue identified.      Final Cytological Interpretation      A. LYMPH NODE (SPECIFY SITE) FINE NEEDLE ASPIRATION- FNA HILAR LYMPH NODE, CYTOLOGY AND CELL BLOCK:                                          Malignant cells present derived from a poorly differentiated carcinoma.      ERCP 4/14/25  Impression  Normal EGD  Extrinsic compression of the proximal common bile duct with normal appearing biliary tissue on cholangioscopy  Following sphincterotomy, a 10 Fr x 9 cm plastic biliary stent was placed.    Assessment and Plan:        Jordyn Neal is a 70 y.o. 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 Her presentation was complicated by hyponatremia and oliguric CHIO. [place] [facility] [place] chemotherapy (cisplatin/gemcitabine initiated 6/27/25; durvalumab planned outpatient but not yet given).  Hepatology was consulted for evaluation of elevated LFTs in the setting of prior plastic CBD stent placement in April for malignant extrinsic biliary obstruction.    Her LFTs are now improving, making stent obstruction less likely. She has no fevers, leukocytosis has resolved, and there are no other systemic signs of cholangitis. While her presentation initially raised concern for cholangitis, particularly given her immunosuppressed state and altered mentation, the absence of ductal dilation on multiple imaging modalities argues against acute stent occlusion. Instead, her cholestatic liver injury and hyperbilirubinemia may reflect tumor progression despite recent initiation of chemotherapy.      Given her altered mentation, we do not believe the benefits of a stent exchange outweigh the risks at this time, particularly given the need for general anesthesia and prone positioning during ERCP, which could pose significant katina-procedural risk.    MELD 3.0: 26 at 7/2/2025  5:38 AM  MELD-Na: 23 at 7/2/2025  5:38 AM  Calculated from:  Serum Creatinine: 2.08 mg/dL at 7/2/2025  5:38 AM  Serum Sodium: 136 mmol/L at 7/2/2025  5:38 AM  Total Bilirubin: 4.6 mg/dL at 7/2/2025  5:38 AM  Serum Albumin: 2.6 g/dL at 7/2/2025  5:38 AM  INR(ratio): 1.3 at 7/2/2025  5:38 AM  Age at listing (hypothetical): 70 years  Sex: Female at 7/2/2025  5:38 AM          #Elevated Liver Enzymes, Cholestatic  #Hyperbilirubinemia  #Leukocytosis  #Cholangiocarcinoma, Poorly Differentiated, Stage III     Recommendations:  -Please obtain daily CBC, INR, CMP.  -Please obtain repeat blood cultures.  - hepatology will sign off for now    Thank you for the consultation.  The consulting team will sign off now.  Please do not hesitate to contact us again on by paging the consultation team again between the weekday hours of 7 AM - 5 PM.  If there is an urgent concern during the weekend, after-hours, or holidays; then please page the on-call GI fellow at [phone].  Thank you.      Patient seen and discussed with attending, Dr. Aly Lemos.       Jamie Kingsley MD, PhD  Gastroenterology Fellow, PGY-6  [facility]   Division of Gastroenterology and Liver Disease       SIGNATURE: Jamie Kingsley MD PATIENT NAME: Jordyn Nela   DATE: July 2, 2025 MRN: [id number]

## 2025-07-02 NOTE — PROGRESS NOTES
Jordyn Neal   70 ylinda    @WT@  MRN/Room: [id number]    Subjective:   I visited and examined the patient at bedside.  She had an uneventful night.    Meds:   Current Medications[1]       Physical Examination:        Vitals:    07/02/25 1132   BP: 115/72   Pulse: 106   Resp: 18   Temp: 36.8 °C (98.3 °F)   SpO2: 95%     General: The patient is awake, oriented, and is not in any distress.  Head and Neck: Normocephalic. No periorbital edema.  Eyes: non-icteric  Respiratory: Symmetric chest expansion. No respiratory distress.  Skin: No maculopapular rash.  Musculoskeletal: No peripheral edema.  Neuro Exam: Speech is fluent. Moves extremities.    Imaging:  === 06/26/25 ===    US ABDOMEN COMPLETE    - Impression -  1. Inability to obtain adequate color flow within the portal venous  system with intraluminal echogenicity. Findings are concerning for  portal venous thrombosis, however markedly slow flow is a  possibility. Recommend further evaluation with dedicated CT of the  liver.  2. Cirrhotic morphology of the liver, with markedly echogenic hepatic  parenchyma, compatible with patient's known metastatic disease.  3. An irregular hypoechoic 8.5 x 7.9 x 7.2 cm mass at the tim  hepatis, compatible with known necrotic lymphadenopathy.  4. Diffuse moderate-to-large volume ascites.  5. Gallbladder sludge without evidence of acute cholecystitis.      I personally reviewed the images/study and I agree with the findings  as stated by Dagoberto Leo MD. This study was interpreted at  [facility], [place], [place].    MACRO:  Critical Finding:  See findings. Notification was initiated on  6/30/2025 at 1:24 pm by  Dagoberto Leo.  (**-OCF-**) Instructions:    Signed by: Leonard Nova 6/30/2025 8:37 PM  Dictation workstation:   [id number]       Blood Labs:  Results for orders placed or performed during the hospital encounter of 06/26/25 (from the past 24 hours)   Phosphorus   Result Value  Ref Range    Phosphorus 4.5 2.5 - 4.9 mg/dL   Magnesium   Result Value Ref Range    Magnesium 2.36 1.60 - 2.40 mg/dL   Comprehensive Metabolic Panel   Result Value Ref Range    Glucose 121 (H) 74 - 99 mg/dL    Sodium 136 136 - 145 mmol/L    Potassium 4.2 3.5 - 5.3 mmol/L    Chloride 105 98 - 107 mmol/L    Bicarbonate 18 (L) 21 - 32 mmol/L    Anion Gap 17 10 - 20 mmol/L    Urea Nitrogen 66 (H) 6 - 23 mg/dL    Creatinine 2.08 (H) 0.50 - 1.05 mg/dL    eGFR 26 (L) >60 mL/min/1.73m*2    Calcium 8.8 8.6 - 10.6 mg/dL    Albumin 2.6 (L) 3.4 - 5.0 g/dL    Alkaline Phosphatase 640 (H) 33 - 136 U/L    Total Protein 4.9 (L) 6.4 - 8.2 g/dL    AST 72 (H) 9 - 39 U/L    Bilirubin, Total 4.6 (H) 0.0 - 1.2 mg/dL    ALT 45 7 - 45 U/L   CBC and Auto Differential   Result Value Ref Range    WBC 11.0 4.4 - 11.3 x10*3/uL    nRBC 0.0 0.0 - 0.0 /100 WBCs    RBC 3.42 (L) 4.00 - 5.20 x10*6/uL    Hemoglobin 10.1 (L) 12.0 - 16.0 g/dL    Hematocrit 30.4 (L) 36.0 - 46.0 %    MCV 89 80 - 100 fL    MCH 29.5 26.0 - 34.0 pg    MCHC 33.2 32.0 - 36.0 g/dL    RDW 18.6 (H) 11.5 - 14.5 %    Platelets 229 150 - 450 x10*3/uL    Immature Granulocytes %, Automated 0.5 0.0 - 0.9 %    Immature Granulocytes Absolute, Automated 0.06 0.00 - 0.70 x10*3/uL   Vancomycin   Result Value Ref Range    Vancomycin 14.3 5.0 - 20.0 ug/mL   Coagulation Screen   Result Value Ref Range    Protime 13.9 (H) 9.8 - 12.4 seconds    INR 1.3 (H) 0.9 - 1.1    aPTT 24 (L) 26 - 36 seconds   Manual Differential   Result Value Ref Range    Neutrophils %, Manual 96.6 40.0 - 80.0 %    Bands %, Manual 0.0 0.0 - 5.0 %    Lymphocytes %, Manual 3.4 13.0 - 44.0 %    Monocytes %, Manual 0.0 2.0 - 10.0 %    Eosinophils %, Manual 0.0 0.0 - 6.0 %    Basophils %, Manual 0.0 0.0 - 2.0 %    Atypical Lymphocytes %, Manual 0.0 0.0 - 2.0 %    Metamyelocytes %, Manual 0.0 0.0 - 0.0 %    Myelocytes %, Manual 0.0 0.0 - 0.0 %    Plasma Cells %, Manual 0.0 0.00 - 0.00 %    Promyelocytes %, Manual 0.0 0.0 - 0.0  %    Blasts %, Manual 0.0 0.0 - 0.0 %    Seg Neutrophils Absolute, Manual 10.63 (H) 1.20 - 7.00 x10*3/uL    Bands Absolute, Manual 0.00 0.00 - 0.70 x10*3/uL    Lymphocytes Absolute, Manual 0.37 (L) 1.20 - 4.80 x10*3/uL    Monocytes Absolute, Manual 0.00 (L) 0.10 - 1.00 x10*3/uL    Eosinophils Absolute, Manual 0.00 0.00 - 0.70 x10*3/uL    Basophils Absolute, Manual 0.00 0.00 - 0.10 x10*3/uL    Atypical Lymphs Absolute, Manual 0.00 0.00 - 0.50 x10*3/uL    Metamyelocytes Absolute, Manual 0.00 0.00 - 0.00 x10*3/uL    Myelocytes Absolute, Manual 0.00 0.00 - 0.00 x10*3/uL    Plasma Cells Absolute, Manual 0.00 0.00 - 0.00 x10*3/uL    Promyelocytes Absolute, Manual 0.00 0.00 - 0.00 x10*3/uL    Blasts Absolute, Manual 0.00 0.00 - 0.00 x10*3/uL    Total Cells Counted 116     Neutrophils Absolute, Manual 10.63 (H) 1.20 - 7.70 x10*3/uL    Manual nRBC per 100 Cells 0.0 0.0 - 0.0 %    RBC Morphology See Below         Lab Results   Component Value Date    GLUCOSE 121 (H) 07/02/2025    CALCIUM 8.8 07/02/2025     07/02/2025    K 4.2 07/02/2025    CO2 18 (L) 07/02/2025     07/02/2025    BUN 66 (H) 07/02/2025    CREATININE 2.08 (H) 07/02/2025             Assessment and Plan:  Jordyn Neal is a 70 y.o. 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 Presented to OSH 6/20 with N, V, Poor Intake, Transferred to [facility] [place] 6/26 to start Chemotherapy. Consult for Hyponatremia.     #Hyponatremia  Sodium level is improved to normal range.      #CHIO  - Baseline Cr 0.9  -Creatinine level was peaked at 2.3 and last creatinine level is 2.0.  - Hemodynamic is stable.  - Abdominal ultrasound did not show any right hydronephrosis but  there is no imaging of the left kidney  -There is liver metastasis with abdominal  ascites    Urine sodium level is low.  Probably the etiology of acute kidney injury is hepatorenal syndrome in the setting of liver metastasis versus intravascular volume depletion      Recommend:  -Kidney ultrasound  -Daily renal panel  -Spot urine protein to creatinine ratio  -Start LR 80 mL /h      Baltazar Castro MD  Senior Attending Physician  Director of Onco-Nephrology Program  Division of Nephrology & Hypertension  [facility]         [1]   Current Facility-Administered Medications   Medication Dose Route Frequency Provider Last Rate Last Admin    acetaminophen (Tylenol) tablet 650 mg  650 mg oral q6h PRN Arline Huddleston PA-C   650 mg at 06/26/25 2002    albuterol 2.5 mg /3 mL (0.083 %) nebulizer solution 3 mL  3 mL nebulization PRN Archie Willis MD        alteplase (Cathflo Activase) injection 2 mg  2 mg intra-catheter PRN Arline Huddleston PA-C        alteplase (Cathflo Activase) injection 2 mg  2 mg intra-catheter PRN KIMBERLEY Rowan        [Held by provider] amLODIPine (Norvasc) tablet 10 mg  10 mg oral Daily Arline Huddleston PA-C   10 mg at 06/30/25 0820    dexAMETHasone oral liquid 1 mg  1 mg oral q8h PEREZ KIMBERLEY Chavez   1 mg at 06/29/25 1110    dextrose 5 % in water (D5W) bolus 500 mL  500 mL intravenous PRN Archie Willis MD        diphenhydrAMINE (BENADryl) injection 50 mg  50 mg intravenous PRN Archie Willis MD        EPINEPHrine HCl (PF) (Adrenalin) injection 0.3 mg  0.3 mg intramuscular q5 min PRN Archie Willis MD        famotidine PF (Pepcid) injection 20 mg  20 mg intravenous Once PRN Archie Willis MD        fluticasone (Flonase) nasal spray 1 spray  1 spray Each Nostril Daily PRN Arline Huddleston PA-C        [Held by provider] heparin (porcine) injection 5,000 Units  5,000 Units subcutaneous q8h KIMBERLEY Chavez   5,000 Units at 06/30/25 0821    heparin flush 10 unit/mL syringe 50 Units  50 Units intra-catheter PRN  KIMBERLEY Rowan        heparin flush 10 unit/mL syringe 50 Units  50 Units intra-catheter q12h KIMBERLEY Rowan   50 Units at 06/28/25 1635    lidocaine-diphenhydrAMINE-Maalox 1:1:1 Magic Mouthwash  10 mL Swish & Swallow q4h PEREZ KIMBERLEY Chavez   10 mL at 06/30/25 0821    methylPREDNISolone sod succinate (SOLU-Medrol) 40 mg/mL injection 40 mg  40 mg intravenous PRN Archie Willis MD        moisturizing mouth (Biotene Dry Mouth) solution 15 mL  15 mL Swish & Spit TID KIMBERLEY Rowan   15 mL at 07/02/25 1517    [Held by provider] OLANZapine (ZyPREXA) tablet 5 mg  5 mg oral Nightly KIMBERLEY Rowan   5 mg at 06/28/25 2105    ondansetron (Zofran) tablet 4 mg  4 mg oral q6h PRN KIMBERLEY Chavez   4 mg at 06/29/25 1110    oxyCODONE (Roxicodone) immediate release tablet 10 mg  10 mg oral q6h PRN NILSON Chavez-CNP        oxyCODONE (Roxicodone) immediate release tablet 5 mg  5 mg oral q6h PRN NILSON Chavez-TALA        pantoprazole (ProtoNix) EC tablet 40 mg  40 mg oral Daily before breakfast Arline Huddleston PA-C   40 mg at 06/30/25 0820    PARoxetine (Paxil) tablet 20 mg  20 mg oral Daily Arline Huddleston PA-C   20 mg at 06/30/25 0820    piperacillin-tazobactam (Zosyn) 3.375 g in dextrose (iso) IV 50 mL  3.375 g intravenous q6h KIMBERLEY Chavez   Stopped at 07/02/25 1202    polyethylene glycol (Glycolax, Miralax) packet 17 g  17 g oral Daily PRN NILSON Chavez-TALA        prochlorperazine (Compazine) injection 10 mg  10 mg intravenous q6h PRN Archie Willis MD   10 mg at 07/02/25 0928    prochlorperazine (Compazine) tablet 10 mg  10 mg oral q6h PRN Archie Willis MD        sodium bicarbonate tablet 1,300 mg  1,300 mg oral TID NILSON Rowan-CNP   1,300 mg at 06/30/25 2203    sodium chloride 0.9 % bolus 500 mL  500 mL intravenous PRN Archie Willis MD        vancomycin (Vancocin) pharmacy to dose - pharmacy monitoring    miscellaneous Daily PRN Delia Sal, APRN-CNP

## 2025-07-02 NOTE — PROGRESS NOTES
"Jordyn Neal is a 70 y.o. female on day 6 of admission presenting with Hyponatremia.    Subjective   Seen this AM at the bedside. Pt very lethargic, responsive to verbal stimulation. No complaints of pain. Pt still c/o throat dryness, encouraged small sips of water for comfort. Denies fever/chills, N/V/C/D, bleeding, bruising, SOB, MIXON, CP, H/A or vision changes.     Objective     Physical Exam  Constitutional:       Appearance: She is ill-appearing.   HENT:      Head: Normocephalic.      Right Ear: External ear normal.      Left Ear: External ear normal.      Nose: Nose normal.      Mouth/Throat:      Mouth: Mucous membranes are dry.   Eyes:      Extraocular Movements: Extraocular movements intact.      Conjunctiva/sclera: Conjunctivae normal.   Cardiovascular:      Rate and Rhythm: Normal rate and regular rhythm.   Pulmonary:      Effort: Pulmonary effort is normal.   Abdominal:      General: Bowel sounds are normal. There is distension.      Palpations: Abdomen is soft.   Musculoskeletal:      Cervical back: Normal range of motion.   Skin:     General: Skin is warm and dry.   Neurological:      Mental Status: She is oriented to person, place, and time.      Motor: Weakness present.     Last Recorded Vitals  Blood pressure 115/72, pulse 106, temperature 36.8 °C (98.3 °F), temperature source Temporal, resp. rate 18, height 1.676 m (5' 6\"), weight 95.5 kg (210 lb 8.6 oz), SpO2 95%.  Intake/Output last 3 Shifts:  I/O last 3 completed shifts:  In: 500 (5.2 mL/kg) [IV Piggyback:500]  Out: 1050 (11 mL/kg) [Urine:1050 (0.3 mL/kg/hr)]  Weight: 95.5 kg     Relevant Results     .Scheduled medications  Scheduled Medications[1]  Continuous medications  Continuous Medications[2]  PRN medications  PRN Medications[3]    .  Results for orders placed or performed during the hospital encounter of 06/26/25 (from the past 24 hours)   Phosphorus   Result Value Ref Range    Phosphorus 4.5 2.5 - 4.9 mg/dL   Magnesium   Result Value Ref " Range    Magnesium 2.36 1.60 - 2.40 mg/dL   Comprehensive Metabolic Panel   Result Value Ref Range    Glucose 121 (H) 74 - 99 mg/dL    Sodium 136 136 - 145 mmol/L    Potassium 4.2 3.5 - 5.3 mmol/L    Chloride 105 98 - 107 mmol/L    Bicarbonate 18 (L) 21 - 32 mmol/L    Anion Gap 17 10 - 20 mmol/L    Urea Nitrogen 66 (H) 6 - 23 mg/dL    Creatinine 2.08 (H) 0.50 - 1.05 mg/dL    eGFR 26 (L) >60 mL/min/1.73m*2    Calcium 8.8 8.6 - 10.6 mg/dL    Albumin 2.6 (L) 3.4 - 5.0 g/dL    Alkaline Phosphatase 640 (H) 33 - 136 U/L    Total Protein 4.9 (L) 6.4 - 8.2 g/dL    AST 72 (H) 9 - 39 U/L    Bilirubin, Total 4.6 (H) 0.0 - 1.2 mg/dL    ALT 45 7 - 45 U/L   CBC and Auto Differential   Result Value Ref Range    WBC 11.0 4.4 - 11.3 x10*3/uL    nRBC 0.0 0.0 - 0.0 /100 WBCs    RBC 3.42 (L) 4.00 - 5.20 x10*6/uL    Hemoglobin 10.1 (L) 12.0 - 16.0 g/dL    Hematocrit 30.4 (L) 36.0 - 46.0 %    MCV 89 80 - 100 fL    MCH 29.5 26.0 - 34.0 pg    MCHC 33.2 32.0 - 36.0 g/dL    RDW 18.6 (H) 11.5 - 14.5 %    Platelets 229 150 - 450 x10*3/uL    Immature Granulocytes %, Automated 0.5 0.0 - 0.9 %    Immature Granulocytes Absolute, Automated 0.06 0.00 - 0.70 x10*3/uL   Vancomycin   Result Value Ref Range    Vancomycin 14.3 5.0 - 20.0 ug/mL   Coagulation Screen   Result Value Ref Range    Protime 13.9 (H) 9.8 - 12.4 seconds    INR 1.3 (H) 0.9 - 1.1    aPTT 24 (L) 26 - 36 seconds   Manual Differential   Result Value Ref Range    Neutrophils %, Manual 96.6 40.0 - 80.0 %    Bands %, Manual 0.0 0.0 - 5.0 %    Lymphocytes %, Manual 3.4 13.0 - 44.0 %    Monocytes %, Manual 0.0 2.0 - 10.0 %    Eosinophils %, Manual 0.0 0.0 - 6.0 %    Basophils %, Manual 0.0 0.0 - 2.0 %    Atypical Lymphocytes %, Manual 0.0 0.0 - 2.0 %    Metamyelocytes %, Manual 0.0 0.0 - 0.0 %    Myelocytes %, Manual 0.0 0.0 - 0.0 %    Plasma Cells %, Manual 0.0 0.00 - 0.00 %    Promyelocytes %, Manual 0.0 0.0 - 0.0 %    Blasts %, Manual 0.0 0.0 - 0.0 %    Seg Neutrophils Absolute, Manual  10.63 (H) 1.20 - 7.00 x10*3/uL    Bands Absolute, Manual 0.00 0.00 - 0.70 x10*3/uL    Lymphocytes Absolute, Manual 0.37 (L) 1.20 - 4.80 x10*3/uL    Monocytes Absolute, Manual 0.00 (L) 0.10 - 1.00 x10*3/uL    Eosinophils Absolute, Manual 0.00 0.00 - 0.70 x10*3/uL    Basophils Absolute, Manual 0.00 0.00 - 0.10 x10*3/uL    Atypical Lymphs Absolute, Manual 0.00 0.00 - 0.50 x10*3/uL    Metamyelocytes Absolute, Manual 0.00 0.00 - 0.00 x10*3/uL    Myelocytes Absolute, Manual 0.00 0.00 - 0.00 x10*3/uL    Plasma Cells Absolute, Manual 0.00 0.00 - 0.00 x10*3/uL    Promyelocytes Absolute, Manual 0.00 0.00 - 0.00 x10*3/uL    Blasts Absolute, Manual 0.00 0.00 - 0.00 x10*3/uL    Total Cells Counted 116     Neutrophils Absolute, Manual 10.63 (H) 1.20 - 7.70 x10*3/uL    Manual nRBC per 100 Cells 0.0 0.0 - 0.0 %    RBC Morphology See Below      Assessment & Plan  Hyponatremia  Jordyn Manzano is a 70 year-old female with PMH of 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 [facility] Oncology consulted on admission. Patient s/p C1 Gemcitabine/Cisplatin inpatient 6/27. Labs noteable for acute hyponatremia, Na+124 (6/27) previously unresponsive to IVF at OSH despite hypovolemia hyponatremia like picture. Nephrology consulted 6/27, ddx hypovolemia vs SIADH vs Cisplatin-induced; rec q4 sodium checks initially, further aggressive IVF boluses (x3 6/27), started sodium bicarb TID 6/28, Na showing improvement as of 6/29. Supportive Onc consulted 6/28 for symptom mgmt (nausea/poor PO intake), recs followed. 6/29 AM pt with significant AMS. CT head (6/29) negative. Pt A&O x3 as of 6/30. Liver and kidney function worsening; Liver and Renal US ordered (6/30)  which showed known metastatic disease of liver, mass in tim hepatis compatible w/ known necrotic lymphadenopathy, gallbladder sludge w/o evidence of acute cholecystitis, diffuse mod-to-large volume ascites, and c/f PV thrombosis- further eval w/ CT recommended. CT liver (6/30) showed known metastatic disease in liver, no evidence of portal vein thrombus, moderate volume ascites and interval development of R anterior abdominal wall soft tissue nodule. GI/Liver consulted re CBD stent removal (6/30) rec daily labs w/ INR, repeat blood cultures and starting empiric Zosyn/Vanc (6/30- current) for c/f cholangitis; GI to hold on stent removal inpatient, rec outpatient FU. PT/OT rec SNF. DC to SNF pending improvement in symptoms, CHIO, LFTs, and facility placement.     Updates 7/2:  - pt very lethargic this morning, responsive to verbal stimuli  - Labs continue to improve; tbili 4.6, sCr. 2.08, phos 4.5, K 4.2  - plan for speech consult for swallow eval and nutrition consult today for worsening PO intake    # Transaminitis - improving  # c/f Cholangitis  # Hx biliary compression stricture s/p stent 4/2025   - Likely multifactorial in setting of metastasis to liver and fatty liver  - No abd pain, afebrile, no chills at this time, endorses intermittent nausea  - Alk phos 530, ALT 53, AST 83, Tbili 2.6 upon admission (6/26) --> now uptrending (6/28) alk phos 1,190, alt 73, ast 179, tbili 3.9--> showing improvement (6/29) alk phos 841, ALT 55, , Tbili 4--> (7/1) alk phos 701, ALT 51, AST 90, tbili 4.5  - 4/15 s/p plastic biliary stent placement, plan for repeat ERCP in 2 months for stent removal, a 4/29 GI appt was cancelled unclear why, otherwise lost to GI follow up  - CT A/P (6/22) showed overall progression of metastatic disease, new small volume ascites, satisfactory placement of CBD stent without significant upstream bilairy dilatation   - Ammonia 74 (6/28)--> repeat ammonia 49 (6/29) s/p 1x dose of Lactulose  (ordered prior to repeat ammonia regardless)  - Hepatitis panel (6/28): neg  - Blood Cx x2 (6/28): NGTD   - renal/liver US w/ doppler (6/30) showed known metastatic disease of liver, mass in tim hepatis compatible w/ known necrotic lymphadenopathy, gallbladder sludge w/o evidence of acute cholecystitis, diffuse mod-to-large volume ascites, and c/f PV thrombosis- further eval w/ CT recommended  - CT liver (6/30) showed known metastatic disease in liver, no evidence of portal vein thrombus, moderate volume ascites, interval development of R anterior abdominal wall soft tissue nodule  - GI/Liver consulted re CBD stent removal, rec daily labs w/ INR, repeat blood cultures empiric Zosyn/Vanc (6/30- current) for c/f cholangitis; GI to hold on stent removal inpatient, rec outpatient FU  - repeat blood cx x2 (6/30) NGTD    # s/p C1 Gemcitabine/Cisplatin Inpatient  # Newly Diagnosed 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 Willis  - Diagnosed in 5/2025  - Discussed at tumor board on 6/4/2025 and rec treatment with Durvalumab + Gemcitabine / Cisplatin   - Treatment was scheduled to begin on 6/22/2025, however, unable to due to admission   - CT A/P (6/22) showed overall progression of metestatic disease, new small volume ascites, satisfactory placement of CBD stent without significant upstream bilairy dilatation, new small pleual effusion  - Primary oncologist rec transfer to [facility] for intiation of emergent inpatient chemotherapy   - Oncology consulted upon arrival to [facility], 6/27 s/p Gemcitabine/Cisplatin while inpatient, durvalumab not permitted inpatient  - NGS panel results pending 6/28     # Leukocytosis-- improving  - Likely 2/2 steroids vs infectious (cholangitis?)  - See above re c/f cholangitis  - WBC BL~10k, (6/27) 12.6 --> 21.6k (6/29)--> 18.1k (6/30)--> 13.9k (7/1)  - CXR (6/20) w/ atelectasis, no consolidation  - CT A/P (6/22) showed  worsening disease, new small/mod ascites, satsifactory placement of CBD stent w/o dilatation  - Repeat CXR (6/29): no c/f infection  - UA with reflex (6/30): +protein, +blood, +urobilinogen, +leuksm +WBC, +RBC, +1 bacteria; UCX inconclusive with mixed bacterial gabino  - Lactate level 1.9 via ABG (6/29): Procal (6/29): 7.78 (6/29), repeat procal 3.89 (7/2)  - MSSA (6/27): +  - Blood cx x2 (6/28): NGTD   - Blood cx x2 (6/30): NGTD  - s/p 10mg IVP decadron premed 6/27  - CBC daily     # AMS  - Ddx include metabolic (elevated LFTs/ ammonia/ CHIO), structural, infectious  - As of 6/29 AM- following commands but unable to keep eyes open, not able to speak- later throughout morning waxing/waning  - As of 6/30 AM- pt is ordered x3  # Metabolic  - Monitor electrolytes; replete PRN. No hypo/hyperglycemia  - ABG (6/29): pH 7.24, pCO2 25, pO2 82, SO2 98; RT consulted, RN to place on humidified 2L NC face mask or tent (mouth breathing)  - TSH (6/29): 1.62  - LFTs slightly improving, Tbili increased to 4 (6/29)--> 4.8 (6/30)  - sCr worsening, sCr 1.81 (6/29)--> 2.33 (6/30)  - s/p 1x dose of Lactulose 6/29 for Ammonia 73  - repeat ammonia 49 (6/30)  - Uric Acid 10.2 (6/28)--> 11.2 (6/29)-->12.2 (6/30) s/p 1x dose of rasburicase--> 2.6 (6/30)  - Vit B12 level (6/29): >2,000  - Zyprexa started 6/28 and held as of 6/29  # Infectious  - WBC 21.6k (6/29)--> 18.1k (6/30)--> 13.9k (7/1)  - Repeat CXR (6/29) without c/f infection  - UA with reflex (6/30): +protein, +blood, +urobilinogen, +leuksm +WBC, +RBC, +1 bacteria; UCX inconclusive with mixed bacterial gabino  - Lactate on ABG 1.9, procal 7.78 (6/29), repeat procal 3.89 (7/1)  - Recent CT a/p (6/22) showing CBD stent intact without significant upstream biliary dilation  - Pt does have CBD stent that needs to be removed- pt without fevers/chills, HoTN, or abdominal pain- no obvious concern for infected stent; GI/Liver consulted re CBD stent removal (6/30) rec daily labs w/ INR, repeat  blood cultures, starting empiric Zosyn/Vanc (6/30- current) for c/f cholangitis; GI to hold on stent removal inpatient, rec outpatient FU  # Structural  - No known brain mets  - CT head (6/29): negative for acute process  - CBD stent in place via CT a/p (6/22)    # Hyponatremia-- improving  - Na 131 on admit to [facility] (6/20) --> Na 124 (6/27) --> Na 129 (6/28)--> 130 (6/29 and 6/30)--> 132 (7/1)--> 136 (7/2)  - DDx includes hypovolemia vs SIADH vs Cisplatin-induced (although hyponatremia started prior to Cisplatin, so if any effect not the whole etiology)  - Serum osm 275, urine Cr 197, urine Na <10, urine osm 777 (6/26)  - Repeat serum osm, urine cr 211, urine Na 10, urine osm 922 (6/27)  - Repeat lytes (6/30) urine cr 185.6, urine Na 11, urine osm 656 (6/30)  - s/p 1L fluid restriction 6/26 -- per Dr. Alvarez hold off for now   - Nephrology consulted 6/27, ddx hypovolemia vs SIADH vs Cisplatin-induced. Rec q4 sodium checks initially, started sodium bicarb TID 6/28, Na showing improvement as of 6/29  - Of note, pt received x2 additional 1L NS boluses as part of chemo regimen (6/27-6/28)   - s/p 500mL NS bolus 6/29 per nephrology d/t kidney function  - updated Nephrology recs (6/30) rec hold amlodipine, obtain Cystatin C, continue sodium bicarb 1300 TID, s/p 500mL LR bolus    # CHIO on CKD III - improving  - Recent sCr BL ~1.2-1.3 ; sCr 1.08 (6/27)--> 1.81 (6/29)--> 2.33 (6/30)--> 2.19 (7/1)--> 2.08 (7/2)  - Avoid nephrotoxic agents  - Monitor CMP daily  - Nephrology following  - s/p 500mL NS bolus 6/29 for CHIO without improvement  - s/p 500ml LR bolus 6/30    # Hyperkalemia  - 2/2 CIHO  - K 5.5 (6/29) s/p 1x dose of Lokelma  - K 5.5 (6/30) s/p Lokelma x2 doses--> K 4.6 (7/1)  - Tele monitoring ordered 6/30  - Nephrology following    # Tachycardia - improving  - -120s since admission 6/26; asymptomatic, no CP or palpitations, no O2 requirement, no pain  - Ddx includes dehydration vs deconditioning from malignancy  vs pain vs infection   - s/p multiple IVF boluses (x3 6/25), 500mL NS 6/29  - EKG (6/28): sinus tach, QTc 446  - Low threshold for CT angio chest if tachycardia worsens or develops new O2 requirement       # Neoplasm-related pain   # FTT  # Poor PO intake   # N/V  - Supportive onc consulted 6/28 for symptom/pain management, recs followed  - Continue with home Oxycodone 10mg q6hrs PRN severe pain, added Oxycodone 5mg q6hrs PRN moderate pain  - Continue with home PO Zofran 4mg q6 hours PRN N/V 2nd line, and PO/IV Compazine 10mg q6hrs PRN N/V 1st line  - STOPPED home mirtazapine 15mg nightly and started Zyprexa 5mg nightly 6/29- however then HELD Zyprexa 6/29 d/t AMS  - Nutrition consulted 6/27, rec regular diet as tolerated, Ensure Clear TID with meals  - PT / OT consulted on admit, ampact 8, SNF list provided 6/28    # Oral thrush, improving  # Mouth Pain  - Treated with oral nystatin 5mL QID swish and spit- completed course 6/29  - Started biotene TID 6/27   - Added scheduled Dex oral liquid 1mg TID swish and swallow and magic mouth wash 10mL swish and swallow q4hrs scheduled for mouth pain  - Humidified face tent/face mask to be applied by nursing to aid in dry mouth     # HTN  - HOLD Amlodipine 10mg daily per updated Neph recs     # MDD  # LUIS FELIPE  - Continue with home Paroxetine 20mg daily      # Type 2 DM  - Last Hgb A1c 5.9 (4/29/24); not on any diabetic medications  - Due to decreased PO intake and normal blood sugars, insulin not ordered on admit, CTM  - Hypoglycemic protocol in place      # GERD  - Continue Protonix 40mg daily      # DVT Prophy: Stopped prophy Lovenox 6/29 and started prophy Heparin SQ 6/29 d/t CHIO. SCDs, encourage safe ambulation     DISPO:  - DNR, DNI - no ICU - confirmed on admit   - DC to SNF pending improvement in symptoms, CHIO, LFTs, neph recs, and facility placement  - NOK: Natalio Neal [phone]   - FUV with Dr. Willis (oncology) 7/24      I spent 75 minutes in the professional  and overall care of this patient.    Assessment and plan as above discussed with attending physician Dr. Avtar Alvarez.    Randi Chatterjee, NILSON-CNP       [1] [Held by provider] amLODIPine, 10 mg, oral, Daily  dexAMETHasone, 1 mg, oral, q8h PEREZ  [Held by provider] heparin, 5,000 Units, subcutaneous, q8h  heparin flush, 50 Units, intra-catheter, q12h  lidocaine-diphenhydraMINE-Maalox 1:1:1, 10 mL, Swish & Swallow, q4h PEREZ  moisturizing mouth, 15 mL, Swish & Spit, TID  [Held by provider] OLANZapine, 5 mg, oral, Nightly  pantoprazole, 40 mg, oral, Daily before breakfast  PARoxetine, 20 mg, oral, Daily  piperacillin-tazobactam, 3.375 g, intravenous, q6h  sodium bicarbonate, 1,300 mg, oral, TID     [2]    [3] PRN medications: acetaminophen, albuterol, alteplase, alteplase, dextrose, diphenhydrAMINE, EPINEPHrine HCl, famotidine, fluticasone, heparin flush, methylPREDNISolone sodium succinate (PF), ondansetron, oxyCODONE, oxyCODONE, polyethylene glycol, prochlorperazine, prochlorperazine, sodium chloride, vancomycin

## 2025-07-02 NOTE — PROGRESS NOTES
Vancomycin Dosing by Pharmacy- FOLLOW UP    Jordyn Neal is a 70 y.o. year old female who Pharmacy has been consulted for vancomycin dosing for abdominal infection. Based on the patient's indication and renal status this patient is being dosed based on a goal trough/random level of 15-20.     Renal function is slightly improved but still above baseline. Continue to dose by levels.     Visit Vitals  /75 (BP Location: Left arm, Patient Position: Lying)   Pulse 104   Temp 36.2 °C (97.2 °F) (Temporal)   Resp 20        Lab Results   Component Value Date    CREATININE 2.08 (H) 2025    CREATININE 2.19 (H) 2025    CREATININE 2.33 (H) 2025    CREATININE 1.92 (H) 2025    CREATININE 1.87 (H) 2025        Patient weight is as follows:   Vitals:    25 1550   Weight: 95.5 kg (210 lb 8.6 oz)       Cultures:  Susceptibility data for the encounter in last 14 days.  Collected Specimen Info Organism   25 Swab from Anterior Nares Methicillin Susceptible Staphylococcus aureus (MSSA)        I/O last 3 completed shifts:  In: 500 (5.2 mL/kg) [IV Piggyback:500]  Out: 1050 (11 mL/kg) [Urine:1050 (0.3 mL/kg/hr)]  Weight: 95.5 kg   I/O during current shift:  No intake/output data recorded.    Temp (24hrs), Av.4 °C (97.5 °F), Min:36.2 °C (97.2 °F), Max:36.5 °C (97.7 °F)      Assessment/Plan    AM level was 14.3 after a 1g dose yesterday. Will give a 1500mg dose today (15mg/kg).   The next level will be obtained on 7/3 with AM labs. May be obtained sooner if clinically indicated.   Will continue to monitor renal function daily while on vancomycin and order serum creatinine at least every 48 hours if not already ordered.  Follow for continued vancomycin needs, clinical response, and signs/symptoms of toxicity.       Ersamo Torres, PharmD, BCPS

## 2025-07-02 NOTE — ASSESSMENT & PLAN NOTE
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 [facility] [place], [place] Oncology consulted on admission. Patient s/p C1 Gemcitabine/Cisplatin inpatient 6/27. Labs noteable for acute hyponatremia, Na+124 (6/27) previously unresponsive to IVF at OSH despite hypovolemia hyponatremia like picture. Nephrology consulted 6/27, ddx hypovolemia vs SIADH vs Cisplatin-induced; rec q4 sodium checks initially, further aggressive IVF boluses (x3 6/27), started sodium bicarb TID 6/28, Na showing improvement as of 6/29. Supportive Onc consulted 6/28 for symptom mgmt (nausea/poor PO intake), recs followed. 6/29 AM pt with significant AMS. CT head (6/29) negative. Pt A&O x3 as of 6/30. Liver and kidney function worsening; Liver and Renal US ordered (6/30) which showed known metastatic disease of liver, mass in tim hepatis compatible w/ known necrotic lymphadenopathy, gallbladder sludge w/o evidence of acute cholecystitis, diffuse mod-to-large volume ascites, and c/f PV thrombosis- further eval w/ CT recommended. CT liver (6/30) showed known metastatic disease in liver, no evidence of portal vein thrombus, moderate volume ascites and interval development of R anterior abdominal wall soft tissue nodule. GI/Liver consulted re CBD stent removal (6/30) rec daily labs w/ INR, repeat blood cultures and starting empiric Zosyn/Vanc (6/30- current) for c/f cholangitis; GI to hold on stent removal inpatient, rec outpatient FU. PT/OT rec SNF. DC to SNF pending improvement in symptoms, CHIO, LFTs, and facility placement.     Updates 7/2:  - pt very lethargic this morning, responsive to verbal stimuli  - Labs continue to  improve; tbili 4.6, sCr. 2.08, phos 4.5, K 4.2  - plan for speech consult for swallow eval and nutrition consult today for worsening PO intake    # Transaminitis - improving  # c/f Cholangitis  # Hx biliary compression stricture s/p stent 4/2025   - Likely multifactorial in setting of metastasis to liver and fatty liver  - No abd pain, afebrile, no chills at this time, endorses intermittent nausea  - Alk phos 530, ALT 53, AST 83, Tbili 2.6 upon admission (6/26) --> now uptrending (6/28) alk phos 1,190, alt 73, ast 179, tbili 3.9--> showing improvement (6/29) alk phos 841, ALT 55, , Tbili 4--> (7/1) alk phos 701, ALT 51, AST 90, tbili 4.5  - 4/15 s/p plastic biliary stent placement, plan for repeat ERCP in 2 months for stent removal, a 4/29 GI appt was cancelled unclear why, otherwise lost to GI follow up  - CT A/P (6/22) showed overall progression of metastatic disease, new small volume ascites, satisfactory placement of CBD stent without significant upstream bilairy dilatation   - Ammonia 74 (6/28)--> repeat ammonia 49 (6/29) s/p 1x dose of Lactulose (ordered prior to repeat ammonia regardless)  - Hepatitis panel (6/28): neg  - Blood Cx x2 (6/28): NGTD   - renal/liver US w/ doppler (6/30) showed known metastatic disease of liver, mass in tim hepatis compatible w/ known necrotic lymphadenopathy, gallbladder sludge w/o evidence of acute cholecystitis, diffuse mod-to-large volume ascites, and c/f PV thrombosis- further eval w/ CT recommended  - CT liver (6/30) showed known metastatic disease in liver, no evidence of portal vein thrombus, moderate volume ascites, interval development of R anterior abdominal wall soft tissue nodule  - GI/Liver consulted re CBD stent removal, rec daily labs w/ INR, repeat blood cultures empiric Zosyn/Vanc (6/30- current) for c/f cholangitis; GI to hold on stent removal inpatient, rec outpatient FU  - repeat blood cx x2 (6/30) NGTD    # s/p C1 Gemcitabine/Cisplatin Inpatient  #  Newly Diagnosed 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 Willis  - Diagnosed in 5/2025  - Discussed at tumor board on 6/4/2025 and rec treatment with Durvalumab + Gemcitabine / Cisplatin   - Treatment was scheduled to begin on 6/22/2025, however, unable to due to admission   - CT A/P (6/22) showed overall progression of metestatic disease, new small volume ascites, satisfactory placement of CBD stent without significant upstream bilairy dilatation, new small pleual effusion  - Primary oncologist rec transfer to [facility] for intiation of emergent inpatient chemotherapy   - Oncology consulted upon arrival to [facility], 6/27 s/p Gemcitabine/Cisplatin while inpatient, durvalumab not permitted inpatient  - NGS panel results pending 6/28     # Leukocytosis-- improving  - Likely 2/2 steroids vs infectious (cholangitis?)  - See above re c/f cholangitis  - WBC BL~10k, (6/27) 12.6 --> 21.6k (6/29)--> 18.1k (6/30)--> 13.9k (7/1)  - CXR (6/20) w/ atelectasis, no consolidation  - CT A/P (6/22) showed worsening disease, new small/mod ascites, satsifactory placement of CBD stent w/o dilatation  - Repeat CXR (6/29): no c/f infection  - UA with reflex (6/30): +protein, +blood, +urobilinogen, +leuksm +WBC, +RBC, +1 bacteria; UCX inconclusive with mixed bacterial gabino  - Lactate level 1.9 via ABG (6/29): Procal (6/29): 7.78 (6/29), repeat procal 3.89 (7/2)  - MSSA (6/27): +  - Blood cx x2 (6/28): NGTD   - Blood cx x2 (6/30): NGTD  - s/p 10mg IVP decadron premed 6/27  - CBC daily     # AMS  - Ddx include metabolic (elevated LFTs/ ammonia/ CHIO), structural, infectious  - As of 6/29 AM- following commands but unable to keep eyes open, not able to speak- later throughout morning waxing/waning  - As of 6/30 AM- pt is ordered x3  # Metabolic  - Monitor electrolytes; replete PRN. No hypo/hyperglycemia  - ABG (6/29): pH 7.24, pCO2 25, pO2 82, SO2 98; RT consulted, RN to  place on humidified 2L NC face mask or tent (mouth breathing)  - TSH (6/29): 1.62  - LFTs slightly improving, Tbili increased to 4 (6/29)--> 4.8 (6/30)  - sCr worsening, sCr 1.81 (6/29)--> 2.33 (6/30)  - s/p 1x dose of Lactulose 6/29 for Ammonia 73  - repeat ammonia 49 (6/30)  - Uric Acid 10.2 (6/28)--> 11.2 (6/29)-->12.2 (6/30) s/p 1x dose of rasburicase--> 2.6 (6/30)  - Vit B12 level (6/29): >2,000  - Zyprexa started 6/28 and held as of 6/29  # Infectious  - WBC 21.6k (6/29)--> 18.1k (6/30)--> 13.9k (7/1)  - Repeat CXR (6/29) without c/f infection  - UA with reflex (6/30): +protein, +blood, +urobilinogen, +leuksm +WBC, +RBC, +1 bacteria; UCX inconclusive with mixed bacterial gabino  - Lactate on ABG 1.9, procal 7.78 (6/29), repeat procal 3.89 (7/1)  - Recent CT a/p (6/22) showing CBD stent intact without significant upstream biliary dilation  - Pt does have CBD stent that needs to be removed- pt without fevers/chills, HoTN, or abdominal pain- no obvious concern for infected stent; GI/Liver consulted re CBD stent removal (6/30) rec daily labs w/ INR, repeat blood cultures, starting empiric Zosyn/Vanc (6/30- current) for c/f cholangitis; GI to hold on stent removal inpatient, rec outpatient FU  # Structural  - No known brain mets  - CT head (6/29): negative for acute process  - CBD stent in place via CT a/p (6/22)    # Hyponatremia-- improving  - Na 131 on admit to [facility] (6/20) --> Na 124 (6/27) --> Na 129 (6/28)--> 130 (6/29 and 6/30)--> 132 (7/1)--> 136 (7/2)  - DDx includes hypovolemia vs SIADH vs Cisplatin-induced (although hyponatremia started prior to Cisplatin, so if any effect not the whole etiology)  - Serum osm 275, urine Cr 197, urine Na <10, urine osm 777 (6/26)  - Repeat serum osm, urine cr 211, urine Na 10, urine osm 922 (6/27)  - Repeat lytes (6/30) urine cr 185.6, urine Na 11, urine osm 656 (6/30)  - s/p 1L fluid restriction 6/26 -- per Dr. Alvarez hold off for now   - Nephrology consulted 6/27, ddx  hypovolemia vs SIADH vs Cisplatin-induced. Rec q4 sodium checks initially, started sodium bicarb TID 6/28, Na showing improvement as of 6/29  - Of note, pt received x2 additional 1L NS boluses as part of chemo regimen (6/27-6/28)   - s/p 500mL NS bolus 6/29 per nephrology d/t kidney function  - updated Nephrology recs (6/30) rec hold amlodipine, obtain Cystatin C, continue sodium bicarb 1300 TID, s/p 500mL LR bolus    # CHIO on CKD III - improving  - Recent sCr BL ~1.2-1.3 ; sCr 1.08 (6/27)--> 1.81 (6/29)--> 2.33 (6/30)--> 2.19 (7/1)--> 2.08 (7/2)  - Avoid nephrotoxic agents  - Monitor CMP daily  - Nephrology following  - s/p 500mL NS bolus 6/29 for CHIO without improvement  - s/p 500ml LR bolus 6/30    # Hyperkalemia  - 2/2 CHIO  - K 5.5 (6/29) s/p 1x dose of Lokelma  - K 5.5 (6/30) s/p Lokelma x2 doses--> K 4.6 (7/1)  - Tele monitoring ordered 6/30  - Nephrology following    # Tachycardia - improving  - -120s since admission 6/26; asymptomatic, no CP or palpitations, no O2 requirement, no pain  - Ddx includes dehydration vs deconditioning from malignancy vs pain vs infection   - s/p multiple IVF boluses (x3 6/25), 500mL NS 6/29  - EKG (6/28): sinus tach, QTc 446  - Low threshold for CT angio chest if tachycardia worsens or develops new O2 requirement       # Neoplasm-related pain   # FTT  # Poor PO intake   # N/V  - Supportive onc consulted 6/28 for symptom/pain management, recs followed  - Continue with home Oxycodone 10mg q6hrs PRN severe pain, added Oxycodone 5mg q6hrs PRN moderate pain  - Continue with home PO Zofran 4mg q6 hours PRN N/V 2nd line, and PO/IV Compazine 10mg q6hrs PRN N/V 1st line  - STOPPED home mirtazapine 15mg nightly and started Zyprexa 5mg nightly 6/29- however then HELD Zyprexa 6/29 d/t AMS  - Nutrition consulted 6/27, rec regular diet as tolerated, Ensure Clear TID with meals  - PT / OT consulted on admit, ampact 8, SNF list provided 6/28    # Oral thrush, improving  # Mouth Pain  -  Treated with oral nystatin 5mL QID swish and spit- completed course 6/29  - Started biotene TID 6/27   - Added scheduled Dex oral liquid 1mg TID swish and swallow and magic mouth wash 10mL swish and swallow q4hrs scheduled for mouth pain  - Humidified face tent/face mask to be applied by nursing to aid in dry mouth     # HTN  - HOLD Amlodipine 10mg daily per updated Neph recs     # MDD  # LUIS FELIPE  - Continue with home Paroxetine 20mg daily      # Type 2 DM  - Last Hgb A1c 5.9 (4/29/24); not on any diabetic medications  - Due to decreased PO intake and normal blood sugars, insulin not ordered on admit, CTM  - Hypoglycemic protocol in place      # GERD  - Continue Protonix 40mg daily      # DVT Prophy: Stopped prophy Lovenox 6/29 and started prophy Heparin SQ 6/29 d/t CHIO. SCDs, encourage safe ambulation     DISPO:  - DNR, DNI - no ICU - confirmed on admit   - DC to SNF pending improvement in symptoms, CHIO, LFTs, neph recs, and facility placement  - NOK: Natalio Neal [phone]   - FUV with Dr. Willis (oncology) 7/24

## 2025-07-02 NOTE — TELEPHONE ENCOUNTER
Left VM for patient to call  to schedule fu ERCP with Dr. Reynaga at [facility].

## 2025-07-02 NOTE — PROGRESS NOTES
SLP Adult Inpatient Speech-Language Pathology Clinical Swallow Evaluation    Patient Name: Jordyn Neal  MRN: [id number]  Today's Date: 7/2/2025   Time Calculation  Start Time: 1300  Stop Time: 1315  Time Calculation (min): 15 min           Assessment/Plan:  #dysphagia    - This is a pt who presented as transfer w/o complex history including,  c/f Cholangitis, Hx of biliary compression stricture s/p stent 4/2025; multifactorial in setting of metastasis to liver and fatty liver; possible stent replacement pending further work-up  - Pt was awake but drowsy during exam, limiting extent of PO that could be safely or practically tested. Accepted several tsp of water x tsp but lacking strength or alertness to accept additional trials. Given her presentation, there is increased for aspiration and related complications given her deconditioning. Additionally, oral intake has been poor during and preceding this admission.     Recommendations:  NPO  SLP will continue to follow for ongoing assess; may benefit from enteral nutrition   Frequent, aggressive oral care as tolerated to improve infection control, as well as to reduce dental plaque and bacteria on oropharyngeal surfaces which may increase the risk nosocomial infections, including pneumonia.  OK for small amounts of ice chips (one at a time, 10x/hour) for oral comfort and to prevent swallow disuse atrophy, but only after aggressive oral care to avoid colonization of bacteria within the oral cavity.      Inpatient/Swing Bed or Outpatient: Inpatient  SLP Plan: Skilled SLP  SLP Frequency: 1x per week  Duration: 2 weeks  SLP Discharge Recommendations:  (TBD)  SLP - OK to Discharge: Yes        Goals:  Encounter Problems       Encounter Problems (Active)       Swallowing       LTG - Patient will tolerate the least restrictive diet without overt difficulty by time of discharge.       Start:  07/02/25    Expected End:  07/16/25            SLP Goal 1       Start:  07/02/25     "Expected End:  07/16/25       STG - Patient will tolerate therapeutic trials of recommended consistency without clinical signs and symptoms of aspiration on 100% of trials                      Subjective     Baseline Assessment:  Respiratory Status: Room air  History of Intubation: No  Behavior/Cognition: Lethargic, Requires cueing  Hearing: Within Functional Limits  Patient Positioning: Upright in Bed  Baseline Vocal Quality: Normal    History and Physical:    Per H&P:  \" Jordyn Manzano is a 70 year-old female with PMH of 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    COMPARISON:  Chest radiograph 06/22/2025. CT of the chest 04/15/2025.    ACCESSION NUMBER(S):  [id number]    ORDERING CLINICIAN:  NOELLE PFEIFFER    FINDINGS:  AP radiograph of the chest was provided.    Right chest wall MediPort with the tip terminating over the lower SVC.    CARDIOMEDIASTINAL SILHOUETTE:  Cardiomediastinal silhouette is stable in size and configuration.    LUNGS:  Minimal amount of bibasilar linear atelectasis. No pleural effusion  or pneumothorax.    ABDOMEN:  No remarkable upper abdominal findings.    BONES:  Reverse right shoulder arthroplasty. No acute osseous changes.    Impression  Minimal amount of bibasilar linear atelectasis. No new consolidation,  pleural effusion, or pneumothorax.    I personally reviewed the images/study and I agree with the findings  as " stated by resident Abraham Navarro. This study was interpreted  at [facility], [place], [place].    MACRO:  None    Signed by: Marcelino Billingsley 6/29/2025 11:38 AM  Dictation workstation:   [id number]      Vent settings:         Objective     Oral/Motor Assessment:  Oral Hygiene: mild dryness xerostomia  Dentition: Adequate/Natural  Oral Motor: Unable to Complete  Intelligibility: Intelligible  Hearing: Within Functional Limits      Clinical Observations:    The 3 oz sequential drinks of thin liquid water was utilized as a reliable, evidence based test to rule out silent aspiration and determine need for additional testing, such as the MBS or FEES (fiberoptic endoscopic evaluation of swallow), if the test is equivocal, incomplete or pt shows s/sx of aspiration,  prior to recommending a oral diet    Patient Positioning: Upright in Bed  Was The 3 oz Swallow Protocol Completed: No    Consistencies Trialed: Yes  Consistencies Trialed: Thin (IDDSI Level 0) - Straw, Thin (IDDSI Level 0) - Spoon    Oral phase:  oral dryness; xerostomia; poor labial seal with straw; showed decent anterior containment w/ tsp x water    Pharyngeal Phase: Timely appearing swallowing onset; effortful appearance of swallow; no overt indicators with tested volumes, albeit these were small.        Inpatient Education:  Inpatient:  Provided education to pt's caregiver/son. In agreement w/ SLP POC               [1]   Past Medical History:  Diagnosis Date    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 06/16/2025    CKD (chronic kidney disease) stage 3, GFR 30-59 ml/min (Multi)     Diabetes mellitus (Multi)     Fatty liver     GERD (gastroesophageal reflux disease)     HLD (hyperlipidemia)     Hypertension     Personal history of other benign neoplasm     History of uterine leiomyoma   [2]   Family History  Problem Relation Name Age of Onset    Pancreatic cancer Mother      Uterine cancer Sister     [3] No Known  Allergies

## 2025-07-02 NOTE — TELEPHONE ENCOUNTER
----- Message from Kasi Reynaga sent at 4/30/2025  7:43 AM EDT -----  Needs a repeat ERCP @ [place] at the end of May or start of June  ----- Message -----  From: Lab, Background User  Sent: 4/22/2025  10:05 AM EDT  To: Kasi Reynaga MD

## 2025-07-02 NOTE — CARE PLAN
The clinical goals for the shift include pt will remain HDS and VSS throughout shift 7/2/25 by 0700.      Problem: Pain - Adult  Goal: Verbalizes/displays adequate comfort level or baseline comfort level  Outcome: Progressing     Problem: Safety - Adult  Goal: Free from fall injury  Outcome: Progressing     Problem: Discharge Planning  Goal: Discharge to home or other facility with appropriate resources  Outcome: Progressing     Problem: Chronic Conditions and Co-morbidities  Goal: Patient's chronic conditions and co-morbidity symptoms are monitored and maintained or improved  Outcome: Progressing     Problem: Nutrition  Goal: Nutrient intake appropriate for maintaining nutritional needs  Outcome: Progressing     Problem: Skin  Goal: Decreased wound size/increased tissue granulation at next dressing change  Outcome: Progressing  Goal: Participates in plan/prevention/treatment measures  Outcome: Progressing  Goal: Prevent/manage excess moisture  Outcome: Progressing  Goal: Prevent/minimize sheer/friction injuries  Outcome: Progressing  Goal: Promote/optimize nutrition  Outcome: Progressing  Goal: Promote skin healing  Outcome: Progressing

## 2025-07-02 NOTE — CARE PLAN
The clinical goals for the shift include Pt will remain HDS and VSS through end of shift 7/2 1900    Over the shift, the patient did not make progress toward the following goals.     Problem: Nutrition  Goal: Nutrient intake appropriate for maintaining nutritional needs  Outcome: Not Progressing    Barriers to progression include drowsiness/lethargy and dry/sore mouth/throat. Recommendations to address these barriers include SLP consult per provider, nutrition consult per provider and aggressive oral care.    The patient's goals for the shift include     Problem: Pain - Adult  Goal: Verbalizes/displays adequate comfort level or baseline comfort level  Outcome: Progressing     Problem: Safety - Adult  Goal: Free from fall injury  Outcome: Progressing     Problem: Discharge Planning  Goal: Discharge to home or other facility with appropriate resources  Outcome: Progressing     Problem: Chronic Conditions and Co-morbidities  Goal: Patient's chronic conditions and co-morbidity symptoms are monitored and maintained or improved  Outcome: Progressing     Problem: Skin  Goal: Decreased wound size/increased tissue granulation at next dressing change  Outcome: Progressing  Flowsheets (Taken 7/2/2025 1834)  Decreased wound size/increased tissue granulation at next dressing change: Promote sleep for wound healing  Goal: Participates in plan/prevention/treatment measures  Outcome: Progressing  Flowsheets (Taken 7/2/2025 1834)  Participates in plan/prevention/treatment measures: Elevate heels  Goal: Prevent/manage excess moisture  Outcome: Progressing  Flowsheets (Taken 7/2/2025 1834)  Prevent/manage excess moisture:   Monitor for/manage infection if present   Cleanse incontinence/protect with barrier cream  Goal: Prevent/minimize sheer/friction injuries  Outcome: Progressing  Flowsheets (Taken 7/2/2025 1834)  Prevent/minimize sheer/friction injuries: Turn/reposition every 2 hours/use positioning/transfer devices  Goal:  Promote/optimize nutrition  Outcome: Progressing  Flowsheets (Taken 7/2/2025 1834)  Promote/optimize nutrition: Discuss with provider if NPO > 2 days  Goal: Promote skin healing  Outcome: Progressing  Flowsheets (Taken 7/2/2025 1834)  Promote skin healing:   Assess skin/pad under line(s)/device(s)   Turn/reposition every 2 hours/use positioning/transfer devices   Ensure correct size (line/device) and apply per  instructions

## 2025-07-03 ENCOUNTER — APPOINTMENT (OUTPATIENT)
Dept: RADIOLOGY | Facility: HOSPITAL | Age: 70
End: 2025-07-03
Payer: MEDICARE

## 2025-07-03 LAB
ALBUMIN SERPL BCP-MCNC: 2.7 G/DL (ref 3.4–5)
ALP SERPL-CCNC: 596 U/L (ref 33–136)
ALT SERPL W P-5'-P-CCNC: 38 U/L (ref 7–45)
AMMONIA PLAS-SCNC: 55 UMOL/L (ref 16–53)
ANION GAP SERPL CALC-SCNC: 17 MMOL/L (ref 10–20)
APTT PPP: 24 SECONDS (ref 26–36)
AST SERPL W P-5'-P-CCNC: 62 U/L (ref 9–39)
BASOPHILS # BLD AUTO: 0.01 X10*3/UL (ref 0–0.1)
BASOPHILS NFR BLD AUTO: 0.2 %
BILIRUB SERPL-MCNC: 4.2 MG/DL (ref 0–1.2)
BUN SERPL-MCNC: 63 MG/DL (ref 6–23)
BURR CELLS BLD QL SMEAR: NORMAL
CALCIUM SERPL-MCNC: 9.2 MG/DL (ref 8.6–10.6)
CHLORIDE SERPL-SCNC: 107 MMOL/L (ref 98–107)
CO2 SERPL-SCNC: 19 MMOL/L (ref 21–32)
CREAT SERPL-MCNC: 1.86 MG/DL (ref 0.5–1.05)
CREAT SERPL-MCNC: 2.19 MG/DL (ref 0.59–1.01)
CYSTATIN C SERPL-MCNC: 3.68 MG/L (ref 0.61–0.95)
DACRYOCYTES BLD QL SMEAR: NORMAL
EGFRCR SERPLBLD CKD-EPI 2021: 29 ML/MIN/1.73M*2
EGFRCR-CYS SERPLBLD CKD-EPI 2021: 16 ML/MIN/{1.73_M2}
EOSINOPHIL # BLD AUTO: 0.01 X10*3/UL (ref 0–0.7)
EOSINOPHIL NFR BLD AUTO: 0.2 %
ERYTHROCYTE [DISTWIDTH] IN BLOOD BY AUTOMATED COUNT: 18.3 % (ref 11.5–14.5)
GLUCOSE SERPL-MCNC: 128 MG/DL (ref 74–99)
HCT VFR BLD AUTO: 30.6 % (ref 36–46)
HGB BLD-MCNC: 10.1 G/DL (ref 12–16)
HYPOCHROMIA BLD QL SMEAR: NORMAL
IMM GRANULOCYTES # BLD AUTO: 0.05 X10*3/UL (ref 0–0.7)
IMM GRANULOCYTES NFR BLD AUTO: 1 % (ref 0–0.9)
INR PPP: 1.3 (ref 0.9–1.1)
LYMPHOCYTES # BLD AUTO: 0.4 X10*3/UL (ref 1.2–4.8)
LYMPHOCYTES NFR BLD AUTO: 7.9 %
MAGNESIUM SERPL-MCNC: 2.3 MG/DL (ref 1.6–2.4)
MCH RBC QN AUTO: 30.1 PG (ref 26–34)
MCHC RBC AUTO-ENTMCNC: 33 G/DL (ref 32–36)
MCV RBC AUTO: 91 FL (ref 80–100)
MONOCYTES # BLD AUTO: 0.01 X10*3/UL (ref 0.1–1)
MONOCYTES NFR BLD AUTO: 0.2 %
NEUTROPHILS # BLD AUTO: 4.61 X10*3/UL (ref 1.2–7.7)
NEUTROPHILS NFR BLD AUTO: 90.5 %
NRBC BLD-RTO: 0 /100 WBCS (ref 0–0)
PHOSPHATE SERPL-MCNC: 3.8 MG/DL (ref 2.5–4.9)
PLATELET # BLD AUTO: 155 X10*3/UL (ref 150–450)
POTASSIUM SERPL-SCNC: 3.6 MMOL/L (ref 3.5–5.3)
PROT SERPL-MCNC: 5.4 G/DL (ref 6.4–8.2)
PROTHROMBIN TIME: 14.4 SECONDS (ref 9.8–12.4)
RBC # BLD AUTO: 3.36 X10*6/UL (ref 4–5.2)
RBC MORPH BLD: NORMAL
SODIUM SERPL-SCNC: 139 MMOL/L (ref 136–145)
TRIGL SERPL-MCNC: 263 MG/DL (ref 0–149)
VANCOMYCIN SERPL-MCNC: 24.6 UG/ML (ref 5–20)
WBC # BLD AUTO: 5.1 X10*3/UL (ref 4.4–11.3)

## 2025-07-03 PROCEDURE — 99232 SBSQ HOSP IP/OBS MODERATE 35: CPT | Performed by: INTERNAL MEDICINE

## 2025-07-03 PROCEDURE — 85610 PROTHROMBIN TIME: CPT

## 2025-07-03 PROCEDURE — 85025 COMPLETE CBC W/AUTO DIFF WBC: CPT | Performed by: NURSE PRACTITIONER

## 2025-07-03 PROCEDURE — 2500000004 HC RX 250 GENERAL PHARMACY W/ HCPCS (ALT 636 FOR OP/ED): Performed by: NURSE PRACTITIONER

## 2025-07-03 PROCEDURE — 2500000004 HC RX 250 GENERAL PHARMACY W/ HCPCS (ALT 636 FOR OP/ED)

## 2025-07-03 PROCEDURE — 82140 ASSAY OF AMMONIA: CPT

## 2025-07-03 PROCEDURE — 83735 ASSAY OF MAGNESIUM: CPT | Performed by: NURSE PRACTITIONER

## 2025-07-03 PROCEDURE — 76770 US EXAM ABDO BACK WALL COMP: CPT | Performed by: RADIOLOGY

## 2025-07-03 PROCEDURE — 2500000001 HC RX 250 WO HCPCS SELF ADMINISTERED DRUGS (ALT 637 FOR MEDICARE OP)

## 2025-07-03 PROCEDURE — 84100 ASSAY OF PHOSPHORUS: CPT | Performed by: NURSE PRACTITIONER

## 2025-07-03 PROCEDURE — 80053 COMPREHEN METABOLIC PANEL: CPT | Performed by: NURSE PRACTITIONER

## 2025-07-03 PROCEDURE — 2500000002 HC RX 250 W HCPCS SELF ADMINISTERED DRUGS (ALT 637 FOR MEDICARE OP, ALT 636 FOR OP/ED): Performed by: NURSE PRACTITIONER

## 2025-07-03 PROCEDURE — 2500000001 HC RX 250 WO HCPCS SELF ADMINISTERED DRUGS (ALT 637 FOR MEDICARE OP): Performed by: NURSE PRACTITIONER

## 2025-07-03 PROCEDURE — 84478 ASSAY OF TRIGLYCERIDES: CPT

## 2025-07-03 PROCEDURE — 76770 US EXAM ABDO BACK WALL COMP: CPT

## 2025-07-03 PROCEDURE — 1170000001 HC PRIVATE ONCOLOGY ROOM DAILY

## 2025-07-03 PROCEDURE — 80202 ASSAY OF VANCOMYCIN: CPT

## 2025-07-03 PROCEDURE — 99233 SBSQ HOSP IP/OBS HIGH 50: CPT

## 2025-07-03 PROCEDURE — 2500000005 HC RX 250 GENERAL PHARMACY W/O HCPCS: Performed by: NURSE PRACTITIONER

## 2025-07-03 RX ORDER — THIAMINE HYDROCHLORIDE 100 MG/ML
100 INJECTION, SOLUTION INTRAMUSCULAR; INTRAVENOUS DAILY
Status: DISCONTINUED | OUTPATIENT
Start: 2025-07-03 | End: 2025-07-05 | Stop reason: HOSPADM

## 2025-07-03 RX ADMIN — Medication 15 ML: at 14:28

## 2025-07-03 RX ADMIN — DEXAMETHASONE SODIUM PHOSPHATE 1 MG: 4 INJECTION, SOLUTION INTRA-ARTICULAR; INTRALESIONAL; INTRAMUSCULAR; INTRAVENOUS; SOFT TISSUE at 02:14

## 2025-07-03 RX ADMIN — THIAMINE HYDROCHLORIDE 100 MG: 100 INJECTION, SOLUTION INTRAMUSCULAR; INTRAVENOUS at 10:09

## 2025-07-03 RX ADMIN — PIPERACILLIN SODIUM AND TAZOBACTAM SODIUM 3.38 G: 3; .375 INJECTION, SOLUTION INTRAVENOUS at 05:09

## 2025-07-03 RX ADMIN — DEXAMETHASONE SODIUM PHOSPHATE 1 MG: 4 INJECTION, SOLUTION INTRA-ARTICULAR; INTRALESIONAL; INTRAMUSCULAR; INTRAVENOUS; SOFT TISSUE at 08:45

## 2025-07-03 RX ADMIN — LIDOCAINE HYDROCHLORIDE 10 ML: 20 SOLUTION ORAL; TOPICAL at 14:28

## 2025-07-03 RX ADMIN — LIDOCAINE HYDROCHLORIDE 10 ML: 20 SOLUTION ORAL; TOPICAL at 05:09

## 2025-07-03 RX ADMIN — DEXAMETHASONE SODIUM PHOSPHATE 1 MG: 4 INJECTION, SOLUTION INTRA-ARTICULAR; INTRALESIONAL; INTRAMUSCULAR; INTRAVENOUS; SOFT TISSUE at 17:14

## 2025-07-03 RX ADMIN — Medication 15 ML: at 08:41

## 2025-07-03 RX ADMIN — LIDOCAINE HYDROCHLORIDE 10 ML: 20 SOLUTION ORAL; TOPICAL at 10:09

## 2025-07-03 RX ADMIN — LACTULOSE 300 ML: 10 SOLUTION ORAL at 12:03

## 2025-07-03 ASSESSMENT — PAIN SCALES - PAIN ASSESSMENT IN ADVANCED DEMENTIA (PAINAD)
BODYLANGUAGE: RELAXED
TOTALSCORE: 0
CONSOLABILITY: NO NEED TO CONSOLE
FACIALEXPRESSION: SMILING OR INEXPRESSIVE
BREATHING: NORMAL
TOTALSCORE: 0
CONSOLABILITY: NO NEED TO CONSOLE
BODYLANGUAGE: RELAXED
FACIALEXPRESSION: SMILING OR INEXPRESSIVE
BREATHING: NORMAL

## 2025-07-03 ASSESSMENT — PAIN SCALES - GENERAL
PAINLEVEL_OUTOF10: 0 - NO PAIN
PAINLEVEL_OUTOF10: 0 - NO PAIN

## 2025-07-03 ASSESSMENT — COGNITIVE AND FUNCTIONAL STATUS - GENERAL
MOBILITY SCORE: 6
CLIMB 3 TO 5 STEPS WITH RAILING: TOTAL
DRESSING REGULAR UPPER BODY CLOTHING: TOTAL
EATING MEALS: TOTAL
DRESSING REGULAR LOWER BODY CLOTHING: TOTAL
HELP NEEDED FOR BATHING: TOTAL
PERSONAL GROOMING: TOTAL
MOVING TO AND FROM BED TO CHAIR: TOTAL
TOILETING: TOTAL
STANDING UP FROM CHAIR USING ARMS: TOTAL
WALKING IN HOSPITAL ROOM: TOTAL
DAILY ACTIVITIY SCORE: 6
TURNING FROM BACK TO SIDE WHILE IN FLAT BAD: TOTAL
MOVING FROM LYING ON BACK TO SITTING ON SIDE OF FLAT BED WITH BEDRAILS: TOTAL

## 2025-07-03 ASSESSMENT — PAIN - FUNCTIONAL ASSESSMENT
PAIN_FUNCTIONAL_ASSESSMENT: PAINAD (PAIN ASSESSMENT IN ADVANCED DEMENTIA SCALE)
PAIN_FUNCTIONAL_ASSESSMENT: PAINAD (PAIN ASSESSMENT IN ADVANCED DEMENTIA SCALE)

## 2025-07-03 ASSESSMENT — PAIN SCALES - WONG BAKER: WONGBAKER_NUMERICALRESPONSE: NO HURT

## 2025-07-03 NOTE — CARE PLAN
Problem: Pain - Adult  Goal: Verbalizes/displays adequate comfort level or baseline comfort level  Outcome: Progressing     Problem: Safety - Adult  Goal: Free from fall injury  Outcome: Progressing     Problem: Discharge Planning  Goal: Discharge to home or other facility with appropriate resources  Outcome: Progressing     Problem: Chronic Conditions and Co-morbidities  Goal: Patient's chronic conditions and co-morbidity symptoms are monitored and maintained or improved  Outcome: Progressing     Problem: Nutrition  Goal: Nutrient intake appropriate for maintaining nutritional needs  Outcome: Progressing     Problem: Skin  Goal: Decreased wound size/increased tissue granulation at next dressing change  Outcome: Progressing  Flowsheets (Taken 7/2/2025 2257)  Decreased wound size/increased tissue granulation at next dressing change: Promote sleep for wound healing  Goal: Participates in plan/prevention/treatment measures  Outcome: Progressing  Flowsheets (Taken 7/2/2025 2257)  Participates in plan/prevention/treatment measures: Elevate heels  Goal: Prevent/manage excess moisture  Outcome: Progressing  Flowsheets (Taken 7/2/2025 2257)  Prevent/manage excess moisture:   Monitor for/manage infection if present   Cleanse incontinence/protect with barrier cream  Goal: Prevent/minimize sheer/friction injuries  Outcome: Progressing  Flowsheets (Taken 7/2/2025 2257)  Prevent/minimize sheer/friction injuries:   Turn/reposition every 2 hours/use positioning/transfer devices   Use pull sheet  Goal: Promote/optimize nutrition  Outcome: Progressing  Flowsheets (Taken 7/2/2025 2257)  Promote/optimize nutrition: Monitor/record intake including meals  Goal: Promote skin healing  Outcome: Progressing  Flowsheets (Taken 7/2/2025 2257)  Promote skin healing:   Protective dressings over bony prominences   Turn/reposition every 2 hours/use positioning/transfer devices   The patient's goals for the shift include      The clinical goals  for the shift include pt will remain HDS and VSS throughout shift

## 2025-07-03 NOTE — ASSESSMENT & PLAN NOTE
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 [facility] consulted on admission. Patient s/p C1 Gemcitabine/Cisplatin inpatient 6/27. Labs noteable for acute hyponatremia, Na+124 (6/27) previously unresponsive to IVF at OSH despite hypovolemia hyponatremia like picture. Nephrology consulted 6/27, ddx hypovolemia vs SIADH vs Cisplatin-induced; rec q4 sodium checks initially, further aggressive IVF boluses (x3 6/27), started sodium bicarb TID 6/28, Na and CHIO showing improvement as of 7/2. Supportive Onc consulted 6/28 for symptom mgmt (nausea/poor PO intake), recs followed. 6/29 AM pt with significant AMS. CT head (6/29) negative. Pt orientation improved; A&Ox3 (6/30). Liver and kidney function worsening; Liver and Renal US ordered (6/30) which showed known metastatic disease of liver, mass in tim hepatis compatible w/ known necrotic lymphadenopathy, gallbladder sludge w/o evidence of acute cholecystitis, diffuse mod-to-large volume ascites, and c/f PV thrombosis- further eval w/ CT recommended. CT liver (6/30) showed known metastatic disease in liver, no evidence of portal vein thrombus, moderate volume ascites and interval development of R anterior abdominal wall soft tissue nodule. GI/Liver consulted re CBD stent removal (6/30) rec daily labs w/ INR, repeat blood cultures and Zosyn/Vanc started (6/30-7/3) for c/f cholangitis; pt remains afebrile, leukocytosis resolved with no other signs of cholangitis; antibiotics stopped 7/3. Hepatology to also hold on stent exchange at this time d/t significant katina-procedural risks. Liver and kidney function improving as of 7/2,  however pt functional status worsening; pt unable to ambulate, sip from straw or drink out of cup at this time. Speech consulted (7/2) rec NPO and additional enteral nutrition. Dietitian reconsulted (7/2) rec additional nutrition support if aggressive treatment to continue. Updates sent to Dr. Parks (7/2), awaiting further recommendations/input regarding treatment goals prior to further intervention. PT/OT rec SNF. DC to SNF pending improvement in symptoms, functional status and likely facility placement.     Updates 7/3:  - pt continues to remain very lethargic, responsive to verbal stimuli  - A&O to name, disoriented to place, time and situation  - Ammonia 55, lactulose enema ordered  - Nephrology rec renal US, spot urine protein to creatinine ratio, and start mIVF LR @ 80/hr for at least 1 L (7/2)  - Renal US (7/2) showed no obstructive nephropathy, moderate ascites  - Pt PO intake declining per nursing; pt unable to sip from straw or drink out of cup at this time. Speech consulted (7/2) rec NPO and additional enteral nutrition. Dietitian reconsulted (7/2) rec additional nutrition support  - reached out to Dr. Willis with updates, awaiting response  - pt afebrile, leukocytosis resolved and no other signs of cholangitis; antibiotics stopped 7/3    # Neoplasm-related pain   # FTT  # Poor PO intake   # N/V  - Supportive onc consulted 6/28 for symptom/pain management, recs followed  - Continue with home Oxycodone 10mg q6hrs PRN severe pain, added Oxycodone 5mg q6hrs PRN moderate pain  - Continue with home PO Zofran 4mg q6 hours PRN N/V 2nd line, and PO/IV Compazine 10mg q6hrs PRN N/V 1st line  - STOPPED home mirtazapine 15mg nightly and started Zyprexa 5mg nightly 6/29- however then HELD Zyprexa 6/29 d/t AMS  - Nutrition consulted 6/27, rec regular diet as tolerated, Ensure Clear TID with meals  - PT / OT consulted on admit, ampact 8, SNF list provided 6/28  - Pt PO intake declining per nursing; pt unable to sip  from straw or drink out of cup as of 7/2  - Speech consulted (7/2) rec NPO and additional enteral nutrition  - Dietitian reconsulted (7/2) rec additional nutrition support   - emailed Dr. Willis with pt updates, awaiting response     # Transaminitis - improving  # c/f Cholangitis  # Hx biliary compression stricture s/p stent 4/2025   - Likely multifactorial in setting of metastasis to liver and fatty liver  - No abd pain, afebrile, no chills at this time, endorses intermittent nausea  - Alk phos 530, ALT 53, AST 83, Tbili 2.6 upon admission (6/26) --> now uptrending (6/28) alk phos 1,190, alt 73, ast 179, tbili 3.9--> showing improvement (6/29) alk phos 841, ALT 55, , Tbili 4--> (7/1) alk phos 701, ALT 51, AST 90, tbili 4.5  - 4/15 s/p plastic biliary stent placement, plan for repeat ERCP in 2 months for stent removal, a 4/29 GI appt was cancelled unclear why, otherwise lost to GI follow up  - CT A/P (6/22) showed overall progression of metastatic disease, new small volume ascites, satisfactory placement of CBD stent without significant upstream bilairy dilatation   - Ammonia 74 (6/28)--> repeat ammonia 49 (6/29) s/p 1x dose of Lactulose (ordered prior to repeat ammonia regardless)  - Hepatitis panel (6/28): neg  - Blood Cx x2 (6/28): NGTD   - empiric antibiotics started Zosyn/Vanc (6/30-7/3) for c/f cholangitis; pt afebrile, leukocytosis resolved and no other signs of cholangitis; antibiotics stopped 7/3  - renal/liver US w/ doppler (6/30) showed known metastatic disease of liver, mass in tim hepatis compatible w/ known necrotic lymphadenopathy, gallbladder sludge w/o evidence of acute cholecystitis, diffuse mod-to-large volume ascites, and c/f PV thrombosis- further eval w/ CT recommended  - CT liver (6/30) showed known metastatic disease in liver, no evidence of portal vein thrombus, moderate volume ascites, interval development of R anterior abdominal wall soft tissue nodule  - GI/Liver consulted re  CBD stent removal, rec daily labs w/ INR, repeat blood cultures empiric Zosyn/Vanc (6/30- current) for c/f cholangitis; GI to hold on stent exchange at this time d/t significant katina-procedural risks  - repeat blood cx x2 (6/30) NGTD    # AMS  - Ddx include metabolic (elevated LFTs/ ammonia/ CHIO), structural, infectious  - As of 7/3 AM- responsive to verbal stimuli, disoriented to time, place and situation, not able to speak, unable to keep eyes open during interview  - As of 6/30 AM- pt is ordered x3  # Metabolic  - Monitor electrolytes; replete PRN. No hypo/hyperglycemia  - ABG (6/29): pH 7.24, pCO2 25, pO2 82, SO2 98; RT consulted, RN to place on humidified 2L NC face mask or tent (mouth breathing)  - TSH (6/29): 1.62  - LFTs continue to improve, Tbili 4 (6/29)--> 4.8 (6/30)--> 4.6 (7/2)--> 4.2 (7/3)  - sCr downtrending, sCr 1.81 (6/29)--> 2.33 (6/30)--> 2.08 (7/2)--> 1.86 (7/3)  - s/p 1x dose of Lactulose 6/29 for Ammonia 73  - repeat ammonia 49 (6/30)  - repeat ammonia 55 (7/3), rectal lactulose enema ordered  - Uric Acid 10.2 (6/28)--> 11.2 (6/29)-->12.2 (6/30) s/p 1x dose of rasburicase--> 2.6 (6/30)  - Vit B12 level (6/29): >2,000  - Zyprexa started 6/28 and held as of 6/29  # Infectious  - WBC 21.6k (6/29)--> 18.1k (6/30)--> 13.9k (7/1)--> 11k (7/2)--> 5.1 (7/3)  - Repeat CXR (6/29) without c/f infection  - UA with reflex (6/30): +protein, +blood, +urobilinogen, +leuksm +WBC, +RBC, +1 bacteria; UCX inconclusive with mixed bacterial gabino  - Lactate on ABG 1.9, procal 7.78 (6/29), repeat procal 3.89 (7/1)  - Recent CT a/p (6/22) showing CBD stent intact without significant upstream biliary dilation  - Pt does have CBD stent that needs to be removed- pt without fevers/chills, HoTN, or abdominal pain- no obvious concern for infected stent; GI/Liver consulted re CBD stent removal (6/30) rec daily labs w/ INR, repeat blood cultures, empiric Zosyn/Vanc (6/30-7/3) for c/f cholangitis; pt afebrile, leukocytosis  resolved and no other signs of cholangitis; antibiotics stopped 7/3  - GI to hold on stent exchange at this time d/t significant katina-procedural risks  # Structural  - No known brain mets  - CT head (6/29): negative for acute process  - CBD stent in place via CT a/p (6/22)    # s/p C1 Gemcitabine/Cisplatin Inpatient  # Newly Diagnosed 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 Willis  - Diagnosed in 5/2025  - Discussed at tumor board on 6/4/2025 and rec treatment with Durvalumab + Gemcitabine / Cisplatin   - Treatment was scheduled to begin on 6/22/2025, however, unable to due to admission   - CT A/P (6/22) showed overall progression of metestatic disease, new small volume ascites, satisfactory placement of CBD stent without significant upstream bilairy dilatation, new small pleual effusion  - Primary oncologist rec transfer to [facility] for intiation of emergent inpatient chemotherapy   - Oncology consulted upon arrival to [facility], 6/27 s/p Gemcitabine/Cisplatin while inpatient, durvalumab not permitted inpatient  - NGS panel results pending 6/28     # Leukocytosis-- resolving  - Likely 2/2 steroids vs infectious (cholangitis?)  - See above re c/f cholangitis  - WBC BL~10k, (6/27) 12.6 --> 21.6k (6/29)--> 18.1k (6/30)--> 13.9k (7/1)--> 5.1 (7/3)  - CXR (6/20) w/ atelectasis, no consolidation  - CT A/P (6/22) showed worsening disease, new small/mod ascites, satsifactory placement of CBD stent w/o dilatation  - Repeat CXR (6/29): no c/f infection  - UA with reflex (6/30): +protein, +blood, +urobilinogen, +leuksm +WBC, +RBC, +1 bacteria; UCX inconclusive with mixed bacterial gabino  - Lactate level 1.9 via ABG (6/29): Procal (6/29): 7.78 (6/29), repeat procal 3.89 (7/2)  - MSSA (6/27): +  - Blood cx x2 (6/28): NGTD   - Blood cx x2 (6/30): NGTD  - s/p 10mg IVP decadron premed 6/27  - CBC daily     # CHIO on CKD III - improving  - Recent sCr BL ~1.2-1.3 ;  sCr 1.08 (6/27)--> 1.81 (6/29)--> 2.33 (6/30)--> 2.19 (7/1)--> 2.08 (7/2)--> 1.86 (7/3)  - s/p 500mL NS bolus 6/29 for CHIO without improvement  - s/p 500ml LR bolus 6/30  - Nephrology rec renal US, spot urine protein to creatinine ratio, and start mIVF LR @ 80/hr for at least 1 L (7/2)  - s/p mIVF LR @80/hr for 12 hours (7/3)  - Renal US (7/2) showed no obstructive nephropathy, moderate ascites  - Avoid nephrotoxic agents  - Monitor CMP daily  - Nephrology following    # Hyponatremia-- resolved  - Na 131 on admit to [place] (6/20) --> Na 124 (6/27) --> Na 129 (6/28)--> 130 (6/29 and 6/30)--> 132 (7/1)--> 136 (7/2)--> 139 (7/3)  - DDx includes hypovolemia vs SIADH vs Cisplatin-induced (although hyponatremia started prior to Cisplatin, so if any effect not the whole etiology)  - Serum osm 275, urine Cr 197, urine Na <10, urine osm 777 (6/26)  - Repeat serum osm, urine cr 211, urine Na 10, urine osm 922 (6/27)  - Repeat lytes (6/30) urine cr 185.6, urine Na 11, urine osm 656 (6/30)  - s/p 1L fluid restriction 6/26 -- per Dr. Alvarez hold off for now   - Nephrology consulted 6/27, ddx hypovolemia vs SIADH vs Cisplatin-induced. Rec q4 sodium checks initially, started sodium bicarb TID 6/28, Na showing improvement as of 6/29  - Of note, pt received x2 additional 1L NS boluses as part of chemo regimen (6/27-6/28)   - s/p 500mL NS bolus 6/29 per nephrology d/t kidney function  - updated Nephrology recs (6/30) rec hold amlodipine, obtain Cystatin C, continue sodium bicarb 1300 TID, s/p 500mL LR bolus  - Cystatin C 3.68 (6/30)    # Hyperkalemia - improving   - 2/2 CHIO  - K 5.5 (6/29) s/p 1x dose of Lokelma  - K 5.5 (6/30) s/p Lokelma x2 doses--> K 4.6 (7/1)  - Tele monitoring ordered 6/30  - Nephrology following    # Tachycardia - improving  - -120s since admission 6/26; asymptomatic, no CP or palpitations, no O2 requirement, no pain  - Ddx includes dehydration vs deconditioning from malignancy vs pain vs infection   -  s/p multiple IVF boluses (x3 6/25), 500mL NS 6/29  - EKG (6/28): sinus tach, QTc 446  - Low threshold for CT angio chest if tachycardia worsens or develops new O2 requirement       # Oral thrush, improving  # Mouth Pain  - Treated with oral nystatin 5mL QID swish and spit- completed course 6/29  - Started biotene TID 6/27   - Added scheduled Dex oral liquid 1mg TID swish and swallow and magic mouth wash 10mL swish and swallow q4hrs scheduled for mouth pain  - Humidified face tent/face mask to be applied by nursing to aid in dry mouth     # HTN  - HOLD Amlodipine 10mg daily per updated Neph recs     # MDD  # LUIS FELIPE  - Continue with home Paroxetine 20mg daily      # Type 2 DM  - Last Hgb A1c 5.9 (4/29/24); not on any diabetic medications  - Due to decreased PO intake and normal blood sugars, insulin not ordered on admit, CTM  - Hypoglycemic protocol in place      # GERD  - Continue Protonix 40mg daily      # DVT Prophy: Stopped prophy Lovenox 6/29 and started prophy Heparin SQ 6/29 d/t CHIO. SCDs, encourage safe ambulation     DISPO:  - DNR, DNI - no ICU - confirmed on admit   - DC to SNF pending improvement in symptoms, CHIO, LFTs, neph recs, and facility placement  - NOK: Natalio Neal [phone]   - FUV with Dr. Willis (oncology) 7/24

## 2025-07-03 NOTE — PROGRESS NOTES
"Jordyn Neal is a 70 y.o. female on day 7 of admission presenting with Hyponatremia.    Subjective   Seen this AM at the bedside. Pt responsive to verbal stimuli. Pt alert to name, disoriented to time, place and situation. She denies pain. She also denies N/V, also endorsing dry mouth and thirst. Discussed with her plans for initiation of supplemental nutrition today.     Objective     Physical Exam  Constitutional:       Appearance: She is ill-appearing.   HENT:      Head: Normocephalic.      Right Ear: External ear normal.      Left Ear: External ear normal.      Nose: Nose normal.      Mouth/Throat:      Mouth: Mucous membranes are dry.   Eyes:      Extraocular Movements: Extraocular movements intact.      Conjunctiva/sclera: Conjunctivae normal.   Cardiovascular:      Rate and Rhythm: Normal rate and regular rhythm.   Pulmonary:      Effort: Pulmonary effort is normal.   Abdominal:      General: Bowel sounds are normal. There is distension.      Palpations: Abdomen is soft.   Musculoskeletal:      Cervical back: Normal range of motion.   Skin:     General: Skin is warm and dry.   Neurological:      Mental Status: She is oriented to person, place, and time.      Motor: Weakness present.       Last Recorded Vitals  Blood pressure 122/78, pulse 105, temperature 36.5 °C (97.7 °F), temperature source Temporal, resp. rate 17, height 1.676 m (5' 6\"), weight 95.5 kg (210 lb 8.6 oz), SpO2 95%.  Intake/Output last 3 Shifts:  I/O last 3 completed shifts:  In: 1757.3 (18.4 mL/kg) [I.V.:957.3 (10 mL/kg); IV Piggyback:800]  Out: 1750 (18.3 mL/kg) [Urine:1750 (0.5 mL/kg/hr)]  Weight: 95.5 kg     Relevant Results     .Scheduled medications  Scheduled Medications[1]  Continuous medications  Continuous Medications[2]  PRN medications  PRN Medications[3]    .  Results for orders placed or performed during the hospital encounter of 06/26/25 (from the past 24 hours)   Albumin-Creatinine Ratio, Urine Random   Result Value Ref Range "    Albumin, Urine Random 10.3 Not established mg/L    Creatinine, Urine Random 60.8 20.0 - 320.0 mg/dL    Albumin/Creatinine Ratio 16.9 <30.0 ug/mg Creat   Phosphorus   Result Value Ref Range    Phosphorus 3.8 2.5 - 4.9 mg/dL   Magnesium   Result Value Ref Range    Magnesium 2.30 1.60 - 2.40 mg/dL   Comprehensive Metabolic Panel   Result Value Ref Range    Glucose 128 (H) 74 - 99 mg/dL    Sodium 139 136 - 145 mmol/L    Potassium 3.6 3.5 - 5.3 mmol/L    Chloride 107 98 - 107 mmol/L    Bicarbonate 19 (L) 21 - 32 mmol/L    Anion Gap 17 10 - 20 mmol/L    Urea Nitrogen 63 (H) 6 - 23 mg/dL    Creatinine 1.86 (H) 0.50 - 1.05 mg/dL    eGFR 29 (L) >60 mL/min/1.73m*2    Calcium 9.2 8.6 - 10.6 mg/dL    Albumin 2.7 (L) 3.4 - 5.0 g/dL    Alkaline Phosphatase 596 (H) 33 - 136 U/L    Total Protein 5.4 (L) 6.4 - 8.2 g/dL    AST 62 (H) 9 - 39 U/L    Bilirubin, Total 4.2 (H) 0.0 - 1.2 mg/dL    ALT 38 7 - 45 U/L   CBC and Auto Differential   Result Value Ref Range    WBC 5.1 4.4 - 11.3 x10*3/uL    nRBC 0.0 0.0 - 0.0 /100 WBCs    RBC 3.36 (L) 4.00 - 5.20 x10*6/uL    Hemoglobin 10.1 (L) 12.0 - 16.0 g/dL    Hematocrit 30.6 (L) 36.0 - 46.0 %    MCV 91 80 - 100 fL    MCH 30.1 26.0 - 34.0 pg    MCHC 33.0 32.0 - 36.0 g/dL    RDW 18.3 (H) 11.5 - 14.5 %    Platelets 155 150 - 450 x10*3/uL    Neutrophils % 90.5 40.0 - 80.0 %    Immature Granulocytes %, Automated 1.0 (H) 0.0 - 0.9 %    Lymphocytes % 7.9 13.0 - 44.0 %    Monocytes % 0.2 2.0 - 10.0 %    Eosinophils % 0.2 0.0 - 6.0 %    Basophils % 0.2 0.0 - 2.0 %    Neutrophils Absolute 4.61 1.20 - 7.70 x10*3/uL    Immature Granulocytes Absolute, Automated 0.05 0.00 - 0.70 x10*3/uL    Lymphocytes Absolute 0.40 (L) 1.20 - 4.80 x10*3/uL    Monocytes Absolute 0.01 (L) 0.10 - 1.00 x10*3/uL    Eosinophils Absolute 0.01 0.00 - 0.70 x10*3/uL    Basophils Absolute 0.01 0.00 - 0.10 x10*3/uL   Coagulation Screen   Result Value Ref Range    Protime 14.4 (H) 9.8 - 12.4 seconds    INR 1.3 (H) 0.9 - 1.1    aPTT  24 (L) 26 - 36 seconds   Vancomycin   Result Value Ref Range    Vancomycin 24.6 (H) 5.0 - 20.0 ug/mL   Ammonia   Result Value Ref Range    Ammonia 55 (H) 16 - 53 umol/L   Morphology   Result Value Ref Range    RBC Morphology See Below     Hypochromia Mild     Teardrop Cells Few     Kisha Cells Few      Assessment & Plan  Hyponatremia  Jordyn Manzano is a 70 year-old female with PMH of 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 [facility] consulted on admission. Patient s/p C1 Gemcitabine/Cisplatin inpatient 6/27. Labs noteable for acute hyponatremia, Na+124 (6/27) previously unresponsive to IVF at OSH despite hypovolemia hyponatremia like picture. Nephrology consulted 6/27, ddx hypovolemia vs SIADH vs Cisplatin-induced; rec q4 sodium checks initially, further aggressive IVF boluses (x3 6/27), started sodium bicarb TID 6/28, Na and CHIO showing improvement as of 7/2. Supportive Onc consulted 6/28 for symptom mgmt (nausea/poor PO intake), recs followed. 6/29 AM pt with significant AMS. CT head (6/29) negative. Pt orientation improved; A&Ox3 (6/30). Liver and kidney function worsening; Liver and Renal US ordered (6/30) which showed known metastatic disease of liver, mass in tim hepatis compatible w/ known necrotic lymphadenopathy, gallbladder sludge w/o evidence of acute cholecystitis, diffuse mod-to-large volume ascites, and c/f PV thrombosis- further eval w/ CT recommended. CT liver (6/30) showed known metastatic disease in liver, no evidence of portal vein thrombus, moderate volume ascites and interval development of R anterior abdominal wall soft tissue nodule. GI/Liver consulted re CBD stent removal (6/30) rec daily  labs w/ INR, repeat blood cultures and Zosyn/Vanc started (6/30-7/3) for c/f cholangitis; pt remains afebrile, leukocytosis resolved with no other signs of cholangitis; antibiotics stopped 7/3. Hepatology to also hold on stent exchange at this time d/t significant katina-procedural risks. Liver and kidney function improving as of 7/2, however pt functional status worsening; pt unable to ambulate, sip from straw or drink out of cup at this time. Speech consulted (7/2) rec NPO and additional enteral nutrition. Dietitian reconsulted (7/2) rec additional nutrition support if aggressive treatment to continue. Updates sent to Dr. Parks (7/2), awaiting further recommendations/input regarding treatment goals prior to further intervention. PT/OT rec SNF. DC to SNF pending improvement in symptoms, functional status and likely facility placement.     Updates 7/3:  - pt continues to remain very lethargic, responsive to verbal stimuli  - A&O to name, disoriented to place, time and situation  - Ammonia 55, lactulose enema ordered  - Nephrology rec renal US, spot urine protein to creatinine ratio, and start mIVF LR @ 80/hr for at least 1 L (7/2)  - Renal US (7/2) showed no obstructive nephropathy, moderate ascites  - Pt PO intake declining per nursing; pt unable to sip from straw or drink out of cup at this time. Speech consulted (7/2) rec NPO and additional enteral nutrition. Dietitian reconsulted (7/2) rec additional nutrition support  - reached out to Dr. Willis with updates, awaiting response  - pt afebrile, leukocytosis resolved and no other signs of cholangitis; antibiotics stopped 7/3    # Neoplasm-related pain   # FTT  # Poor PO intake   # N/V  - Supportive onc consulted 6/28 for symptom/pain management, recs followed  - Continue with home Oxycodone 10mg q6hrs PRN severe pain, added Oxycodone 5mg q6hrs PRN moderate pain  - Continue with home PO Zofran 4mg q6 hours PRN N/V 2nd line, and PO/IV Compazine 10mg q6hrs PRN N/V  1st line  - STOPPED home mirtazapine 15mg nightly and started Zyprexa 5mg nightly 6/29- however then HELD Zyprexa 6/29 d/t AMS  - Nutrition consulted 6/27, rec regular diet as tolerated, Ensure Clear TID with meals  - PT / OT consulted on admit, ampact 8, SNF list provided 6/28  - Pt PO intake declining per nursing; pt unable to sip from straw or drink out of cup as of 7/2  - Speech consulted (7/2) rec NPO and additional enteral nutrition  - Dietitian reconsulted (7/2) rec additional nutrition support   - emailed Dr. Willis with pt updates, awaiting response     # Transaminitis - improving  # c/f Cholangitis  # Hx biliary compression stricture s/p stent 4/2025   - Likely multifactorial in setting of metastasis to liver and fatty liver  - No abd pain, afebrile, no chills at this time, endorses intermittent nausea  - Alk phos 530, ALT 53, AST 83, Tbili 2.6 upon admission (6/26) --> now uptrending (6/28) alk phos 1,190, alt 73, ast 179, tbili 3.9--> showing improvement (6/29) alk phos 841, ALT 55, , Tbili 4--> (7/1) alk phos 701, ALT 51, AST 90, tbili 4.5  - 4/15 s/p plastic biliary stent placement, plan for repeat ERCP in 2 months for stent removal, a 4/29 GI appt was cancelled unclear why, otherwise lost to GI follow up  - CT A/P (6/22) showed overall progression of metastatic disease, new small volume ascites, satisfactory placement of CBD stent without significant upstream bilairy dilatation   - Ammonia 74 (6/28)--> repeat ammonia 49 (6/29) s/p 1x dose of Lactulose (ordered prior to repeat ammonia regardless)  - Hepatitis panel (6/28): neg  - Blood Cx x2 (6/28): NGTD   - empiric antibiotics started Zosyn/Vanc (6/30-7/3) for c/f cholangitis; pt afebrile, leukocytosis resolved and no other signs of cholangitis; antibiotics stopped 7/3  - renal/liver US w/ doppler (6/30) showed known metastatic disease of liver, mass in tim hepatis compatible w/ known necrotic lymphadenopathy, gallbladder sludge w/o evidence  of acute cholecystitis, diffuse mod-to-large volume ascites, and c/f PV thrombosis- further eval w/ CT recommended  - CT liver (6/30) showed known metastatic disease in liver, no evidence of portal vein thrombus, moderate volume ascites, interval development of R anterior abdominal wall soft tissue nodule  - GI/Liver consulted re CBD stent removal, rec daily labs w/ INR, repeat blood cultures empiric Zosyn/Vanc (6/30- current) for c/f cholangitis; GI to hold on stent exchange at this time d/t significant katina-procedural risks  - repeat blood cx x2 (6/30) NGTD    # AMS  - Ddx include metabolic (elevated LFTs/ ammonia/ CHIO), structural, infectious  - As of 7/3 AM- responsive to verbal stimuli, disoriented to time, place and situation, not able to speak, unable to keep eyes open during interview  - As of 6/30 AM- pt is ordered x3  # Metabolic  - Monitor electrolytes; replete PRN. No hypo/hyperglycemia  - ABG (6/29): pH 7.24, pCO2 25, pO2 82, SO2 98; RT consulted, RN to place on humidified 2L NC face mask or tent (mouth breathing)  - TSH (6/29): 1.62  - LFTs continue to improve, Tbili 4 (6/29)--> 4.8 (6/30)--> 4.6 (7/2)--> 4.2 (7/3)  - sCr downtrending, sCr 1.81 (6/29)--> 2.33 (6/30)--> 2.08 (7/2)--> 1.86 (7/3)  - s/p 1x dose of Lactulose 6/29 for Ammonia 73  - repeat ammonia 49 (6/30)  - repeat ammonia 55 (7/3), rectal lactulose enema ordered  - Uric Acid 10.2 (6/28)--> 11.2 (6/29)-->12.2 (6/30) s/p 1x dose of rasburicase--> 2.6 (6/30)  - Vit B12 level (6/29): >2,000  - Zyprexa started 6/28 and held as of 6/29  # Infectious  - WBC 21.6k (6/29)--> 18.1k (6/30)--> 13.9k (7/1)--> 11k (7/2)--> 5.1 (7/3)  - Repeat CXR (6/29) without c/f infection  - UA with reflex (6/30): +protein, +blood, +urobilinogen, +leuksm +WBC, +RBC, +1 bacteria; UCX inconclusive with mixed bacterial gabino  - Lactate on ABG 1.9, procal 7.78 (6/29), repeat procal 3.89 (7/1)  - Recent CT a/p (6/22) showing CBD stent intact without significant upstream  biliary dilation  - Pt does have CBD stent that needs to be removed- pt without fevers/chills, HoTN, or abdominal pain- no obvious concern for infected stent; GI/Liver consulted re CBD stent removal (6/30) rec daily labs w/ INR, repeat blood cultures, empiric Zosyn/Vanc (6/30-7/3) for c/f cholangitis; pt afebrile, leukocytosis resolved and no other signs of cholangitis; antibiotics stopped 7/3  - GI to hold on stent exchange at this time d/t significant katina-procedural risks  # Structural  - No known brain mets  - CT head (6/29): negative for acute process  - CBD stent in place via CT a/p (6/22)    # s/p C1 Gemcitabine/Cisplatin Inpatient  # Newly Diagnosed 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 Willis  - Diagnosed in 5/2025  - Discussed at tumor board on 6/4/2025 and rec treatment with Durvalumab + Gemcitabine / Cisplatin   - Treatment was scheduled to begin on 6/22/2025, however, unable to due to admission   - CT A/P (6/22) showed overall progression of metestatic disease, new small volume ascites, satisfactory placement of CBD stent without significant upstream bilairy dilatation, new small pleual effusion  - Primary oncologist rec transfer to [facility] for intiation of emergent inpatient chemotherapy   - Oncology consulted upon arrival to [facility], 6/27 s/p Gemcitabine/Cisplatin while inpatient, durvalumab not permitted inpatient  - NGS panel results pending 6/28     # Leukocytosis-- resolving  - Likely 2/2 steroids vs infectious (cholangitis?)  - See above re c/f cholangitis  - WBC BL~10k, (6/27) 12.6 --> 21.6k (6/29)--> 18.1k (6/30)--> 13.9k (7/1)--> 5.1 (7/3)  - CXR (6/20) w/ atelectasis, no consolidation  - CT A/P (6/22) showed worsening disease, new small/mod ascites, satsifactory placement of CBD stent w/o dilatation  - Repeat CXR (6/29): no c/f infection  - UA with reflex (6/30): +protein, +blood, +urobilinogen, +leuksm +WBC, +RBC, +1  bacteria; UCX inconclusive with mixed bacterial gabino  - Lactate level 1.9 via ABG (6/29): Procal (6/29): 7.78 (6/29), repeat procal 3.89 (7/2)  - MSSA (6/27): +  - Blood cx x2 (6/28): NGTD   - Blood cx x2 (6/30): NGTD  - s/p 10mg IVP decadron premed 6/27  - CBC daily     # CHIO on CKD III - improving  - Recent sCr BL ~1.2-1.3 ; sCr 1.08 (6/27)--> 1.81 (6/29)--> 2.33 (6/30)--> 2.19 (7/1)--> 2.08 (7/2)--> 1.86 (7/3)  - s/p 500mL NS bolus 6/29 for CHIO without improvement  - s/p 500ml LR bolus 6/30  - Nephrology rec renal US, spot urine protein to creatinine ratio, and start mIVF LR @ 80/hr for at least 1 L (7/2)  - s/p mIVF LR @80/hr for 12 hours (7/3)  - Renal US (7/2) showed no obstructive nephropathy, moderate ascites  - Avoid nephrotoxic agents  - Monitor CMP daily  - Nephrology following    # Hyponatremia-- resolved  - Na 131 on admit to [facility] (6/20) --> Na 124 (6/27) --> Na 129 (6/28)--> 130 (6/29 and 6/30)--> 132 (7/1)--> 136 (7/2)--> 139 (7/3)  - DDx includes hypovolemia vs SIADH vs Cisplatin-induced (although hyponatremia started prior to Cisplatin, so if any effect not the whole etiology)  - Serum osm 275, urine Cr 197, urine Na <10, urine osm 777 (6/26)  - Repeat serum osm, urine cr 211, urine Na 10, urine osm 922 (6/27)  - Repeat lytes (6/30) urine cr 185.6, urine Na 11, urine osm 656 (6/30)  - s/p 1L fluid restriction 6/26 -- per Dr. Homeida hold off for now   - Nephrology consulted 6/27, ddx hypovolemia vs SIADH vs Cisplatin-induced. Rec q4 sodium checks initially, started sodium bicarb TID 6/28, Na showing improvement as of 6/29  - Of note, pt received x2 additional 1L NS boluses as part of chemo regimen (6/27-6/28)   - s/p 500mL NS bolus 6/29 per nephrology d/t kidney function  - updated Nephrology recs (6/30) rec hold amlodipine, obtain Cystatin C, continue sodium bicarb 1300 TID, s/p 500mL LR bolus  - Cystatin C 3.68 (6/30)    # Hyperkalemia - improving   - 2/2 CHIO  - K 5.5 (6/29) s/p 1x dose of  Lokelma  - K 5.5 (6/30) s/p Lokelma x2 doses--> K 4.6 (7/1)  - Tele monitoring ordered 6/30  - Nephrology following    # Tachycardia - improving  - -120s since admission 6/26; asymptomatic, no CP or palpitations, no O2 requirement, no pain  - Ddx includes dehydration vs deconditioning from malignancy vs pain vs infection   - s/p multiple IVF boluses (x3 6/25), 500mL NS 6/29  - EKG (6/28): sinus tach, QTc 446  - Low threshold for CT angio chest if tachycardia worsens or develops new O2 requirement       # Oral thrush, improving  # Mouth Pain  - Treated with oral nystatin 5mL QID swish and spit- completed course 6/29  - Started biotene TID 6/27   - Added scheduled Dex oral liquid 1mg TID swish and swallow and magic mouth wash 10mL swish and swallow q4hrs scheduled for mouth pain  - Humidified face tent/face mask to be applied by nursing to aid in dry mouth     # HTN  - HOLD Amlodipine 10mg daily per updated Neph recs     # MDD  # LUIS FELIPE  - Continue with home Paroxetine 20mg daily      # Type 2 DM  - Last Hgb A1c 5.9 (4/29/24); not on any diabetic medications  - Due to decreased PO intake and normal blood sugars, insulin not ordered on admit, CTM  - Hypoglycemic protocol in place      # GERD  - Continue Protonix 40mg daily      # DVT Prophy: Stopped prophy Lovenox 6/29 and started prophy Heparin SQ 6/29 d/t CHIO. SCDs, encourage safe ambulation     DISPO:  - DNR, DNI - no ICU - confirmed on admit   - DC to SNF pending improvement in symptoms, CHIO, LFTs, neph recs, and facility placement  - NOK: Natalio Neal [phone]   - FUV with Dr. Willis (oncology) 7/24      I spent 75 minutes in the professional and overall care of this patient.    Assessment and plan as above discussed with attending physician Dr. Avtar Alvarez.    Randi Chatterjee, NILSON-CNP       [1] [Held by provider] amLODIPine, 10 mg, oral, Daily  dexAMETHasone, 1 mg, intravenous, q8h  [Held by provider] dexAMETHasone, 1 mg, oral, q8h PEREZ  [Held  by provider] heparin, 5,000 Units, subcutaneous, q8h  heparin flush, 50 Units, intra-catheter, q12h  lactulose, 300 mL, rectal, Once  lidocaine-diphenhydraMINE-Maalox 1:1:1, 10 mL, Swish & Swallow, q4h PEREZ  moisturizing mouth, 15 mL, Swish & Spit, TID  [Held by provider] OLANZapine, 5 mg, oral, Nightly  pantoprazole, 40 mg, oral, Daily before breakfast  PARoxetine, 20 mg, oral, Daily  piperacillin-tazobactam, 3.375 g, intravenous, q6h  sodium bicarbonate, 1,300 mg, oral, TID  thiamine, 100 mg, intravenous, Daily     [2]    [3] PRN medications: acetaminophen, albuterol, alteplase, alteplase, dextrose, diphenhydrAMINE, EPINEPHrine HCl, famotidine, fluticasone, heparin flush, methylPREDNISolone sodium succinate (PF), ondansetron, oxyCODONE, oxyCODONE, polyethylene glycol, prochlorperazine, prochlorperazine, sodium chloride, vancomycin

## 2025-07-03 NOTE — PROGRESS NOTES
Vancomycin Dosing by Pharmacy- Cessation of Therapy    Consult to pharmacy for vancomycin dosing has been discontinued by the prescriber, pharmacy will sign off at this time.    Please call pharmacy if there are further questions or re-enter a consult if vancomycin is resumed.     HILARIA MILLER

## 2025-07-03 NOTE — CARE PLAN
The clinical goals for the shift include pt will remain HDS and VSS throughout shift        Problem: Pain - Adult  Goal: Verbalizes/displays adequate comfort level or baseline comfort level  Outcome: Progressing     Problem: Safety - Adult  Goal: Free from fall injury  Outcome: Progressing     Problem: Discharge Planning  Goal: Discharge to home or other facility with appropriate resources  Outcome: Progressing     Problem: Chronic Conditions and Co-morbidities  Goal: Patient's chronic conditions and co-morbidity symptoms are monitored and maintained or improved  Outcome: Progressing     Problem: Nutrition  Goal: Nutrient intake appropriate for maintaining nutritional needs  Outcome: Progressing     Problem: Skin  Goal: Decreased wound size/increased tissue granulation at next dressing change  Outcome: Progressing  Goal: Participates in plan/prevention/treatment measures  Outcome: Progressing  Flowsheets (Taken 7/3/2025 1522)  Participates in plan/prevention/treatment measures: Elevate heels  Goal: Prevent/manage excess moisture  Outcome: Progressing  Flowsheets (Taken 7/3/2025 1522)  Prevent/manage excess moisture: Cleanse incontinence/protect with barrier cream  Goal: Prevent/minimize sheer/friction injuries  Outcome: Progressing  Goal: Promote/optimize nutrition  Outcome: Progressing  Goal: Promote skin healing  Outcome: Progressing

## 2025-07-03 NOTE — PROGRESS NOTES
06/27/25 1200   Discharge Planning   Living Arrangements Spouse/significant other   Support Systems Spouse/significant other   Assistance Needed PT/OT Pending   Type of Residence Private residence   Number of Stairs to Enter Residence 2   Number of Stairs Within Residence 0   Do you have animals or pets at home? Yes   Type of Animals or Pets 1 cat   Home or Post Acute Services Post acute facilities (Rehab/SNF/etc)   Type of Post Acute Facility Services Skilled nursing   Expected Discharge Disposition SNF   Does the patient need discharge transport arranged? Yes   RoundTrip coordination needed? Yes   Has discharge transport been arranged? No   Financial Resource Strain   How hard is it for you to pay for the very basics like food, housing, medical care, and heating? Not very   Housing Stability   In the last 12 months, was there a time when you were not able to pay the mortgage or rent on time? N   At any time in the past 12 months, were you homeless or living in a shelter (including now)? N   Transportation Needs   In the past 12 months, has lack of transportation kept you from medical appointments or from getting medications? no   In the past 12 months, has lack of transportation kept you from meetings, work, or from getting things needed for daily living? No   Patient Choice   Provider Choice list and CMS website (https://medicare.gov/care-compare#search) for post-acute Quality and Resource Measure Data were provided and reviewed with: Patient   Patient / Family choosing to utilize agency / facility established prior to hospitalization No   Intensity of Service   Intensity of Service 0-30 min     Care Transitions Note  06/27/25  Plan per Medical/Surgical Team: Recent dx pancreaticobiliary carcinoma, weakness, fall at home  Status: Inpatient  Payor Source: [employer] Medicare  Discharge disposition: PT/OT Pending, likely SNF  Expected date of discharge: TBD, next week  PCP / Primary Oncologist:  Alba    Met with patient at bedside, introduced self and role. Demographics and insurance verifed with patient. [place] She has one son Eric who assists occasionally. Pt has a cane, walker, and shower chair at home. No home O2, no home health. She states she has been to  acute rehab at [facility] in the past. Pt's  drives her to [facility]. She is retired. No SDOH concerns when prompted.     Pt was a transfer from [facility], says she has been in bed for about a week and feels deconditioned. She is agreeable to Snf if recommended. PT/OT at [facility] recommended SNF. A list was provided at bedside by this LSW. Snf process was explained as well. Will continue to follow for dispo planning.   AMADO Weiss, ANITAW     06/30/25  Spoke with pt spouse re: dispo. FOC is [facility]. Spouse did not have additional choices at this time but SW continued to encourage more choices in case [facility] cannot accept. Spouse said he would talk with his son who is an EMT and his NEELIMA who worked in a few nursing homes. Per spouse, pt will have chemo every other week and will need transportation to [facility]. SW to follow.   AMADO Weiss, NICHOLE     07/03/25  Patient to possibly start TPN today. Medical plan is pending. May have GOC discussion pending recs from primary onc. ASHUTOSH sent updates to [facility], will follow.   AMADO Weiss, LSW

## 2025-07-03 NOTE — PROGRESS NOTES
Jordyn Neal   70 yilnda    @WT@  MRN/Room: [id number]    Subjective:   I visited and examined the patient on bedside.  She had an uneventful night.  Meds:   Current Medications[1]         Physical Examination:        Vitals:    07/03/25 0844   BP: 122/78   Pulse: 105   Resp: 17   Temp: 36.5 °C (97.7 °F)   SpO2: 95%     General: The patient is awake, oriented, and is not in any distress.  Head and Neck: Normocephalic. No periorbital edema.  Eyes: non-icteric  Respiratory: Symmetric chest expansion. No respiratory distress.  Skin: No maculopapular rash.  Musculoskeletal: No peripheral edema.  Neuro Exam: Speech is fluent. Moves extremities.    Imaging:  === 06/26/25 ===    US RENAL COMPLETE    - Impression -  1. Mild cortical thinning and increased echogenicity of the bilateral  kidneys, correlate with patient history for chronic or acute on  chronic kidney disease.  2. No obstructive nephropathy.  3. Morphology and moderate volume abdominal ascites.      I personally reviewed the image(s)/study and resident interpretation.  I agree with the findings as stated by resident Telly Love.  Data analyzed and images interpreted at [facility], [place], [place].    MACRO:  None      Signed by: Satya Aviles 7/3/2025 6:28 AM  Dictation workstation:   [id number]       Blood Labs:  Results for orders placed or performed during the hospital encounter of 06/26/25 (from the past 24 hours)   Albumin-Creatinine Ratio, Urine Random   Result Value Ref Range    Albumin, Urine Random 10.3 Not established mg/L    Creatinine, Urine Random 60.8 20.0 - 320.0 mg/dL    Albumin/Creatinine Ratio 16.9 <30.0 ug/mg Creat   Phosphorus   Result Value Ref Range    Phosphorus 3.8 2.5 - 4.9 mg/dL   Magnesium   Result Value Ref Range    Magnesium 2.30 1.60 - 2.40 mg/dL   Comprehensive Metabolic Panel   Result Value Ref Range    Glucose 128 (H) 74 - 99 mg/dL    Sodium 139 136 - 145 mmol/L    Potassium 3.6  3.5 - 5.3 mmol/L    Chloride 107 98 - 107 mmol/L    Bicarbonate 19 (L) 21 - 32 mmol/L    Anion Gap 17 10 - 20 mmol/L    Urea Nitrogen 63 (H) 6 - 23 mg/dL    Creatinine 1.86 (H) 0.50 - 1.05 mg/dL    eGFR 29 (L) >60 mL/min/1.73m*2    Calcium 9.2 8.6 - 10.6 mg/dL    Albumin 2.7 (L) 3.4 - 5.0 g/dL    Alkaline Phosphatase 596 (H) 33 - 136 U/L    Total Protein 5.4 (L) 6.4 - 8.2 g/dL    AST 62 (H) 9 - 39 U/L    Bilirubin, Total 4.2 (H) 0.0 - 1.2 mg/dL    ALT 38 7 - 45 U/L   CBC and Auto Differential   Result Value Ref Range    WBC 5.1 4.4 - 11.3 x10*3/uL    nRBC 0.0 0.0 - 0.0 /100 WBCs    RBC 3.36 (L) 4.00 - 5.20 x10*6/uL    Hemoglobin 10.1 (L) 12.0 - 16.0 g/dL    Hematocrit 30.6 (L) 36.0 - 46.0 %    MCV 91 80 - 100 fL    MCH 30.1 26.0 - 34.0 pg    MCHC 33.0 32.0 - 36.0 g/dL    RDW 18.3 (H) 11.5 - 14.5 %    Platelets 155 150 - 450 x10*3/uL    Neutrophils % 90.5 40.0 - 80.0 %    Immature Granulocytes %, Automated 1.0 (H) 0.0 - 0.9 %    Lymphocytes % 7.9 13.0 - 44.0 %    Monocytes % 0.2 2.0 - 10.0 %    Eosinophils % 0.2 0.0 - 6.0 %    Basophils % 0.2 0.0 - 2.0 %    Neutrophils Absolute 4.61 1.20 - 7.70 x10*3/uL    Immature Granulocytes Absolute, Automated 0.05 0.00 - 0.70 x10*3/uL    Lymphocytes Absolute 0.40 (L) 1.20 - 4.80 x10*3/uL    Monocytes Absolute 0.01 (L) 0.10 - 1.00 x10*3/uL    Eosinophils Absolute 0.01 0.00 - 0.70 x10*3/uL    Basophils Absolute 0.01 0.00 - 0.10 x10*3/uL   Coagulation Screen   Result Value Ref Range    Protime 14.4 (H) 9.8 - 12.4 seconds    INR 1.3 (H) 0.9 - 1.1    aPTT 24 (L) 26 - 36 seconds   Vancomycin   Result Value Ref Range    Vancomycin 24.6 (H) 5.0 - 20.0 ug/mL   Ammonia   Result Value Ref Range    Ammonia 55 (H) 16 - 53 umol/L   Morphology   Result Value Ref Range    RBC Morphology See Below     Hypochromia Mild     Teardrop Cells Few     [place] Cells Few    Triglyceride   Result Value Ref Range    Triglycerides 263 (H) 0 - 149 mg/dL     Lab Results   Component Value Date    GLUCOSE 128 (H)  07/03/2025    CALCIUM 9.2 07/03/2025     07/03/2025    K 3.6 07/03/2025    CO2 19 (L) 07/03/2025     07/03/2025    BUN 63 (H) 07/03/2025    CREATININE 1.86 (H) 07/03/2025             Assessment and Plan:  Jordyn Neal is a 70 y.o. 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 Presented to OSH 6/20 with N, V, Poor Intake, [facility] [facility] [place] to start Chemotherapy. Consult for Hyponatremia.      #Hyponatremia  Sodium level is improved to normal range.       #CHIO  - Baseline Cr 0.9  -Creatinine level was peaked at 2.3 and last creatinine level is 2.0.  - Hemodynamic is stable.  - No hydronephrosis  -There is liver metastasis with abdominal ascites     Urine sodium level is low.  No major change in creatinine level.  Response to IV fluid.  Considering lack of response to IV fluid and extensive liver metastasis as well as low urine sodium, probably the etiology of acute kidney injury is hepatorenal syndrome.        Recommend:  - Daily renal panel        Baltazar Castro MD  Senior Attending Physician  Director of Onco-Nephrology Program  Division of Nephrology & Hypertension  [facility]         [1]   Current Facility-Administered Medications   Medication Dose Route Frequency Provider Last Rate Last Admin    acetaminophen (Tylenol) tablet 650 mg  650 mg oral q6h PRN Arline Huddleston PA-C   650 mg at 06/26/25 2002    albuterol 2.5 mg /3 mL (0.083 %) nebulizer solution 3 mL  3 mL nebulization PRN Archie Willis MD        alteplase (Cathflo Activase) injection 2 mg  2 mg intra-catheter PRN Arline Huddleston PA-C        alteplase (Cathflo Activase) injection 2 mg  2 mg intra-catheter PRN NILSON Rowan-CNP        [Held by provider] amLODIPine  (Norvasc) tablet 10 mg  10 mg oral Daily Arline Huddleston PA-C   10 mg at 06/30/25 0820    dexAMETHasone (Decadron) injection 1 mg  1 mg intravenous q8h Ahmet Mooney MD   1 mg at 07/03/25 0845    [Held by provider] dexAMETHasone oral liquid 1 mg  1 mg oral q8h PEREZ KIMBERLEY Chavez   1 mg at 06/29/25 1110    dextrose 5 % in water (D5W) bolus 500 mL  500 mL intravenous PRN Archie Willis MD        diphenhydrAMINE (BENADryl) injection 50 mg  50 mg intravenous PRN Archie Willis MD        EPINEPHrine HCl (PF) (Adrenalin) injection 0.3 mg  0.3 mg intramuscular q5 min PRN Archie Willis MD        famotidine PF (Pepcid) injection 20 mg  20 mg intravenous Once PRN Archie Willis MD        fluticasone (Flonase) nasal spray 1 spray  1 spray Each Nostril Daily PRN Arline Huddleston PA-C        [Held by provider] heparin (porcine) injection 5,000 Units  5,000 Units subcutaneous q8h KIMBERLEY Chavez   5,000 Units at 06/30/25 0821    heparin flush 10 unit/mL syringe 50 Units  50 Units intra-catheter PRN KIMBERLEY Rowan        heparin flush 10 unit/mL syringe 50 Units  50 Units intra-catheter q12h NILSON Rowan-CNP   50 Units at 06/28/25 1635    lactulose 200 g/300 mL enema 300 mL  300 mL rectal Once KIMBERLEY Martin        lidocaine-diphenhydrAMINE-Maalox 1:1:1 Magic Mouthwash  10 mL Swish & Swallow q4h [facility] KIMBERLEY Chavez   10 mL at 07/03/25 1009    methylPREDNISolone sod succinate (SOLU-Medrol) 40 mg/mL injection 40 mg  40 mg intravenous PRN Archie Willis MD        moisturizing mouth (Biotene Dry Mouth) solution 15 mL  15 mL Swish & Spit TID KIMBERLEY Rowan   15 mL at 07/03/25 0841    [Held by provider] OLANZapine (ZyPREXA) tablet 5 mg  5 mg oral Nightly KIMBERLEY Rowan   5 mg at 06/28/25 2105    ondansetron (Zofran) tablet 4 mg  4 mg oral q6h PRN KIMBERLEY Chavez   4 mg at 06/29/25 1110    oxyCODONE (Roxicodone) immediate release tablet  10 mg  10 mg oral q6h PRN NILSON Chavez-CNP        oxyCODONE (Roxicodone) immediate release tablet 5 mg  5 mg oral q6h PRN KIMBERLEY Chavez        pantoprazole (ProtoNix) EC tablet 40 mg  40 mg oral Daily before breakfast Arline Huddleston PA-C   40 mg at 06/30/25 0820    PARoxetine (Paxil) tablet 20 mg  20 mg oral Daily Arline Huddleston PA-C   20 mg at 06/30/25 0820    polyethylene glycol (Glycolax, Miralax) packet 17 g  17 g oral Daily PRN NILSON Chavez-TALA        prochlorperazine (Compazine) injection 10 mg  10 mg intravenous q6h PRN Archie Willis MD   10 mg at 07/02/25 0928    prochlorperazine (Compazine) tablet 10 mg  10 mg oral q6h PRN Archie Willis MD        sodium bicarbonate tablet 1,300 mg  1,300 mg oral TID KIMBERLEY Rowan   1,300 mg at 06/30/25 2203    sodium chloride 0.9 % bolus 500 mL  500 mL intravenous PRN Archie Willis MD        thiamine (Vitamin B1) injection 100 mg  100 mg intravenous Daily KIMBERLEY Martin   100 mg at 07/03/25 1009

## 2025-07-04 LAB
ALBUMIN SERPL BCP-MCNC: 2.6 G/DL (ref 3.4–5)
ALP SERPL-CCNC: 562 U/L (ref 33–136)
ALT SERPL W P-5'-P-CCNC: 34 U/L (ref 7–45)
ANION GAP SERPL CALC-SCNC: 17 MMOL/L (ref 10–20)
APTT PPP: 23 SECONDS (ref 26–36)
AST SERPL W P-5'-P-CCNC: 58 U/L (ref 9–39)
BACTERIA BLD CULT: NORMAL
BACTERIA BLD CULT: NORMAL
BASOPHILS # BLD AUTO: 0.01 X10*3/UL (ref 0–0.1)
BASOPHILS NFR BLD AUTO: 0.2 %
BILIRUB SERPL-MCNC: 3.7 MG/DL (ref 0–1.2)
BUN SERPL-MCNC: 68 MG/DL (ref 6–23)
CALCIUM SERPL-MCNC: 9.1 MG/DL (ref 8.6–10.6)
CHLORIDE SERPL-SCNC: 110 MMOL/L (ref 98–107)
CO2 SERPL-SCNC: 18 MMOL/L (ref 21–32)
CREAT SERPL-MCNC: 1.77 MG/DL (ref 0.5–1.05)
EGFRCR SERPLBLD CKD-EPI 2021: 31 ML/MIN/1.73M*2
EOSINOPHIL # BLD AUTO: 0 X10*3/UL (ref 0–0.7)
EOSINOPHIL NFR BLD AUTO: 0 %
ERYTHROCYTE [DISTWIDTH] IN BLOOD BY AUTOMATED COUNT: 18.4 % (ref 11.5–14.5)
GLUCOSE SERPL-MCNC: 129 MG/DL (ref 74–99)
HCT VFR BLD AUTO: 30.8 % (ref 36–46)
HGB BLD-MCNC: 10.2 G/DL (ref 12–16)
HYPOCHROMIA BLD QL SMEAR: NORMAL
IMM GRANULOCYTES # BLD AUTO: 0.01 X10*3/UL (ref 0–0.7)
IMM GRANULOCYTES NFR BLD AUTO: 0.2 % (ref 0–0.9)
INR PPP: 1.3 (ref 0.9–1.1)
LYMPHOCYTES # BLD AUTO: 0.55 X10*3/UL (ref 1.2–4.8)
LYMPHOCYTES NFR BLD AUTO: 10 %
MAGNESIUM SERPL-MCNC: 2.52 MG/DL (ref 1.6–2.4)
MCH RBC QN AUTO: 30.3 PG (ref 26–34)
MCHC RBC AUTO-ENTMCNC: 33.1 G/DL (ref 32–36)
MCV RBC AUTO: 91 FL (ref 80–100)
MONOCYTES # BLD AUTO: 0.01 X10*3/UL (ref 0.1–1)
MONOCYTES NFR BLD AUTO: 0.2 %
NEUTROPHILS # BLD AUTO: 4.91 X10*3/UL (ref 1.2–7.7)
NEUTROPHILS NFR BLD AUTO: 89.4 %
NRBC BLD-RTO: 0 /100 WBCS (ref 0–0)
PHOSPHATE SERPL-MCNC: 3.8 MG/DL (ref 2.5–4.9)
PLATELET # BLD AUTO: 146 X10*3/UL (ref 150–450)
POTASSIUM SERPL-SCNC: 3.7 MMOL/L (ref 3.5–5.3)
PROT SERPL-MCNC: 5.1 G/DL (ref 6.4–8.2)
PROTHROMBIN TIME: 14.4 SECONDS (ref 9.8–12.4)
RBC # BLD AUTO: 3.37 X10*6/UL (ref 4–5.2)
RBC MORPH BLD: NORMAL
SODIUM SERPL-SCNC: 141 MMOL/L (ref 136–145)
WBC # BLD AUTO: 5.5 X10*3/UL (ref 4.4–11.3)

## 2025-07-04 PROCEDURE — 85025 COMPLETE CBC W/AUTO DIFF WBC: CPT | Performed by: NURSE PRACTITIONER

## 2025-07-04 PROCEDURE — 85610 PROTHROMBIN TIME: CPT

## 2025-07-04 PROCEDURE — 2500000004 HC RX 250 GENERAL PHARMACY W/ HCPCS (ALT 636 FOR OP/ED): Performed by: PHYSICIAN ASSISTANT

## 2025-07-04 PROCEDURE — 99233 SBSQ HOSP IP/OBS HIGH 50: CPT | Performed by: PHYSICIAN ASSISTANT

## 2025-07-04 PROCEDURE — 84520 ASSAY OF UREA NITROGEN: CPT | Performed by: NURSE PRACTITIONER

## 2025-07-04 PROCEDURE — 2500000004 HC RX 250 GENERAL PHARMACY W/ HCPCS (ALT 636 FOR OP/ED)

## 2025-07-04 PROCEDURE — 2500000001 HC RX 250 WO HCPCS SELF ADMINISTERED DRUGS (ALT 637 FOR MEDICARE OP)

## 2025-07-04 PROCEDURE — 84100 ASSAY OF PHOSPHORUS: CPT | Performed by: NURSE PRACTITIONER

## 2025-07-04 PROCEDURE — 83735 ASSAY OF MAGNESIUM: CPT | Performed by: NURSE PRACTITIONER

## 2025-07-04 PROCEDURE — 1170000001 HC PRIVATE ONCOLOGY ROOM DAILY

## 2025-07-04 RX ORDER — DEXTROSE MONOHYDRATE AND SODIUM CHLORIDE 5; .9 G/100ML; G/100ML
75 INJECTION, SOLUTION INTRAVENOUS CONTINUOUS
Status: DISPENSED | OUTPATIENT
Start: 2025-07-04 | End: 2025-07-05

## 2025-07-04 RX ADMIN — DEXTROSE AND SODIUM CHLORIDE 75 ML/HR: 5; .9 INJECTION, SOLUTION INTRAVENOUS at 17:49

## 2025-07-04 RX ADMIN — Medication 15 ML: at 09:35

## 2025-07-04 RX ADMIN — DEXAMETHASONE SODIUM PHOSPHATE 1 MG: 4 INJECTION, SOLUTION INTRA-ARTICULAR; INTRALESIONAL; INTRAMUSCULAR; INTRAVENOUS; SOFT TISSUE at 00:53

## 2025-07-04 RX ADMIN — THIAMINE HYDROCHLORIDE 100 MG: 100 INJECTION, SOLUTION INTRAMUSCULAR; INTRAVENOUS at 09:35

## 2025-07-04 RX ADMIN — Medication 15 ML: at 20:04

## 2025-07-04 RX ADMIN — DEXAMETHASONE SODIUM PHOSPHATE 1 MG: 4 INJECTION, SOLUTION INTRA-ARTICULAR; INTRALESIONAL; INTRAMUSCULAR; INTRAVENOUS; SOFT TISSUE at 17:49

## 2025-07-04 RX ADMIN — DEXAMETHASONE SODIUM PHOSPHATE 1 MG: 4 INJECTION, SOLUTION INTRA-ARTICULAR; INTRALESIONAL; INTRAMUSCULAR; INTRAVENOUS; SOFT TISSUE at 09:35

## 2025-07-04 RX ADMIN — Medication 15 ML: at 15:12

## 2025-07-04 ASSESSMENT — COGNITIVE AND FUNCTIONAL STATUS - GENERAL
DAILY ACTIVITIY SCORE: 6
DRESSING REGULAR UPPER BODY CLOTHING: TOTAL
MOVING FROM LYING ON BACK TO SITTING ON SIDE OF FLAT BED WITH BEDRAILS: A LOT
DAILY ACTIVITIY SCORE: 6
CLIMB 3 TO 5 STEPS WITH RAILING: TOTAL
HELP NEEDED FOR BATHING: TOTAL
DRESSING REGULAR LOWER BODY CLOTHING: TOTAL
PERSONAL GROOMING: TOTAL
TURNING FROM BACK TO SIDE WHILE IN FLAT BAD: TOTAL
HELP NEEDED FOR BATHING: TOTAL
TOILETING: TOTAL
DRESSING REGULAR UPPER BODY CLOTHING: TOTAL
STANDING UP FROM CHAIR USING ARMS: TOTAL
MOBILITY SCORE: 7
MOVING FROM LYING ON BACK TO SITTING ON SIDE OF FLAT BED WITH BEDRAILS: A LOT
MOVING TO AND FROM BED TO CHAIR: TOTAL
MOBILITY SCORE: 7
EATING MEALS: TOTAL
PERSONAL GROOMING: TOTAL
EATING MEALS: TOTAL
WALKING IN HOSPITAL ROOM: TOTAL
CLIMB 3 TO 5 STEPS WITH RAILING: TOTAL
MOVING TO AND FROM BED TO CHAIR: TOTAL
TURNING FROM BACK TO SIDE WHILE IN FLAT BAD: TOTAL
CLIMB 3 TO 5 STEPS WITH RAILING: TOTAL
STANDING UP FROM CHAIR USING ARMS: TOTAL
STANDING UP FROM CHAIR USING ARMS: TOTAL
DRESSING REGULAR LOWER BODY CLOTHING: TOTAL
MOBILITY SCORE: 7
MOVING FROM LYING ON BACK TO SITTING ON SIDE OF FLAT BED WITH BEDRAILS: A LOT
WALKING IN HOSPITAL ROOM: TOTAL
MOVING TO AND FROM BED TO CHAIR: TOTAL
TOILETING: TOTAL
TURNING FROM BACK TO SIDE WHILE IN FLAT BAD: TOTAL
WALKING IN HOSPITAL ROOM: TOTAL

## 2025-07-04 ASSESSMENT — PAIN - FUNCTIONAL ASSESSMENT
PAIN_FUNCTIONAL_ASSESSMENT: 0-10

## 2025-07-04 ASSESSMENT — PAIN SCALES - PAIN ASSESSMENT IN ADVANCED DEMENTIA (PAINAD)
BODYLANGUAGE: RELAXED
FACIALEXPRESSION: SMILING OR INEXPRESSIVE
TOTALSCORE: 0
CONSOLABILITY: NO NEED TO CONSOLE
BREATHING: NORMAL

## 2025-07-04 ASSESSMENT — PAIN SCALES - GENERAL
PAINLEVEL_OUTOF10: 0 - NO PAIN
PAINLEVEL_OUTOF10: 0 - NO PAIN

## 2025-07-04 NOTE — PROGRESS NOTES
"Jordyn Neal is a 70 y.o. female on day 8 of admission presenting with Hyponatremia.    Subjective   Seen this morning at bedside, A+OX3 but very lethargic, not able to have full conversation, able to follow commands. States that she is not in any pain. Cannot assess full ROS sec to mental status.     Objective     Physical Exam  Constitutional:       Appearance: She is ill-appearing.   HENT:      Head: Normocephalic.      Right Ear: External ear normal.      Left Ear: External ear normal.      Nose: Nose normal.      Mouth/Throat:      Mouth: Mucous membranes are dry.   Eyes:      Extraocular Movements: Extraocular movements intact.      Conjunctiva/sclera: Conjunctivae normal.   Cardiovascular:      Rate and Rhythm: Normal rate and regular rhythm.   Pulmonary:      Effort: Pulmonary effort is normal.   Abdominal:      General: Bowel sounds are normal. There is distension.      Palpations: Abdomen is soft.   Musculoskeletal:      Cervical back: Normal range of motion.   Skin:     General: Skin is warm and dry.   Neurological:      Mental Status: She is oriented to person, place, and time.      Motor: Weakness present.       Last Recorded Vitals  Blood pressure 131/82, pulse 104, temperature 36.1 °C (97 °F), temperature source Temporal, resp. rate 16, height 1.676 m (5' 6\"), weight 95.5 kg (210 lb 8.6 oz), SpO2 94%.  Intake/Output last 3 Shifts:  I/O last 3 completed shifts:  In: 946.7 (9.9 mL/kg) [I.V.:846.7 (8.9 mL/kg); IV Piggyback:100]  Out: 1000 (10.5 mL/kg) [Urine:1000 (0.4 mL/kg/hr)]  Weight: 95.5 kg     Relevant Results     .Scheduled medications  Scheduled Medications[1]  Continuous medications  Continuous Medications[2]  PRN medications  PRN Medications[3]    Assessment & Plan  Hyponatremia  Jordyn Manzano is a 70 year-old female with PMH of recently diagnosed metastatic pancreaticobiliary carcinoma with mets to liver, and soft tissue (dx 5/2024, s/p 4/14 ERCP with stent placement in the CBD), HTN, Type 2 " DM, HLD, fatty liver, GERD, and CKD stage III, who presented to [facility] on 6/26/25  as a transfer from OSH for initiaition of inpatient chemotherapy per her oncologist request. [facility] Oncology consulted on admission. Patient s/p C1 Gemcitabine/Cisplatin inpatient 6/27. Labs noteable for acute hyponatremia, Na+124 (6/27) previously unresponsive to IVF at OSH despite hypovolemia hyponatremia like picture. Nephrology consulted 6/27, ddx hypovolemia vs SIADH vs Cisplatin-induced; rec q4 sodium checks initially, further aggressive IVF boluses (x3 6/27), started sodium bicarb TID 6/28, Na and CHIO showing improvement as of 7/2. Supportive Onc consulted 6/28 for symptom mgmt (nausea/poor PO intake), recs followed. 6/29 AM pt with significant AMS. CT head (6/29) negative. Pt orientation improved; A&Ox3 (6/30). Liver and kidney function worsening; Liver and Renal US ordered (6/30) which showed known metastatic disease of liver, mass in tim hepatis compatible w/ known necrotic lymphadenopathy, gallbladder sludge w/o evidence of acute cholecystitis, diffuse mod-to-large volume ascites, and c/f PV thrombosis- further eval w/ CT recommended. CT liver (6/30) showed known metastatic disease in liver, no evidence of portal vein thrombus, moderate volume ascites and interval development of R anterior abdominal wall soft tissue nodule. GI/Liver consulted re CBD stent removal (6/30) rec daily labs w/ INR, repeat blood cultures and Zosyn/Vanc started (6/30-7/3) for c/f cholangitis; pt remains afebrile, leukocytosis resolved with no other signs of cholangitis; antibiotics stopped 7/3. Hepatology to also hold on stent exchange at this time d/t significant katina-procedural risks. Liver and kidney function improving as of 7/2, however pt functional status worsening; pt unable to ambulate, sip from straw or drink out of cup at this time. Speech consulted (7/2) rec NPO and additional enteral nutrition. Dietitian reconsulted (7/2) rec additional  nutrition support if aggressive treatment to continue. Updates sent to Dr. Parks (7/2), awaiting further recommendations/input regarding treatment goals prior to further intervention. PT/OT rec SNF. DC to SNF pending improvement in symptoms, functional status and likely facility placement.     Updates 7/4:  - pt continues to remain very lethargic, responsive to verbal stimuli  - A&OX3, but minimal understanding of care  - Nephrology following, etiology of acute kidney injury is hepatorenal syndrome  - Dr. Willis rec GOC discussion and hospice consult, plan GOC discussion with family today      # Neoplasm-related pain   # FTT  # Poor PO intake   # N/V  - Supportive onc consulted 6/28 for symptom/pain management, recs followed  - Continue with home Oxycodone 10mg q6hrs PRN severe pain, added Oxycodone 5mg q6hrs PRN moderate pain  - Continue with home PO Zofran 4mg q6 hours PRN N/V 2nd line, and PO/IV Compazine 10mg q6hrs PRN N/V 1st line  - STOPPED home mirtazapine 15mg nightly and started Zyprexa 5mg nightly 6/29- however then HELD Zyprexa 6/29 d/t AMS  - Nutrition consulted 6/27, rec regular diet as tolerated, Ensure Clear TID with meals  - PT / OT consulted on admit, ampact 8, SNF list provided 6/28  - Pt PO intake declining per nursing; pt unable to sip from straw or drink out of cup as of 7/2  - Speech consulted (7/2) rec NPO and additional enteral nutrition  - Dietitian reconsulted (7/2) rec additional nutrition support     # Transaminitis - improving  # c/f Cholangitis  # Hx biliary compression stricture s/p stent 4/2025   - Likely multifactorial in setting of metastasis to liver and fatty liver  - No abd pain, afebrile, no chills at this time, endorses intermittent nausea  - Alk phos 530, ALT 53, AST 83, Tbili 2.6 upon admission (6/26) --> now uptrending (6/28) alk phos 1,190, alt 73, ast 179, tbili 3.9--> showing improvement (6/29) alk phos 841, ALT 55, , Tbili 4--> (7/1) alk phos 701, ALT 51, AST  90, tbili 4.5  - 4/15 s/p plastic biliary stent placement, plan for repeat ERCP in 2 months for stent removal, a 4/29 GI appt was cancelled unclear why, otherwise lost to GI follow up  - CT A/P (6/22) showed overall progression of metastatic disease, new small volume ascites, satisfactory placement of CBD stent without significant upstream bilairy dilatation   - Ammonia 74 (6/28)--> repeat ammonia 49 (6/29) s/p 1x dose of Lactulose (ordered prior to repeat ammonia regardless)  - Hepatitis panel (6/28): neg  - Blood Cx x2 (6/28): NGTD   - empiric antibiotics started Zosyn/Vanc (6/30-7/3) for c/f cholangitis; pt afebrile, leukocytosis resolved and no other signs of cholangitis; antibiotics stopped 7/3  - renal/liver US w/ doppler (6/30) showed known metastatic disease of liver, mass in tim hepatis compatible w/ known necrotic lymphadenopathy, gallbladder sludge w/o evidence of acute cholecystitis, diffuse mod-to-large volume ascites, and c/f PV thrombosis- further eval w/ CT recommended  - CT liver (6/30) showed known metastatic disease in liver, no evidence of portal vein thrombus, moderate volume ascites, interval development of R anterior abdominal wall soft tissue nodule  - GI/Liver consulted re CBD stent removal, rec daily labs w/ INR, repeat blood cultures empiric Zosyn/Vanc (6/30- current) for c/f cholangitis; GI to hold on stent exchange at this time d/t significant katina-procedural risks  - repeat blood cx x2 (6/30) NGTD    # AMS  - Ddx include metabolic (elevated LFTs/ ammonia/ CHIO), structural, infectious  - As of 7/3 AM- responsive to verbal stimuli, disoriented to time, place and situation, not able to speak, unable to keep eyes open during interview  - As of 6/30 AM- pt is ordered x3  # Metabolic  - Monitor electrolytes; replete PRN. No hypo/hyperglycemia  - ABG (6/29): pH 7.24, pCO2 25, pO2 82, SO2 98; RT consulted, RN to place on humidified 2L NC face mask or tent (mouth breathing)  - TSH (6/29):  1.62  - LFTs continue to improve, Tbili 4 (6/29)--> 4.8 (6/30)--> 4.6 (7/2)--> 4.2 (7/3)  - sCr downtrending, sCr 1.81 (6/29)--> 2.33 (6/30)--> 2.08 (7/2)--> 1.86 (7/3)  - s/p 1x dose of Lactulose 6/29 for Ammonia 73  - repeat ammonia 49 (6/30)  - repeat ammonia 55 (7/3), rectal lactulose enema ordered  - Uric Acid 10.2 (6/28)--> 11.2 (6/29)-->12.2 (6/30) s/p 1x dose of rasburicase--> 2.6 (6/30)  - Vit B12 level (6/29): >2,000  - Zyprexa started 6/28 and held as of 6/29  # Infectious  - WBC 21.6k (6/29)--> 18.1k (6/30)--> 13.9k (7/1)--> 11k (7/2)--> 5.1 (7/3)  - Repeat CXR (6/29) without c/f infection  - UA with reflex (6/30): +protein, +blood, +urobilinogen, +leuksm +WBC, +RBC, +1 bacteria; UCX inconclusive with mixed bacterial gabino  - Lactate on ABG 1.9, procal 7.78 (6/29), repeat procal 3.89 (7/1)  - Recent CT a/p (6/22) showing CBD stent intact without significant upstream biliary dilation  - Pt does have CBD stent that needs to be removed- pt without fevers/chills, HoTN, or abdominal pain- no obvious concern for infected stent; GI/Liver consulted re CBD stent removal (6/30) rec daily labs w/ INR, repeat blood cultures, empiric Zosyn/Vanc (6/30-7/3) for c/f cholangitis; pt afebrile, leukocytosis resolved and no other signs of cholangitis; antibiotics stopped 7/3  - GI to hold on stent exchange at this time d/t significant katina-procedural risks  # Structural  - No known brain mets  - CT head (6/29): negative for acute process  - CBD stent in place via CT a/p (6/22)    # s/p C1 Gemcitabine/Cisplatin Inpatient  # Newly Diagnosed 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 Willis  - Diagnosed in 5/2025  - Discussed at tumor board on 6/4/2025 and rec treatment with Durvalumab + Gemcitabine / Cisplatin   - Treatment was scheduled to begin on 6/22/2025, however, unable to due to admission   - CT A/P (6/22) showed overall progression of metestatic  disease, new small volume ascites, satisfactory placement of CBD stent without significant upstream bilairy dilatation, new small pleual effusion  - Primary oncologist rec transfer to [facility] for intiation of emergent inpatient chemotherapy   - Oncology consulted upon arrival to [facility], 6/27 s/p Gemcitabine/Cisplatin while inpatient, durvalumab not permitted inpatient  - NGS panel results pending 6/28   - 7/4: Dr. Willis rec GO discussion and hospice consult, plan [facility] discussion with family today      # CHIO on CKD III - improving  - Recent sCr BL ~1.2-1.3 ; sCr 1.08 (6/27)--> 1.81 (6/29)--> 2.33 (6/30)--> 2.19 (7/1)--> 2.08 (7/2)--> 1.86 (7/3)  - s/p 500mL NS bolus 6/29 & 6/30 for CHIO without improvement  - Nephrology rec renal US, spot urine protein to creatinine ratio, and start mIVF LR @ 80/hr for at least 1 L (7/2)  - s/p mIVF LR @80/hr for 12 hours (7/3)  - Renal US (7/2) showed no obstructive nephropathy, moderate ascites  - Monitor CMP daily  - Nephrology following, etiology of acute kidney injury is hepatorenal syndrome    # Hyponatremia-- resolved  - Na 131 on admit to [facility] (6/20) --> Na 124 (6/27) --> Na 129 (6/28)--> 130 (6/29 and 6/30)--> 132 (7/1)--> 136 (7/2)--> 139 (7/3)  - DDx includes hypovolemia vs SIADH vs Cisplatin-induced (although hyponatremia started prior to Cisplatin, so if any effect not the whole etiology)  - Serum osm 275, urine Cr 197, urine Na <10, urine osm 777 (6/26)  - Repeat serum osm, urine cr 211, urine Na 10, urine osm 922 (6/27)  - Repeat lytes (6/30) urine cr 185.6, urine Na 11, urine osm 656 (6/30)  - s/p 1L fluid restriction 6/26 -- per Dr. Alvarez hold off for now   - Nephrology consulted 6/27, ddx hypovolemia vs SIADH vs Cisplatin-induced. Rec q4 sodium checks initially, started sodium bicarb TID 6/28, Na showing improvement as of 6/29  - Of note, pt received x2 additional 1L NS boluses as part of chemo regimen (6/27-6/28)   - s/p 500mL NS bolus 6/29 per nephrology d/t  kidney function  - updated Nephrology recs (6/30) rec hold amlodipine, obtain Cystatin C, continue sodium bicarb 1300 TID, s/p 500mL LR bolus  - Cystatin C 3.68 (6/30)    # Leukocytosis  - Likely 2/2 steroids vs infectious- Now resolved   - WBC BL~10k, (6/27) 12.6 --> 21.6k (6/29)--> 18.1k (6/30)--> 13.9k (7/1)--> 5.1 (7/3)  - CXR (6/20) (-), Repeat CXR (6/29): no c/f infection  - CT A/P (6/22) showed worsening disease, new small/mod ascites, satsifactory placement of CBD stent w/o dilatation  - UCX inconclusive with mixed bacterial gabino  - Lactate level 1.9 via ABG (6/29): Procal (6/29): 7.78 (6/29), repeat procal 3.89 (7/2)  - MSSA (6/27): +  - Blood cx x2 (6/28/6/30): NGTD   - s/p 10mg IVP decadron premed 6/27    # Tachycardia - improving  - -120s since admission 6/26; asymptomatic, no CP or palpitations, no O2 requirement, no pain  - Ddx includes dehydration vs deconditioning from malignancy vs pain vs infection   - s/p multiple IVF boluses (x3 6/25), 500mL NS 6/29  - EKG (6/28): sinus tach, QTc 446  - Low threshold for CT angio chest if tachycardia worsens or develops new O2 requirement       # Oral thrush, improving  # Mouth Pain  - Treated with oral nystatin 5mL QID swish and spit- completed course 6/29  - Started biotene TID 6/27   - Added scheduled Dex oral liquid 1mg TID swish and swallow and magic mouth wash 10mL swish and swallow q4hrs scheduled for mouth pain  - Humidified face tent/face mask to be applied by nursing to aid in dry mouth     # HTN  - HOLD Amlodipine 10mg daily per updated Neph recs     # MDD  # LUIS FELIPE  - Continue with home Paroxetine 20mg daily      # Type 2 DM  - Last Hgb A1c 5.9 (4/29/24); not on any diabetic medications  - Due to decreased PO intake and normal blood sugars, insulin not ordered on admit, CTM  - Hypoglycemic protocol in place      # GERD  - Continue Protonix 40mg daily      # DVT Prophy: Stopped prophy Lovenox 6/29 and started prophy Heparin SQ 6/29 d/t CHIO. SCDs,  encourage safe ambulation     # DISPO:  - DNR, DNI - no ICU - confirmed on admit   - DC to SNF pending improvement in symptoms, CHIO, LFTs, neph recs, and facility placement  - NOK: Natalio Neal [phone]   - FUV with Dr. Willis (oncology) 7/24      I spent 75 minutes in the professional and overall care of this patient.    Assessment and plan as above discussed with attending physician Dr. Avtar Alvarez.    Nelson Taylor PA-C         [1] [Held by provider] amLODIPine, 10 mg, oral, Daily  dexAMETHasone, 1 mg, intravenous, q8h  [Held by provider] dexAMETHasone, 1 mg, oral, q8h PEREZ  [Held by provider] heparin, 5,000 Units, subcutaneous, q8h  heparin flush, 50 Units, intra-catheter, q12h  lidocaine-diphenhydraMINE-Maalox 1:1:1, 10 mL, Swish & Swallow, q4h PEREZ  moisturizing mouth, 15 mL, Swish & Spit, TID  [Held by provider] OLANZapine, 5 mg, oral, Nightly  pantoprazole, 40 mg, oral, Daily before breakfast  PARoxetine, 20 mg, oral, Daily  sodium bicarbonate, 1,300 mg, oral, TID  thiamine, 100 mg, intravenous, Daily     [2]    [3] PRN medications: acetaminophen, albuterol, alteplase, alteplase, dextrose, diphenhydrAMINE, EPINEPHrine HCl, famotidine, fluticasone, heparin flush, methylPREDNISolone sodium succinate (PF), ondansetron, oxyCODONE, oxyCODONE, polyethylene glycol, prochlorperazine, prochlorperazine, sodium chloride

## 2025-07-04 NOTE — ASSESSMENT & PLAN NOTE
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 [facility] consulted on admission. Patient s/p C1 Gemcitabine/Cisplatin inpatient 6/27. Labs noteable for acute hyponatremia, Na+124 (6/27) previously unresponsive to IVF at OSH despite hypovolemia hyponatremia like picture. Nephrology consulted 6/27, ddx hypovolemia vs SIADH vs Cisplatin-induced; rec q4 sodium checks initially, further aggressive IVF boluses (x3 6/27), started sodium bicarb TID 6/28, Na and CHIO showing improvement as of 7/2. Supportive Onc consulted 6/28 for symptom mgmt (nausea/poor PO intake), recs followed. 6/29 AM pt with significant AMS. CT head (6/29) negative. Pt orientation improved; A&Ox3 (6/30). Liver and kidney function worsening; Liver and Renal US ordered (6/30) which showed known metastatic disease of liver, mass in tim hepatis compatible w/ known necrotic lymphadenopathy, gallbladder sludge w/o evidence of acute cholecystitis, diffuse mod-to-large volume ascites, and c/f PV thrombosis- further eval w/ CT recommended. CT liver (6/30) showed known metastatic disease in liver, no evidence of portal vein thrombus, moderate volume ascites and interval development of R anterior abdominal wall soft tissue nodule. GI/Liver consulted re CBD stent removal (6/30) rec daily labs w/ INR, repeat blood cultures and Zosyn/Vanc started (6/30-7/3) for c/f cholangitis; pt remains afebrile, leukocytosis resolved with no other signs of cholangitis; antibiotics stopped 7/3. Hepatology to also hold on stent exchange at this time d/t significant katina-procedural risks. Liver and kidney function improving as of 7/2,  however pt functional status worsening; pt unable to ambulate, sip from straw or drink out of cup at this time. Speech consulted (7/2) rec NPO and additional enteral nutrition. Dietitian reconsulted (7/2) rec additional nutrition support if aggressive treatment to continue. Updates sent to Dr. Parks (7/2), awaiting further recommendations/input regarding treatment goals prior to further intervention. PT/OT rec SNF. DC to SNF pending improvement in symptoms, functional status and likely facility placement.     Updates 7/4:  - pt continues to remain very lethargic, responsive to verbal stimuli  - A&OX3, but minimal understanding of care  - Nephrology following, etiology of acute kidney injury is hepatorenal syndrome  - Dr. Willis rec GOC discussion and hospice consult, plan GOC discussion with family today      # Neoplasm-related pain   # FTT  # Poor PO intake   # N/V  - Supportive onc consulted 6/28 for symptom/pain management, recs followed  - Continue with home Oxycodone 10mg q6hrs PRN severe pain, added Oxycodone 5mg q6hrs PRN moderate pain  - Continue with home PO Zofran 4mg q6 hours PRN N/V 2nd line, and PO/IV Compazine 10mg q6hrs PRN N/V 1st line  - STOPPED home mirtazapine 15mg nightly and started Zyprexa 5mg nightly 6/29- however then HELD Zyprexa 6/29 d/t AMS  - Nutrition consulted 6/27, rec regular diet as tolerated, Ensure Clear TID with meals  - PT / OT consulted on admit, ampact 8, SNF list provided 6/28  - Pt PO intake declining per nursing; pt unable to sip from straw or drink out of cup as of 7/2  - Speech consulted (7/2) rec NPO and additional enteral nutrition  - Dietitian reconsulted (7/2) rec additional nutrition support     # Transaminitis - improving  # c/f Cholangitis  # Hx biliary compression stricture s/p stent 4/2025   - Likely multifactorial in setting of metastasis to liver and fatty liver  - No abd pain, afebrile, no chills at this time, endorses intermittent nausea  - Alk phos 530,  ALT 53, AST 83, Tbili 2.6 upon admission (6/26) --> now uptrending (6/28) alk phos 1,190, alt 73, ast 179, tbili 3.9--> showing improvement (6/29) alk phos 841, ALT 55, , Tbili 4--> (7/1) alk phos 701, ALT 51, AST 90, tbili 4.5  - 4/15 s/p plastic biliary stent placement, plan for repeat ERCP in 2 months for stent removal, a 4/29 GI appt was cancelled unclear why, otherwise lost to GI follow up  - CT A/P (6/22) showed overall progression of metastatic disease, new small volume ascites, satisfactory placement of CBD stent without significant upstream bilairy dilatation   - Ammonia 74 (6/28)--> repeat ammonia 49 (6/29) s/p 1x dose of Lactulose (ordered prior to repeat ammonia regardless)  - Hepatitis panel (6/28): neg  - Blood Cx x2 (6/28): NGTD   - empiric antibiotics started Zosyn/Vanc (6/30-7/3) for c/f cholangitis; pt afebrile, leukocytosis resolved and no other signs of cholangitis; antibiotics stopped 7/3  - renal/liver US w/ doppler (6/30) showed known metastatic disease of liver, mass in tim hepatis compatible w/ known necrotic lymphadenopathy, gallbladder sludge w/o evidence of acute cholecystitis, diffuse mod-to-large volume ascites, and c/f PV thrombosis- further eval w/ CT recommended  - CT liver (6/30) showed known metastatic disease in liver, no evidence of portal vein thrombus, moderate volume ascites, interval development of R anterior abdominal wall soft tissue nodule  - GI/Liver consulted re CBD stent removal, rec daily labs w/ INR, repeat blood cultures empiric Zosyn/Vanc (6/30- current) for c/f cholangitis; GI to hold on stent exchange at this time d/t significant katina-procedural risks  - repeat blood cx x2 (6/30) NGTD    # AMS  - Ddx include metabolic (elevated LFTs/ ammonia/ CHIO), structural, infectious  - As of 7/3 AM- responsive to verbal stimuli, disoriented to time, place and situation, not able to speak, unable to keep eyes open during interview  - As of 6/30 AM- pt is ordered x3  #  Metabolic  - Monitor electrolytes; replete PRN. No hypo/hyperglycemia  - ABG (6/29): pH 7.24, pCO2 25, pO2 82, SO2 98; RT consulted, RN to place on humidified 2L NC face mask or tent (mouth breathing)  - TSH (6/29): 1.62  - LFTs continue to improve, Tbili 4 (6/29)--> 4.8 (6/30)--> 4.6 (7/2)--> 4.2 (7/3)  - sCr downtrending, sCr 1.81 (6/29)--> 2.33 (6/30)--> 2.08 (7/2)--> 1.86 (7/3)  - s/p 1x dose of Lactulose 6/29 for Ammonia 73  - repeat ammonia 49 (6/30)  - repeat ammonia 55 (7/3), rectal lactulose enema ordered  - Uric Acid 10.2 (6/28)--> 11.2 (6/29)-->12.2 (6/30) s/p 1x dose of rasburicase--> 2.6 (6/30)  - Vit B12 level (6/29): >2,000  - Zyprexa started 6/28 and held as of 6/29  # Infectious  - WBC 21.6k (6/29)--> 18.1k (6/30)--> 13.9k (7/1)--> 11k (7/2)--> 5.1 (7/3)  - Repeat CXR (6/29) without c/f infection  - UA with reflex (6/30): +protein, +blood, +urobilinogen, +leuksm +WBC, +RBC, +1 bacteria; UCX inconclusive with mixed bacterial gabino  - Lactate on ABG 1.9, procal 7.78 (6/29), repeat procal 3.89 (7/1)  - Recent CT a/p (6/22) showing CBD stent intact without significant upstream biliary dilation  - Pt does have CBD stent that needs to be removed- pt without fevers/chills, HoTN, or abdominal pain- no obvious concern for infected stent; GI/Liver consulted re CBD stent removal (6/30) rec daily labs w/ INR, repeat blood cultures, empiric Zosyn/Vanc (6/30-7/3) for c/f cholangitis; pt afebrile, leukocytosis resolved and no other signs of cholangitis; antibiotics stopped 7/3  - GI to hold on stent exchange at this time d/t significant katina-procedural risks  # Structural  - No known brain mets  - CT head (6/29): negative for acute process  - CBD stent in place via CT a/p (6/22)    # s/p C1 Gemcitabine/Cisplatin Inpatient  # Newly Diagnosed 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 Willis  - Diagnosed in 5/2025  - Discussed at tumor board on  6/4/2025 and rec treatment with Durvalumab + Gemcitabine / Cisplatin   - Treatment was scheduled to begin on 6/22/2025, however, unable to due to admission   - CT A/P (6/22) showed overall progression of metestatic disease, new small volume ascites, satisfactory placement of CBD stent without significant upstream bilairy dilatation, new small pleual effusion  - Primary oncologist rec transfer to [facility] for intiation of emergent inpatient chemotherapy   - Oncology consulted upon arrival to [facility], 6/27 s/p Gemcitabine/Cisplatin while inpatient, durvalumab not permitted inpatient  - NGS panel results pending 6/28   - 7/4: Dr. Willis rec GO discussion and hospice consult, plan [facility] discussion with family today      # CHIO on CKD III - improving  - Recent sCr BL ~1.2-1.3 ; sCr 1.08 (6/27)--> 1.81 (6/29)--> 2.33 (6/30)--> 2.19 (7/1)--> 2.08 (7/2)--> 1.86 (7/3)  - s/p 500mL NS bolus 6/29 & 6/30 for CHIO without improvement  - Nephrology rec renal US, spot urine protein to creatinine ratio, and start mIVF LR @ 80/hr for at least 1 L (7/2)  - s/p mIVF LR @80/hr for 12 hours (7/3)  - Renal US (7/2) showed no obstructive nephropathy, moderate ascites  - Monitor CMP daily  - Nephrology following, etiology of acute kidney injury is hepatorenal syndrome    # Hyponatremia-- resolved  - Na 131 on admit to [facility] (6/20) --> Na 124 (6/27) --> Na 129 (6/28)--> 130 (6/29 and 6/30)--> 132 (7/1)--> 136 (7/2)--> 139 (7/3)  - DDx includes hypovolemia vs SIADH vs Cisplatin-induced (although hyponatremia started prior to Cisplatin, so if any effect not the whole etiology)  - Serum osm 275, urine Cr 197, urine Na <10, urine osm 777 (6/26)  - Repeat serum osm, urine cr 211, urine Na 10, urine osm 922 (6/27)  - Repeat lytes (6/30) urine cr 185.6, urine Na 11, urine osm 656 (6/30)  - s/p 1L fluid restriction 6/26 -- per Dr. Alvarez hold off for now   - Nephrology consulted 6/27, ddx hypovolemia vs SIADH vs Cisplatin-induced. Rec q4 sodium checks  initially, started sodium bicarb TID 6/28, Na showing improvement as of 6/29  - Of note, pt received x2 additional 1L NS boluses as part of chemo regimen (6/27-6/28)   - s/p 500mL NS bolus 6/29 per nephrology d/t kidney function  - updated Nephrology recs (6/30) rec hold amlodipine, obtain Cystatin C, continue sodium bicarb 1300 TID, s/p 500mL LR bolus  - Cystatin C 3.68 (6/30)    # Leukocytosis  - Likely 2/2 steroids vs infectious- Now resolved   - WBC BL~10k, (6/27) 12.6 --> 21.6k (6/29)--> 18.1k (6/30)--> 13.9k (7/1)--> 5.1 (7/3)  - CXR (6/20) (-), Repeat CXR (6/29): no c/f infection  - CT A/P (6/22) showed worsening disease, new small/mod ascites, satsifactory placement of CBD stent w/o dilatation  - UCX inconclusive with mixed bacterial gabino  - Lactate level 1.9 via ABG (6/29): Procal (6/29): 7.78 (6/29), repeat procal 3.89 (7/2)  - MSSA (6/27): +  - Blood cx x2 (6/28/6/30): NGTD   - s/p 10mg IVP decadron premed 6/27    # Tachycardia - improving  - -120s since admission 6/26; asymptomatic, no CP or palpitations, no O2 requirement, no pain  - Ddx includes dehydration vs deconditioning from malignancy vs pain vs infection   - s/p multiple IVF boluses (x3 6/25), 500mL NS 6/29  - EKG (6/28): sinus tach, QTc 446  - Low threshold for CT angio chest if tachycardia worsens or develops new O2 requirement       # Oral thrush, improving  # Mouth Pain  - Treated with oral nystatin 5mL QID swish and spit- completed course 6/29  - Started biotene TID 6/27   - Added scheduled Dex oral liquid 1mg TID swish and swallow and magic mouth wash 10mL swish and swallow q4hrs scheduled for mouth pain  - Humidified face tent/face mask to be applied by nursing to aid in dry mouth     # HTN  - HOLD Amlodipine 10mg daily per updated Neph recs     # MDD  # LUIS FELIPE  - Continue with home Paroxetine 20mg daily      # Type 2 DM  - Last Hgb A1c 5.9 (4/29/24); not on any diabetic medications  - Due to decreased PO intake and normal blood  sugars, insulin not ordered on admit, CTM  - Hypoglycemic protocol in place      # GERD  - Continue Protonix 40mg daily      # DVT Prophy: Stopped prophy Lovenox 6/29 and started prophy Heparin SQ 6/29 d/t CHIO. SCDs, encourage safe ambulation     # DISPO:  - DNR, DNI - no ICU - confirmed on admit   - DC to SNF pending improvement in symptoms, CHIO, LFTs, neph recs, and facility placement  - NOK: Natalio Neal [phone]   - FUV with Dr. Willis (oncology) 7/24

## 2025-07-04 NOTE — CARE PLAN
The patient's goals for the shift include      The clinical goals for the shift include Pt will remain HDS and VSS through end of shift 7/4 1900      Problem: Pain - Adult  Goal: Verbalizes/displays adequate comfort level or baseline comfort level  Outcome: Progressing     Problem: Safety - Adult  Goal: Free from fall injury  Outcome: Progressing     Problem: Discharge Planning  Goal: Discharge to home or other facility with appropriate resources  Outcome: Progressing     Problem: Chronic Conditions and Co-morbidities  Goal: Patient's chronic conditions and co-morbidity symptoms are monitored and maintained or improved  Outcome: Progressing     Problem: Nutrition  Goal: Nutrient intake appropriate for maintaining nutritional needs  Outcome: Progressing     Problem: Skin  Goal: Decreased wound size/increased tissue granulation at next dressing change  Outcome: Progressing  Flowsheets (Taken 7/4/2025 1849)  Decreased wound size/increased tissue granulation at next dressing change: Promote sleep for wound healing  Goal: Participates in plan/prevention/treatment measures  Outcome: Progressing  Goal: Prevent/manage excess moisture  Outcome: Progressing  Flowsheets (Taken 7/4/2025 1849)  Prevent/manage excess moisture: Monitor for/manage infection if present  Goal: Prevent/minimize sheer/friction injuries  Outcome: Progressing  Flowsheets (Taken 7/4/2025 1849)  Prevent/minimize sheer/friction injuries: Turn/reposition every 2 hours/use positioning/transfer devices  Goal: Promote/optimize nutrition  Outcome: Progressing  Goal: Promote skin healing  Outcome: Progressing

## 2025-07-04 NOTE — CARE PLAN
Urine output ~ 700 ml last 24h, s.creatinine 1.77 mg/dl, from 1.86 yesterday.   Continue sodium bicarb supplementation and holding amlodipine as you are doing. Will continue to follow.

## 2025-07-04 NOTE — CARE PLAN
The clinical goals for the shift include patient will remain safe and free from injury throughout shift 7/4 at 0700      Problem: Safety - Adult  Goal: Free from fall injury  Outcome: Progressing     Problem: Skin  Goal: Promote skin healing  Outcome: Progressing     Problem: Skin  Goal: Participates in plan/prevention/treatment measures  Outcome: Progressing

## 2025-07-04 NOTE — PROGRESS NOTES
07/04/25 1200   Discharge Planning   Living Arrangements Spouse/significant other   Support Systems Spouse/significant other;Family members;Children   Type of Residence Private residence   Number of Stairs to Enter Residence 2   Number of Stairs Within Residence 0   Do you have animals or pets at home? Yes   Type of Animals or Pets 1 cat   Home or Post Acute Services Other (Comment)  (Hospice referral)   Expected Discharge Disposition   (Hospice referral)   Patient Choice   Provider Choice list and CMS website (https://medicare.gov/care-compare#search) for post-acute Quality and Resource Measure Data were provided and reviewed with: Family   Patient / Family choosing to utilize agency / facility established prior to hospitalization No     SW received referral from care team for hospice care.  SW met with pt and family at bedside.  Pt is somewhat alert so much of the conversation occurred with family. Family report medical team states hospice care is needed.  Hospice care, philosophy, and services were discussed.  SW offered an information meeting and family is in agreement.  SW offered choice of hospice agencies.  [facility] is desired.  SW will make referral to [facility] via [facility].  Support provided.    CHIRAG Lawrence    7/4/25  2:21pm  Representative from [facility] is meeting with pt and family between 9:30 and 10 am 7/5.  SW will relay to care team.  CHIRAG Lawrence

## 2025-07-05 VITALS
WEIGHT: 210.54 LBS | SYSTOLIC BLOOD PRESSURE: 121 MMHG | OXYGEN SATURATION: 95 % | BODY MASS INDEX: 33.84 KG/M2 | HEIGHT: 66 IN | RESPIRATION RATE: 18 BRPM | HEART RATE: 106 BPM | DIASTOLIC BLOOD PRESSURE: 79 MMHG | TEMPERATURE: 96.1 F

## 2025-07-05 PROBLEM — E87.1 HYPONATREMIA: Status: RESOLVED | Noted: 2025-06-26 | Resolved: 2025-07-05

## 2025-07-05 LAB
ALBUMIN SERPL BCP-MCNC: 2.8 G/DL (ref 3.4–5)
ALP SERPL-CCNC: 633 U/L (ref 33–136)
ALT SERPL W P-5'-P-CCNC: 35 U/L (ref 7–45)
ANION GAP SERPL CALC-SCNC: 14 MMOL/L (ref 10–20)
APTT PPP: 23 SECONDS (ref 26–36)
AST SERPL W P-5'-P-CCNC: 63 U/L (ref 9–39)
BASOPHILS # BLD MANUAL: 0 X10*3/UL (ref 0–0.1)
BASOPHILS NFR BLD MANUAL: 0 %
BILIRUB SERPL-MCNC: 3.3 MG/DL (ref 0–1.2)
BLASTS # BLD MANUAL: 0 X10*3/UL
BLASTS NFR BLD MANUAL: 0 %
BUN SERPL-MCNC: 79 MG/DL (ref 6–23)
CALCIUM SERPL-MCNC: 9.7 MG/DL (ref 8.6–10.6)
CHLORIDE SERPL-SCNC: 112 MMOL/L (ref 98–107)
CO2 SERPL-SCNC: 21 MMOL/L (ref 21–32)
CREAT SERPL-MCNC: 1.94 MG/DL (ref 0.5–1.05)
EGFRCR SERPLBLD CKD-EPI 2021: 28 ML/MIN/1.73M*2
EOSINOPHIL # BLD MANUAL: 0.07 X10*3/UL (ref 0–0.7)
EOSINOPHIL NFR BLD MANUAL: 0.9 %
ERYTHROCYTE [DISTWIDTH] IN BLOOD BY AUTOMATED COUNT: 18 % (ref 11.5–14.5)
GLUCOSE SERPL-MCNC: 166 MG/DL (ref 74–99)
HCT VFR BLD AUTO: 32.4 % (ref 36–46)
HGB BLD-MCNC: 10.3 G/DL (ref 12–16)
IMM GRANULOCYTES # BLD AUTO: 0.11 X10*3/UL (ref 0–0.7)
IMM GRANULOCYTES NFR BLD AUTO: 1.5 % (ref 0–0.9)
INR PPP: 1.3 (ref 0.9–1.1)
LYMPHOCYTES # BLD MANUAL: 0.26 X10*3/UL (ref 1.2–4.8)
LYMPHOCYTES NFR BLD MANUAL: 3.4 %
MAGNESIUM SERPL-MCNC: 2.61 MG/DL (ref 1.6–2.4)
MCH RBC QN AUTO: 29.3 PG (ref 26–34)
MCHC RBC AUTO-ENTMCNC: 31.8 G/DL (ref 32–36)
MCV RBC AUTO: 92 FL (ref 80–100)
METAMYELOCYTES # BLD MANUAL: 0 X10*3/UL
METAMYELOCYTES NFR BLD MANUAL: 0 %
MONOCYTES # BLD MANUAL: 0 X10*3/UL (ref 0.1–1)
MONOCYTES NFR BLD MANUAL: 0 %
MYELOCYTES # BLD MANUAL: 0 X10*3/UL
MYELOCYTES NFR BLD MANUAL: 0 %
NEUTROPHILS # BLD MANUAL: 7.18 X10*3/UL (ref 1.2–7.7)
NEUTS BAND # BLD MANUAL: 0 X10*3/UL (ref 0–0.7)
NEUTS BAND NFR BLD MANUAL: 0 %
NEUTS SEG # BLD MANUAL: 7.18 X10*3/UL (ref 1.2–7)
NEUTS SEG NFR BLD MANUAL: 95.7 %
NRBC BLD MANUAL-RTO: 0 % (ref 0–0)
NRBC BLD-RTO: 0.7 /100 WBCS (ref 0–0)
PHOSPHATE SERPL-MCNC: 4.3 MG/DL (ref 2.5–4.9)
PLASMA CELLS # BLD MANUAL: 0 X10*3/UL
PLASMA CELLS NFR BLD MANUAL: 0 %
PLATELET # BLD AUTO: 106 X10*3/UL (ref 150–450)
POTASSIUM SERPL-SCNC: 3.7 MMOL/L (ref 3.5–5.3)
PROMYELOCYTES # BLD MANUAL: 0 X10*3/UL
PROMYELOCYTES NFR BLD MANUAL: 0 %
PROT SERPL-MCNC: 5.6 G/DL (ref 6.4–8.2)
PROTHROMBIN TIME: 14.4 SECONDS (ref 9.8–12.4)
RBC # BLD AUTO: 3.52 X10*6/UL (ref 4–5.2)
RBC MORPH BLD: ABNORMAL
SODIUM SERPL-SCNC: 143 MMOL/L (ref 136–145)
TOTAL CELLS COUNTED BLD: 117
VARIANT LYMPHS # BLD MANUAL: 0 X10*3/UL (ref 0–0.5)
VARIANT LYMPHS NFR BLD: 0 %
WBC # BLD AUTO: 7.5 X10*3/UL (ref 4.4–11.3)

## 2025-07-05 PROCEDURE — 2500000004 HC RX 250 GENERAL PHARMACY W/ HCPCS (ALT 636 FOR OP/ED): Performed by: PHYSICIAN ASSISTANT

## 2025-07-05 PROCEDURE — 2500000004 HC RX 250 GENERAL PHARMACY W/ HCPCS (ALT 636 FOR OP/ED)

## 2025-07-05 PROCEDURE — 84100 ASSAY OF PHOSPHORUS: CPT | Performed by: NURSE PRACTITIONER

## 2025-07-05 PROCEDURE — 85007 BL SMEAR W/DIFF WBC COUNT: CPT | Performed by: NURSE PRACTITIONER

## 2025-07-05 PROCEDURE — 83735 ASSAY OF MAGNESIUM: CPT | Performed by: NURSE PRACTITIONER

## 2025-07-05 PROCEDURE — 85027 COMPLETE CBC AUTOMATED: CPT | Performed by: NURSE PRACTITIONER

## 2025-07-05 PROCEDURE — 85610 PROTHROMBIN TIME: CPT

## 2025-07-05 PROCEDURE — 80053 COMPREHEN METABOLIC PANEL: CPT | Performed by: NURSE PRACTITIONER

## 2025-07-05 RX ORDER — HEPARIN 100 UNIT/ML
5 SYRINGE INTRAVENOUS AS NEEDED
Status: DISCONTINUED | OUTPATIENT
Start: 2025-07-05 | End: 2025-07-05 | Stop reason: HOSPADM

## 2025-07-05 RX ADMIN — DEXAMETHASONE SODIUM PHOSPHATE 1 MG: 4 INJECTION, SOLUTION INTRA-ARTICULAR; INTRALESIONAL; INTRAMUSCULAR; INTRAVENOUS; SOFT TISSUE at 18:40

## 2025-07-05 RX ADMIN — DEXAMETHASONE SODIUM PHOSPHATE 1 MG: 4 INJECTION, SOLUTION INTRA-ARTICULAR; INTRALESIONAL; INTRAMUSCULAR; INTRAVENOUS; SOFT TISSUE at 08:55

## 2025-07-05 RX ADMIN — THIAMINE HYDROCHLORIDE 100 MG: 100 INJECTION, SOLUTION INTRAMUSCULAR; INTRAVENOUS at 08:55

## 2025-07-05 RX ADMIN — HEPARIN 500 UNITS: 100 SYRINGE at 18:39

## 2025-07-05 RX ADMIN — DEXTROSE AND SODIUM CHLORIDE 75 ML/HR: 5; .9 INJECTION, SOLUTION INTRAVENOUS at 06:16

## 2025-07-05 RX ADMIN — DEXAMETHASONE SODIUM PHOSPHATE 1 MG: 4 INJECTION, SOLUTION INTRA-ARTICULAR; INTRALESIONAL; INTRAMUSCULAR; INTRAVENOUS; SOFT TISSUE at 01:12

## 2025-07-05 RX ADMIN — Medication 15 ML: at 08:55

## 2025-07-05 ASSESSMENT — COGNITIVE AND FUNCTIONAL STATUS - GENERAL
TURNING FROM BACK TO SIDE WHILE IN FLAT BAD: TOTAL
STANDING UP FROM CHAIR USING ARMS: TOTAL
STANDING UP FROM CHAIR USING ARMS: TOTAL
TURNING FROM BACK TO SIDE WHILE IN FLAT BAD: TOTAL
WALKING IN HOSPITAL ROOM: TOTAL
CLIMB 3 TO 5 STEPS WITH RAILING: TOTAL
MOVING TO AND FROM BED TO CHAIR: TOTAL
MOVING TO AND FROM BED TO CHAIR: TOTAL
CLIMB 3 TO 5 STEPS WITH RAILING: TOTAL
MOBILITY SCORE: 7
WALKING IN HOSPITAL ROOM: TOTAL
MOVING FROM LYING ON BACK TO SITTING ON SIDE OF FLAT BED WITH BEDRAILS: A LOT
MOBILITY SCORE: 7
MOVING FROM LYING ON BACK TO SITTING ON SIDE OF FLAT BED WITH BEDRAILS: A LOT

## 2025-07-05 ASSESSMENT — PAIN SCALES - PAIN ASSESSMENT IN ADVANCED DEMENTIA (PAINAD)
TOTALSCORE: REPOSITIONED
BODYLANGUAGE: RELAXED
CONSOLABILITY: NO NEED TO CONSOLE
FACIALEXPRESSION: SMILING OR INEXPRESSIVE
BREATHING: OCCASIONAL LABORED BREATHING, SHORT PERIOD OF HYPERVENTILATION
TOTALSCORE: 1

## 2025-07-05 ASSESSMENT — PAIN - FUNCTIONAL ASSESSMENT: PAIN_FUNCTIONAL_ASSESSMENT: 0-10

## 2025-07-05 ASSESSMENT — PAIN SCALES - GENERAL: PAINLEVEL_OUTOF10: 0 - NO PAIN

## 2025-07-05 NOTE — CARE PLAN
The patient's goals for the shift include      The clinical goals for the shift include patient will remain HDS and VSS throughout shift 7/5 at 0700      Problem: Pain - Adult  Goal: Verbalizes/displays adequate comfort level or baseline comfort level  Outcome: Progressing     Problem: Safety - Adult  Goal: Free from fall injury  Outcome: Progressing     Problem: Discharge Planning  Goal: Discharge to home or other facility with appropriate resources  Outcome: Progressing     Problem: Chronic Conditions and Co-morbidities  Goal: Patient's chronic conditions and co-morbidity symptoms are monitored and maintained or improved  Outcome: Progressing     Problem: Nutrition  Goal: Nutrient intake appropriate for maintaining nutritional needs  Outcome: Progressing     Problem: Skin  Goal: Decreased wound size/increased tissue granulation at next dressing change  Outcome: Progressing  Goal: Participates in plan/prevention/treatment measures  Outcome: Progressing  Goal: Prevent/manage excess moisture  Outcome: Progressing  Goal: Prevent/minimize sheer/friction injuries  Outcome: Progressing  Goal: Promote/optimize nutrition  Outcome: Progressing  Goal: Promote skin healing  Outcome: Progressing

## 2025-07-05 NOTE — NURSING NOTE
7/5/25 1854:    Pt discharge complete. IV removed, catheter intact. Port de-accessed per policy. Andrew cathter placed and to be left in for home-going needs per order. Pt discharging to home w/ [facility]. Discharge instructions printed and sent with transporters. No belongings or medications to return to patient, collected by family earlier in day.     Bedside report given to EMTs of [facility]. VSS at time of discharge. Pt picked up at 1845.     KURT Wilson RN

## 2025-07-05 NOTE — PROGRESS NOTES
"   07/05/25 1100   Discharge Planning   Living Arrangements Spouse/significant other   Support Systems Spouse/significant other;Family members;Children   Assistance Needed hospice   Type of Residence Private residence   Who is requesting discharge planning? Patient   Home or Post Acute Services In home services   Type of Home Care Services Hospice   [facility]   Does the patient need discharge transport arranged? Yes   [facility] coordination needed? No   Has discharge transport been arranged? Yes   What day is the transport expected? 07/05/25   What time is the transport expected? 1700  (arranged by [facility] RN)     LSW received the following message from [facility] RN re: discharge planning as pt/ signed consents yesterday:  \"Met with pt and spouse today. Plan is for pt to discharge home at 5pm. Discharge paperwork and ambulance forms completed and with .\" LSW to cont to follow through discharge.  "

## 2025-07-05 NOTE — DISCHARGE SUMMARY
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 [facility] consulted on admission. Patient s/p C1 Gemcitabine/Cisplatin inpatient 6/27. Labs noteable for acute hyponatremia, Na+124 (6/27) previously unresponsive to IVF at OSH despite hypovolemia hyponatremia like picture. Nephrology consulted 6/27, ddx hypovolemia vs SIADH vs Cisplatin-induced; rec q4 sodium checks initially, further aggressive IVF boluses (x3 6/27), started sodium bicarb TID 6/28, Na and CHIO showing improvement as of 7/2. Supportive Onc consulted 6/28 for symptom mgmt (nausea/poor PO intake), recs followed. 6/29 AM pt with significant AMS. CT head (6/29) negative. Pt orientation improved; A&Ox3 (6/30). Liver and kidney function worsening; Liver and Renal US ordered (6/30) which showed known metastatic disease of liver, mass in tim hepatis compatible w/ known necrotic lymphadenopathy, gallbladder sludge w/o evidence of acute cholecystitis, diffuse mod-to-large volume ascites, and c/f PV thrombosis- further eval w/ CT recommended. CT liver (6/30) showed known metastatic disease in liver, no evidence of portal vein thrombus, moderate volume ascites and interval development of R anterior abdominal wall soft tissue nodule. GI/Liver consulted re CBD stent removal (6/30) rec daily labs w/ INR, repeat blood cultures and Zosyn/Vanc started (6/30-7/3) for c/f cholangitis; pt  remains afebrile, leukocytosis resolved with no other signs of cholangitis; antibiotics stopped 7/3. Hepatology to also hold on stent exchange at this time d/t significant katina-procedural risks. Liver and kidney function improving as of 7/2, however pt functional status worsening; pt unable to ambulate, sip from straw or drink out of cup at this time. Speech consulted (7/2) rec NPO and additional enteral nutrition. Dietitian reconsulted (7/2) rec additional nutrition support if aggressive treatment to continue. Updates sent to Dr. Parks (7/2), rec supportive measures and GOC at this time. On 7/4, discussed her steady decline with family and transition to hospice care, family agreed and hospice consult was placed. On 7/5, family met with [facility] and signed for home hospice. Today she is discharge home with hospice care.   Attending has reviewed all labs and vitals, and discussed and agreed with the discharge plan prior to patient discharge.  Patient discharged in stable condition. > 30 minutes spent on discharge planning.      Visit Vitals  /84   Pulse 106   Temp 36.1 °C (97 °F) (Temporal)   Resp 16     Vitals:    06/27/25 1550   Weight: 95.5 kg (210 lb 8.6 oz)       Immunization History   Administered Date(s) Administered    COVID-19, mRNA, LNP-S, PF, 30 mcg/0.3 mL dose 04/18/2021, 05/10/2021, 01/25/2022    Pfizer COVID-19 vaccine, 12 years and older, (30mcg/0.3mL) (Comirnaty) 11/14/2023    Pfizer COVID-19 vaccine, bivalent, age 12 years and older (30 mcg/0.3 mL) 10/27/2022       Results  US renal complete  Result Date: 7/3/2025  Interpreted By:  Satya Aviles,  and Ivan Varner STUDY: US RENAL COMPLETE;  7/3/2025 3:28 am   INDICATION: Signs/Symptoms:hx pancreaticobiliary CA, admitted for urgent chemo and poor po intake, CHIO.     COMPARISON: CT liver 06/30/2025, ultrasound abdomen 06/30/2025   ACCESSION NUMBER(S): [id number]   ORDERING CLINICIAN: ESEQUIEL GARCIA   TECHNIQUE: Multiple images of the  kidneys were obtained.   FINDINGS: Limited evaluation secondary to bowel-gas and abdominal ascites.   RIGHT KIDNEY: The right kidney measures 9.52 cm in length. Mild cortical thinning and increased parenchymal echogenicity. No hydronephrosis or nephroureterolithiasis.   LEFT KIDNEY: The left kidney measures 10.24 cm in length.  Mild cortical thinning and increased parenchymal echogenicity. No hydronephrosis or nephroureterolithiasis. Simple cyst of the midpole of the left kidney measuring 1.4 x 1.1 x 1.1 cm.   BLADDER: The urinary bladder is unremarkable in appearance.   Cirrhotic morphology of the liver and moderate volume abdominal ascites.       1. Mild cortical thinning and increased echogenicity of the bilateral kidneys, correlate with patient history for chronic or acute on chronic kidney disease. 2. No obstructive nephropathy. 3. Morphology and moderate volume abdominal ascites.     I personally reviewed the image(s)/study and resident interpretation. I agree with the findings as stated by resident Telly Love. Data analyzed and images interpreted at [facility], [place], [place].   MACRO: None     Signed by: Satya Aviles 7/3/2025 6:28 AM Dictation workstation:   [id number]    ECG 12 lead  Result Date: 7/2/2025  Sinus tachycardia Possible Left atrial enlargement Inferior infarct (cited on or before 20-JUN-2025) Possible Anterolateral infarct (cited on or before 11-APR-2025) Abnormal ECG When compared with ECG of 22-JUN-2025 14:02, No significant change was found Confirmed by Napoleon Roman ([id number]) on 7/2/2025 10:57:01 AM    US abdomen complete  Result Date: 6/30/2025  Interpreted By:  Leonard Nova and Mercado Amiel STUDY: US ABDOMEN COMPLETE; VASC US ABDOMINAL/PELVIC DUPLEX COMPLETE; 6/30/2025 10:56 am   INDICATION: Signs/Symptoms:pancreaticbiliary carcinoma liver mets, hx CBD stent, worsening transaminitis; Signs/Symptoms:CHIO.   COMPARISON: None.   ACCESSION NUMBER(S):  [id number]; [id number]   ORDERING CLINICIAN: CLARISA PFEIFFER   TECHNIQUE: Multiple images of the right upper quadrant were obtained.  Gray scale, color Doppler and spectral Doppler waveform analysis was performed.  This examination was interpreted at [facility].   FINDINGS: LIVER: The liver measures 17.2 cm in craniocaudal dimension. The liver is nodular in contour, concerning for chronic liver disease/cirrhosis. The hepatic parenchyma is markedly heterogeneous with ill-defined heterogenously hypoechoic areas.   There is an approximately 8.5 x 7.9 x 7.2 cm irregular hypoechoic mass in the tim hepatis medial to the right hepatic lobe.   GALLBLADDER: Gallbladder: Normal. Gallstones: None. Gallbladder sludge: Small volume layering biliary sludge. Gallbladder wall thickening: None. Pericholecystic fluid: None.   BILIARY SYSTEM: No evidence of intra or extrahepatic biliary dilatation is identified.   PANCREAS: The visualized pancreas is unremarkable in appearance.   PERITONEUM AND RETROPERITONEUM: There is no free or loculated fluid seen in the abdomen.   SPLEEN: Measures 10.36cm in craniocaudal dimension, within normal limits. Within normal limits for age limits.  No focal splenic lesion identified.   DOPPLER EVALUATION:   HEPATIC ARTERIES: Hepatic artery and its right and left branches RI's are estimated at 0.7, 0.69 and 0.7, respectively.   PORTAL VEIN: Portal vein measures 0.92cm. There is inability to obtain adequate color flow within the portal venous system. The visualized flow is visualization of the portal vein flow with intraluminal echogenicity.   HEPATIC VEIN: The right, middle and left hepatic veins are patent and demonstrate triphasic antegrade flow. IVC appears also patent.   RIGHT KIDNEY: The right kidney measures 9.3 cm in length. No hydronephrosis or renal calculi are seen.   Moderate to large abdominal ascites is partially visualized.        1. Inability to obtain adequate color flow within the portal venous system with intraluminal echogenicity. Findings are concerning for portal venous thrombosis, however markedly slow flow is a possibility. Recommend further evaluation with dedicated CT of the liver. 2. Cirrhotic morphology of the liver, with markedly echogenic hepatic parenchyma, compatible with patient's known metastatic disease. 3. An irregular hypoechoic 8.5 x 7.9 x 7.2 cm mass at the tim hepatis, compatible with known necrotic lymphadenopathy. 4. Diffuse moderate-to-large volume ascites. 5. Gallbladder sludge without evidence of acute cholecystitis.     I personally reviewed the images/study and I agree with the findings as stated by Dagoberto Leo MD. This study was interpreted at [place], [place].   MACRO: Critical Finding:  See findings. Notification was initiated on 6/30/2025 at 1:24 pm by  Dagoberto Leo.  (**-OCF-**) Instructions:   Signed by: Leonard Nova 6/30/2025 8:37 PM Dictation workstation:   [id number]    Vascular US abdomen/pelvis duplex complete  Result Date: 6/30/2025  Interpreted By:  Leonard Nova and Mercado Amiel STUDY: US ABDOMEN COMPLETE; [employer] US ABDOMINAL/PELVIC DUPLEX COMPLETE; 6/30/2025 10:56 am   INDICATION: Signs/Symptoms:pancreaticbiliary carcinoma liver mets, hx CBD stent, worsening transaminitis; Signs/Symptoms:CHIO.   COMPARISON: None.   ACCESSION NUMBER(S): [id number]; [id number]   ORDERING CLINICIAN: CLARISA PFEIFFER   TECHNIQUE: Multiple images of the right upper quadrant were obtained.  Gray scale, color Doppler and spectral Doppler waveform analysis was performed.  This examination was interpreted at [facility].   FINDINGS: LIVER: The liver measures 17.2 cm in craniocaudal dimension. The liver is nodular in contour, concerning for chronic liver disease/cirrhosis. The hepatic parenchyma is  markedly heterogeneous with ill-defined heterogenously hypoechoic areas.   There is an approximately 8.5 x 7.9 x 7.2 cm irregular hypoechoic mass in the tim hepatis medial to the right hepatic lobe.   GALLBLADDER: Gallbladder: Normal. Gallstones: None. Gallbladder sludge: Small volume layering biliary sludge. Gallbladder wall thickening: None. Pericholecystic fluid: None.   BILIARY SYSTEM: No evidence of intra or extrahepatic biliary dilatation is identified.   PANCREAS: The visualized pancreas is unremarkable in appearance.   PERITONEUM AND RETROPERITONEUM: There is no free or loculated fluid seen in the abdomen.   SPLEEN: Measures 10.36cm in craniocaudal dimension, within normal limits. Within normal limits for age limits.  No focal splenic lesion identified.   DOPPLER EVALUATION:   HEPATIC ARTERIES: Hepatic artery and its right and left branches RI's are estimated at 0.7, 0.69 and 0.7, respectively.   PORTAL VEIN: Portal vein measures 0.92cm. There is inability to obtain adequate color flow within the portal venous system. The visualized flow is visualization of the portal vein flow with intraluminal echogenicity.   HEPATIC VEIN: The right, middle and left hepatic veins are patent and demonstrate triphasic antegrade flow. IVC appears also patent.   RIGHT KIDNEY: The right kidney measures 9.3 cm in length. No hydronephrosis or renal calculi are seen.   Moderate to large abdominal ascites is partially visualized.       1. Inability to obtain adequate color flow within the portal venous system with intraluminal echogenicity. Findings are concerning for portal venous thrombosis, however markedly slow flow is a possibility. Recommend further evaluation with dedicated CT of the liver. 2. Cirrhotic morphology of the liver, with markedly echogenic hepatic parenchyma, compatible with patient's known metastatic disease. 3. An irregular hypoechoic 8.5 x 7.9 x 7.2 cm mass at the tim hepatis, compatible with known  necrotic lymphadenopathy. 4. Diffuse moderate-to-large volume ascites. 5. Gallbladder sludge without evidence of acute cholecystitis.     I personally reviewed the images/study and I agree with the findings as stated by Dagoberto Leo MD. This study was interpreted at [facility], [place], [place].   MACRO: Critical Finding:  See findings. Notification was initiated on 6/30/2025 at 1:24 pm by  Dagoberto Leo.  (**-OCF-**) Instructions:   Signed by: Leonard Nova 6/30/2025 8:37 PM Dictation workstation:   [id number]    CT liver w IV contrast  Result Date: 6/30/2025  Interpreted By:  Chu Osman  and Chuckie Martinez STUDY: CT LIVER W IV CONTRAST;  6/30/2025 12:57 pm   INDICATION: Signs/Symptoms:Hx pancreaticobiliary carcinoma with elevated LFTs, US liver c/f PV thrombus.   COMPARISON: CT ABDOMEN PELVIS W IV CONTRAST 6/22/2025   ACCESSION NUMBER(S): [id number]   ORDERING CLINICIAN: NOELLE PFEIFFER   TECHNIQUE: CT of the abdomen was performed including contiguous axial images through the abdomen at 3 mm slice thickness in portal venous and delayed phases. Coronal and sagittal reconstructions at 3 mm slice thickness were performed. 75 ML of Omnipaque 350 was administered intravenously without immediate complication.   FINDINGS: LOWER CHEST: Interval reduction in size of trace right pleural effusion. Bandlike opacities consistent with atelectasis in the right lung base. No left pleural effusion. Heart is normal in size. No pericardial effusion. No significant coronary artery calcifications.   ABDOMEN:   LIVER: There are numerous heterogenous enhancing liver lesions the largest in hepatic segment 6/7 which measures approximately 7.8 x 5.5 cm, previously measuring 8.0 x 5.2 cm. Differences in measurement are likely secondary to differences in imaging technique/contrast timing. Nodular contour of the liver consistent with liver cirrhosis.   BILE DUCTS: Stable appearance of the common bile  duct stent. No upstream biliary ductal dilatation.   GALLBLADDER: Cholelithiasis without evidence of cholecystitis.   PANCREAS: The pancreas appears unremarkable without evidence of ductal dilatation or masses.   SPLEEN: The spleen is normal in size without focal lesions.   ADRENAL GLANDS: Bilateral adrenal glands appear normal.   KIDNEYS AND URETERS: Hypodense cystic lesion in the left kidney measuring 1.1 cm most consistent with a simple renal cyst. Mild nonspecific perinephric fat stranding similar to prior examination. No hydroureteronephrosis or nephroureterolithiasis is identified.   BOWEL: The stomach is unremarkable. The small bowel is not abnormally dilated. The large bowel demonstrates multiple diverticula without inflammation. The appendix appears normal.   VESSELS: Moderate to severe atherosclerotic disease of the abdominal aorta and its branches. Patency of the portal vein, splenic vein, and superior mesenteric vein.   PERITONEUM/RETROPERITONEUM/LYMPH NODES: Moderate volume ascites overall similar compared to prior examination. No evidence of pneumoperitoneum. Redemonstration of extensive tim hepatic and retroperitoneal lymphadenopathy. For example there is a conglomerate of lymph nodes in the tim hepatis which measures 8.5 x 7.6 cm, previously measuring 8.4 x 7.8 cm when measured in a similar fashion. There are numerous bulky para-aortic lymph nodes measuring up to 4.1 cm, previously measuring 3.8 cm. A right external iliac chain lymph node measures 2.1 cm, previously measures same.   BONES AND ABDOMINAL WALL: No suspicious osseous lesions are identified.  Interval development of a right anterior abdominal wall soft tissue nodule measuring 2.6 x 1.8 cm which may represent a site of injection given its location and rapid development.       1. Compared to most recent CT from 06/22/2025, there is overall similar degree of metastatic disease in the visualized abdomen. Similar metastatic disease in the  liver with a dominant lesion in the right hepatic lobe. Extensive melony metastatic disease throughout the visualized abdomen. 2. No evidence of portal vein thrombus. 3. Similar moderate volume ascites. 4. Interval development of a right anterior abdominal wall soft tissue nodule likely reflecting a site of injection given its location/rapid development. 5. Additional chronic or incidental findings as described above.   I personally reviewed the images/study and I agree with the findings as stated by Juan Noguera MD. This study was interpreted at [place], [place].   MACRO: None   Signed by: Chu Osman 6/30/2025 7:32 PM Dictation workstation:   [id number]    CT head wo IV contrast  Result Date: 6/29/2025  Interpreted By:  Travis Acharya and Ritchie Brandon STUDY: CT HEAD WO IV CONTRAST;  6/29/2025 10:46 am   INDICATION: Signs/Symptoms:AMS, hx pancreaticobiliary carcinoma.   COMPARISON: None.   ACCESSION NUMBER(S): [id number]   ORDERING CLINICIAN: NOELLE PFEIFFER   TECHNIQUE: Noncontrast axial CT scan of head was performed. Angled reformats in brain and bone windows were generated. The images were reviewed in bone, brain, blood and soft tissue windows.   FINDINGS: Parenchyma: Mild-to-moderate generalized cortical volume loss. There are patchy periventricular and subcortical white matter hypo intensities which are favored to reflect sequela of chronic small-vessel ischemic change. The grey-white differentiation is intact without evidence of large territory ischemic infarct. There is no mass effect or midline shift.  There is no intracranial hemorrhage.   CSF Spaces: There is mild ventriculomegaly likely secondary to central and cortical volume loss. The sulci and basal cisterns are patent. There is no extraaxial fluid collection.   Calvarium: The calvarium is unremarkable.   Paranasal sinuses and mastoids: Visualized paranasal sinuses and mastoids are clear.        1. No evidence of acute intracranial process. 2. Chronic findings as above including sequela of small-vessel ischemic change and mild ventriculomegaly likely due to central/cortical volume loss.   I personally reviewed the images/study and I agree with the findings as stated by resident Abraham Navarro. This study was interpreted at [place], [place].   MACRO: None     Signed by: Travis Acharya 6/29/2025 11:51 AM Dictation workstation:   [id number]    XR chest 1 view  Result Date: 6/29/2025  Interpreted By:  Marcelino Billinglsey and Ritchie Brandon STUDY: XR CHEST 1 VIEW;  6/29/2025 9:45 am   INDICATION: Signs/Symptoms:r/o infectious process.   COMPARISON: Chest radiograph 06/22/2025. CT of the chest 04/15/2025.   ACCESSION NUMBER(S): [id number]   ORDERING CLINICIAN: NOELLE PFEIFFER   FINDINGS: AP radiograph of the chest was provided.   Right chest wall MediPort with the tip terminating over the lower SVC.   CARDIOMEDIASTINAL SILHOUETTE: Cardiomediastinal silhouette is stable in size and configuration.   LUNGS: Minimal amount of bibasilar linear atelectasis. No pleural effusion or pneumothorax.   ABDOMEN: No remarkable upper abdominal findings.   BONES: Reverse right shoulder arthroplasty. No acute osseous changes.       Minimal amount of bibasilar linear atelectasis. No new consolidation, pleural effusion, or pneumothorax.   I personally reviewed the images/study and I agree with the findings as stated by resident Abraham Navarro. This study was interpreted at [place], [place].   MACRO: None   Signed by: Marcelino Billingsley 6/29/2025 11:38 AM Dictation workstation:   [id number]    Transthoracic Echo (TTE) Limited  Result Date: 6/29/2025  [address] [place] [address], [address] [place] [address]                Tel [phone] and Fax [phone] TRANSTHORACIC ECHOCARDIOGRAM REPORT  Patient Name:       TOY  BORIS Weaver Physician:    [id number] Satish Neal MD Study Date:         6/28/2025           Ordering Provider:    [id number] SANA BALLESTEROS                                                               VARUN MRN/PID:            [id number]            Fellow: Accession#:         [id number]        Nurse: Date of Birth/Age:  1955 / 70 years Sonographer:          Ghazala Newby                                                               RDCS Gender assigned at  F                   Additional Staff: Birth: Height:             167.64 cm           Admit Date:           6/26/2025 Weight:             95.25 kg            Admission Status:     Inpatient -                                                               Routine BSA / BMI:          2.04 m2 / 33.89     Encounter#:           [id number]                     kg/m2 Blood Pressure:     138/86 mmHg         Department Location:  [facility]                                                               Non Invasive Study Type:    TRANSTHORACIC ECHO (TTE) LIMITED Diagnosis/ICD: Encounter for screening for cardiovascular disorders-Z13.6 Indication:    IVC POCUS to assess for hypovolemia CPT Code:      Echo Limited-26379; Doppler Limited-99512 Patient History: Pertinent History: Diagnosed metastatic pancreaticobiliary carcinoma with mets                    to the pancreas, liver, and soft tissue (s/p ERCP with stent                    placement in the CBD, plans to start Durvalumab + Gemcitabine                    / Cisplatin), HTN, Type 2 DM, HLD, fatty liver, GERD, CKD                    stage III. Study Detail: The following Echo studies were performed: 2D and M-Mode.               Technically challenging study due to body habitus and poor               acoustic windows.  PHYSICIAN INTERPRETATION: Left Ventricle: Left ventricular ejection fraction is hyperdynamic by visual estimate at 75-80%. There  are no regional left ventricular wall motion abnormalities. The left ventricular cavity size is decreased. There is mildly increased septal and mildly increased posterior left ventricular wall thickness. There is left ventricular concentric remodeling. Left ventricular diastolic filling was not assessed. Left Atrium: The left atrial size is normal. Right Ventricle: The right ventricle was not assessed. Right ventricular systolic function not assessed. Right Atrium: The right atrial size was not assessed. Aortic Valve: The aortic valve was not assessed. Aortic valve regurgitation was not assessed. Mitral Valve: The mitral valve was not assessed. Mitral valve regurgitation was not assessed. Tricuspid Valve: The tricuspid valve was not assessed. Tricuspid regurgitation was not assessed. Pulmonic Valve: The pulmonic valve was not assessed. Pulmonic valve regurgitation was not assessed. Pericardium: Trivial to small pericardial effusion. Aorta: The aortic root is normal. Systemic Veins: The inferior vena cava appears small in size, with IVC inspiratory collapse greater than 50%. Elements suggestive of decreased intravascular volume. In comparison to the previous echocardiogram(s): There are no prior studies on this patient for comparison purposes.  CONCLUSIONS:  1. Left ventricular ejection fraction is hyperdynamic by visual estimate at 75-80%.  2. There is left ventricular concentric remodeling.  3. Left ventricular cavity size is decreased.  4. Trivial to small pericardial effusion.  5. The inferior vena cava appears small in size, with IVC inspiratory collapse greater than 50%.  6. Elements suggestive of decreased intravascular volume. QUANTITATIVE DATA SUMMARY:  2D MEASUREMENTS:         Normal Ranges: Ao Root d:       3.00 cm (2.0-3.7cm) LAs:             3.40 cm (2.7-4.0cm) IVSd:            0.98 cm (0.6-1.1cm) LVPWd:           0.99 cm (0.6-1.1cm) LVIDd:           3.33 cm (3.9-5.9cm) LVIDs:           2.22 cm LV Mass  "Index:   46 g/m2 LV % FS          33.4 %  LEFT ATRIUM:                Normal Ranges: LA Volume Index: 15.2 ml/m2  AORTA MEASUREMENTS:         Normal Ranges: Asc Ao, d:          2.90 cm (2.1-3.4cm)  LV SYSTOLIC FUNCTION:                      Normal Ranges: EF-Visual:      78 % LV EF Reported: 78 %  [id number] Satish Neal MD Electronically signed on 6/29/2025 at 1:59:40 AM  ** Final **     ECG 12 lead  Result Date: 6/28/2025  Sinus tachycardia Possible Inferior infarct , age undetermined Possible Anterior infarct (cited on or before 11-APR-2025) Abnormal ECG When compared with ECG of 11-APR-2025 17:35, No significant change was found Confirmed by Ramicone, James ([id number]) on 6/28/2025 7:44:24 AM    ECG 12 lead  Result Date: 6/24/2025  Sinus tachycardia Inferior infarct , age undetermined Anterolateral infarct (cited on or before 11-APR-2025) Abnormal ECG When compared with ECG of 11-APR-2025 17:35, Questionable change in initial forces of Anterolateral leads Nonspecific T wave abnormality now evident in Anterior leads    IR CVC check  Result Date: 6/23/2025  Interpreted By:  Chris Mcelroy, STUDY: IR CVC CHECK;  6/23/2025 2:44 pm   INDICATION: Signs/Symptoms:evaluate port.     COMPARISON: None.   ACCESSION NUMBER(S): [id number]   ORDERING CLINICIAN: CRISTIANA TORRES   TECHNIQUE: INTERVENTIONALIST(S): Chris Mcelroy MD   The history and physical exam pertinent to the procedure were reviewed and no updates were made.\"     CONSENT: The patient/patient's POA/next of kin was informed of the nature of the proposed procedure. The purposes, alternatives, risks, and benefits were explained and discussed. All questions were answered and consent was obtained.   RADIATION EXPOSURE: None   SEDATION: None   MEDICATION/CONTRAST: No additional   TIME OUT: A time out was performed immediately prior to procedure start with the interventional team, correctly identifying the patient name, date of birth, MRN, procedure, anatomy (including marking " of site and side), patient position, procedure consent form, relevant laboratory and imaging test results, antibiotic administration, safety precautions, and procedure-specific equipment needs.   COMPLICATIONS: No immediate adverse events identified.   FINDINGS: The right chest chest port was prepped and draped in usual sterile fashion. Accessed in the standard sterile fashion. The chest port was easily palpated and accessible. There was immediate return of blood and no resistance to flushing. Port was flushed with 100 units/mL heparin dwell. Port de accessed in standard fashion. Patient tolerated procedure well.       Normal functioning right IJ chest port. Port is ready to use.   I was present for and/or performed the critical portions of the procedure and immediately available throughout the entire procedure.   I personally reviewed the image(s)/study and interpretation. I agree with the findings as stated.   Performed and dictated at [facility].   MACRO: None   Signed by: Chris Mcelroy 6/23/2025 4:32 PM Dictation workstation:   [id number]    XR chest 1 view  Result Date: 6/22/2025  Interpreted By:  Xavier Rocha, STUDY: XR CHEST 1 VIEW;  6/22/2025 1:51 pm   INDICATION: Signs/Symptoms:Reevaluate chest port placement.   COMPARISON: None.   ACCESSION NUMBER(S): [id number]   ORDERING CLINICIAN: ANA GREENFIELD   FINDINGS: Right IJ chest port catheter tip terminates in the upper portion of the SVC Small lung volumes. Small bands of right perihilar and left basilar atelectasis. No apparent pleural effusion. Cardiomediastinal silhouette unremarkable. No pulmonary vascular congestion.       Chest port catheter tip terminates in the cranial portion of the superior vena cava. Small bands of right perihilar and left basilar atelectasis.   MACRO: None   Signed by: Xavier Rocha 6/22/2025 1:56 PM Dictation workstation:   [id number]    CT abdomen pelvis w IV contrast  Result Date:  6/22/2025  Interpreted By:  Xavier Rocha, STUDY: CT ABDOMEN PELVIS W IV CONTRAST;  6/22/2025 6:45 am   INDICATION: Signs/Symptoms:Elevated LFTs, evaluate biliary stent placement.   COMPARISON: 04/10/2025   ACCESSION NUMBER(S): [id number]   ORDERING CLINICIAN: ANA GREENFIELD   TECHNIQUE: CT of the abdomen and pelvis was performed.  Contiguous axial images were obtained at 3 mm slice thickness through the abdomen and pelvis. Coronal and sagittal reconstructions at 3 mm slice thickness were performed. 75 mL Omnipaque 350 administered intravenously without immediate complication.   FINDINGS: LOWER CHEST: New small pleural effusions, right greater than left. Increased overlying atelectasis.   ABDOMEN:   LIVER: Cirrhotic. New necrotic mass in the right posterior cyst centered in segment 7 measuring about 8.0 cm is similar to prior exam. There are innumerable new hypoenhancing tiny masses scattered throughout every segment.   BILE DUCTS: Interval placement of common bile duct stent. No upstream biliary dilatation.   GALLBLADDER: No calcified stone or definite inflammation.   PANCREAS: Unremarkable   SPLEEN: Unremarkable   ADRENAL GLANDS: Unremarkable   KIDNEYS AND URETERS: Unremarkable without hydronephrosis.   PELVIS:   BLADDER: Partially distended.   REPRODUCTIVE ORGANS: Endometrial thickening persists.   BOWEL: No findings of bowel obstruction or inflammation.   Appendix not identified. Unremarkable mesentery.     VESSELS: Aortoiliac system is patent without aneurysm.  Major visceral branches are patent.Severe atherosclerosis. IVC and visualized major branches are patent. There is compression of these suprarenal IVC by enlarged lymph nodes. Major portal venous branches are patent.   PERITONEUM AND RETROPERITONEUM: Small to moderate volume ascites appears new since prior exam. No free air.   Extensive miquel hepatis and retroperitoneal lymphadenopathy demonstrates a mixed treatment response. For example:     Miquel hepatis  conglomerate 8.4 x 5.0 cm, not significantly changed   Left periaortic enlarged from 13 mm to 23 mm short axis More inferior left periaortic enlarged from 13 mm to 38 mm short axis Right common iliac: Enlarged from 14 mm to 21 mm short axis   Aortocaval: Decreased from 30 mm to 24 mm short axis Retrocaval: Decreased from 25 mm to 18 mm   BONE AND ABDOMINAL WALL: No acute skeletal findings.   Degenerative changes of the spine. Grade 1 anterolisthesis L3-4.         1.  Overall progression of metastatic disease compared to prior exam just over 2 months ago. 2. Right hepatic lobe mass roughly unchanged. There are otherwise innumerable new tiny masses scattered throughout every segment of the liver. 3. There is a mixed treatment response in extensive melony metastatic disease. Some nodes are unchanged, some are smaller, and some of enlarged compared to prior exam. 4. New small to moderate volume ascites. 5. Interval satisfactory placement of common bile duct stent without significant upstream biliary dilatation. 6. New small pleural effusions.   MACRO: None.   Signed by: Xavier Rocha 6/22/2025 8:25 AM Dictation workstation:   [id number]    XR chest 2 views  Result Date: 6/20/2025  Interpreted By:  Solis Witt, STUDY: XR CHEST 2 VIEWS;  6/20/2025 11:51 am   INDICATION: Signs/Symptoms:Gen weakness.     COMPARISON: 04/11/2025.   ACCESSION NUMBER(S): [id number]   ORDERING CLINICIAN: ROSALBA ELIZABETH   FINDINGS: CARDIOMEDIASTINAL SILHOUETTE: There has been interval placement of a right chest port with tubing tip at the proximal SVC level. Cardiac silhouette is stable and not significantly enlarged.   LUNGS: Inspiratory volume is low. There is elevation of the right hemidiaphragm and mild right basilar atelectasis. No focal consolidation. No pleural effusion or pneumothorax.   ABDOMEN: No remarkable upper abdominal findings.   BONES: There is thoracic mild dextrocurvature and mild increased thoracic kyphosis. Mild  multilevel disc space narrowing and anterior endplate spurring present in the spine. Right shoulder arthroplasty hardware again seen.       1.  Low inspiratory volume with mild right basilar atelectasis. No focal consolidation.       MACRO: None.   Signed by: Solis Witt 6/20/2025 12:14 PM Dictation workstation:   [id number]      Pertinent Physical Exam At Time of Discharge  Physical Exam  PE:   Constitutional: Awake, responsive to name, aletr but only oriented to name, grunts and babble to questions   ENMT: mucous membranes moist, no apparent injury, no lesions seen  Respiratory/Thorax: CTAB, no wheezing  Cardiovascular: RRR, S1+S2, no murmur  Gastrointestinal: Soft, NT, nondistended, +BS  Extremities: No LE edema  Psychological: Appropriate mood and behavior  Skin: no rash noted  Neuro: weakness b/l upper and lower ext, lethargic   Home Medications     Medication List      START taking these medications     ondansetron 8 mg tablet; Commonly known as: Zofran; Take 1 tablet (8 mg)   by mouth every 8 hours if needed for nausea or vomiting.     CHANGE how you take these medications     OLANZapine 5 mg tablet; Commonly known as: ZyPREXA; Take 1 tablet (5 mg)   by mouth once daily at bedtime. For 4 days starting the evening of   treatment; What changed: additional instructions   * prochlorperazine 10 mg tablet; Commonly known as: Compazine; Take 1   tablet (10 mg) by mouth every 6 hours if needed for nausea or vomiting.;   What changed: Another medication with the same name was added. Make sure   you understand how and when to take each.   * prochlorperazine 10 mg tablet; Commonly known as: Compazine; Take 1   tablet (10 mg) by mouth every 6 hours if needed for nausea or vomiting.;   What changed: You were already taking a medication with the same name, and   this prescription was added. Make sure you understand how and when to take   each.  * This list has 2 medication(s) that are the same as other medications    prescribed for you. Read the directions carefully, and ask your doctor or   other care provider to review them with you.     CONTINUE taking these medications     oxyCODONE 5 mg immediate release tablet; Commonly known as: Roxicodone;   Take 2 tablets (10 mg) by mouth every 6 hours if needed for severe pain (7   - 10).   PARoxetine 20 mg tablet; Commonly known as: Paxil; TAKE 1 TABLET BY   MOUTH ONCE DAILY.   traZODone 100 mg tablet; Commonly known as: Desyrel; Take 1 tablet (100   mg) by mouth as needed at bedtime for sleep.     STOP taking these medications     amLODIPine 10 mg tablet; Commonly known as: Norvasc   fluticasone 50 mcg/actuation nasal spray; Commonly known as: Flonase   mirtazapine 15 mg tablet; Commonly known as: Remeron   omeprazole 40 mg DR capsule; Commonly known as: PriLOSEC       Outpatient Follow-Up  Future Appointments   Date Time Provider Department Center   7/10/2025 11:30 AM INF 03 PARMA [id number] [facility]   7/11/2025  9:00 AM INF 07 PARMA [id number] [facility]   7/16/2025  1:30 PM INF 02 PARMA [id number] [facility]   7/24/2025 11:00 AM INF 03 PARMA [id number] [facility]   7/24/2025 11:40 AM Archie Willis MD [id number] [facility]   7/25/2025  9:00 AM INF 07 PARMA [id number] [facility]   7/30/2025  1:30 PM INF 05 PARMA [id number] [facility]   8/7/2025 11:30 AM INF 03 PARMA [id number] [facility]   8/8/2025  9:00 AM INF 09 PARMA [id number] [facility]   8/13/2025  1:30 PM INF 01 PARMA [id number] [facility]       Nelson Taylor PA-C

## 2025-07-09 LAB
ELECTRONICALLY SIGNED BY: NORMAL
MYELOID NGS RESULTS: NORMAL

## 2025-07-10 ENCOUNTER — APPOINTMENT (OUTPATIENT)
Dept: HEMATOLOGY/ONCOLOGY | Facility: CLINIC | Age: 70
End: 2025-07-10
Payer: MEDICARE

## 2025-07-11 ENCOUNTER — APPOINTMENT (OUTPATIENT)
Dept: HEMATOLOGY/ONCOLOGY | Facility: CLINIC | Age: 70
End: 2025-07-11
Payer: MEDICARE

## 2025-07-16 ENCOUNTER — APPOINTMENT (OUTPATIENT)
Dept: HEMATOLOGY/ONCOLOGY | Facility: CLINIC | Age: 70
End: 2025-07-16
Payer: MEDICARE

## 2025-07-18 LAB
ATRIAL RATE: 108 BPM
P AXIS: 46 DEGREES
P OFFSET: 210 MS
P ONSET: 155 MS
PR INTERVAL: 124 MS
Q ONSET: 217 MS
QRS COUNT: 18 BEATS
QRS DURATION: 76 MS
QT INTERVAL: 324 MS
QTC CALCULATION(BAZETT): 434 MS
QTC FREDERICIA: 394 MS
R AXIS: 51 DEGREES
T AXIS: 20 DEGREES
T OFFSET: 379 MS
VENTRICULAR RATE: 108 BPM

## 2025-07-24 ENCOUNTER — APPOINTMENT (OUTPATIENT)
Dept: HEMATOLOGY/ONCOLOGY | Facility: CLINIC | Age: 70
End: 2025-07-24
Payer: MEDICARE

## 2025-07-25 ENCOUNTER — APPOINTMENT (OUTPATIENT)
Dept: HEMATOLOGY/ONCOLOGY | Facility: CLINIC | Age: 70
End: 2025-07-25
Payer: MEDICARE

## 2025-07-30 ENCOUNTER — APPOINTMENT (OUTPATIENT)
Dept: HEMATOLOGY/ONCOLOGY | Facility: CLINIC | Age: 70
End: 2025-07-30
Payer: MEDICARE

## 2025-08-07 ENCOUNTER — APPOINTMENT (OUTPATIENT)
Dept: HEMATOLOGY/ONCOLOGY | Facility: CLINIC | Age: 70
End: 2025-08-07
Payer: MEDICARE

## 2025-08-08 ENCOUNTER — APPOINTMENT (OUTPATIENT)
Dept: HEMATOLOGY/ONCOLOGY | Facility: CLINIC | Age: 70
End: 2025-08-08
Payer: MEDICARE

## 2025-08-13 ENCOUNTER — APPOINTMENT (OUTPATIENT)
Dept: HEMATOLOGY/ONCOLOGY | Facility: CLINIC | Age: 70
End: 2025-08-13
Payer: MEDICARE